# Patient Record
Sex: FEMALE | Race: WHITE | NOT HISPANIC OR LATINO | Employment: OTHER | URBAN - METROPOLITAN AREA
[De-identification: names, ages, dates, MRNs, and addresses within clinical notes are randomized per-mention and may not be internally consistent; named-entity substitution may affect disease eponyms.]

---

## 2017-02-09 ENCOUNTER — ALLSCRIPTS OFFICE VISIT (OUTPATIENT)
Dept: OTHER | Facility: OTHER | Age: 62
End: 2017-02-09

## 2017-02-09 DIAGNOSIS — E78.5 HYPERLIPIDEMIA: ICD-10-CM

## 2017-02-09 DIAGNOSIS — R94.6 ABNORMAL RESULTS OF THYROID FUNCTION STUDIES: ICD-10-CM

## 2017-08-28 ENCOUNTER — ALLSCRIPTS OFFICE VISIT (OUTPATIENT)
Dept: OTHER | Facility: OTHER | Age: 62
End: 2017-08-28

## 2017-08-28 DIAGNOSIS — Z11.59 ENCOUNTER FOR SCREENING FOR OTHER VIRAL DISEASES: ICD-10-CM

## 2017-08-28 DIAGNOSIS — M54.41 LOW BACK PAIN WITH RIGHT-SIDED SCIATICA: ICD-10-CM

## 2017-08-28 DIAGNOSIS — F11.90 UNCOMPLICATED OPIOID USE: ICD-10-CM

## 2017-08-28 DIAGNOSIS — Z12.31 ENCOUNTER FOR SCREENING MAMMOGRAM FOR MALIGNANT NEOPLASM OF BREAST: ICD-10-CM

## 2017-08-28 DIAGNOSIS — E78.2 MIXED HYPERLIPIDEMIA: ICD-10-CM

## 2017-08-28 DIAGNOSIS — Z78.0 ASYMPTOMATIC MENOPAUSAL STATE: ICD-10-CM

## 2017-08-28 DIAGNOSIS — M54.42 LOW BACK PAIN WITH LEFT-SIDED SCIATICA: ICD-10-CM

## 2018-01-10 NOTE — RESULT NOTES
Verified Results  (1) CBC/PLT/DIFF [id number] 09:20AM Mili Tristan     Test Name Result Flag Reference   WBC COUNT 5 63 Thousand/uL  4 31-10 16   RBC COUNT 3 98 Million/uL  3 81-5 12   HEMOGLOBIN 12 6 g/dL  11 5-15 4   HEMATOCRIT 37 9 %  34 8-46  1   MCV 95 fL  82-98   MCH 31 7 pg  26 8-34 3   MCHC 33 2 g/dL  31 4-37 4   RDW 12 6 %  11 6-15 1   MPV 10 7 fL  8 9-12 7   PLATELET COUNT 957 Thousands/uL  149-390   NEUTROPHILS RELATIVE PERCENT 68 %  43-75   LYMPHOCYTES RELATIVE PERCENT 19 %  14-44   MONOCYTES RELATIVE PERCENT 8 %  4-12   EOSINOPHILS RELATIVE PERCENT 4 %  0-6   BASOPHILS RELATIVE PERCENT 1 %  0-1   NEUTROPHILS ABSOLUTE COUNT 3 87 Thousands/?L  1 85-7 62   LYMPHOCYTES ABSOLUTE COUNT 1 05 Thousands/?L  0 60-4 47   MONOCYTES ABSOLUTE COUNT 0 46 Thousand/?L  0 17-1 22   EOSINOPHILS ABSOLUTE COUNT 0 21 Thousand/?L  0 00-0 61   BASOPHILS ABSOLUTE COUNT 0 04 Thousands/?L  0 00-0 10     (1) COMPREHENSIVE METABOLIC PANEL [id number] 97:29BX Mili Tristan     Test Name Result Flag Reference   GLUCOSE,RANDM 80 mg/dL     If the patient is fasting, the ADA then defines impaired fasting glucose as > 100 mg/dL and diabetes as > or equal to 123 mg/dL     SODIUM 141 mmol/L  136-145   POTASSIUM 4 7 mmol/L  3 5-5 3   CHLORIDE 104 mmol/L  100-108   CARBON DIOXIDE 29 mmol/L  21-32   ANION GAP (CALC) 8 mmol/L  4-13   BLOOD UREA NITROGEN 14 mg/dL  5-25   CREATININE 0 60 mg/dL  0 60-1 30   Standardized to IDMS reference method   CALCIUM 9 2 mg/dL  8 3-10 1   BILI, TOTAL 0 40 mg/dL  0 20-1 00   ALK PHOSPHATAS 61 U/L     ALT (SGPT) 28 U/L  12-78   AST(SGOT) 17 U/L  5-45   ALBUMIN 4 0 g/dL  3 5-5 0   TOTAL PROTEIN 7 1 g/dL  6 4-8 2   eGFR Non-African American      >60 0 ml/min/1 73sq m   [facility] Disease Education Program recommendations are as follows:  GFR calculation is accurate only with a steady state creatinine  Chronic Kidney disease less than 60 ml/min/1 73 sq  meters  Kidney failure less than 15 ml/min/1 73 sq  meters  (1) LIPID PANEL FASTING W DIRECT LDL REFLEX [id number] 09:20AM Doralee Sitter     Test Name Result Flag Reference   CHOLESTEROL 220 mg/dL H    LDL CHOLESTEROL CALCULATED 140 mg/dL H 0-100   Triglyceride:         Normal              <150 mg/dl       Borderline High    150-199 mg/dl       High               200-499 mg/dl       Very High          >499 mg/dl  Cholesterol:         Desirable        <200 mg/dl      Borderline High  200-239 mg/dl      High             >239 mg/dl  HDL Cholesterol:        High    >59 mg/dL      Low     <41 mg/dL  LDL Cholesterol:        Optimal          <100 mg/dl        Near Optimal     100-129 mg/dl        Above Optimal          Borderline High   130-159 mg/dl          High              160-189 mg/dl          Very High        >189 mg/dl  LDL CALCULATED:    This screening LDL is a calculated result  It does not have the accuracy of the Direct Measured LDL in the monitoring of patients with hyperlipidemia and/or statin therapy  Direct Measure LDL (JPI244) must be ordered separately in these patients  TRIGLYCERIDES 140 mg/dL  <=150   Specimen collection should occur prior to N-Acetylcysteine or Metamizole administration due to the potential for falsely depressed results  HDL,DIRECT 52 mg/dL  40-60   Specimen collection should occur prior to Metamizole administration due to the potential for falsely depressed results       (1) SED RATE [id number] 09:20AM Doralee Sitter     Test Name Result Flag Reference   SED RATE 45 mm/hour H 0-20     (1) TSH [id number] 09:20AM Doralee Sitter     Test Name Result Flag Reference   TSH 5 870 uIU/mL H 0 358-3 740   The recommended reference ranges for TSH during pregnancy are as follows:  First trimester 0 1 to 2 5 uIU/mL  Second trimester  0 2 to 3 0 uIU/mL  Third trimester 0 3 to 3 0 uIU/m     (1) FOLATE [id number] 09:20AM Doralee Sitter     Test Name Result Flag Reference   FOLATE 19 4 ng/mL H 3 1-17 5     (1) HEMOGLOBIN A1C [id number] 09:20AM Jose Beat     Test Name Result Flag Reference   HEMOGLOBIN A1C 5 5 %  4 2-6 3   EST  AVG   GLUCOSE 111 mg/dl       (1) MAGNESIUM [id number] 09:20AM Jose Beat     Test Name Result Flag Reference   MAGNESIUM 1 7 mg/dL  1 6-2 6     (1) VITAMIN B12 [id number] 09:20AM Jose Beat     Test Name Result Flag Reference   VITAMIN B12 416 pg/mL  100-900     (1) HEP C ANTIBODY [id number] 09:20AM Merry Duval Order Number: [id number]   Order Number: [id number]     Test Name Result Flag Reference   HEPATITIS C ANTIBODY Non-reactive  Non-reactive

## 2018-01-12 VITALS
RESPIRATION RATE: 18 BRPM | BODY MASS INDEX: 38.45 KG/M2 | HEIGHT: 63 IN | WEIGHT: 217 LBS | HEART RATE: 80 BPM | SYSTOLIC BLOOD PRESSURE: 128 MMHG | TEMPERATURE: 98.6 F | DIASTOLIC BLOOD PRESSURE: 80 MMHG

## 2018-01-13 VITALS
OXYGEN SATURATION: 92 % | RESPIRATION RATE: 20 BRPM | HEART RATE: 72 BPM | BODY MASS INDEX: 37.43 KG/M2 | WEIGHT: 211.25 LBS | HEIGHT: 63 IN | SYSTOLIC BLOOD PRESSURE: 128 MMHG | DIASTOLIC BLOOD PRESSURE: 82 MMHG | TEMPERATURE: 96.7 F

## 2018-02-15 DIAGNOSIS — M54.5 CHRONIC LOW BACK PAIN, UNSPECIFIED BACK PAIN LATERALITY, WITH SCIATICA PRESENCE UNSPECIFIED: Primary | ICD-10-CM

## 2018-02-15 DIAGNOSIS — G89.29 CHRONIC LOW BACK PAIN, UNSPECIFIED BACK PAIN LATERALITY, WITH SCIATICA PRESENCE UNSPECIFIED: Primary | ICD-10-CM

## 2018-02-15 RX ORDER — TRAMADOL HYDROCHLORIDE 50 MG/1
100 TABLET ORAL EVERY 6 HOURS PRN
Qty: 120 TABLET | Refills: 1 | OUTPATIENT
Start: 2018-02-15

## 2018-02-15 RX ORDER — TRAMADOL HYDROCHLORIDE 50 MG/1
2 TABLET ORAL EVERY 6 HOURS PRN
COMMUNITY
Start: 2016-05-26 | End: 2018-03-29 | Stop reason: SDUPTHER

## 2018-03-21 PROBLEM — E78.2 MIXED HYPERLIPIDEMIA: Status: ACTIVE | Noted: 2017-02-09

## 2018-03-21 PROBLEM — E66.9 OBESITY: Status: ACTIVE | Noted: 2017-08-28

## 2018-03-29 ENCOUNTER — OFFICE VISIT (OUTPATIENT)
Dept: FAMILY MEDICINE CLINIC | Facility: CLINIC | Age: 63
End: 2018-03-29
Payer: MEDICARE

## 2018-03-29 VITALS
HEIGHT: 63 IN | TEMPERATURE: 98.2 F | BODY MASS INDEX: 38.62 KG/M2 | HEART RATE: 68 BPM | WEIGHT: 218 LBS | RESPIRATION RATE: 20 BRPM | SYSTOLIC BLOOD PRESSURE: 128 MMHG | DIASTOLIC BLOOD PRESSURE: 78 MMHG

## 2018-03-29 DIAGNOSIS — M54.41 CHRONIC BILATERAL LOW BACK PAIN WITH BILATERAL SCIATICA: ICD-10-CM

## 2018-03-29 DIAGNOSIS — M54.42 CHRONIC BILATERAL LOW BACK PAIN WITH BILATERAL SCIATICA: ICD-10-CM

## 2018-03-29 DIAGNOSIS — Z12.39 SCREENING FOR BREAST CANCER: ICD-10-CM

## 2018-03-29 DIAGNOSIS — G89.29 CHRONIC BILATERAL LOW BACK PAIN WITH BILATERAL SCIATICA: ICD-10-CM

## 2018-03-29 DIAGNOSIS — G89.29 OTHER CHRONIC PAIN: Primary | ICD-10-CM

## 2018-03-29 DIAGNOSIS — F11.90 CHRONIC NARCOTIC USE: ICD-10-CM

## 2018-03-29 DIAGNOSIS — E78.2 MIXED HYPERLIPIDEMIA: ICD-10-CM

## 2018-03-29 DIAGNOSIS — Z13.820 SCREENING FOR OSTEOPOROSIS: ICD-10-CM

## 2018-03-29 DIAGNOSIS — E66.09 CLASS 2 OBESITY DUE TO EXCESS CALORIES WITHOUT SERIOUS COMORBIDITY WITH BODY MASS INDEX (BMI) OF 38.0 TO 38.9 IN ADULT: ICD-10-CM

## 2018-03-29 DIAGNOSIS — Z11.59 NEED FOR HEPATITIS C SCREENING TEST: ICD-10-CM

## 2018-03-29 PROCEDURE — 99214 OFFICE O/P EST MOD 30 MIN: CPT | Performed by: INTERNAL MEDICINE

## 2018-03-29 RX ORDER — TRAMADOL HYDROCHLORIDE 50 MG/1
50 TABLET ORAL EVERY 6 HOURS PRN
Qty: 120 TABLET | Refills: 0 | Status: SHIPPED | OUTPATIENT
Start: 2018-03-29 | End: 2018-05-02 | Stop reason: SDUPTHER

## 2018-03-29 RX ORDER — MULTIVITAMIN
1 TABLET ORAL DAILY
COMMUNITY
End: 2019-08-02 | Stop reason: ALTCHOICE

## 2018-03-29 NOTE — PATIENT INSTRUCTIONS

## 2018-03-29 NOTE — PROGRESS NOTES
Assessment/Plan:    No problem-specific Assessment & Plan notes found for this encounter  Diagnoses and all orders for this visit:    Other chronic pain  -     CBC and differential; Future  -     traMADol (ULTRAM) 50 mg tablet; Take 1 tablet (50 mg total) by mouth every 6 (six) hours as needed for moderate pain    Mixed hyperlipidemia  -     Comprehensive metabolic panel; Future  -     Lipid Panel with Direct LDL reflex; Future  -     TSH, 3rd generation; Future    Chronic bilateral low back pain with bilateral sciatica    Chronic narcotic use  -     Toxicology screen, urine  -     TRAMADOL, URINE    Class 2 obesity due to excess calories without serious comorbidity with body mass index (BMI) of 38 0 to 38 9 in adult    Screening for breast cancer  -     Mammo screening bilateral w 3d & cad; Future    Screening for osteoporosis  -     Vitamin D 1,25 dihydroxy; Future  -     DXA bone density spine hip and pelvis; Future    Need for hepatitis C screening test  -     Hepatitis C antibody    Other orders  -     Multiple Vitamin (MULTIVITAMIN) tablet; Take 1 tablet by mouth daily      A/P: Doing well and will check labs including a UDT  Order mammo and dexa due in July  Continue current treatment pending the labs  Encouraged to loose wt  RTC six months for routine  Subjective:      Patient ID: Elizabeth Parkinson is a 57 y o  female  WF RTC for f/u hyperlipidemia, chronic LBP, etc  Doing ok and no new issues  Remains as active as her LBP will permit  No falls reported  Due for labs, mammo, and dexa  Colon cancer is up to date  Did not get the flu vaccine this year  The following portions of the patient's history were reviewed and updated as appropriate:   She  has a past medical history of Chronic pain; Obesity; and Uterine cancer ([facility] [address] )    She   Patient Active Problem List    Diagnosis Date Noted    Chronic narcotic use 03/29/2018    Obesity 08/28/2017    Mixed hyperlipidemia 02/09/2017    Chronic lumbar radiculopathy 05/26/2016    Degeneration of intervertebral disc of lumbosacral region 05/26/2016    Chronic bilateral low back pain with bilateral sciatica 05/19/2016    Malignant neoplasm of uterus ([facility] [address] ) 05/19/2016     She  has a past surgical history that includes Cholecystectomy and Hysterectomy  Her family history includes Ovarian cancer in her mother; Pancreatic cancer in her father  She  reports that she has never smoked  She has never used smokeless tobacco  She reports that she drinks alcohol  She reports that she does not use drugs  Current Outpatient Prescriptions   Medication Sig Dispense Refill    Multiple Vitamin (MULTIVITAMIN) tablet Take 1 tablet by mouth daily      traMADol (ULTRAM) 50 mg tablet Take 1 tablet (50 mg total) by mouth every 6 (six) hours as needed for moderate pain 120 tablet 0     No current facility-administered medications for this visit  Current Outpatient Prescriptions on File Prior to Visit   Medication Sig    [DISCONTINUED] traMADol (ULTRAM) 50 mg tablet Take 2 tablets by mouth every 6 (six) hours as needed     No current facility-administered medications on file prior to visit  She has No Known Allergies       Review of Systems   Constitutional: Negative for activity change, chills, diaphoresis, fatigue and fever  Respiratory: Negative for cough, chest tightness, shortness of breath and wheezing  Cardiovascular: Negative for chest pain, palpitations and leg swelling  Gastrointestinal: Negative for abdominal pain, constipation, diarrhea, nausea and vomiting  Endocrine: Negative for cold intolerance and heat intolerance  Genitourinary: Negative for difficulty urinating, dysuria and frequency  Musculoskeletal: Positive for back pain  Negative for arthralgias, gait problem and myalgias  Neurological: Negative for dizziness, seizures, weakness, light-headedness and headaches     Psychiatric/Behavioral: Negative for confusion, dysphoric mood and sleep disturbance  The patient is not nervous/anxious  Objective:      /78 (BP Location: Left arm, Patient Position: Sitting, Cuff Size: Large)   Pulse 68   Temp 98 2 °F (36 8 °C) (Tympanic)   Resp 20   Ht 5' 3" (1 6 m)   Wt 98 9 kg (218 lb)   BMI 38 62 kg/m²          Physical Exam   Constitutional: She is oriented to person, place, and time  She appears well-developed and well-nourished  No distress  HENT:   Head: Normocephalic and atraumatic  Mouth/Throat: Oropharynx is clear and moist    Eyes: Conjunctivae are normal  Pupils are equal, round, and reactive to light  Neck: Neck supple  No JVD present  Cardiovascular: Normal rate, regular rhythm and normal heart sounds  Pulmonary/Chest: Effort normal and breath sounds normal  No respiratory distress  She has no wheezes  Abdominal: Soft  Bowel sounds are normal  She exhibits no distension  There is no tenderness  Musculoskeletal: She exhibits no edema  Neurological: She is alert and oriented to person, place, and time  Psychiatric: She has a normal mood and affect  Her behavior is normal  Judgment and thought content normal    Nursing note and vitals reviewed

## 2018-05-02 DIAGNOSIS — G89.29 OTHER CHRONIC PAIN: ICD-10-CM

## 2018-05-02 RX ORDER — TRAMADOL HYDROCHLORIDE 50 MG/1
50 TABLET ORAL EVERY 6 HOURS PRN
Qty: 120 TABLET | Refills: 0 | Status: SHIPPED | OUTPATIENT
Start: 2018-05-02 | End: 2018-05-23 | Stop reason: SDUPTHER

## 2018-05-13 ENCOUNTER — HOSPITAL ENCOUNTER (EMERGENCY)
Facility: HOSPITAL | Age: 63
Discharge: HOME | End: 2018-05-13
Attending: EMERGENCY MEDICINE
Payer: MEDICARE

## 2018-05-13 VITALS
RESPIRATION RATE: 20 BRPM | SYSTOLIC BLOOD PRESSURE: 140 MMHG | WEIGHT: 215 LBS | DIASTOLIC BLOOD PRESSURE: 60 MMHG | TEMPERATURE: 97.9 F | HEART RATE: 87 BPM | BODY MASS INDEX: 39.56 KG/M2 | HEIGHT: 62 IN | OXYGEN SATURATION: 97 %

## 2018-05-13 DIAGNOSIS — M54.30 SCIATIC PAIN: Primary | ICD-10-CM

## 2018-05-13 DIAGNOSIS — M54.50 LOW BACK PAIN: ICD-10-CM

## 2018-05-13 PROCEDURE — 96372 THER/PROPH/DIAG INJ SC/IM: CPT

## 2018-05-13 PROCEDURE — 99283 EMERGENCY DEPT VISIT LOW MDM: CPT

## 2018-05-13 RX ORDER — PREDNISONE 20 MG/1
40 TABLET ORAL DAILY
Qty: 10 TABLET | Refills: 0 | Status: SHIPPED | OUTPATIENT
Start: 2018-05-13 | End: 2018-05-21 | Stop reason: HOSPADM

## 2018-05-13 RX ORDER — CYCLOBENZAPRINE HCL 10 MG
10 TABLET ORAL 3 TIMES DAILY PRN
Qty: 30 TABLET | Refills: 0 | Status: SHIPPED | OUTPATIENT
Start: 2018-05-13 | End: 2018-05-30 | Stop reason: ALTCHOICE

## 2018-05-13 RX ORDER — PREDNISONE 20 MG/1
60 TABLET ORAL ONCE
Status: COMPLETED | OUTPATIENT
Start: 2018-05-13 | End: 2018-05-13

## 2018-05-13 RX ORDER — OXYCODONE HYDROCHLORIDE AND ACETAMINOPHEN 5; 325 MG/1; MG/1
1 TABLET ORAL EVERY 6 HOURS PRN
Qty: 25 TABLET | Refills: 0 | Status: SHIPPED | OUTPATIENT
Start: 2018-05-13 | End: 2018-05-23

## 2018-05-13 RX ORDER — ONDANSETRON 4 MG/1
4 TABLET, ORALLY DISINTEGRATING ORAL ONCE
Status: COMPLETED | OUTPATIENT
Start: 2018-05-13 | End: 2018-05-13

## 2018-05-13 RX ADMIN — ONDANSETRON 4 MG: 4 TABLET, ORALLY DISINTEGRATING ORAL at 18:29

## 2018-05-13 RX ADMIN — HYDROMORPHONE HYDROCHLORIDE 1 MG: 1 INJECTION, SOLUTION INTRAMUSCULAR; INTRAVENOUS; SUBCUTANEOUS at 18:29

## 2018-05-13 RX ADMIN — PREDNISONE 60 MG: 20 TABLET ORAL at 18:29

## 2018-05-13 NOTE — DISCHARGE INSTRUCTIONS
Heat and rest  Percocet 1 every 6 hours if needed for pain caution narcotic will make a sleepy  Flexeril every 8 hours if needed for muscle spasm caution will also make a sleepy  Prednisone 2 tabs daily for the next 5 days to reduce inflammation  Follow up with doctor for further evaluation possible physical therapy     Acute Low Back Pain   WHAT YOU NEED TO KNOW:   Acute low back pain is sudden discomfort in your lower back area that lasts for up to 6 weeks  The discomfort makes it difficult to tolerate activity  DISCHARGE INSTRUCTIONS:   Return to the emergency department if:   · You have severe pain  · You have sudden stiffness and heaviness on both buttocks down to both legs  · You have numbness or weakness in one leg, or pain in both legs  · You have numbness in your genital area or across your lower back  · You cannot control your urine or bowel movements  Contact your healthcare provider if:   · You have a fever  · You have pain at night or when you rest     · Your pain does not get better with treatment  · You have pain that worsens when you cough or sneeze  · You suddenly feel something pop or snap in your back  · You have questions or concerns about your condition or care  Medicines: The following medicines may be ordered by your healthcare provider:  · Acetaminophen  decreases pain  It is available without a doctor's order  Ask how much to take and how often to take it  Follow directions  Acetaminophen can cause liver damage if not taken correctly  · NSAIDs  help decrease swelling and pain  This medicine is available with or without a doctor's order  NSAIDs can cause stomach bleeding or kidney problems in certain people  If you take blood thinner medicine, always ask your healthcare provider if NSAIDs are safe for you  Always read the medicine label and follow directions  · Prescription pain medicine  may be given   Ask your healthcare provider how to take this medicine safely  · Muscle relaxers  decrease pain by relaxing the muscles in your lower spine  · Take your medicine as directed  Contact your healthcare provider if you think your medicine is not helping or if you have side effects  Tell him of her if you are allergic to any medicine  Keep a list of the medicines, vitamins, and herbs you take  Include the amounts, and when and why you take them  Bring the list or the pill bottles to follow-up visits  Carry your medicine list with you in case of an emergency  Self-care:   · Stay active  as much as you can without causing more pain  Bed rest could make your back pain worse  Start with some light exercises such as walking  Avoid heavy lifting until your pain is gone  Ask for more information about the activities or exercises that are right for you  · Ice  helps decrease swelling, pain, and muscle spams  Put crushed ice in a plastic bag  Cover it with a towel  Place it on your lower back for 20 to 30 minutes every 2 hours  Do this for about 2 to 3 days after your pain starts, or as directed  · Heat  helps decrease pain and muscle spasms  Start to use heat after treatment with ice has stopped  Use a small towel dampened with warm water or a heating pad, or sit in a warm bath  Apply heat on the area for 20 to 30 minutes every 2 hours for as many days as directed  Alternate heat and ice  Prevent acute low back pain:   · Use proper body mechanics  ¨ Bend at the hips and knees when you  objects  Do not bend from the waist  Use your leg muscles as you lift the load  Do not use your back  Keep the object close to your chest as you lift it  Try not to twist or lift anything above your waist     ¨ Change your position often when you stand for long periods of time  Rest one foot on a small box or footrest, and then switch to the other foot often  ¨ Try not to sit for long periods of time   When you do, sit in a straight-backed chair with your feet flat on the floor  Never reach, pull, or push while you are sitting  · Do exercises that strengthen your back muscles  Warm up before you exercise  Ask your healthcare provider the best exercises for you  · Maintain a healthy weight  Ask your healthcare provider how much you should weigh  Ask him to help you create a weight loss plan if you are overweight  Follow up with your healthcare provider as directed:  Return for a follow-up visit if you still have pain after 1 to 3 weeks of treatment  You may need to visit an orthopedist if your back pain lasts more than 12 weeks  Write down your questions so you remember to ask them during your visits  © 2017 [address]  Information is for End User's use only and may not be sold, redistributed or otherwise used for commercial purposes  All illustrations and images included in CareNotes® are the copyrighted property of [employer]  or [employer]  The above information is an  only  It is not intended as medical advice for individual conditions or treatments  Talk to your doctor, nurse or pharmacist before following any medical regimen to see if it is safe and effective for you  Sciatica   WHAT YOU NEED TO KNOW:   Sciatica is a condition that causes pain along your sciatic nerve  The sciatic nerve runs from your spine through both sides of your buttocks  It then runs down the back of your thigh, into your lower leg and foot  Your sciatic nerve may be compressed, inflamed, irritated, or stretched  DISCHARGE INSTRUCTIONS:   Medicines:   · NSAIDs:  These medicines decrease swelling and pain  NSAIDs are available without a doctor's order  Ask your healthcare provider which medicine is right for you  Ask how much to take and when to take it  Take as directed  NSAIDs can cause stomach bleeding or kidney problems if not taken correctly  · Acetaminophen: This medicine decreases pain   Acetaminophen is available without a doctor's order  Ask how much to take and when to take it  Follow directions  Acetaminophen can cause liver damage if not taken correctly  · Muscle relaxers  help decrease pain and muscle spasms  · Take your medicine as directed  Contact your healthcare provider if you think your medicine is not helping or if you have side effects  Tell him of her if you are allergic to any medicine  Keep a list of the medicines, vitamins, and herbs you take  Include the amounts, and when and why you take them  Bring the list or the pill bottles to follow-up visits  Carry your medicine list with you in case of an emergency  Follow up with your healthcare provider as directed:  Write down your questions so you remember to ask them during your visits  Manage your symptoms:   · Activity:  Decrease your activity  Do not lift heavy objects or twist your back for at least 6 weeks  Slowly return to your usual activity  · Ice:  Ice helps decrease swelling and pain  Ice may also help prevent tissue damage  Use an ice pack, or put crushed ice in a plastic bag  Cover it with a towel and place it on your low back or leg for 15 to 20 minutes every hour or as directed  · Heat:  Heat helps decrease pain and muscle spasms  Apply heat on the area for 20 to 30 minutes every 2 hours for as many days as directed  · Physical therapy:  You may need to see physical therapist to teach you exercises to help improve movement and strength, and to decrease pain  An occupational therapist teaches you skills to help with your daily activities  · Use assistive devices if directed: You may need to wear back support, such as a back brace  You may need crutches, a cane, or a walker to decrease stress on your lower back and leg muscles  Ask your healthcare provider for more information about assistive devices and how to use them correctly    Self-care:   · Avoid pressure on your back and legs:  Do not  lift heavy objects, or stand or sit for long periods of time  · Lift objects safely:  Keep your back straight and bend your knees when you  an object  Do not bend or twist your back when you lift  · Maintain a healthy weight:  Ask your healthcare provider how much you should weigh  Ask him to help you create a weight loss plan if you are overweight  · Exercise:  Ask your healthcare provider about the best stretching, warmup, and exercise plan for you  Contact your healthcare provider if:   · You have pain in your lower back at night or when resting  · You have pain in your lower back with numbness below the knee  · You have weakness in one leg only  · You have questions or concerns about your condition or care  Return to the emergency department if:   · You have trouble holding back your urine or bowel movements  · You have weakness in both legs  · You have numbness in your groin or buttocks  © 2017 [address] Information is for End User's use only and may not be sold, redistributed or otherwise used for commercial purposes  All illustrations and images included in CareNotes® are the copyrighted property of [employer]  or [employer]  The above information is an  only  It is not intended as medical advice for individual conditions or treatments  Talk to your doctor, nurse or pharmacist before following any medical regimen to see if it is safe and effective for you

## 2018-05-13 NOTE — ED PROVIDER NOTES
History  Chief Complaint   Patient presents with    Hip Pain     Patient c/o left hip, thigh, and buttocks pain that started today  HPI patient is a 38-year-old female, history of back pain in the past, patient reports using tramadol in the past   She reports today she had increasing pain in her left low back, somewhat suddenly more severe worse with bending and with moving  Patient reports she twisted and felt the sudden sharp pain in her left buttocks which now radiates down her left leg  She denies any numbness or weakness  Patient reports it is very difficult to get comfortable due to the pain  She describes it as a stabbing pain in her buttocks with radiation down the posterior part of her left leg  She denies any urinary symptoms  She denies any incontinence  She denies any focal weakness  She reports she can ambulate but it is painful  She reports the leg is better somewhat bent but when she tries to extend her knee she has severe pain in her low back  Patient reports she has had sciatica before and this is similar  Past medical history of obesity, back pain, uterine cancer  Family history noncontributory  Social history, nonsmoker no history of drug abuse  Prior to Admission Medications   Prescriptions Last Dose Informant Patient Reported? Taking? Multiple Vitamin (MULTIVITAMIN) tablet   Yes No   Sig: Take 1 tablet by mouth daily   traMADol (ULTRAM) 50 mg tablet   No No   Sig: Take 1 tablet (50 mg total) by mouth every 6 (six) hours as needed for moderate pain      Facility-Administered Medications: None       Past Medical History:   Diagnosis Date    Chronic pain     Obesity     Uterine cancer (HCC)        Past Surgical History:   Procedure Laterality Date    CHOLECYSTECTOMY      HYSTERECTOMY      Total       Family History   Problem Relation Age of Onset    Ovarian cancer Mother     Pancreatic cancer Father      I have reviewed and agree with the history as documented      Social History   Substance Use Topics    Smoking status: Never Smoker    Smokeless tobacco: Never Used    Alcohol use Yes      Comment: social         Review of Systems   Constitutional: Negative for diaphoresis, fatigue and fever  HENT: Negative for congestion, ear pain, nosebleeds and sore throat  Eyes: Negative for photophobia, pain, discharge and visual disturbance  Respiratory: Negative for cough, choking, chest tightness, shortness of breath and wheezing  Cardiovascular: Negative for chest pain and palpitations  Gastrointestinal: Negative for abdominal distention, abdominal pain, diarrhea and vomiting  Genitourinary: Negative for dysuria, flank pain and frequency  Musculoskeletal: Positive for back pain  Negative for gait problem and joint swelling  Skin: Negative for color change and rash  Neurological: Negative for dizziness, syncope and headaches  Psychiatric/Behavioral: Negative for behavioral problems and confusion  The patient is not nervous/anxious  All other systems reviewed and are negative  Physical Exam  ED Triage Vitals   Temperature Pulse Respirations Blood Pressure SpO2   05/13/18 1801 05/13/18 1801 05/13/18 1801 05/13/18 1802 05/13/18 1801   97 9 °F (36 6 °C) 87 20 140/60 97 %      Temp Source Heart Rate Source Patient Position - Orthostatic VS BP Location FiO2 (%)   05/13/18 1801 05/13/18 1801 05/13/18 1801 05/13/18 1801 --   Oral Monitor Lying Left arm       Pain Score       --                  Orthostatic Vital Signs  Vitals:    05/13/18 1801 05/13/18 1802   BP:  140/60   Pulse: 87    Patient Position - Orthostatic VS: Lying Sitting       Physical Exam   Constitutional: She is oriented to person, place, and time  She appears well-developed and well-nourished  HENT:   Head: Normocephalic     Right Ear: External ear normal    Left Ear: External ear normal    Nose: Nose normal    Mouth/Throat: Oropharynx is clear and moist    Eyes: EOM and lids are normal  Pupils are equal, round, and reactive to light  Neck: Normal range of motion  Neck supple  Pulmonary/Chest: Effort normal  No respiratory distress  Musculoskeletal: She exhibits no deformity  Decreased range of motion secondary to pain, there is left-sided low back tenderness in the left buttocks and radiate down the left leg, severe pain with left-sided straight leg raise, severe pain with extension of the left knee  Distal neurovascular tendon intact, good pulses, normal sensation and capillary refill, no incontinence  Neurological: She is alert and oriented to person, place, and time  Skin: Skin is warm and dry  Psychiatric: She has a normal mood and affect  Nursing note and vitals reviewed  ED Medications  Medications   HYDROmorphone (DILAUDID) injection 1 mg (1 mg Intramuscular Given 5/13/18 1829)   ondansetron (ZOFRAN-ODT) dispersible tablet 4 mg (4 mg Oral Given 5/13/18 1829)   predniSONE tablet 60 mg (60 mg Oral Given 5/13/18 1829)       Diagnostic Studies  Results Reviewed     None                 No orders to display              Procedures  Procedures       Phone Contacts  ED Phone Contact    ED Course                               MDM medical decision making 38-year-old female with acute low back pain radiating down her left leg consistent with sciatica, discussed treatment with prednisone she agrees, discussed analgesics, discussed anti-inflammatories and muscle relaxers, discussed follow-up, discussed indications to return    CritCare Time    Disposition  Final diagnoses:   Sciatic pain - Left leg   Low back pain     Time reflects when diagnosis was documented in both MDM as applicable and the Disposition within this note     Time User Action Codes Description Comment    5/13/2018  6:20 PM Narvis Schaumann Add [M54 30] Sciatic pain     5/13/2018  6:20 PM Amanda Grewal [M54 30] Sciatic pain Left leg    5/13/2018  6:20 PM Narvis Schaumann Add [M54 5] Low back pain       ED Disposition     ED Disposition Condition Comment    Discharge  Wally Nunez discharge to home/self care  Condition at discharge: Good        Follow-up Information     Follow up With Specialties Details Why Contact Info    Cesar Salinas DO Internal Medicine   [address]  [phone]          Discharge Medication List as of 5/13/2018  6:22 PM      START taking these medications    Details   cyclobenzaprine (FLEXERIL) 10 mg tablet Take 1 tablet (10 mg total) by mouth 3 (three) times a day as needed for muscle spasms for up to 30 doses, Starting Sun 5/13/2018, Print      oxyCODONE-acetaminophen (PERCOCET) 5-325 mg per tablet Take 1 tablet by mouth every 6 (six) hours as needed for severe pain for up to 25 doses Max Daily Amount: 4 tablets, Starting Sun 5/13/2018, Print      predniSONE 20 mg tablet Take 2 tablets (40 mg total) by mouth daily for 5 days, Starting Sun 5/13/2018, Until Fri 5/18/2018, Print         CONTINUE these medications which have NOT CHANGED    Details   Multiple Vitamin (MULTIVITAMIN) tablet Take 1 tablet by mouth daily, Historical Med      traMADol (ULTRAM) 50 mg tablet Take 1 tablet (50 mg total) by mouth every 6 (six) hours as needed for moderate pain, Starting Wed 5/2/2018, Normal           No discharge procedures on file      ED Provider  Electronically Signed by           Dorothea Wayne MD  05/13/18 2003

## 2018-05-16 ENCOUNTER — OFFICE VISIT (OUTPATIENT)
Dept: FAMILY MEDICINE CLINIC | Facility: CLINIC | Age: 63
End: 2018-05-16
Payer: MEDICARE

## 2018-05-16 VITALS
WEIGHT: 219 LBS | DIASTOLIC BLOOD PRESSURE: 100 MMHG | HEIGHT: 62 IN | TEMPERATURE: 98 F | HEART RATE: 88 BPM | BODY MASS INDEX: 40.3 KG/M2 | RESPIRATION RATE: 24 BRPM | SYSTOLIC BLOOD PRESSURE: 150 MMHG

## 2018-05-16 DIAGNOSIS — M54.17 RADICULAR PAIN OF LUMBOSACRAL REGION: Primary | ICD-10-CM

## 2018-05-16 DIAGNOSIS — R33.9 URINARY RETENTION: ICD-10-CM

## 2018-05-16 DIAGNOSIS — M51.37 DEGENERATION OF INTERVERTEBRAL DISC OF LUMBOSACRAL REGION: ICD-10-CM

## 2018-05-16 DIAGNOSIS — F41.9 ANXIETY: ICD-10-CM

## 2018-05-16 LAB
ALBUMIN SERPL BCP-MCNC: 4.5 G/DL (ref 3.5–5.7)
ALP SERPL-CCNC: 45 IU/L (ref 55–165)
ALT SERPL W P-5'-P-CCNC: 22 IU/L (ref 10–30)
ANION GAP SERPL CALCULATED.3IONS-SCNC: 13.3 MM/L
AST SERPL W P-5'-P-CCNC: 25 U/L (ref 7–26)
BASOPHILS # BLD AUTO: 0 X3/UL (ref 0–0.3)
BASOPHILS # BLD AUTO: 0.2 % (ref 0–2)
BILIRUB SERPL-MCNC: 0.4 MG/DL (ref 0.3–1)
BUN SERPL-MCNC: 23 MG/DL (ref 7–25)
CALCIUM SERPL-MCNC: 9.8 MG/DL (ref 8.6–10.5)
CHLORIDE SERPL-SCNC: 98 MM/L (ref 98–107)
CO2 SERPL-SCNC: 29 MM/L (ref 21–31)
CREAT SERPL-MCNC: 0.88 MG/DL (ref 0.6–1.2)
DEPRECATED RDW RBC AUTO: 12.5 % (ref 11.5–14.5)
EGFR (HISTORICAL): > 60 GFR
EGFR AFRICAN AMERICAN (HISTORICAL): > 60 GFR
EOSINOPHIL # BLD AUTO: 0 X3/UL (ref 0–0.5)
EOSINOPHIL NFR BLD AUTO: 0.2 % (ref 0–5)
GLUCOSE (HISTORICAL): 116 MG/DL (ref 65–99)
HCT VFR BLD AUTO: 39.1 % (ref 37–47)
HGB BLD-MCNC: 13.1 G/DL (ref 12–16)
LYMPHOCYTES # BLD AUTO: 0.8 X3/UL (ref 1.2–4.2)
LYMPHOCYTES NFR BLD AUTO: 5 % (ref 20.5–51.1)
LYMPHOCYTES NFR BLD AUTO: 8.5 % (ref 20.5–51.1)
MCH RBC QN AUTO: 31.5 PG (ref 26–34)
MCHC RBC AUTO-ENTMCNC: 33.5 G/DL (ref 31–36)
MCV RBC AUTO: 93.8 FL (ref 81–99)
MONOCYTES # BLD AUTO: 0.2 X3/UL (ref 0–1)
MONOCYTES (HISTORICAL): 4 % (ref 1.7–12)
MONOCYTES NFR BLD AUTO: 2.7 % (ref 1.7–12)
NEUTROPHILS # BLD AUTO: 8 X3/UL (ref 1.4–6.5)
NEUTROPHILS ABS COUNT (HISTORICAL): 91 % (ref 42.2–75.2)
NEUTS SEG NFR BLD AUTO: 88.4 % (ref 42.2–75.2)
OSMOLALITY, SERUM (HISTORICAL): 277 MOSM (ref 262–291)
PLATELET # BLD AUTO: 258 X3/UL (ref 130–400)
PLATELET ESTIMATE (HISTORICAL): NORMAL
PMV BLD AUTO: 8 FL (ref 8.6–11.7)
POTASSIUM SERPL-SCNC: 4.3 MM/L (ref 3.5–5.5)
RBC # BLD AUTO: 4.16 X6/UL (ref 3.9–5.2)
RBC MORPHOLOGY (HISTORICAL): NORMAL
SODIUM SERPL-SCNC: 136 MM/L (ref 134–143)
TOTAL PROTEIN (HISTORICAL): 7.7 G/DL (ref 6.4–8.9)
WBC # BLD AUTO: 9 X3/UL (ref 4.8–10.8)

## 2018-05-16 PROCEDURE — 99214 OFFICE O/P EST MOD 30 MIN: CPT | Performed by: NURSE PRACTITIONER

## 2018-05-16 PROCEDURE — 96372 THER/PROPH/DIAG INJ SC/IM: CPT

## 2018-05-16 RX ORDER — KETOROLAC TROMETHAMINE 30 MG/ML
60 INJECTION, SOLUTION INTRAMUSCULAR; INTRAVENOUS ONCE
Status: DISCONTINUED | OUTPATIENT
Start: 2018-05-16 | End: 2018-05-16 | Stop reason: HOSPADM

## 2018-05-16 RX ORDER — DIAZEPAM 10 MG/1
10 TABLET ORAL EVERY 8 HOURS PRN
Qty: 30 TABLET | Refills: 0 | Status: SHIPPED | OUTPATIENT
Start: 2018-05-16 | End: 2018-06-11 | Stop reason: SDUPTHER

## 2018-05-16 RX ADMIN — KETOROLAC TROMETHAMINE 60 MG: 30 INJECTION, SOLUTION INTRAMUSCULAR; INTRAVENOUS at 13:59

## 2018-05-16 NOTE — PROGRESS NOTES
Assessment/Plan:    No problem-specific Assessment & Plan notes found for this encounter  Diagnoses and all orders for this visit:    Radicular pain of lumbosacral region  Comments:  STAT MRI lumbosacral area ordered  Toradol admin Valium ordered  labwork ordered  Orders:  -     MRI lumbar spine w wo contrast; Future  -     ketorolac (TORADOL) 60 mg/2 mL IM injection 60 mg; Inject 2 mL (60 mg total) into the shoulder, thigh, or buttocks once   -     MRI lumbar spine w wo contrast; Future  -     CBC and differential; Future  -     Comprehensive metabolic panel; Future    Degeneration of intervertebral disc of lumbosacral region  Comments:  STAT MRI lumbosacral area ordered  Orders:  -     MRI lumbar spine w wo contrast; Future  -     CBC and differential; Future  -     Comprehensive metabolic panel; Future    Urinary retention  Comments:  STAT MRI lumbosacral area ordered  Orders:  -     MRI lumbar spine w wo contrast; Future  -     MRI lumbar spine w wo contrast; Future  -     CBC and differential; Future  -     Comprehensive metabolic panel; Future    Anxiety  Comments:  Rx for Valium provided   Orders:  -     diazepam (VALIUM) 10 mg tablet; Take 1 tablet (10 mg total) by mouth every 8 (eight) hours as needed for anxiety, sedation or muscle spasms          Subjective:      Patient ID: Fito Cabrera is a 70 y o  female here to discuss ongoing back pain despite evaluation in [facility]  Pt was given dilaudid in the ED and prescribed Prednisone, Percocet, has been using Tramadol with minimal relief pt states pain is 10/10 and pt is unable to sit or walk without assistance  Pt c/o pain lower back area radiating down front of left leg ( new onset) pt also reports urinary retention today has been experiencing constipation  HPI    The following portions of the patient's history were reviewed and updated as appropriate:   She  has a past medical history of Chronic pain; Obesity; and Uterine cancer ([address] )    She Patient Active Problem List    Diagnosis Date Noted    Chronic narcotic use 03/29/2018    Obesity 08/28/2017    Mixed hyperlipidemia 02/09/2017    Chronic lumbar radiculopathy 05/26/2016    Degeneration of intervertebral disc of lumbosacral region 05/26/2016    Chronic bilateral low back pain with bilateral sciatica 05/19/2016    Malignant neoplasm of uterus ([address] ) 05/19/2016     She  has a past surgical history that includes Cholecystectomy and Hysterectomy  Her family history includes Ovarian cancer in her mother; Pancreatic cancer in her father  She  reports that she has never smoked  She has never used smokeless tobacco  She reports that she drinks alcohol  She reports that she does not use drugs  Current Outpatient Prescriptions   Medication Sig Dispense Refill    cyclobenzaprine (FLEXERIL) 10 mg tablet Take 1 tablet (10 mg total) by mouth 3 (three) times a day as needed for muscle spasms for up to 30 doses 30 tablet 0    Multiple Vitamin (MULTIVITAMIN) tablet Take 1 tablet by mouth daily      oxyCODONE-acetaminophen (PERCOCET) 5-325 mg per tablet Take 1 tablet by mouth every 6 (six) hours as needed for severe pain for up to 25 doses Max Daily Amount: 4 tablets 25 tablet 0    predniSONE 20 mg tablet Take 2 tablets (40 mg total) by mouth daily for 5 days 10 tablet 0    traMADol (ULTRAM) 50 mg tablet Take 1 tablet (50 mg total) by mouth every 6 (six) hours as needed for moderate pain 120 tablet 0    diazepam (VALIUM) 10 mg tablet Take 1 tablet (10 mg total) by mouth every 8 (eight) hours as needed for anxiety, sedation or muscle spasms 30 tablet 0     No current facility-administered medications for this visit        Current Outpatient Prescriptions on File Prior to Visit   Medication Sig    cyclobenzaprine (FLEXERIL) 10 mg tablet Take 1 tablet (10 mg total) by mouth 3 (three) times a day as needed for muscle spasms for up to 30 doses    Multiple Vitamin (MULTIVITAMIN) tablet Take 1 tablet by mouth daily    oxyCODONE-acetaminophen (PERCOCET) 5-325 mg per tablet Take 1 tablet by mouth every 6 (six) hours as needed for severe pain for up to 25 doses Max Daily Amount: 4 tablets    predniSONE 20 mg tablet Take 2 tablets (40 mg total) by mouth daily for 5 days    traMADol (ULTRAM) 50 mg tablet Take 1 tablet (50 mg total) by mouth every 6 (six) hours as needed for moderate pain     No current facility-administered medications on file prior to visit  She has No Known Allergies       Review of Systems   Constitutional: Negative for fatigue  HENT: Negative  Negative for congestion, postnasal drip, rhinorrhea, sinus pain, sore throat and trouble swallowing  Eyes: Negative  Negative for pain and visual disturbance  Respiratory: Negative  Negative for cough, shortness of breath and wheezing  Cardiovascular: Negative  Negative for chest pain and palpitations  Gastrointestinal: Negative  Negative for constipation, diarrhea, nausea and vomiting  Endocrine: Negative  Negative for polydipsia, polyphagia and polyuria  Genitourinary: Positive for decreased urine volume  Negative for difficulty urinating, flank pain, frequency and pelvic pain  Pt reports inability to feel the need to urinate however she did urinate a large amount of urine forcibly but could not feel it   Musculoskeletal: Positive for back pain and gait problem  Negative for arthralgias, joint swelling and myalgias  Pt is c/o extreme lower back pain during evaluation   Skin: Negative  Negative for color change and rash  Allergic/Immunologic: Negative  Neurological: Positive for weakness and numbness  Negative for dizziness, syncope, light-headedness and headaches  Left leg   Hematological: Negative  Negative for adenopathy  Does not bruise/bleed easily  Psychiatric/Behavioral: Negative for behavioral problems and sleep disturbance  The patient is nervous/anxious            Objective:      /100 (BP Location: Left arm, Patient Position: Sitting, Cuff Size: Large)   Pulse 88   Temp 98 °F (36 7 °C) (Tympanic)   Resp (!) 24   Ht 5' 2" (1 575 m)   Wt 99 3 kg (219 lb)   BMI 40 06 kg/m²          Physical Exam   Constitutional: She is oriented to person, place, and time  She appears well-developed and well-nourished  She has a sickly appearance  Restless with pain in lower back   HENT:   Head: Normocephalic  Eyes: Pupils are equal, round, and reactive to light  Neck: Normal range of motion  Cardiovascular: Normal rate and regular rhythm  Pulmonary/Chest: Effort normal and breath sounds normal    Abdominal: Soft  Bowel sounds are normal    Musculoskeletal:        Lumbar back: She exhibits decreased range of motion, pain and spasm  Back:    Pt c/o bilateral lower back pain  Positive straight leg test   Neurological: She is alert and oriented to person, place, and time  A sensory deficit is present  She exhibits abnormal muscle tone  Positive straight leg test   Skin: Skin is warm and dry  Psychiatric: Her behavior is normal  Judgment and thought content normal  Her mood appears anxious  Nursing note and vitals reviewed

## 2018-05-16 NOTE — PATIENT INSTRUCTIONS
Lower Back Exercises   AMBULATORY CARE:   Lower back exercises  help heal and strengthen your back muscles to prevent another injury  Ask your healthcare provider if you need to see a physical therapist for more advanced exercises  Seek care immediately if:   · You have severe pain that prevents you from moving  Contact your healthcare provider if:   · Your pain becomes worse  · You have new pain  · You have questions or concerns about your condition or care  Do lower back exercises safely:   · Do the exercises on a mat or firm surface  (not on a bed) to support your spine and prevent low back pain  · Move slowly and smoothly  Avoid fast or jerky motions  · Breathe normally  Do not hold your breath  · Stop if you feel pain  It is normal to feel some discomfort at first  Regular exercise will help decrease your discomfort over time  Lower back exercises: Your healthcare provider may recommend that you do back exercises 10 to 30 minutes each day  He may also recommend that you do exercises 1 to 3 times each day  Ask your healthcare provider which exercises are best for you and how often to do them  · Ankle pumps:  Lie on your back  Move your foot up (with your toes pointing toward your head)  Then, move your foot down (with your toes pointing away from you)  Repeat this exercise 10 times on each side  · Heel slides:  Lie on your back  Slowly bend one leg and then straighten it  Next, bend the other leg and then straighten it  Repeat 10 times on each side  · Pelvic tilt:  Lie on your back with your knees bent and feet flat on the floor  Place your arms in a relaxed position beside your body  Tighten the muscles of your abdomen and flatten your back against the floor  Hold for 5 seconds  Repeat 5 times  · Back stretch:  Lie on your back with your hands behind your head  Bend your knees and turn the lower half of your body to one side   Hold this position for 10 seconds  Repeat 3 times on each side  · Straight leg raises:  Lie on your back with one leg straight  Bend the other knee  Tighten your abdomen and then slowly lift the straight leg up about 6 to 12 inches off the floor  Hold for 1 to 5 seconds  Lower your leg slowly  Repeat 10 times on each leg  · Knee-to-chest:  Lie on your back with your knees bent and feet flat on the floor  Pull one of your knees toward your chest and hold it there for 5 seconds  Return your leg to the starting position  Lift the other knee toward your chest and hold for 5 seconds  Do this 5 times on each side  · Cat and camel:  Place your hands and knees on the floor  Arch your back upward toward the ceiling and lower your head  Round out your spine as much as you can  Hold for 5 seconds  Lift your head upward and push your chest downward toward the floor  Hold for 5 seconds  Do 3 sets or as directed  · Wall squats:  Stand with your back against a wall  Tighten the muscles of your abdomen  Slowly lower your body until your knees are bent at a 45 degree angle  Hold this position for 5 seconds  Slowly move back up to a standing position  Repeat 10 times  · Curl up:  Lie on your back with your knees bent and feet flat on the floor  Place your hands, palms down, underneath the curve in your lower back  Next, with your elbows on the floor, lift your shoulders and chest 2 to 3 inches  Keep your head in line with your shoulders  Hold this position for 5 seconds  When you can do this exercise without pain for 10 to 15 seconds, you may add a rotation  While your shoulders and chest are lifted off the ground, turn slightly to the left and hold  Repeat on the other side  · Bird dog:  Place your hands and knees on the floor  Keep your wrists directly below your shoulders and your knees directly below your hips  Pull your belly button in toward your spine  Do not flatten or arch your back   Tighten your abdominal muscles  Raise one arm straight out so that it is aligned with your head  Next, raise the leg opposite your arm  Hold this position for 15 seconds  Lower your arm and leg slowly and change sides  Do 5 sets  © 2017 [address] [employer] Information is for End User's use only and may not be sold, redistributed or otherwise used for commercial purposes  All illustrations and images included in CareNotes® are the copyrighted property of [employer]  or [employer]  The above information is an  only  It is not intended as medical advice for individual conditions or treatments  Talk to your doctor, nurse or pharmacist before following any medical regimen to see if it is safe and effective for you

## 2018-05-17 ENCOUNTER — HOSPITAL ENCOUNTER (INPATIENT)
Facility: HOSPITAL | Age: 63
LOS: 3 days | Discharge: HOME HEALTH | DRG: 552 | End: 2018-05-21
Attending: EMERGENCY MEDICINE | Admitting: INTERNAL MEDICINE
Payer: MEDICARE

## 2018-05-17 ENCOUNTER — HOSPITAL ENCOUNTER (OUTPATIENT)
Dept: MRI IMAGING | Facility: HOSPITAL | Age: 63
Discharge: HOME | End: 2018-05-17
Payer: MEDICARE

## 2018-05-17 DIAGNOSIS — M51.37 DEGENERATION OF INTERVERTEBRAL DISC OF LUMBOSACRAL REGION: ICD-10-CM

## 2018-05-17 DIAGNOSIS — M48.061 LUMBAR STENOSIS: ICD-10-CM

## 2018-05-17 DIAGNOSIS — M54.17 RADICULAR PAIN OF LUMBOSACRAL REGION: ICD-10-CM

## 2018-05-17 DIAGNOSIS — K59.00 CONSTIPATION: ICD-10-CM

## 2018-05-17 DIAGNOSIS — R90.89 ABNORMAL MRI, SPINAL CORD: Primary | ICD-10-CM

## 2018-05-17 DIAGNOSIS — R52 PAIN: ICD-10-CM

## 2018-05-17 DIAGNOSIS — R33.9 URINARY RETENTION: ICD-10-CM

## 2018-05-17 DIAGNOSIS — M54.16 CHRONIC LUMBAR RADICULOPATHY: ICD-10-CM

## 2018-05-17 PROBLEM — R65.10 SIRS (SYSTEMIC INFLAMMATORY RESPONSE SYNDROME) (HCC): Status: ACTIVE | Noted: 2018-05-17

## 2018-05-17 PROCEDURE — A9585 GADOBUTROL INJECTION: HCPCS | Performed by: RADIOLOGY

## 2018-05-17 PROCEDURE — 99219 PR INITIAL OBSERVATION CARE/DAY 50 MINUTES: CPT | Performed by: INTERNAL MEDICINE

## 2018-05-17 PROCEDURE — 99204 OFFICE O/P NEW MOD 45 MIN: CPT | Performed by: PHYSICIAN ASSISTANT

## 2018-05-17 PROCEDURE — 96374 THER/PROPH/DIAG INJ IV PUSH: CPT

## 2018-05-17 PROCEDURE — 99284 EMERGENCY DEPT VISIT MOD MDM: CPT

## 2018-05-17 PROCEDURE — 72158 MRI LUMBAR SPINE W/O & W/DYE: CPT

## 2018-05-17 RX ORDER — PANTOPRAZOLE SODIUM 40 MG/1
40 TABLET, DELAYED RELEASE ORAL
Status: DISCONTINUED | OUTPATIENT
Start: 2018-05-18 | End: 2018-05-21 | Stop reason: HOSPADM

## 2018-05-17 RX ORDER — METHYLPREDNISOLONE 4 MG/1
8 TABLET ORAL DAILY
Status: DISCONTINUED | OUTPATIENT
Start: 2018-05-22 | End: 2018-05-21 | Stop reason: HOSPADM

## 2018-05-17 RX ORDER — DOCUSATE SODIUM 100 MG/1
100 CAPSULE, LIQUID FILLED ORAL 2 TIMES DAILY
Status: DISCONTINUED | OUTPATIENT
Start: 2018-05-17 | End: 2018-05-21 | Stop reason: HOSPADM

## 2018-05-17 RX ORDER — DIAZEPAM 5 MG/1
5 TABLET ORAL EVERY 8 HOURS PRN
Status: DISCONTINUED | OUTPATIENT
Start: 2018-05-17 | End: 2018-05-21 | Stop reason: HOSPADM

## 2018-05-17 RX ORDER — SODIUM CHLORIDE 9 MG/ML
75 INJECTION, SOLUTION INTRAVENOUS ONCE
Status: COMPLETED | OUTPATIENT
Start: 2018-05-17 | End: 2018-05-18

## 2018-05-17 RX ORDER — METHYLPREDNISOLONE 4 MG/1
12 TABLET ORAL DAILY
Status: COMPLETED | OUTPATIENT
Start: 2018-05-21 | End: 2018-05-21

## 2018-05-17 RX ORDER — OXYCODONE HYDROCHLORIDE AND ACETAMINOPHEN 5; 325 MG/1; MG/1
1 TABLET ORAL EVERY 6 HOURS PRN
Status: CANCELLED | OUTPATIENT
Start: 2018-05-17

## 2018-05-17 RX ORDER — TRAMADOL HYDROCHLORIDE 50 MG/1
50 TABLET ORAL EVERY 6 HOURS PRN
Status: CANCELLED | OUTPATIENT
Start: 2018-05-17

## 2018-05-17 RX ORDER — METHYLPREDNISOLONE 16 MG/1
16 TABLET ORAL DAILY
Status: COMPLETED | OUTPATIENT
Start: 2018-05-20 | End: 2018-05-20

## 2018-05-17 RX ORDER — OXYCODONE HYDROCHLORIDE 5 MG/1
5 TABLET ORAL EVERY 4 HOURS PRN
Status: DISCONTINUED | OUTPATIENT
Start: 2018-05-17 | End: 2018-05-19

## 2018-05-17 RX ORDER — CALCIUM CARBONATE 200(500)MG
1000 TABLET,CHEWABLE ORAL DAILY PRN
Status: DISCONTINUED | OUTPATIENT
Start: 2018-05-17 | End: 2018-05-21 | Stop reason: HOSPADM

## 2018-05-17 RX ORDER — SENNOSIDES 8.6 MG
1 TABLET ORAL DAILY
Status: DISCONTINUED | OUTPATIENT
Start: 2018-05-18 | End: 2018-05-21 | Stop reason: HOSPADM

## 2018-05-17 RX ORDER — KETOROLAC TROMETHAMINE 30 MG/ML
15 INJECTION, SOLUTION INTRAMUSCULAR; INTRAVENOUS ONCE
Status: COMPLETED | OUTPATIENT
Start: 2018-05-17 | End: 2018-05-17

## 2018-05-17 RX ORDER — ONDANSETRON 2 MG/ML
4 INJECTION INTRAMUSCULAR; INTRAVENOUS EVERY 6 HOURS PRN
Status: DISCONTINUED | OUTPATIENT
Start: 2018-05-17 | End: 2018-05-21 | Stop reason: HOSPADM

## 2018-05-17 RX ORDER — ACETAMINOPHEN 325 MG/1
975 TABLET ORAL EVERY 8 HOURS SCHEDULED
Status: DISCONTINUED | OUTPATIENT
Start: 2018-05-17 | End: 2018-05-21 | Stop reason: HOSPADM

## 2018-05-17 RX ORDER — OXYCODONE HYDROCHLORIDE 5 MG/1
2.5 TABLET ORAL EVERY 4 HOURS PRN
Status: DISCONTINUED | OUTPATIENT
Start: 2018-05-17 | End: 2018-05-20

## 2018-05-17 RX ORDER — LIDOCAINE 50 MG/G
1 PATCH TOPICAL DAILY
Status: DISCONTINUED | OUTPATIENT
Start: 2018-05-18 | End: 2018-05-18

## 2018-05-17 RX ORDER — METHYLPREDNISOLONE 4 MG/1
4 TABLET ORAL DAILY
Status: DISCONTINUED | OUTPATIENT
Start: 2018-05-23 | End: 2018-05-21 | Stop reason: HOSPADM

## 2018-05-17 RX ORDER — KETOROLAC TROMETHAMINE 30 MG/ML
15 INJECTION, SOLUTION INTRAMUSCULAR; INTRAVENOUS EVERY 6 HOURS PRN
Status: COMPLETED | OUTPATIENT
Start: 2018-05-17 | End: 2018-05-18

## 2018-05-17 RX ADMIN — OXYCODONE HYDROCHLORIDE 5 MG: 5 TABLET ORAL at 18:10

## 2018-05-17 RX ADMIN — KETOROLAC TROMETHAMINE 15 MG: 30 INJECTION, SOLUTION INTRAMUSCULAR at 19:01

## 2018-05-17 RX ADMIN — DOCUSATE SODIUM 100 MG: 100 CAPSULE, LIQUID FILLED ORAL at 18:12

## 2018-05-17 RX ADMIN — SODIUM CHLORIDE 75 ML/HR: 0.9 INJECTION, SOLUTION INTRAVENOUS at 18:13

## 2018-05-17 RX ADMIN — GADOBUTROL 9 ML: 604.72 INJECTION INTRAVENOUS at 06:45

## 2018-05-17 RX ADMIN — DIAZEPAM 5 MG: 5 TABLET ORAL at 21:26

## 2018-05-17 RX ADMIN — KETOROLAC TROMETHAMINE 15 MG: 30 INJECTION, SOLUTION INTRAMUSCULAR at 14:06

## 2018-05-17 RX ADMIN — ACETAMINOPHEN 975 MG: 325 TABLET, FILM COATED ORAL at 21:25

## 2018-05-17 NOTE — ASSESSMENT & PLAN NOTE
· Hx of lumbar spine stenosis with chronic radiculopathy   · MRI lumbar spine  · Multilevel lumbar degenerative changes  · There is moderate to severe central canal stenosis (AP canal dimension diminished 5 mm) at L3-L4 below which there is redundancy of cauda equina nerve roots  · Significant degenerative changes noted at L4-L5 and L5-S1 with multiple sites of potential impingement upon L5 nerve roots and impingement upon descending left S1 nerve roots L5-S1 lateral recess  · Appreciate neurosurgery input- no emergent surgical intervention  Exam inconsistent with cauda equina syndrome     · Pain control - add oxy 2 5 and 5prn, muscle relaxer, temo tylenol, lidocaine patch, aqua K, medrol dose pack  · Neuro checks, PT/OT

## 2018-05-17 NOTE — ED PROVIDER NOTES
History  Chief Complaint   Patient presents with    Back Pain     Pt c/o lower back pain that radiates into her left buttocks and left leg  Pt states "there is numbness in my abdomen and I don't know I have to urinate and when I stand up I start urinating " Pt had MRI this AM and was told to come to ED after  HPI   patient states that she has chronic back pain for which she has only ever seen her primary care doctor in the past   On Saturday she was lifting a drape that had fallen and trying to put it back in place when she had sudden onset of back pain that radiated down her left leg and into her foot she felt that there was some numbness and tingling in her foot intermittently  On Sunday she was seen in the emergency room and told that this is likely sciatica the patient states that on Monday she started with dribbling of urine and difficulty passing her urine that she would have to push to get her urine to come out  She saw her primary care on Tuesday who ordered her an outpatient MRI  Patient had MRI this morning and was sent to the emergency room because of abnormal reading  Patient states that she does not have weakness and has never had weakness but she has a lot of pain with ambulation  She states that she was also unable to move her bowels but with a laxative was able to have a bowel movement on Wednesday  MDM:  Neurosurgery was called and we discussed discussed with the PA  She states they will be down to evaluate the patient  Will give pain medication patient recently had laboratory work for her MRI and will get pre and postvoid residual in her urine  Prior to Admission Medications   Prescriptions Last Dose Informant Patient Reported? Taking?    Multiple Vitamin (MULTIVITAMIN) tablet   Yes No   Sig: Take 1 tablet by mouth daily   cyclobenzaprine (FLEXERIL) 10 mg tablet   No No   Sig: Take 1 tablet (10 mg total) by mouth 3 (three) times a day as needed for muscle spasms for up to 30 doses   diazepam (VALIUM) 10 mg tablet   No No   Sig: Take 1 tablet (10 mg total) by mouth every 8 (eight) hours as needed for anxiety, sedation or muscle spasms   oxyCODONE-acetaminophen (PERCOCET) 5-325 mg per tablet   No No   Sig: Take 1 tablet by mouth every 6 (six) hours as needed for severe pain for up to 25 doses Max Daily Amount: 4 tablets   predniSONE 20 mg tablet   No No   Sig: Take 2 tablets (40 mg total) by mouth daily for 5 days   traMADol (ULTRAM) 50 mg tablet   No No   Sig: Take 1 tablet (50 mg total) by mouth every 6 (six) hours as needed for moderate pain      Facility-Administered Medications Last Administration Doses Remaining   ketorolac (TORADOL) 60 mg/2 mL IM injection 60 mg 5/16/2018  1:59 PM 0          Past Medical History:   Diagnosis Date    Chronic pain     Obesity     Uterine cancer ([address] )     uterine       Past Surgical History:   Procedure Laterality Date    CHOLECYSTECTOMY      HYSTERECTOMY      Total       Family History   Problem Relation Age of Onset    Ovarian cancer Mother     Pancreatic cancer Father      I have reviewed and agree with the history as documented  Social History   Substance Use Topics    Smoking status: Never Smoker    Smokeless tobacco: Never Used    Alcohol use Yes      Comment: social         Review of Systems   Constitutional: Negative for activity change, fatigue and fever  Respiratory: Negative for chest tightness and shortness of breath  Cardiovascular: Negative for leg swelling  Gastrointestinal: Positive for constipation  Negative for abdominal distention, abdominal pain, blood in stool and vomiting  Patient states there is numbness across her abdomen   Genitourinary: Positive for difficulty urinating  Urinary dribbling since Monday   Musculoskeletal: Positive for back pain  Neurological: Negative for weakness and light-headedness  All other systems reviewed and are negative        Physical Exam  ED Triage Vitals [05/17/18 1113]   Temperature Pulse Respirations Blood Pressure SpO2   98 1 °F (36 7 °C) (!) 116 22 142/65 96 %      Temp Source Heart Rate Source Patient Position - Orthostatic VS BP Location FiO2 (%)   Oral Monitor Sitting Left arm --      Pain Score       Worst Possible Pain           Orthostatic Vital Signs  Vitals:    05/17/18 1113 05/17/18 1345 05/17/18 1549   BP: 142/65 130/62 137/66   Pulse: (!) 116 78 63   Patient Position - Orthostatic VS: Sitting  Lying       Physical Exam   Constitutional: She is oriented to person, place, and time  She appears well-developed and well-nourished  HENT:   Head: Normocephalic and atraumatic  Eyes: EOM are normal  Pupils are equal, round, and reactive to light  Neck: Normal range of motion  Neck supple  Cardiovascular: Normal rate, regular rhythm, normal heart sounds and intact distal pulses  Pulmonary/Chest: Effort normal and breath sounds normal    Abdominal: Soft  Bowel sounds are normal  She exhibits no distension  There is no tenderness  Musculoskeletal: Normal range of motion  She exhibits no edema  Neurological: She is alert and oriented to person, place, and time  No cranial nerve deficit or sensory deficit  DTR 2+ right 1+ on the left  Rectal tone normal sensation intact around  the sacral area   Skin: No erythema  Vitals reviewed  ED Medications  Medications   ketorolac (TORADOL) injection 15 mg (15 mg Intravenous Given 5/17/18 1406)       Diagnostic Studies  Results Reviewed     None                 No orders to display              Procedures  Procedures       Phone Contacts  ED Phone Contact    ED Course   Pt seen and evaluated by neurosurgery   They recommend admission with bed rest and medrol dose pack will call slim         Identification of Seniors at Risk      Most Recent Value   (ISAR) Identification of Seniors at Risk   Before the illness or injury that brought you to the Emergency, did you need someone to help you on a regular basis?  0 Filed at: 05/17/2018 1114   In the last 24 hours, have you needed more help than usual?  1 Filed at: 05/17/2018 1114   Have you been hospitalized for one or more nights during the past 6 months? 0 Filed at: 05/17/2018 1114   In general, do you see well?  0 Filed at: 05/17/2018 1114   In general, do you have serious problems with your memory? 0 Filed at: 05/17/2018 1114   Do you take more than three different medications every day? 1 Filed at: 05/17/2018 1114   ISAR Score  2 Filed at: 05/17/2018 1114                          MDM  CritCare Time    Disposition  Final diagnoses:   Abnormal MRI, spinal cord   Lumbar stenosis     Time reflects when diagnosis was documented in both MDM as applicable and the Disposition within this note     Time User Action Codes Description Comment    5/17/2018  1:01 PM Robin Simons Add [R90 89] Abnormal MRI, spinal cord     5/17/2018  4:47 PM Robin Simons Add [T16 [id number]] Lumbar stenosis       ED Disposition     ED Disposition Condition Comment    Admit  Case was discussed with ELAINE and the patient's admission status was agreed to be Admission Status: observation status to the service of Dr Lisandro Cervantes  Follow-up Information    None       Patient's Medications   Discharge Prescriptions    No medications on file     No discharge procedures on file      ED Provider  Electronically Signed by           Dee Dee Mtz MD  05/17/18 1016
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
## 2018-05-17 NOTE — CONSULTS
Consultation - Neurosurgery   Cayetano Parkinson 70 y o  female MRN: [id number]  Unit/Bed#: 2 214-01 Encounter: [id number]      Inpatient consult to Neurosurgery  Consult performed by: Josefina Bennett ordered by: Heidy Carreon          Assessment/Plan     Assessment:  1  Acute lumbar radiculopathy  2  Lumbar spinal stenosis  3  Lumbar facet hypertrophy/arthopathy  4  Lumbar degenerative disc disease  5  History of uterine cancer  6  Chronic pain syndrome    Plan:  · Exam reveals 5/5 throughout bilateral lower extremities  No sensory level appreciated  Focus of diminished pinprick central right groin  No saddle anesthesias  · Images personally by attending  Final results below  · MRI lumbar spine without contrast: multilevel lumbar degenerative changes with moderate to severe central canal stenosis L3/4  Foraminal stenosis secondary to disc bulge and facet hypertrophy most significant left L4/5 and L5/S1  No cord compression or signal abnormality  · Images reviewed in detail with patient  · Patient's exam and imaging not consistent with cauda equina syndrome  Patient does not require emergent surgical intervention  · Pre void bladder scan 1L, voided 300ml, Post void - 200s, straight cath for only 25ml  Incontinence consistent with stress incontinence as patient describes this prior to Saturday  Increased difficulties related to limitations with ambulation and timing to get to bathroom  · We discussed recommended treatment plan to include bedrest with bathroom privileges along with a multimodal analgesic regimen to include Medrol Dosepak, scheduled Tylenol, scheduled muscle relaxer and p r n  narcotic  · Discuss consideration for epidural steroid injection if no improvement  · Discussed possibility of surgical intervention if patient were to fail conservative management  · She is agreeable to plan of care  · Neurosurgery will follow  Call with questions/concerns       History of Present Illness     HPI: Noel Torres is a 70 y o  female with PMH including uterine cancer s/p VICKEY/chemotherapy and RT [facility] [address] [facility] [address] with complicated postoperative fluid collection/infection who presents with complaint of LLE pain x 5 days  Patient states she was replacing a shade which had fallen when she developed sharp low back pain while standing on a chair  Pain was progressive described as sharp and stabbing with associated radiation down the lateral aspect of her left leg to her ankle  There is associated intermittent left foot numbness and numbness in right low abd/groin  She admits to urinary incontinence but describes h/o stress incontinence x years which is now worse as limited ambulation 2/2 pain  Admits to constipation since resolved with use of miralax  She admits to chronic low back pain which is controlled with Tramadol and Tylenol arthritis  History of sciatic for which she completed 6 month of PT in 2016 with good relief  She has limited flares in her pain with activity adjustments  Given pain, patient presented to [facility]/13/[facility] and diagnosed with sciatic  She was discharged home with Percocet 5/325mg, Flexeril and prednisone  With limited improvement, she was seen by here PCP on Monday who provided an injection of Toradol and a prescription for a lumbar MRI and Valium  Patient completed MRI lumbar spine and was directed to [facility] for evaluation  Review of Systems   Constitutional: Positive for activity change  Negative for fatigue and fever  HENT: Negative for hearing loss, trouble swallowing and voice change  Eyes: Negative for photophobia and visual disturbance  Respiratory: Negative for cough and shortness of breath  Cardiovascular: Negative for chest pain and leg swelling  Gastrointestinal: Negative for abdominal pain, nausea and vomiting  Genitourinary: Negative for decreased urine volume and difficulty urinating          Stress incontinence Musculoskeletal: Positive for back pain (chronic)  Negative for gait problem (relayed to pain) and neck pain  Skin: Negative for pallor, rash and wound  Neurological: Positive for numbness (left foot, right groin)  Negative for dizziness, tremors, seizures, speech difficulty, weakness, light-headedness and headaches  Psychiatric/Behavioral: Negative for agitation and confusion         Historical Information   Past Medical History:   Diagnosis Date    Chronic pain     Obesity     Uterine cancer ([address] )     uterine     Past Surgical History:   Procedure Laterality Date    CHOLECYSTECTOMY      HYSTERECTOMY      Total     History   Alcohol Use    Yes     Comment: social      History   Drug Use No     History   Smoking Status    Never Smoker   Smokeless Tobacco    Never Used     Family History   Problem Relation Age of Onset    Ovarian cancer Mother     Pancreatic cancer Father        Meds/Allergies   all current active meds have been reviewed, current meds:   Current Facility-Administered Medications   Medication Dose Route Frequency    acetaminophen (TYLENOL) tablet 975 mg  975 mg Oral Q8H [address]    calcium carbonate (TUMS) chewable tablet 1,000 mg  1,000 mg Oral Daily PRN    diazepam (VALIUM) tablet 5 mg  5 mg Oral Q8H PRN    docusate sodium (COLACE) capsule 100 mg  100 mg Oral BID    [START ON 5/18/2018] enoxaparin (LOVENOX) subcutaneous injection 40 mg  40 mg Subcutaneous Daily    ketorolac (TORADOL) injection 15 mg  15 mg Intravenous Q6H PRN    [START ON 5/18/2018] lidocaine (LIDODERM) 5 % patch 1 patch  1 patch Transdermal Daily    [START ON 5/18/2018] methylprednisolone (MEDROL) tablet 24 mg  24 mg Oral Daily    Followed by   Bubba Kim ON 5/19/2018] methylprednisolone (MEDROL) tablet 20 mg  20 mg Oral Daily    Followed by   Bubba Kim ON 5/20/2018] methylPREDNISolone (MEDROL) tablet 16 mg  16 mg Oral Daily    Followed by   Bubba Kim ON 5/21/2018] methylprednisolone (MEDROL) tablet 12 mg  12 mg Oral Daily    Followed by   Ruby Love ON 5/22/2018] methylprednisolone (MEDROL) tablet 8 mg  8 mg Oral Daily    Followed by   Ruby Love ON 5/23/2018] methylprednisolone (MEDROL) tablet 4 mg  4 mg Oral Daily    ondansetron (ZOFRAN) injection 4 mg  4 mg Intravenous Q6H PRN    oxyCODONE (ROXICODONE) IR tablet 2 5 mg  2 5 mg Oral Q4H PRN    oxyCODONE (ROXICODONE) IR tablet 5 mg  5 mg Oral Q4H PRN    [START ON 5/18/2018] pantoprazole (PROTONIX) EC tablet 40 mg  40 mg Oral Early Morning    [START ON 5/18/2018] senna (SENOKOT) tablet 8 6 mg  1 tablet Oral Daily    and PTA meds:   Prior to Admission Medications   Prescriptions Last Dose Informant Patient Reported? Taking?    Multiple Vitamin (MULTIVITAMIN) tablet 5/17/2018 at Unknown time  Yes Yes   Sig: Take 1 tablet by mouth daily   cyclobenzaprine (FLEXERIL) 10 mg tablet 5/17/2018 at Unknown time  No Yes   Sig: Take 1 tablet (10 mg total) by mouth 3 (three) times a day as needed for muscle spasms for up to 30 doses   diazepam (VALIUM) 10 mg tablet 5/17/2018 at Unknown time  No Yes   Sig: Take 1 tablet (10 mg total) by mouth every 8 (eight) hours as needed for anxiety, sedation or muscle spasms   oxyCODONE-acetaminophen (PERCOCET) 5-325 mg per tablet 5/17/2018 at Unknown time  No Yes   Sig: Take 1 tablet by mouth every 6 (six) hours as needed for severe pain for up to 25 doses Max Daily Amount: 4 tablets   predniSONE 20 mg tablet 5/17/2018 at Unknown time  No Yes   Sig: Take 2 tablets (40 mg total) by mouth daily for 5 days   traMADol (ULTRAM) 50 mg tablet 5/17/2018 at Unknown time  No Yes   Sig: Take 1 tablet (50 mg total) by mouth every 6 (six) hours as needed for moderate pain      Facility-Administered Medications Last Administration Doses Remaining   ketorolac (TORADOL) 60 mg/2 mL IM injection 60 mg 5/16/2018  1:59 PM 0        No Known Allergies    Objective   I/O       05/15 0701 - 05/16 0700 05/16 0701 - 05/17 0700 05/17 0701 - 05/18 0700    Urine (mL/kg/hr) 557    Total Output     557    Net     -557                 Physical Exam   Constitutional: She is oriented to person, place, and time  She appears well-developed and well-nourished  No distress  HENT:   Head: Normocephalic and atraumatic  Eyes: Conjunctivae and EOM are normal  Pupils are equal, round, and reactive to light  Right eye exhibits no discharge  Left eye exhibits no discharge  No scleral icterus  Neck: Normal range of motion  Neck supple  Cardiovascular: Normal rate  Pulmonary/Chest: Effort normal  No respiratory distress  Abdominal: Soft  She exhibits no distension  There is no tenderness  obese  PP intact except focal area right central groin/low abd   Musculoskeletal: She exhibits no edema, tenderness or deformity  Neurological: She is oriented to person, place, and time  She has normal strength  She has a normal Finger-Nose-Finger Test    Reflex Scores:       Bicep reflexes are 2+ on the right side and 2+ on the left side  Brachioradialis reflexes are 2+ on the right side and 2+ on the left side  Patellar reflexes are 0 on the right side and 0 on the left side  Achilles reflexes are 1+ on the right side and 1+ on the left side  Skin: Skin is warm and dry  No rash noted  No erythema  Psychiatric: She has a normal mood and affect  Her speech is normal and behavior is normal  Judgment and thought content normal      Neurologic Exam     Mental Status   Oriented to person, place, and time  Follows 3 step commands  Attention: normal  Concentration: normal    Speech: speech is normal   Level of consciousness: alert  Knowledge: good  Normal comprehension  Cranial Nerves     CN III, IV, VI   Pupils are equal, round, and reactive to light  Extraocular motions are normal    Right pupil: Size: 4 mm  Shape: regular  Reactivity: brisk  Left pupil: Size: 4 mm  Shape: regular  Reactivity: brisk     Nystagmus: none   Upgaze: normal  Conjugate gaze: present    CN VII Facial expression full, symmetric  CN VIII   Hearing: intact    CN XI   Right trapezius strength: normal  Left trapezius strength: normal    CN XII   Tongue: not atrophic  Fasciculations: absent  Tongue deviation: none    Motor Exam   Muscle bulk: normal  Overall muscle tone: normal    Strength   Strength 5/5 throughout  Sensory Exam   Light touch normal    Pinprick normal    PP intact diffuse back and buttock  PP diminished central right groin     Gait, Coordination, and Reflexes     Coordination   Finger to nose coordination: normal    Tremor   Resting tremor: absent  Intention tremor: absent  Action tremor: absent    Reflexes   Right brachioradialis: 2+  Left brachioradialis: 2+  Right biceps: 2+  Left biceps: 2+  Right patellar: 0  Left patellar: 0  Right achilles: 1+  Left achilles: 1+  Right Painter: absent  Left Painter: absent  Right ankle clonus: absent  Left ankle clonus: absentJPS intact b/l hallus       Vitals:Blood pressure 162/86, pulse 65, temperature 97 7 °F (36 5 °C), temperature source Oral, resp  rate 18, height 5' 2" (1 575 m), weight 98 9 kg (218 lb), SpO2 94 %  ,Body mass index is 39 87 kg/m²  Lab Results:         Invalid input(s):  EOSPCT        Invalid input(s): LABALBU              No results found for: TROPONINT  ABG:No results found for: PHART, AXB4PXM, PO2ART, GMO1ETV, M1ZYDSER, BEART, SOURCE    Imaging Studies: I have personally reviewed pertinent reports  and I have personally reviewed pertinent films in PACS    Mri Lumbar Spine W Wo Contrast    Result Date: 5/17/2018  Narrative: MRI LUMBAR SPINE WITH AND WITHOUT CONTRAST INDICATION: M51 37: Other intervertebral disc degeneration, lumbosacral region R33 9: Retention of urine, unspecified M54 17: Radiculopathy, lumbosacral region  Back pain and urinary retention  COMPARISON:  None  TECHNIQUE:  Sagittal T1, sagittal T2, sagittal inversion recovery, axial T1 and axial T2, coronal T2    Sagittal and axial T1 postcontrast  IV Contrast:  9 mL of Gadobutrol injection (SINGLE-DOSE) IMAGE QUALITY:  Diagnostic FINDINGS: ALIGNMENT:  There is left convex curvature the lumbar spine, apex at L3  There is minimal grade 1 retrolisthesis of L5 relative to L4 and S1  Otherwise alignment is normal   No compression deformity  MARROW SIGNAL:  There is a 15 mm right L1 vertebral body hemangioma  No suspicious discrete marrow lesion  DISTAL CORD AND CONUS:  Normal size and signal of the distal cord and conus  The conus ends at the L2 level  PARASPINAL SOFT TISSUES:  Parapelvic renal cysts noted bilaterally  No prevertebral or paraspinal soft tissue mass is seen  SACRUM:  Normal signal within the sacrum  No evidence of insufficiency or stress fracture  LOWER THORACIC DISC SPACES:  Normal disc height and signal   No disc herniation, canal stenosis or foraminal narrowing  LUMBAR DISC SPACES: L1-L2:  Normal  L2-L3:  Small focal right paracentral protrusion type disc herniation  Slight effacement of ventral margin of thecal sac  In association with minor facet spondylosis there is mild noncompressive central canal narrowing  L3-L4:  Moderate circumferential annular bulge with advanced hypertrophic facet spondylosis bilaterally results and moderate to severe central canal stenosis, AP dimension of the canal diminished to as little as 5 mm  There is a superimposed broad-based  left paracentral and foraminal protrusion disc herniation  There is severe left and moderate to severe right lateral recess narrowing  Crowding of cauda equina nerve roots is noted and there is redundancy of cauda equina nerve roots below this level  L4-L5:  Moderate circumferential annular disc bulge which, in conjunction with hypertrophic facet spondylosis results in mild central canal stenosis, severe left lateral recess narrowing, and moderate bilateral foraminal stenosis  There is impingement upon descending left L5 roots the left lateral recess    Correlate for left L5 articular symptoms  L5-S1:  Mild annular bulge with a superimposed broad-based left paracentral, foraminal, and far lateral protrusion  Advanced left greater than right hypertrophic facet spondylosis is noted  Herniated disc material impact descending left S1 roots in the  lateral recess and partly impact exiting left L5 roots in the left neural foramen  Far lateral left disc osteophyte complex also potentially impacts the extraforaminal left L5 root  Correlate for left L5 and S1 radicular symptoms  POSTCONTRAST IMAGING:  No abnormal enhancement  No epidural epidural abscess or epidural hematoma  Impression: Multilevel lumbar degenerative changes  There is moderate to severe central canal stenosis (AP canal dimension diminished 5 mm) at L3-L4 below which there is redundancy of cauda equina nerve roots  Significant degenerative changes noted at L4-L5 and L5-S1 with multiple sites of potential impingement upon L5 nerve roots and impingement upon descending left S1 nerve roots L5-S1 lateral recess  Workstation performed: [id number]     EKG, Pathology, and Other Studies: I have personally reviewed pertinent reports  VTE Prophylaxis: Sequential compression device Justin Maguire)     Code Status: Level 1 - Full Code  Advance Directive and Living Will:      Power of :    POLST:      Counseling / Coordination of Care  I spent 1 hour with the patient

## 2018-05-17 NOTE — ASSESSMENT & PLAN NOTE
· Hx of lumbar spine stenosis with chronic radiculopathy  Acute low back pain x 5 days   · MRI lumbar spine  · Multilevel lumbar degenerative changes  · There is moderate to severe central canal stenosis (AP canal dimension diminished 5 mm) at L3-L4 below which there is redundancy of cauda equina nerve roots  · Significant degenerative changes noted at L4-L5 and L5-S1 with multiple sites of potential impingement upon L5 nerve roots and impingement upon descending left S1 nerve roots L5-S1 lateral recess  · Appreciate neurosurgery input- no emergent surgical intervention  Exam inconsistent with cauda equina syndrome     · Pain control - add oxy 2 5 and 5prn, muscle relaxer, temo tylenol, lidocaine patch, aqua K, medrol dose pack, prn toradol, add protonix for GI protection   · Neuro checks, PT/OT

## 2018-05-17 NOTE — ED NOTES
Dr Ashia Hutchins okay with patient going upstairs at this time  Pt has bridging orders        Khalida Eid RN  05/17/18 [id number]

## 2018-05-17 NOTE — ASSESSMENT & PLAN NOTE
· In ER, prevoid bladder scan 1L, voided 300cc, post void -200 and straight cath only with 25cc  · Monitor PVR   · Urinary retention protocol  · Hx of stress incontinence

## 2018-05-18 LAB
ANION GAP SERPL CALCULATED.3IONS-SCNC: 5 MMOL/L (ref 4–13)
BUN SERPL-MCNC: 27 MG/DL (ref 5–25)
CALCIUM SERPL-MCNC: 8.7 MG/DL (ref 8.3–10.1)
CHLORIDE SERPL-SCNC: 106 MMOL/L (ref 100–108)
CO2 SERPL-SCNC: 28 MMOL/L (ref 21–32)
CREAT SERPL-MCNC: 0.96 MG/DL (ref 0.6–1.3)
ERYTHROCYTE [DISTWIDTH] IN BLOOD BY AUTOMATED COUNT: 12.2 % (ref 11.6–15.1)
GFR SERPL CREATININE-BSD FRML MDRD: 62 ML/MIN/1.73SQ M
GLUCOSE SERPL-MCNC: 90 MG/DL (ref 65–140)
HCT VFR BLD AUTO: 35.2 % (ref 34.8–46.1)
HGB BLD-MCNC: 12 G/DL (ref 11.5–15.4)
MCH RBC QN AUTO: 32.3 PG (ref 26.8–34.3)
MCHC RBC AUTO-ENTMCNC: 34.1 G/DL (ref 31.4–37.4)
MCV RBC AUTO: 95 FL (ref 82–98)
PLATELET # BLD AUTO: 214 THOUSANDS/UL (ref 149–390)
PMV BLD AUTO: 10.1 FL (ref 8.9–12.7)
POTASSIUM SERPL-SCNC: 3.8 MMOL/L (ref 3.5–5.3)
RBC # BLD AUTO: 3.72 MILLION/UL (ref 3.81–5.12)
SODIUM SERPL-SCNC: 139 MMOL/L (ref 136–145)
WBC # BLD AUTO: 8.65 THOUSAND/UL (ref 4.31–10.16)

## 2018-05-18 PROCEDURE — G8978 MOBILITY CURRENT STATUS: HCPCS | Performed by: PHYSICAL THERAPIST

## 2018-05-18 PROCEDURE — 80048 BASIC METABOLIC PNL TOTAL CA: CPT | Performed by: PHYSICIAN ASSISTANT

## 2018-05-18 PROCEDURE — 97166 OT EVAL MOD COMPLEX 45 MIN: CPT

## 2018-05-18 PROCEDURE — 85027 COMPLETE CBC AUTOMATED: CPT | Performed by: PHYSICIAN ASSISTANT

## 2018-05-18 PROCEDURE — 99232 SBSQ HOSP IP/OBS MODERATE 35: CPT | Performed by: NURSE PRACTITIONER

## 2018-05-18 PROCEDURE — G8979 MOBILITY GOAL STATUS: HCPCS | Performed by: PHYSICAL THERAPIST

## 2018-05-18 PROCEDURE — 97163 PT EVAL HIGH COMPLEX 45 MIN: CPT | Performed by: PHYSICAL THERAPIST

## 2018-05-18 PROCEDURE — G8987 SELF CARE CURRENT STATUS: HCPCS

## 2018-05-18 PROCEDURE — G8988 SELF CARE GOAL STATUS: HCPCS

## 2018-05-18 PROCEDURE — 99232 SBSQ HOSP IP/OBS MODERATE 35: CPT | Performed by: PHYSICIAN ASSISTANT

## 2018-05-18 RX ORDER — LIDOCAINE 50 MG/G
1 PATCH TOPICAL DAILY
Status: DISCONTINUED | OUTPATIENT
Start: 2018-05-18 | End: 2018-05-21 | Stop reason: HOSPADM

## 2018-05-18 RX ORDER — KETOROLAC TROMETHAMINE 30 MG/ML
15 INJECTION, SOLUTION INTRAMUSCULAR; INTRAVENOUS EVERY 6 HOURS PRN
Status: COMPLETED | OUTPATIENT
Start: 2018-05-18 | End: 2018-05-19

## 2018-05-18 RX ADMIN — ACETAMINOPHEN 975 MG: 325 TABLET, FILM COATED ORAL at 21:17

## 2018-05-18 RX ADMIN — ACETAMINOPHEN 975 MG: 325 TABLET, FILM COATED ORAL at 05:25

## 2018-05-18 RX ADMIN — METHYLPREDNISOLONE 24 MG: 16 TABLET ORAL at 09:53

## 2018-05-18 RX ADMIN — KETOROLAC TROMETHAMINE 15 MG: 30 INJECTION, SOLUTION INTRAMUSCULAR at 21:17

## 2018-05-18 RX ADMIN — DIAZEPAM 5 MG: 5 TABLET ORAL at 22:10

## 2018-05-18 RX ADMIN — ACETAMINOPHEN 975 MG: 325 TABLET, FILM COATED ORAL at 13:12

## 2018-05-18 RX ADMIN — DIAZEPAM 5 MG: 5 TABLET ORAL at 05:32

## 2018-05-18 RX ADMIN — ENOXAPARIN SODIUM 40 MG: 40 INJECTION SUBCUTANEOUS at 09:57

## 2018-05-18 RX ADMIN — LIDOCAINE 1 PATCH: 50 PATCH CUTANEOUS at 09:57

## 2018-05-18 RX ADMIN — DIAZEPAM 5 MG: 5 TABLET ORAL at 13:44

## 2018-05-18 RX ADMIN — PANTOPRAZOLE SODIUM 40 MG: 40 TABLET, DELAYED RELEASE ORAL at 05:25

## 2018-05-18 RX ADMIN — DOCUSATE SODIUM 100 MG: 100 CAPSULE, LIQUID FILLED ORAL at 18:35

## 2018-05-18 RX ADMIN — OXYCODONE HYDROCHLORIDE 5 MG: 5 TABLET ORAL at 07:48

## 2018-05-18 RX ADMIN — OXYCODONE HYDROCHLORIDE 5 MG: 5 TABLET ORAL at 19:45

## 2018-05-18 RX ADMIN — OXYCODONE HYDROCHLORIDE 5 MG: 5 TABLET ORAL at 13:12

## 2018-05-18 RX ADMIN — SENNOSIDES 8.6 MG: 8.6 TABLET ORAL at 09:53

## 2018-05-18 RX ADMIN — KETOROLAC TROMETHAMINE 15 MG: 30 INJECTION, SOLUTION INTRAMUSCULAR at 07:48

## 2018-05-18 RX ADMIN — KETOROLAC TROMETHAMINE 15 MG: 30 INJECTION, SOLUTION INTRAMUSCULAR at 00:43

## 2018-05-18 RX ADMIN — DOCUSATE SODIUM 100 MG: 100 CAPSULE, LIQUID FILLED ORAL at 02:00

## 2018-05-18 NOTE — ASSESSMENT & PLAN NOTE
· In ER, prevoid bladder scan 1L, voided 300cc, post void -200 and straight cath only with 25cc  · Monitor PVR   · Urinary retention protocol, pt  With no sensation now when trying to urinate needs to force this and bowel  · Will order shift bladder scans and now with almost 200 will hold on straight cath at this time   · Hx of stress incontinence

## 2018-05-18 NOTE — PLAN OF CARE
Problem: PHYSICAL THERAPY ADULT  Goal: Performs mobility at highest level of function for planned discharge setting  See evaluation for individualized goals  Treatment/Interventions: Functional transfer training, LE strengthening/ROM, Elevations, Therapeutic exercise, Endurance training, Equipment eval/education, Bed mobility, Gait training, Spoke to nursing, Continued evaluation, OT  Equipment Recommended: Lucrecia Alvarado       See flowsheet documentation for full assessment, interventions and recommendations  Prognosis: Good  Problem List: Decreased strength, Decreased range of motion, Decreased endurance, Impaired balance, Decreased mobility, Decreased coordination, Obesity, Pain  Assessment: Pt is a 70 y o  female admitted to [facility] on 5/17/2018 w/ Chronic lumbar radiculopathy; Patient's problem list and PMH includes degeneration of intervertebral disc of lumbosacral region, malignant neoplasm of uterus, mixed hyperlipidemia, obesity, chronic narcotic use, urinary retention and SIRS  Pt exhibits significant impairments with weakness, decreased ROM, impaired balance, decreased endurance, impaired coordination, gait deviations, pain, decreased activity tolerance, decreased functional mobility tolerance, altered sensation and fall risk; these impact independence with mobility, ADLs, and IADLs; Patient received a 55 on the Barthel Index which reveals limitations;  therapy prognosis is impacted by relevant co morbidities as noted in evaluation; clinical presentation is currently evolving - Patient was supervision for bed mobility and transfers  She was min A x 1 (contact guard) for ambulation  She ambulated 25 ft x 1 with a rolling walker  PTA, pt was Modified Independent with mobility, ADLs and IADLs  PT recommends skilled acute PT services in order to optimize functional independence and discharge planning; pending functional progress, PT recommendation at discharge is home with family support  Barriers to Discharge: Inaccessible home environment     Recommendation: Home with family support          See flowsheet documentation for full assessment

## 2018-05-18 NOTE — PHYSICIAN ADVISOR
Current patient class: Inpatient  The patient is currently on Hospital Day: 2      The patient was admitted to the hospital at 1116 on 5/18/18 for the following diagnosis:  Back pain [M54 9]  Lumbar stenosis [M48 061]  Abnormal MRI, spinal cord [R90 89]       There is documentation in the medical record of an expected length of stay of at least 2 midnights  The patient is therefore expected to satisfy the 2 midnight benchmark and given the 2 midnight presumption is appropriate for INPATIENT ADMISSION  Given this expectation of a satisfying stay, CMS instructs us that the patient is most often appropriate for inpatient admission under part A provided medical necessity is documented in the chart  After review of the relevant documentation, labs, vital signs and test results, the patient is appropriate for INPATIENT ADMISSION  Admission to the hospital as an inpatient is a complex decision making process which requires the practitioner to consider the patients presenting complaint, history and physical examination and all relevant testing  With this in mind, in this case, the patient was deemed appropriate for INPATIENT ADMISSION  After review of the documentation and testing available at the time of the admission I concur with this clinical determination of medical necessity  Rationale is as follows: The patient is a 70 yrs old Female who presented to the ED at 5/17/2018 11:47 AM with a chief complaint of Back Pain (Pt c/o lower back pain that radiates into her left buttocks and left leg  Pt states "there is numbness in my abdomen and I don't know I have to urinate and when I stand up I start urinating " Pt had MRI this AM and was told to come to ED after  )     The patient presented with acute back pain that began Sunday  The patient is being admitted with acute on chronic radiculopathy and lumbar spinal stenosis for 5 days   The plan of care includes neurosurgery input, pain control, neuro checks, PT/OT consultation  This admission is appropriate for INPATIENT admission - continued hospitalization is necessary to ensure stabilization of her clinical status       The patients vitals on arrival were ED Triage Vitals [05/17/18 1113]   Temperature Pulse Respirations Blood Pressure SpO2   98 1 °F (36 7 °C) (!) 116 22 142/65 96 %      Temp Source Heart Rate Source Patient Position - Orthostatic VS BP Location FiO2 (%)   Oral Monitor Sitting Left arm --      Pain Score       Worst Possible Pain           Past Medical History:   Diagnosis Date    Chronic pain     Obesity     Uterine cancer ([address] )     uterine     Past Surgical History:   Procedure Laterality Date    CHOLECYSTECTOMY      HYSTERECTOMY      Total           Consults have been placed to:   IP CONSULT TO NEUROSURGERY    Vitals:    05/17/18 1815 05/17/18 2334 05/18/18 1213 05/18/18 1530   BP: 162/86 120/61 104/51 122/58   BP Location: Left arm Left arm Left arm Left arm   Pulse: 65 63 72 69   Resp: 18 18 18 18   Temp: 97 7 °F (36 5 °C) 98 9 °F (37 2 °C) 97 8 °F (36 6 °C) 98 8 °F (37 1 °C)   TempSrc: Oral Oral Oral Oral   SpO2: 94% 97% 97% 94%   Weight: 98 9 kg (218 lb)      Height: 5' 2" (1 575 m)          Most recent labs:    Recent Labs      05/18/18   0443   WBC  8 65   HGB  12 0   HCT  35 2   PLT  214   K  3 8   NA  139   CALCIUM  8 7   BUN  27*   CREATININE  0 96       Scheduled Meds:  Current Facility-Administered Medications:  acetaminophen 975 mg Oral Q8H [facility] Morgan Mendez PA-C   calcium carbonate 1,000 mg Oral Daily PRN Morgan Mendez PA-C   diazepam 5 mg Oral Q8H PRN Morgan Mendez PA-C   docusate sodium 100 mg Oral BID Morgan Mendez PA-C   enoxaparin 40 mg Subcutaneous Daily Morgan Mendez PA-C   lidocaine 1 patch Transdermal Daily LINA Vincent   [START ON 5/19/2018] methylPREDNISolone 20 mg Oral Daily Morgan Mendez PA-C   Followed by       Krystina Charles ON 5/20/2018] methylPREDNISolone 16 mg Oral Daily Morgan Mendez PA-C Followed by       Sana Body ON 5/21/2018] methylPREDNISolone 12 mg Oral Daily Morgan Mendez PA-C   Followed by       Sana Body ON 5/22/2018] methylPREDNISolone 8 mg Oral Daily Morgan Mendez PA-C   Followed by       Sana Body ON 5/23/2018] methylPREDNISolone 4 mg Oral Daily Morgan Mendez PA-C   ondansetron 4 mg Intravenous Q6H PRN Zachary Mendez PA-C   oxyCODONE 2 5 mg Oral Q4H PRN Zachary Mendez PA-C   oxyCODONE 5 mg Oral Q4H PRN Morgan Mendez PA-C   pantoprazole 40 mg Oral Early Morning Morgan Mendez PA-C   senna 1 tablet Oral Daily Morgan Mendez PA-C     Continuous Infusions:   PRN Meds: calcium carbonate    diazepam    ondansetron    oxyCODONE    oxyCODONE

## 2018-05-18 NOTE — PLAN OF CARE
Problem: PAIN - ADULT  Goal: Verbalizes/displays adequate comfort level or baseline comfort level  Interventions:  - Encourage patient to monitor pain and request assistance  - Assess pain using appropriate pain scale  - Administer analgesics based on type and severity of pain and evaluate response  - Implement non-pharmacological measures as appropriate and evaluate response  - Consider cultural and social influences on pain and pain management  - Notify physician/advanced practitioner if interventions unsuccessful or patient reports new pain   Outcome: Progressing      Problem: INFECTION - ADULT  Goal: Absence or prevention of progression during hospitalization  INTERVENTIONS:  - Assess and monitor for signs and symptoms of infection  - Monitor lab/diagnostic results  - Monitor all insertion sites, i e  indwelling lines, tubes, and drains  - Monitor endotracheal (as able) and nasal secretions for changes in amount and color  - [place] appropriate cooling/warming therapies per order  - Administer medications as ordered  - Instruct and encourage patient and family to use good hand hygiene technique  - Identify and instruct in appropriate isolation precautions for identified infection/condition   Outcome: Progressing    Goal: Absence of fever/infection during neutropenic period  INTERVENTIONS:  - Monitor WBC  - Implement neutropenic guidelines   Outcome: Progressing      Problem: SAFETY ADULT  Goal: Maintain or return to baseline ADL function  INTERVENTIONS:  -  Assess patient's ability to carry out ADLs; assess patient's baseline for ADL function and identify physical deficits which impact ability to perform ADLs (bathing, care of mouth/teeth, toileting, grooming, dressing, etc )  - Assess/evaluate cause of self-care deficits   - Assess range of motion  - Assess patient's mobility; develop plan if impaired  - Assess patient's need for assistive devices and provide as appropriate  - Encourage maximum independence but intervene and supervise when necessary  ¯ Involve family in performance of ADLs  ¯ Assess for home care needs following discharge   ¯ Request OT consult to assist with ADL evaluation and planning for discharge  ¯ Provide patient education as appropriate   Outcome: Progressing    Goal: Maintain or return mobility status to optimal level  INTERVENTIONS:  - Assess patient's baseline mobility status (ambulation, transfers, stairs, etc )    - Identify cognitive and physical deficits and behaviors that affect mobility  - Identify mobility aids required to assist with transfers and/or ambulation (gait belt, sit-to-stand, lift, walker, cane, etc )  - [place] fall precautions as indicated by assessment  - Record patient progress and toleration of activity level on Mobility SBAR; progress patient to next Phase/Stage  - Instruct patient to call for assistance with activity based on assessment  - Request Rehabilitation consult to assist with strengthening/weightbearing, etc    Outcome: Progressing      Problem: DISCHARGE PLANNING  Goal: Discharge to home or other facility with appropriate resources  INTERVENTIONS:  - Identify barriers to discharge w/patient and caregiver  - Arrange for needed discharge resources and transportation as appropriate  - Identify discharge learning needs (meds, wound care, etc )  - Arrange for interpretive services to assist at discharge as needed  - Refer to Case Management Department for coordinating discharge planning if the patient needs post-hospital services based on physician/advanced practitioner order or complex needs related to functional status, cognitive ability, or social support system   Outcome: Progressing      Problem: Knowledge Deficit  Goal: Patient/family/caregiver demonstrates understanding of disease process, treatment plan, medications, and discharge instructions  Complete learning assessment and assess knowledge base    Interventions:  - Provide teaching at level of understanding  - Provide teaching via preferred learning methods   Outcome: Progressing

## 2018-05-18 NOTE — ASSESSMENT & PLAN NOTE
· POA, HR >90, RR>20   · Suspect 2/2 acute on chronic low back pain   · Pt with no signs of infection more likely related to pain and anxiety at this point

## 2018-05-18 NOTE — ASSESSMENT & PLAN NOTE
· Hx of lumbar spine stenosis with chronic radiculopathy  Acute low back pain x 5 days   · MRI lumbar spine  · Multilevel lumbar degenerative changes  · There is moderate to severe central canal stenosis (AP canal dimension diminished 5 mm) at L3-L4 below which there is redundancy of cauda equina nerve roots  · Significant degenerative changes noted at L4-L5 and L5-S1 with multiple sites of potential impingement upon L5 nerve roots and impingement upon descending left S1 nerve roots L5-S1 lateral recess  · Appreciate neurosurgery input- no emergent surgical intervention   Pt does have no sensation along suprapubic area (can feel a pressure sensation), pt unable to feel sensation to urinate or have BM (discussed with neurosx  · Pain control  (neurosx to modify a little consider neurontin, pending final attending input)- add oxy 2 5 and 5prn, muscle relaxer, temo tylenol, lidocaine patch, aqua K, medrol dose pack, prn toradol, add protonix for GI protection   · Neuro to discuss with attending  · Neuro checks, PT/OT

## 2018-05-18 NOTE — PROGRESS NOTES
Progress Note - Neurosurgery   Noel Torres 70 y o  female MRN: [id number]  Unit/Bed#: 2 214-01 Encounter: [id number]    Assessment:  1  Acute lumbar radiculopathy  2  Lumbar spinal stenosis  3  Lumbar facet hypertrophy/arthopathy  4  Lumbar degenerative disc disease  5  History of uterine cancer  6  Chronic pain syndrome     Plan:  · Exam reveals 5/5 throughout bilateral lower extremities  diminished PP T11-L1 then left L5  Focus of diminished pinprick central right groin  No saddle anesthesias  ? Monitor sensory deficits  ? Able to feel straight cath on 5/17/18  · Images personally by attending  Final results below  ? MRI lumbar spine without contrast: multilevel lumbar degenerative changes with moderate to severe central canal stenosis L3/4  Foraminal stenosis secondary to disc bulge and facet hypertrophy most significant left L4/5 and L5/S1  No cord compression or signal abnormality  ? Images reviewed in detail with patient  · Patient's exam and imaging not consistent with cauda equina syndrome  Patient does not require emergent surgical intervention  ? Pre void bladder scan 1L, voided 300ml, Post void - 200s, straight cath for only 25ml  Incontinence consistent with stress incontinence as patient describes this prior to Saturday  Increased difficulties related to limitations with ambulation and timing to get to bathroom  ? Patient straining to void  PVR overnight 0  Continue to monitor PVR  · We discussed recommended treatment plan to include bedrest with bathroom privileges along with a multimodal analgesic regimen to include Medrol Dosepak, scheduled Tylenol, scheduled muscle relaxer and p r n  narcotic  ? Recommend addition of Gabapentin  · Discuss consideration for epidural steroid injection if no improvement but unable to be completed over weekend  · Discussed possibility of surgical intervention if patient were to fail conservative management    · Ideally, manage pain then mobilize and discharge home with outpatient pain management/injection  If unable to discharge 2/2 pain and limited mobility will need to consider further intervention during admission  · She is agreeable to plan of care  · Neurosurgery will follow  Call with questions/concerns  Subjective/Objective   Chief Complaint: "I feel numb"    Subjective: Patient c/o low abd numbness and straining to void  States voiding less frequently than baseline  Ongoing LLE pain and sensation alteration  No new weakness  No incontinence  Objective: Laying in bed  NAD  I/O       05/16 0701 - 05/17 0700 05/17 0701 - 05/18 0700 05/18 0701 - 05/19 0700    P  O   100     I V  (mL/kg)  1000 (10 1)     Total Intake(mL/kg)  1100 (11 1)     Urine (mL/kg/hr)  957     Total Output   957      Net   +143             Unmeasured Urine Occurrence   1 x          Invasive Devices     Peripheral Intravenous Line            Peripheral IV 05/17/18 Right Hand less than 1 day                Physical Exam:  Vitals: Blood pressure 120/61, pulse 63, temperature 98 9 °F (37 2 °C), temperature source Oral, resp  rate 18, height 5' 2" (1 575 m), weight 98 9 kg (218 lb), SpO2 97 %  ,Body mass index is 39 87 kg/m²  General appearance: alert, appears stated age, cooperative and no distress  Head: Normocephalic, without obvious abnormality, atraumatic  Eyes: EOMI  Neck: supple, symmetrical, trachea midline and NT  Back: no kyphosis present, no tenderness to percussion or palpation  Lungs: non labored breathing  Heart: regular heart rate  Neurologic:   Mental status: Alert, oriented, thought content appropriate  Sensory: PP diminished T11-L1 b/l and left L5/S1   Intact medial buttock  Motor: moving all extremities without focal weakness   Reflexes: 0 b/l patellar, 1+ achilles, no clonus    Lab Results:    Results from last 7 days  Lab Units 05/18/18  0443   WBC Thousand/uL 8 65   HEMOGLOBIN g/dL 12 0   HEMATOCRIT % 35 2   PLATELETS Thousands/uL 214       Results from last 7 days  Lab Units 05/18/18  0443   SODIUM mmol/L 139   POTASSIUM mmol/L 3 8   CHLORIDE mmol/L 106   CO2 mmol/L 28   BUN mg/dL 27*   CREATININE mg/dL 0 96   CALCIUM mg/dL 8 7   GLUCOSE RANDOM mg/dL 90                 No results found for: TROPONINT  ABG:No results found for: PHART, ZME4KTI, PO2ART, GIK9HOR, L7PCQWFD, BEART, SOURCE    Imaging Studies: I have personally reviewed pertinent reports  and I have personally reviewed pertinent films in PACS    Mri Lumbar Spine W Wo Contrast    Result Date: 5/17/2018  Narrative: MRI LUMBAR SPINE WITH AND WITHOUT CONTRAST INDICATION: M51 37: Other intervertebral disc degeneration, lumbosacral region R33 9: Retention of urine, unspecified M54 17: Radiculopathy, lumbosacral region  Back pain and urinary retention  COMPARISON:  None  TECHNIQUE:  Sagittal T1, sagittal T2, sagittal inversion recovery, axial T1 and axial T2, coronal T2  Sagittal and axial T1 postcontrast  IV Contrast:  9 mL of Gadobutrol injection (SINGLE-DOSE) IMAGE QUALITY:  Diagnostic FINDINGS: ALIGNMENT:  There is left convex curvature the lumbar spine, apex at L3  There is minimal grade 1 retrolisthesis of L5 relative to L4 and S1  Otherwise alignment is normal   No compression deformity  MARROW SIGNAL:  There is a 15 mm right L1 vertebral body hemangioma  No suspicious discrete marrow lesion  DISTAL CORD AND CONUS:  Normal size and signal of the distal cord and conus  The conus ends at the L2 level  PARASPINAL SOFT TISSUES:  Parapelvic renal cysts noted bilaterally  No prevertebral or paraspinal soft tissue mass is seen  SACRUM:  Normal signal within the sacrum  No evidence of insufficiency or stress fracture  LOWER THORACIC DISC SPACES:  Normal disc height and signal   No disc herniation, canal stenosis or foraminal narrowing  LUMBAR DISC SPACES: L1-L2:  Normal  L2-L3:  Small focal right paracentral protrusion type disc herniation  Slight effacement of ventral margin of thecal sac    In association with minor facet spondylosis there is mild noncompressive central canal narrowing  L3-L4:  Moderate circumferential annular bulge with advanced hypertrophic facet spondylosis bilaterally results and moderate to severe central canal stenosis, AP dimension of the canal diminished to as little as 5 mm  There is a superimposed broad-based  left paracentral and foraminal protrusion disc herniation  There is severe left and moderate to severe right lateral recess narrowing  Crowding of cauda equina nerve roots is noted and there is redundancy of cauda equina nerve roots below this level  L4-L5:  Moderate circumferential annular disc bulge which, in conjunction with hypertrophic facet spondylosis results in mild central canal stenosis, severe left lateral recess narrowing, and moderate bilateral foraminal stenosis  There is impingement upon descending left L5 roots the left lateral recess  Correlate for left L5 articular symptoms  L5-S1:  Mild annular bulge with a superimposed broad-based left paracentral, foraminal, and far lateral protrusion  Advanced left greater than right hypertrophic facet spondylosis is noted  Herniated disc material impact descending left S1 roots in the  lateral recess and partly impact exiting left L5 roots in the left neural foramen  Far lateral left disc osteophyte complex also potentially impacts the extraforaminal left L5 root  Correlate for left L5 and S1 radicular symptoms  POSTCONTRAST IMAGING:  No abnormal enhancement  No epidural epidural abscess or epidural hematoma  Impression: Multilevel lumbar degenerative changes  There is moderate to severe central canal stenosis (AP canal dimension diminished 5 mm) at L3-L4 below which there is redundancy of cauda equina nerve roots   Significant degenerative changes noted at L4-L5 and L5-S1 with multiple sites of potential impingement upon L5 nerve roots and impingement upon descending left S1 nerve roots L5-S1 lateral recess  Workstation performed: [id number]     EKG, Pathology, and Other Studies: I have personally reviewed pertinent reports        VTE Pharmacologic Prophylaxis: Enoxaparin (Lovenox)    VTE Mechanical Prophylaxis: sequential compression device

## 2018-05-18 NOTE — SOCIAL WORK
Cm met with patient and explained cm role  Pt alert and oriented  Pt reports she lives in 2 story home w/magaly Mcdanielvelt [phone]  Pt reports DME: cane, rw, wc, commode, prev  VNA w/ LV, prev  rehab w/SL  Pt reports being independent PTA, reports good support at home, juan josé is planned caregiver, she drives and has transport home with juan josé  Pts pharmacy is [facility] in [place]  No POA  Pt denies hx/admission for drug/etoh and psych/mental health  CM discussed meds to bed and DASH, pt acknowledged understanding of information  Pt declined meds to bed at this time  CM reviewed d/c planning process including the following: identifying help at home, patient preference for d/c planning needs, [facility]charge [facility], availability of treatment team to discuss questions or concerns patient and/or family may have regarding understanding medications and recognizing signs and symptoms once discharged  CM also encouraged patient to follow up with all recommended appointments after discharge  Patient advised of importance for patient and family to participate in managing patients medical well being

## 2018-05-18 NOTE — PROGRESS NOTES
Progress Note - Paiz Arch 1955, 70 y o  female MRN: [id number]    Unit/Bed#: CW2 214-01 Encounter: [id number]    Primary Care Provider: Joshua Dockery DO   Date and time admitted to hospital: 5/17/2018 11:47 AM        Urinary retention   Assessment & Plan    · In ER, prevoid bladder scan 1L, voided 300cc, post void -200 and straight cath only with 25cc  · Monitor PVR   · Urinary retention protocol, pt  With no sensation now when trying to urinate needs to force this and bowel  · Will order shift bladder scans and now with almost 200 will hold on straight cath at this time   · Hx of stress incontinence  * Acute on chronic lumbar radiculopathy   Assessment & Plan    · Hx of lumbar spine stenosis with chronic radiculopathy  Acute low back pain x 5 days   · MRI lumbar spine  · Multilevel lumbar degenerative changes  · There is moderate to severe central canal stenosis (AP canal dimension diminished 5 mm) at L3-L4 below which there is redundancy of cauda equina nerve roots  · Significant degenerative changes noted at L4-L5 and L5-S1 with multiple sites of potential impingement upon L5 nerve roots and impingement upon descending left S1 nerve roots L5-S1 lateral recess  · Appreciate neurosurgery input- no emergent surgical intervention   Pt does have no sensation along suprapubic area (can feel a pressure sensation), pt unable to feel sensation to urinate or have BM (discussed with neurosx  · Pain control  (neurosx to modify a little consider neurontin, pending final attending input)- add oxy 2 5 and 5prn, muscle relaxer, temo tylenol, lidocaine patch, aqua K, medrol dose pack, prn toradol, add protonix for GI protection   · Neuro to discuss with attending  · Neuro checks, PT/OT        Obesity   Assessment & Plan    Diet consult   Contributes to current issue         Mixed hyperlipidemia   Assessment & Plan    Stable         SIRS (systemic inflammatory response syndrome) ([facility] [address] )   Assessment & Plan · POA, HR >90, RR>20   · Suspect 2/2 acute on chronic low back pain   · Pt with no signs of infection more likely related to pain and anxiety at this point        Malignant neoplasm of uterus [facility])   Assessment & Plan    Hx of hysterectomy            VTE Pharmacologic Prophylaxis:   Pharmacologic: Enoxaparin (Lovenox)  Mechanical VTE Prophylaxis in Place: Yes    Patient Centered Rounds: I have performed bedside rounds with nursing staff today  Discussions with Specialists or Other Care Team Provider:  Nursing    Education and Discussions with Family / Patient:  Patient    Time Spent for Care: 45 minutes  More than 50% of total time spent on counseling and coordination of care as described above  Current Length of Stay: 0 day(s)    Current Patient Status: Observation   Certification Statement: The patient, admitted on an observation basis, will now require > 2 midnight hospital stay due to Patient unable to ambulate  Pain not under control  Plan for medication adjustments and monitoring overnight to see if any improvement  Patient may requiresteroid injection  Discharge Plan:  Still pending improvement in patient's pain and ability to mobilize, since she is usually quite mobile    Code Status: Level 1 - Full Code      Subjective:   Upon arrival to room patient is laying flat on back  She states that she cannot sit up pain is too severe  She reports pain from her left buttock area  That radiates down laterally and then medially to knee and then back laterally lower extremity  She denies any new numbness, however reports some slight tingling in bilateral lower extremities  She does report having no sensation to urinate no sensation to have bowel movement  She reports that if she needs to urinate she has to force evacuated  Now with new loss of sensation horizontally across suprapubic area  Discussed with Neurosurgery who will discuss with attending    Patient does feel pressure sensation but no pressure sensation to void  With no mobility patient has throbbing pulsation in left buttock  But with any sort of movement patient has severe pain so much so that inhibits her ability to even move for try to set up  Objective:     Vitals:   Temp (24hrs), Av 2 °F (36 8 °C), Min:97 7 °F (36 5 °C), Max:98 9 °F (37 2 °C)    HR:  [] 63  Resp:  [18-22] 18  BP: (120-162)/(61-86) 120/61  SpO2:  [94 %-97 %] 97 %  Body mass index is 39 87 kg/m²  Input and Output Summary (last 24 hours): Intake/Output Summary (Last 24 hours) at 18 1111  Last data filed at 18 0542   Gross per 24 hour   Intake             1100 ml   Output              957 ml   Net              143 ml       Physical Exam:     Physical Exam   Constitutional: She is oriented to person, place, and time  She appears well-developed and well-nourished  No distress  HENT:   Head: Normocephalic and atraumatic  Eyes: Conjunctivae are normal  Right eye exhibits no discharge  Left eye exhibits no discharge  No scleral icterus  Neck: No JVD present  No tracheal deviation present  No thyromegaly present  Cardiovascular: Normal rate  Exam reveals no gallop and no friction rub  No murmur heard  Pulmonary/Chest: No stridor  No respiratory distress  She has no wheezes  She has no rales  She exhibits no tenderness  Abdominal: She exhibits no distension and no mass  There is no tenderness  There is no rebound and no guarding  No pinprick sensation horizontally across suprapubic area    Musculoskeletal: She exhibits no edema, tenderness or deformity  Lymphadenopathy:     She has no cervical adenopathy  Neurological: She is oriented to person, place, and time  Skin: Skin is warm and dry  No rash noted  She is not diaphoretic  No erythema  No pallor  Psychiatric: She has a normal mood and affect           Additional Data:     Labs:      Results from last 7 days  Lab Units 18  0443   WBC Thousand/uL 8 65   HEMOGLOBIN g/dL 12 0   HEMATOCRIT % 35 2   PLATELETS Thousands/uL 214       Results from last 7 days  Lab Units 05/18/18  0443   SODIUM mmol/L 139   POTASSIUM mmol/L 3 8   CHLORIDE mmol/L 106   CO2 mmol/L 28   BUN mg/dL 27*   CREATININE mg/dL 0 96   CALCIUM mg/dL 8 7   GLUCOSE RANDOM mg/dL 90                     * I Have Reviewed All Lab Data Listed Above  * Additional Pertinent Lab Tests Reviewed: All Labs Within Last 24 Hours Reviewed    Imaging:    Imaging Reports Reviewed Today Include: reviewed       Recent Cultures (last 7 days):           Last 24 Hours Medication List:     Current Facility-Administered Medications:  acetaminophen 975 mg Oral Q8H [facility] Morgan Mendez PA-C   calcium carbonate 1,000 mg Oral Daily PRN Morgan Mednez PA-C   diazepam 5 mg Oral Q8H PRN Morgan Mendez PA-C   docusate sodium 100 mg Oral BID Morgan Mendez PA-C   enoxaparin 40 mg Subcutaneous Daily Morgan Mendez PA-C   lidocaine 1 patch Transdermal Daily LINA Ndiaye   [START ON 5/19/2018] methylPREDNISolone 20 mg Oral Daily Morgan Mendez PA-C   Followed by       Barbara Reyna ON 5/20/2018] methylPREDNISolone 16 mg Oral Daily Morgan Mendez PA-C   Followed by       Barbara Reyna ON 5/21/2018] methylPREDNISolone 12 mg Oral Daily Morgan Mendez PA-C   Followed by       Barbara Reyna ON 5/22/2018] methylPREDNISolone 8 mg Oral Daily Morgan Mendez PA-C   Followed by       Barbara Reyna ON 5/23/2018] methylPREDNISolone 4 mg Oral Daily Morgan Mendez PA-C   ondansetron 4 mg Intravenous Q6H PRN Morgan Mendez PA-C   oxyCODONE 2 5 mg Oral Q4H PRN Morgan Mendez PA-C   oxyCODONE 5 mg Oral Q4H PRN Morgan Mendez PA-C   pantoprazole 40 mg Oral Early Morning Morgan Mendez PA-C   senna 1 tablet Oral Daily Antonietta Mendez PA-C        Today, Patient Was Seen By: LINA Ndiaye    ** Please Note: Dictation voice to text software may have been used in the creation of this document   **

## 2018-05-18 NOTE — PHYSICAL THERAPY NOTE
PT EVALUATION   Patient was identified by name, birth date and medical bracelet   05/18/18 0501   Note Type   Note type Eval only   Pain Assessment   Pain Assessment 0-10   Pain Score Worst Possible Pain   Pain Type Acute pain   Pain Location Back;Buttocks;Leg   Pain Orientation Left   Hospital Pain Intervention(s) Heat applied;Repositioned; Ambulation/increased activity; Emotional support   Response to Interventions improved   Home Living   Type of Home House; Other (Comment)  (Patient did not mention number of GIL[facility] )   Home Layout Two level;Stairs to enter with rails   Bathroom Toilet Raised   [address] Walker;Cane;Wheelchair-manual   Prior Function   Level of [place] Independent with ADLs and functional mobility   Lives With Significant other   Receives Help From [facility], [phone] in the last 6 months 0   Vocational Retired   Restrictions/Precautions   [facility] Bearing Precautions Per Order No   Braces or Orthoses Other (Comment)  (none)   Other Precautions Fall Risk;Pain   General   Additional Pertinent History Patient has a history of urinary retention  She said she does not feel the need to go to the bathroom    However if she forces herself to go, she can go "a lot "    Family/Caregiver Present No   Cognition   Overall Cognitive Status WFL   Arousal/Participation Cooperative   Orientation Level Oriented X4   Memory Within functional limits   Following Commands Follows one step commands without difficulty   RUE Assessment   RUE Assessment WFL   LUE Assessment   LUE Assessment WFL   RLE Assessment   RLE Assessment WFL   LLE Assessment   LLE Assessment X  (Pain in the LLE secondary to back pain)   Coordination   Movements are Fluid and Coordinated 0   Coordination and Movement Description Movements are Slow and Apprehensive   Light Touch   RLE Light Touch Grossly intact   LLE Light Touch Impaired LLE Light Touch Comments N/T in the LLE reported   Bed Mobility   Rolling R 5  Supervision   Additional items Verbal cues   Rolling L 5  Supervision   Additional items Verbal cues   Supine to Sit 5  Supervision   Additional items Verbal cues   Sit to Supine 5  Supervision   Additional items Verbal cues   Transfers   Sit to Stand 5  Supervision   Stand to Sit 5  Supervision   Ambulation/Elevation   Gait pattern Decreased foot clearance; Foward flexed; Short stride; Step to;Excessively slow   Gait Assistance 4  Minimal assist  (contact guard)   Additional items Verbal cues   Assistive Device Rolling walker   Distance 25 ft x 1    Stair Management Assistance Not tested   Balance   Static Sitting Fair +   Dynamic Sitting Fair   Static Standing Fair   Dynamic Standing Fair   Ambulatory Fair   Endurance Deficit   Endurance Deficit Yes   Activity Tolerance   Activity Tolerance Patient limited by pain   Assessment   Prognosis Good   Problem List Decreased strength;Decreased range of motion;Decreased endurance; Impaired balance;Decreased mobility; Decreased coordination;Obesity;Pain   Assessment Pt is a 70 y o  female admitted to [facility] on 5/17/2018 w/ Chronic lumbar radiculopathy; Patient's problem list and PMH includes degeneration of intervertebral disc of lumbosacral region, malignant neoplasm of uterus, mixed hyperlipidemia, obesity, chronic narcotic use, urinary retention and SIRS    Pt exhibits significant impairments with weakness, decreased ROM, impaired balance, decreased endurance, impaired coordination, gait deviations, pain, decreased activity tolerance, decreased functional mobility tolerance, altered sensation and fall risk; these impact independence with mobility, ADLs, and IADLs; Patient received a 55 on the Barthel Index which reveals limitations;  therapy prognosis is impacted by relevant co morbidities as noted in evaluation; clinical presentation is currently evolving - Patient was supervision for bed mobility and transfers  She was min A x 1 (contact guard) for ambulation  She ambulated 25 ft x 1 with a rolling walker  PTA, pt was Modified Independent with mobility, ADLs and IADLs  PT recommends skilled acute PT services in order to optimize functional independence and discharge planning; pending functional progress, PT recommendation at discharge is home with family support  Barriers to Discharge Inaccessible home environment   Goals   Patient Goals "I want to have less pain"   [facility] Expiration Date 06/01/18   Short Term Goal #1 In 10-14 days, patient will be: 1  Modified Independent with Bed Mobility Rolling Right and Left     2  Modified Independent with Bed Mobility Supine-Sit     3  Modified Independent with Transfer Bed-Chair     4  Increase Dynamic Sitting Balance at least 1 Grade for improved stability with functional reach activities     5  Increase Dynamic Standing Balance at least 1 Grade for improved ease with Activities of Daily Living     6  Increase Lower Extremity Strength at least 1 Grade for improved ease mobility tasks     7  Modified Independent with  Ambulation 300 feet using a rolling walker to facilitate home and community mobility 8  Modified Independent with Ascending/Descending 12 steps to facilitate home and community accessibility  Plan   Treatment/Interventions Functional transfer training;LE strengthening/ROM; Elevations; Therapeutic exercise; Endurance training;Equipment eval/education; Bed mobility;Gait training;Spoke to nursing;Continued evaluation;OT   PT Frequency Other (Comment)  (3-5x/week )   Recommendation   Recommendation Home with family support   Equipment Recommended Walker   Barthel Index   Feeding 10   Bathing 0   Grooming Score 5   Dressing Score 5   Bladder Score 10   Bowels Score 10   Toilet Use Score 5   Transfers (Bed/Chair) Score 10   Mobility (Level Surface) Score 0   Stairs Score 0   Barthel Index Score 55   Chiara Valdez, PT
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
## 2018-05-18 NOTE — PLAN OF CARE
Problem: OCCUPATIONAL THERAPY ADULT  Goal: Performs self-care activities at highest level of function for planned discharge setting  See evaluation for individualized goals  Treatment Interventions: ADL retraining, Functional transfer training, Endurance training, Patient/family training, Equipment evaluation/education, Compensatory technique education, Activityengagement          See flowsheet documentation for full assessment, interventions and recommendations  Limitation: Decreased ADL status, Decreased endurance, Decreased self-care trans, Decreased high-level ADLs  Prognosis: Good  Assessment: Pt is a 70 y o  female who was [facility] to [facility] on 5/17/2018 with Chronic lumbar radiculopathy   Pt's problem list also includes PMH of obesity, previous surgery, cancer history and chronic pain, uterine ca  At baseline pt was completing      OT Discharge Recommendation: Home with family support  OT - OK to Discharge:  Yes

## 2018-05-18 NOTE — PLAN OF CARE
Problem: Potential for Falls  Goal: Patient will remain free of falls  INTERVENTIONS:  - Assess patient frequently for physical needs  -  Identify cognitive and physical deficits and behaviors that affect risk of falls    -  [place] fall precautions as indicated by assessment   - Educate patient/family on patient safety including physical limitations  - Instruct patient to call for assistance with activity based on assessment  - Modify environment to reduce risk of injury  - Consider OT/PT consult to assist with strengthening/mobility   Outcome: Progressing

## 2018-05-18 NOTE — CASE MANAGEMENT
Continued Stay Review    Date:  18 AT 1116 ADMIT INPATIENT  (18 AT [phone] OBSERVATION)    18 1116  Inpatient Admission Once     Transfer Service: General Medicine    Expected Discharge Time: Afternoon    Expected Discharge Date: 18       Question Answer Comment   Admitting Physician Flor Leonard    Level of Care Med Surg    Estimated length of stay More than 2 Midnights    Certification I certify that inpatient services are medically necessary for this patient for a duration of greater than two midnights  See H&P and MD Progress Notes for additional information about the patient's course of treatment          18 1116       Vital Signs: /58 (BP Location: Left arm)   Pulse 69   Temp 98 8 °F (37 1 °C) (Oral)   Resp 18   Ht 5' 2" (1 575 m)   Wt 98 9 kg (218 lb)   SpO2 94%   BMI 39 87 kg/m²        Vitals:   Temp (24hrs), Av 2 °F (36 8 °C), Min:97 7 °F (36 5 °C), Max:98 9 °F (37 2 °C)   HR:  [] 63  Resp:  [18-22] 18  BP: (120-162)/(61-86) 120/61  SpO2:  [94 %-97 %] 97 %  Body mass index is 39 87 kg/m²         Input and Output Summary (last 24 hours):   Last data filed at 18 0542    Gross per 24 hour   Intake             1100 ml   Output              957 ml   Net              143 ml       Regular House Diet    IV ACCESS    Medications:   Scheduled Meds:   Current Facility-Administered Medications:  acetaminophen 975 mg Oral Q8H [address] Morgan Mendez PA-C   calcium carbonate 1,000 mg Oral Daily PRN Morgan Mendez PA-C   diazepam 5 mg Oral Q8H PRN Morgan Mendez PA-C   docusate sodium 100 mg Oral BID Morgan Mendez PA-C   enoxaparin 40 mg Subcutaneous Daily Morgan Mendez PA-C   lidocaine 1 patch Transdermal Daily LINA Young   [START ON 2018] methylPREDNISolone 20 mg Oral Daily Morgan Mendez PA-C   Followed by       Eliot Zavala ON 2018] methylPREDNISolone 16 mg Oral Daily Morgan Mendez PA-C   Followed by       Eliot Zavala ON 2018] methylPREDNISolone 12 mg Oral Daily Morgan Mendez PA-C   Followed by       Isla Cranker ON 5/22/2018] methylPREDNISolone 8 mg Oral Daily Morgan Mendez PA-C   Followed by       Isla Cranker ON 5/23/2018] methylPREDNISolone 4 mg Oral Daily Morgan Mendez PA-C   ondansetron 4 mg Intravenous Q6H PRN Lu Mendez PA-C   oxyCODONE 2 5 mg Oral Q4H PRN Morgan Mendez PA-C   oxyCODONE 5 mg Oral Q4H PRN Morgan Mendez PA-C   pantoprazole 40 mg Oral Early Morning Morgan Mendez PA-C   senna 1 tablet Oral Daily Morgan Mendez PA-C       PRN Meds:      calcium carbonate    Diazepam 5 mg q8hrs prn given x 3/ 24 hrs    ondansetron    oxyCODONE 5 mg q4hrs prn given x 3/ 24 hrs        LABS/Diagnostic Results:   CBC  Results from last 7 days  Lab Units 05/18/18  0443   WBC Thousand/uL 8 65   HEMOGLOBIN g/dL 12 0   HEMATOCRIT % 35 2   PLATELETS Thousands/uL 214       CMP  Results from last 7 days  Lab Units 05/18/18  0443   SODIUM mmol/L 139   POTASSIUM mmol/L 3 8   CHLORIDE mmol/L 106   CO2 mmol/L 28   BUN mg/dL 27*   CREATININE mg/dL 0 96   CALCIUM mg/dL 8 7   GLUCOSE RANDOM mg/dL 90       Age/Sex: 70 y o  female       Assessment/Plan:   Urinary retention   Assessment & Plan     · In ER, prevoid bladder scan 1L, voided 300cc, post void -200 and straight cath only with 25cc  · Monitor PVR   · Urinary retention protocol, pt  With no sensation now when trying to urinate needs to force this and bowel  · Will order shift bladder scans and now with almost 200 will hold on straight cath at this time   · Hx of stress incontinence            * Acute on chronic lumbar radiculopathy   Assessment & Plan     · Hx of lumbar spine stenosis with chronic radiculopathy  Acute low back pain x 5 days   · MRI lumbar spine  ? Multilevel lumbar degenerative changes  ? There is moderate to severe central canal stenosis (AP canal dimension diminished 5 mm) at L3-L4 below which there is redundancy of cauda equina nerve roots    ? Significant degenerative changes noted at L4-L5 and L5-S1 with multiple sites of potential impingement upon L5 nerve roots and impingement upon descending left S1 nerve roots L5-S1 lateral recess  · Appreciate neurosurgery input- no emergent surgical intervention  Pt does have no sensation along suprapubic area (can feel a pressure sensation), pt unable to feel sensation to urinate or have BM (discussed with neurosx  · Pain control  (neurosx to modify a little consider neurontin, pending final attending input)- add oxy 2 5 and 5prn, muscle relaxer, temo tylenol, lidocaine patch, aqua K, medrol dose pack, prn toradol, add protonix for GI protection   · Neuro to discuss with attending  · Neuro checks, PT/OT          Obesity   Assessment & Plan     Diet consult   Contributes to current issue           Mixed hyperlipidemia   Assessment & Plan     Stable           SIRS (systemic inflammatory response syndrome) (HCC)   Assessment & Plan     · POA, HR >90, RR>20   · Suspect 2/2 acute on chronic low back pain   · Pt with no signs of infection more likely related to pain and anxiety at this point          Malignant neoplasm of uterus (HCC)   Assessment & Plan     Hx of hysterectomy                VTE Pharmacologic Prophylaxis:   Pharmacologic: Enoxaparin (Lovenox)  Mechanical VTE Prophylaxis in Place: Yes      Current Patient Status: Observation     Certification Statement: The patient, admitted on an observation basis, will now require > 2 midnight hospital stay due to Patient unable to ambulate  Pain not under control  Plan for medication adjustments and monitoring overnight to see if any improvement  Patient may requiresteroid injection              Discharge Plan:   [facility] CLEARED    CASE MANAGEMENT FOLLOWING CLOSELY FOR ALL DISCHARGE NEEDS

## 2018-05-18 NOTE — OCCUPATIONAL THERAPY NOTE
[address] Evaluation     Patient Name: Max Fernando  [id number] Date: 5/18/2018  Problem List  Patient Active Problem List   Diagnosis    Acute on chronic lumbar radiculopathy    Degeneration of intervertebral disc of lumbosacral region    Malignant neoplasm of uterus ([facility] )    Mixed hyperlipidemia    Obesity    Chronic narcotic use    Urinary retention    SIRS (systemic inflammatory response syndrome) ([facility] )     Past Medical History  Past Medical History:   Diagnosis Date    Chronic pain     Obesity     Uterine cancer ([facility] [address] )     uterine     Past Surgical History  Past Surgical History:   Procedure Laterality Date    CHOLECYSTECTOMY      HYSTERECTOMY      Total         05/18/18 1640   Note Type   Note type Eval/Treat   Restrictions/Precautions   Weight Bearing Precautions Per Order No   Pain Assessment   Pain Assessment 0-10   Pain Score Worst Possible Pain   Pain Type Acute pain   Pain Location Back;Leg   Hospital Pain Intervention(s) Heat applied;Repositioned; Ambulation/increased activity; Emotional support   Home Living   Type of [address] [place] [address] Two level;Stairs to enter with rails   Prior Function   Level of [place] Independent with ADLs and functional mobility   Lives With Significant other   Receives Help From Family   ADL Assistance Independent   IADLs Independent   Falls in the last 6 months 0   Vocational Retired   [facility] S/O    Reciprocal Relationships SUPPORTIVE FAMILY   Service to Others RETIRED   Intrinsic Gratification ACTIVE PTA   Subjective   Subjective "IT HURTS TO EVEN MOVE"   ADL   Eating Assistance 7  Independent   Grooming Assistance 5  Supervision/Setup   UB Bathing Assistance 5  Supervision/Setup   LB Bathing Assistance 4  Minimal Assistance   [address] 5  [address]  [address]  5  Supervision/Setup   Bed Mobility   Rolling R 5  Supervision   Rolling L 5  Supervision   Supine to Sit 5  Supervision   Sit to Supine 5  Supervision   Transfers   Sit to Stand 5  Supervision   Stand to Sit 5  Supervision   Stand pivot 5  Supervision   Functional Mobility   Functional Mobility 5  Supervision   Additional items Rolling walker   Balance   Static Sitting Fair +   Dynamic Sitting Fair   Static Standing Fair   Dynamic Standing Fair   Ambulatory Fair   Activity Tolerance   Activity Tolerance Patient limited by pain   RUE Assessment   RUE Assessment WFL   LUE Assessment   LUE Assessment WFL   Cognition   Overall Cognitive Status WFL   Assessment   Limitation Decreased ADL status; Decreased endurance;Decreased self-care trans;Decreased high-level ADLs   Prognosis Good   Assessment Pt is a 70 y o  female who was admitted to [facility] on 5/17/2018 with Chronic lumbar radiculopathy   Pt's problem list also includes PMH of obesity, previous surgery, cancer history and chronic pain, uterine ca  At baseline pt was completing    Goals   Patient Goals have less pain   LTG Time Frame 7-10   Long Term Goal #1 refer to established goals below   Plan   Treatment Interventions ADL retraining;Functional transfer training; Endurance training;Patient/family training;Equipment evaluation/education; Compensatory technique education; Activityengagement   Goal Expiration Date 05/28/18   OT Frequency 3-5x/wk   Recommendation   OT Discharge Recommendation Home with family support   OT - OK to Discharge Yes   Barthel Index   Feeding 10   Bathing 0   Grooming Score 5   Dressing Score 5   Bladder Score 10   Bowels Score 10   Toilet Use Score 5   Transfers (Bed/Chair) Score 10   Mobility (Level Surface) Score 0   Stairs Score 0   Barthel Index Score 55       OCCUPATIONAL THERAPY GOALS:    *MOD I ADLS AFTER SETUP WITH USE OF ADAPTIVE DEVICES PRN  *MOD I TOILETING AND CLOTHING MANAGEMENT   *MOD I FUNCTIONAL MOB AND TRANSFERS TO ALL SURFACES WITH [place] TO GOOD BALANCE/SAFETY FOR PARTICIPATION IN DYNAMIC ADLS   *DEMONSTRATE GOOD CARRYOVER WITH SAFE USE OF AD AND USE OF PROPER BODY MECHANICS  DURING FUNCTIONAL TASKS  *ASSESS DME NEEDS  *INCREASE ACTIVITY TOLERANCE TO 40-45 MIN FOR PARTICIPATION IN ADLS AND ENJOYABLE ACTIVITIES     * ASSIST WITH SAFE D/C RECOMMENDATIONS     Vijaya Woo, OT

## 2018-05-19 LAB
ANION GAP SERPL CALCULATED.3IONS-SCNC: 6 MMOL/L (ref 4–13)
BUN SERPL-MCNC: 29 MG/DL (ref 5–25)
CALCIUM SERPL-MCNC: 9 MG/DL (ref 8.3–10.1)
CHLORIDE SERPL-SCNC: 107 MMOL/L (ref 100–108)
CO2 SERPL-SCNC: 26 MMOL/L (ref 21–32)
CREAT SERPL-MCNC: 0.84 MG/DL (ref 0.6–1.3)
GFR SERPL CREATININE-BSD FRML MDRD: 73 ML/MIN/1.73SQ M
GLUCOSE SERPL-MCNC: 101 MG/DL (ref 65–140)
POTASSIUM SERPL-SCNC: 4.2 MMOL/L (ref 3.5–5.3)
SODIUM SERPL-SCNC: 139 MMOL/L (ref 136–145)

## 2018-05-19 PROCEDURE — 80048 BASIC METABOLIC PNL TOTAL CA: CPT | Performed by: NURSE PRACTITIONER

## 2018-05-19 PROCEDURE — 99231 SBSQ HOSP IP/OBS SF/LOW 25: CPT | Performed by: PHYSICIAN ASSISTANT

## 2018-05-19 PROCEDURE — 99232 SBSQ HOSP IP/OBS MODERATE 35: CPT | Performed by: NURSE PRACTITIONER

## 2018-05-19 RX ORDER — KETOROLAC TROMETHAMINE 30 MG/ML
15 INJECTION, SOLUTION INTRAMUSCULAR; INTRAVENOUS EVERY 6 HOURS PRN
Status: DISCONTINUED | OUTPATIENT
Start: 2018-05-19 | End: 2018-05-21 | Stop reason: HOSPADM

## 2018-05-19 RX ORDER — OXYCODONE HYDROCHLORIDE 5 MG/1
10 TABLET ORAL EVERY 4 HOURS PRN
Status: DISCONTINUED | OUTPATIENT
Start: 2018-05-19 | End: 2018-05-21 | Stop reason: HOSPADM

## 2018-05-19 RX ADMIN — ACETAMINOPHEN 975 MG: 325 TABLET, FILM COATED ORAL at 21:50

## 2018-05-19 RX ADMIN — OXYCODONE HYDROCHLORIDE 5 MG: 5 TABLET ORAL at 18:44

## 2018-05-19 RX ADMIN — KETOROLAC TROMETHAMINE 15 MG: 30 INJECTION, SOLUTION INTRAMUSCULAR at 05:29

## 2018-05-19 RX ADMIN — DIAZEPAM 5 MG: 5 TABLET ORAL at 13:43

## 2018-05-19 RX ADMIN — ACETAMINOPHEN 975 MG: 325 TABLET, FILM COATED ORAL at 05:29

## 2018-05-19 RX ADMIN — PANTOPRAZOLE SODIUM 40 MG: 40 TABLET, DELAYED RELEASE ORAL at 05:29

## 2018-05-19 RX ADMIN — KETOROLAC TROMETHAMINE 15 MG: 30 INJECTION, SOLUTION INTRAMUSCULAR at 13:43

## 2018-05-19 RX ADMIN — METHYLPREDNISOLONE 20 MG: 16 TABLET ORAL at 13:41

## 2018-05-19 RX ADMIN — OXYCODONE HYDROCHLORIDE 5 MG: 5 TABLET ORAL at 14:39

## 2018-05-19 RX ADMIN — ENOXAPARIN SODIUM 40 MG: 40 INJECTION SUBCUTANEOUS at 09:22

## 2018-05-19 RX ADMIN — OXYCODONE HYDROCHLORIDE 5 MG: 5 TABLET ORAL at 09:21

## 2018-05-19 RX ADMIN — DIAZEPAM 5 MG: 5 TABLET ORAL at 21:50

## 2018-05-19 RX ADMIN — ACETAMINOPHEN 975 MG: 325 TABLET, FILM COATED ORAL at 13:43

## 2018-05-19 RX ADMIN — SENNOSIDES 8.6 MG: 8.6 TABLET ORAL at 09:21

## 2018-05-19 RX ADMIN — KETOROLAC TROMETHAMINE 15 MG: 30 INJECTION, SOLUTION INTRAMUSCULAR at 19:38

## 2018-05-19 RX ADMIN — DOCUSATE SODIUM 100 MG: 100 CAPSULE, LIQUID FILLED ORAL at 09:21

## 2018-05-19 RX ADMIN — OXYCODONE HYDROCHLORIDE 5 MG: 5 TABLET ORAL at 00:37

## 2018-05-19 RX ADMIN — LIDOCAINE 1 PATCH: 50 PATCH CUTANEOUS at 09:22

## 2018-05-19 NOTE — PROGRESS NOTES
Progress Note - Jannifer Dakins 1955, 70 y o  female MRN: [id number]    Unit/Bed#: [facility] 827-01 Encounter: [id number]    Primary Care Provider: Ann Mohan DO   Date and time admitted to hospital: 5/17/2018 11:47 AM        Urinary retention   Assessment & Plan    · In ER, prevoid bladder scan 1L, voided 300cc, post void -200 and straight cath only with 25cc  · Monitor PVR   · Urinary retention protocol, pt  With no sensation now when trying to urinate needs to force this and bowel  · Will order shift bladder scans and now with almost 200 will hold on straight cath at this time   · Hx of stress incontinence  * Acute on chronic lumbar radiculopathy   Assessment & Plan    · Hx of lumbar spine stenosis with chronic radiculopathy  Acute low back pain x 5 days   · MRI lumbar spine  · Multilevel lumbar degenerative changes  · There is moderate to severe central canal stenosis (AP canal dimension diminished 5 mm) at L3-L4 below which there is redundancy of cauda equina nerve roots  · Significant degenerative changes noted at L4-L5 and L5-S1 with multiple sites of potential impingement upon L5 nerve roots and impingement upon descending left S1 nerve roots L5-S1 lateral recess  · Appreciate neurosurgery input- no emergent surgical intervention   Pt does have no sensation along suprapubic area, with some improvement on left side  (can feel a pressure sensation), pt unable to feel sensation to urinate or have BM (discussed with neurosx on Friday which they felt was new from prior exam)   · Pain control  (neurosx to modify a little consider neurontin, pending final attending input)- add oxy 2 5 and 5prn, muscle relaxer, temo tylenol, lidocaine patch, aqua K, medrol dose pack, prn toradol, add protonix for GI protection   · Neuro to discuss with attending  · Neuro checks, PT/OT        Obesity   Assessment & Plan    Diet consult   Contributes to current issue         Mixed hyperlipidemia   Assessment & Plan Stable         SIRS (systemic inflammatory response syndrome) (HCC)   Assessment & Plan    · POA, HR >90, RR>20   · Suspect 2/2 acute on chronic low back pain   · Pt with no signs of infection more likely related to pain and anxiety at this point now stable no repeat episodes         Malignant neoplasm of uterus (HCC)   Assessment & Plan    Hx of hysterectomy            VTE Pharmacologic Prophylaxis:   Pharmacologic: Enoxaparin (Lovenox)  Mechanical VTE Prophylaxis in Place: Yes    Patient Centered Rounds: I have performed bedside rounds with nursing staff today  Discussions with Specialists or Other Care Team Provider: nursing     Education and Discussions with Family / Patient: patient     Time Spent for Care: 30 minutes  More than 50% of total time spent on counseling and coordination of care as described above  Current Length of Stay: 1 day(s)    Current Patient Status: Inpatient   Certification Statement: The patient will continue to require additional inpatient hospital stay due to pending final plan for misbah on monday     Discharge Plan: home with home physical therapy at discharge     Code Status: Level 1 - Full Code      Subjective:   Pt reports that she is still with severe pain not getting out of bed  Discussed with her really important to get oob and ambulate hallway (not aggressive exercise, but light walking) nursing to encourage  Pt states she has severe pulsation in buttock at rest and she is afraid of the pain when walking so bad  No bm  Objective:     Vitals:   Temp (24hrs), Av 2 °F (36 8 °C), Min:97 9 °F (36 6 °C), Max:98 6 °F (37 °C)    HR:  [63-70] 70  Resp:  [14-18] 14  BP: (118-137)/(58-64) 122/59  SpO2:  [97 %-98 %] 98 %  Body mass index is 39 87 kg/m²  Input and Output Summary (last 24 hours):        Intake/Output Summary (Last 24 hours) at 18 1732  Last data filed at 18 1658   Gross per 24 hour   Intake              460 ml   Output              925 ml   Net -465 ml       Physical Exam:     Physical Exam   Constitutional: She is oriented to person, place, and time  She appears well-developed and well-nourished  No distress  HENT:   Head: Normocephalic and atraumatic  Mouth/Throat: No oropharyngeal exudate  Eyes: Conjunctivae are normal  Right eye exhibits no discharge  Left eye exhibits no discharge  No scleral icterus  Neck: No JVD present  No tracheal deviation present  No thyromegaly present  Cardiovascular: Normal rate  Exam reveals no gallop and no friction rub  No murmur heard  Pulmonary/Chest: Effort normal  No stridor  No respiratory distress  She has no wheezes  She has no rales  She exhibits no tenderness  Abdominal: Soft  She exhibits no distension and no mass  There is no tenderness  There is no rebound and no guarding  Musculoskeletal: She exhibits no edema, tenderness or deformity  Lymphadenopathy:     She has no cervical adenopathy  Neurological: She is oriented to person, place, and time  Skin: No rash noted  She is not diaphoretic  No erythema  No pallor  Psychiatric: She has a normal mood and affect  Additional Data:     Labs:      Results from last 7 days  Lab Units 05/18/18  0443   WBC Thousand/uL 8 65   HEMOGLOBIN g/dL 12 0   HEMATOCRIT % 35 2   PLATELETS Thousands/uL 214       Results from last 7 days  Lab Units 05/19/18  0521   SODIUM mmol/L 139   POTASSIUM mmol/L 4 2   CHLORIDE mmol/L 107   CO2 mmol/L 26   BUN mg/dL 29*   CREATININE mg/dL 0 84   CALCIUM mg/dL 9 0   GLUCOSE RANDOM mg/dL 101                     * I Have Reviewed All Lab Data Listed Above  * Additional Pertinent Lab Tests Reviewed:  All Labs Within Last 24 Hours Reviewed    Imaging:    Imaging Reports Reviewed Today Include: reviewed       Recent Cultures (last 7 days):           Last 24 Hours Medication List:     Current Facility-Administered Medications:  acetaminophen 975 mg Oral Q8H [facility] Morgan Mendez PA-C   calcium carbonate 1,000 mg Oral Daily PRN Anila Mendez PA-C   diazepam 5 mg Oral Q8H PRN Morgan Mendez PA-C   docusate sodium 100 mg Oral BID Morgan Mendez PA-C   enoxaparin 40 mg Subcutaneous Daily Morgan Mendez PA-C   lidocaine 1 patch Transdermal Daily LINA Cruz   [START ON 5/20/2018] methylPREDNISolone 16 mg Oral Daily Morgan Mendez PA-C   Followed by       Esdras Vera ON 5/21/2018] methylPREDNISolone 12 mg Oral Daily Morgan Mendez PA-C   Followed by       Esdras Vera ON 5/22/2018] methylPREDNISolone 8 mg Oral Daily Morgan Mendez PA-C   Followed by       Esdras Vera ON 5/23/2018] methylPREDNISolone 4 mg Oral Daily Morgan Mendez PA-C   ondansetron 4 mg Intravenous Q6H PRN Morgan Mendez PA-C   oxyCODONE 2 5 mg Oral Q4H PRN Morgan Mendez PA-C   oxyCODONE 5 mg Oral Q4H PRN Morgan Mendez PA-C   pantoprazole 40 mg Oral Early Morning Morgan Mendez PA-C   senna 1 tablet Oral Daily Anila Mendez PA-C        Today, Patient Was Seen By: LINA Cruz    ** Please Note: Dictation voice to text software may have been used in the creation of this document   **

## 2018-05-19 NOTE — ASSESSMENT & PLAN NOTE
· Hx of lumbar spine stenosis with chronic radiculopathy  Acute low back pain x 5 days   · MRI lumbar spine  · Multilevel lumbar degenerative changes  · There is moderate to severe central canal stenosis (AP canal dimension diminished 5 mm) at L3-L4 below which there is redundancy of cauda equina nerve roots  · Significant degenerative changes noted at L4-L5 and L5-S1 with multiple sites of potential impingement upon L5 nerve roots and impingement upon descending left S1 nerve roots L5-S1 lateral recess  · Appreciate neurosurgery input- no emergent surgical intervention   Pt does have no sensation along suprapubic area, with some improvement on left side  (can feel a pressure sensation), pt unable to feel sensation to urinate or have BM (discussed with neurosx on Friday which they felt was new from prior exam)   · Pain control  (neurosx to modify a little consider neurontin, pending final attending input)- add oxy 2 5 and 5prn, muscle relaxer, temo tylenol, lidocaine patch, aqua K, medrol dose pack, prn toradol, add protonix for GI protection   · Neuro to discuss with attending  · Neuro checks, PT/OT

## 2018-05-19 NOTE — ASSESSMENT & PLAN NOTE
· POA, HR >90, RR>20   · Suspect 2/2 acute on chronic low back pain   · Pt with no signs of infection more likely related to pain and anxiety at this point now stable no repeat episodes

## 2018-05-20 LAB
ANION GAP SERPL CALCULATED.3IONS-SCNC: 6 MMOL/L (ref 4–13)
BUN SERPL-MCNC: 32 MG/DL (ref 5–25)
CALCIUM SERPL-MCNC: 9 MG/DL (ref 8.3–10.1)
CHLORIDE SERPL-SCNC: 105 MMOL/L (ref 100–108)
CO2 SERPL-SCNC: 28 MMOL/L (ref 21–32)
CREAT SERPL-MCNC: 0.86 MG/DL (ref 0.6–1.3)
GFR SERPL CREATININE-BSD FRML MDRD: 71 ML/MIN/1.73SQ M
GLUCOSE SERPL-MCNC: 97 MG/DL (ref 65–140)
POTASSIUM SERPL-SCNC: 4.5 MMOL/L (ref 3.5–5.3)
SODIUM SERPL-SCNC: 139 MMOL/L (ref 136–145)

## 2018-05-20 PROCEDURE — 99232 SBSQ HOSP IP/OBS MODERATE 35: CPT | Performed by: INTERNAL MEDICINE

## 2018-05-20 PROCEDURE — 80048 BASIC METABOLIC PNL TOTAL CA: CPT | Performed by: NURSE PRACTITIONER

## 2018-05-20 PROCEDURE — 99231 SBSQ HOSP IP/OBS SF/LOW 25: CPT | Performed by: PHYSICIAN ASSISTANT

## 2018-05-20 RX ORDER — OXYCODONE HYDROCHLORIDE 5 MG/1
5 TABLET ORAL EVERY 4 HOURS PRN
Status: DISCONTINUED | OUTPATIENT
Start: 2018-05-20 | End: 2018-05-21 | Stop reason: HOSPADM

## 2018-05-20 RX ORDER — METHOCARBAMOL 500 MG/1
500 TABLET, FILM COATED ORAL EVERY 8 HOURS SCHEDULED
Status: DISCONTINUED | OUTPATIENT
Start: 2018-05-20 | End: 2018-05-21 | Stop reason: HOSPADM

## 2018-05-20 RX ORDER — GABAPENTIN 100 MG/1
100 CAPSULE ORAL 3 TIMES DAILY
Status: DISCONTINUED | OUTPATIENT
Start: 2018-05-20 | End: 2018-05-21 | Stop reason: HOSPADM

## 2018-05-20 RX ADMIN — GABAPENTIN 100 MG: 100 CAPSULE ORAL at 16:49

## 2018-05-20 RX ADMIN — ACETAMINOPHEN 975 MG: 325 TABLET, FILM COATED ORAL at 22:04

## 2018-05-20 RX ADMIN — LIDOCAINE 1 PATCH: 50 PATCH CUTANEOUS at 09:05

## 2018-05-20 RX ADMIN — ACETAMINOPHEN 975 MG: 325 TABLET, FILM COATED ORAL at 14:19

## 2018-05-20 RX ADMIN — METHOCARBAMOL 500 MG: 500 TABLET ORAL at 14:18

## 2018-05-20 RX ADMIN — PANTOPRAZOLE SODIUM 40 MG: 40 TABLET, DELAYED RELEASE ORAL at 05:44

## 2018-05-20 RX ADMIN — OXYCODONE HYDROCHLORIDE 10 MG: 10 TABLET ORAL at 09:01

## 2018-05-20 RX ADMIN — DIAZEPAM 5 MG: 5 TABLET ORAL at 10:13

## 2018-05-20 RX ADMIN — ACETAMINOPHEN 975 MG: 325 TABLET, FILM COATED ORAL at 05:44

## 2018-05-20 RX ADMIN — METHOCARBAMOL 500 MG: 500 TABLET ORAL at 22:03

## 2018-05-20 RX ADMIN — ENOXAPARIN SODIUM 40 MG: 40 INJECTION SUBCUTANEOUS at 09:04

## 2018-05-20 RX ADMIN — OXYCODONE HYDROCHLORIDE 10 MG: 10 TABLET ORAL at 16:49

## 2018-05-20 RX ADMIN — GABAPENTIN 100 MG: 100 CAPSULE ORAL at 22:03

## 2018-05-20 RX ADMIN — DOCUSATE SODIUM 100 MG: 100 CAPSULE, LIQUID FILLED ORAL at 09:03

## 2018-05-20 RX ADMIN — METHYLPREDNISOLONE 16 MG: 16 TABLET ORAL at 09:03

## 2018-05-20 RX ADMIN — KETOROLAC TROMETHAMINE 15 MG: 30 INJECTION, SOLUTION INTRAMUSCULAR at 02:21

## 2018-05-20 RX ADMIN — SENNOSIDES 8.6 MG: 8.6 TABLET ORAL at 09:03

## 2018-05-20 RX ADMIN — KETOROLAC TROMETHAMINE 15 MG: 30 INJECTION, SOLUTION INTRAMUSCULAR at 10:13

## 2018-05-20 RX ADMIN — OXYCODONE HYDROCHLORIDE 10 MG: 10 TABLET ORAL at 22:04

## 2018-05-20 NOTE — PROGRESS NOTES
Progress Note - Neurosurgery   Judy Acevedo 70 y o  female MRN: [id number]  Unit/Bed#: [facility] 827-01 Encounter: [id number]    Assessment:  Acute lumbar radiculopathy  Lumbar spinal stenosis  Lumbar facet hypertrophy/arthopathy  Lumbar degenerative disc disease  History of uterine cancer  Chronic pain syndrome      Plan:  - discussed with Dr Joao Lu  - will evaluate for CLIFF in the morning  - hold Lovenox  - reviewed with her  at the bedside    Subjective/Objective       Feels a little better today  Pain still moderate at best      Intake/Output       05/20/18 0701 - 05/21/18 0700      [id number] Total       Intake    P O   405  -- 405    Total Intake 405 -- 405       Output    Total Output -- -- --       Net I/O     405 -- 405          Invasive Devices     Peripheral Intravenous Line            Peripheral IV 05/20/18 Right Forearm less than 1 day                Physical Exam:     Vitals: Blood pressure 124/59, pulse 68, temperature 97 5 °F (36 4 °C), temperature source Oral, resp  rate 16, height 5' 2" (1 575 m), weight 98 9 kg (218 lb), SpO2 96 %  ,Body mass index is 39 87 kg/m²  Awake and alert  Moves extremities  LLE strength unchanged  RLL intact  Sensation intact bilateral  Lungs clear  Heart regular  Abdomen soft    Lab Results: I have personally reviewed pertinent results  Imaging Studies: I have personally reviewed pertinent reports          VTE Pharmacologic Prophylaxis: Enoxaparin (Lovenox)    VTE Mechanical Prophylaxis: sequential compression device

## 2018-05-20 NOTE — PROGRESS NOTES
Progress Note - Max Fernando 1955, 70 y o  female MRN: [id number]    Unit/Bed#: [facility] 827-01 Encounter: [id number]    Primary Care Provider: Nithin Mehta DO   Date and time admitted to hospital: 5/17/2018 11:47 AM        * Acute on chronic lumbar radiculopathy   Assessment & Plan    · Hx of lumbar spine stenosis with chronic radiculopathy  Acute low back pain x 5 days   · MRI lumbar spine  · Multilevel lumbar degenerative changes  · There is moderate to severe central canal stenosis (AP canal dimension diminished 5 mm) at L3-L4 below which there is redundancy of cauda equina nerve roots  · Significant degenerative changes noted at L4-L5 and L5-S1 with multiple sites of potential impingement upon L5 nerve roots and impingement upon descending left S1 nerve roots L5-S1 lateral recess  · Appreciate neurosurgery input- no emergent surgical intervention  These past 2 days, Pt did have no sensation along suprapubic area, with some improvement on left side  (can feel a pressure sensation), pt unable to feel sensation to urinate or have BM (discussed with neurosx on Friday which they felt was new from prior exam)  Today, 5/20, no complains of the above  Patient's complaints are more of pains on her left back which radiates to her left thigh and left leg  Otherwise no other complaints today  Patient has difficulty ambulating  · Continue with pain control  Continue with muscle relaxant, Tylenol, Lidoderm patch, ketorolac p r n  Oxycodone p r n  Yasmani Jair K pad/heating pad  Continue Medrol Dosepak  I will add Neurontin  I will ask acute pain specialist to be on board  · Neuro PA discussed with their attending, and patient is for evaluation for possibility of CLIFF tomorrow  Thus patient's Lovenox was held off    · Neuro checks, PT/OT        SIRS (systemic inflammatory response syndrome) (HCC)   Assessment & Plan    · POA, HR >90, RR>20   · Suspect 2/2 acute on chronic low back pain   · Pt with no signs of infection more likely related to pain and anxiety at this point now stable no repeat episodes         Urinary retention   Assessment & Plan    · In ER, prevoid bladder scan 1L, voided 300cc, post void -200 and straight cath only with 25cc  · Monitor PVR   · Urinary retention protocol, pt  previously with no sensation when trying to urinate needs to force this and bowel  · Will order shift bladder scans and now with almost 200 will hold on straight cath at this time   · Hx of stress incontinence  Obesity   Assessment & Plan    Contributes to current issue   Needs to lose weight  Mixed hyperlipidemia   Assessment & Plan    Stable         Malignant neoplasm of uterus [facility])   Assessment & Plan    Hx of hysterectomy            Note:  Patient is on GI prophylaxis as patient is on NSAIDs, steroids  VTE Pharmacologic Prophylaxis:   Pharmacologic: Enoxaparin (Lovenox) was on Lovenox today, but this will be on hold as patient is going to be evaluated for possibility of CLIFF tomorrow  Mechanical VTE Prophylaxis in Place: Yes    Patient Centered Rounds: I have performed bedside rounds with nursing staff today  Discussions with Specialists or Other Care Team Provider:  Neurosurgery advance practitioner  Education and Discussions with Family / Patient:  Patient  Patient's  at bedside  I gave updates for today  I answered all questions and concerns  Patient's  is very concerned about patient's pains and difficulty ambulating due to this  Patient's  is adamant that she should get the shot while here  Time Spent for Care: 30 minutes  More than 50% of total time spent on counseling and coordination of care as described above  Current Length of Stay: 2 day(s)    Current Patient Status: Inpatient   Certification Statement: The patient will continue to require additional inpatient hospital stay due to Above findings and plans  Discharge Plan:  None yet      Code Status: Level 1 - Full Code      Subjective:   Patient complains of significant pains on her left lower back, that radiates to her left thigh and left leg areas  Otherwise no other complaints  No numbness at this point  No problems urinating or moving her bowels at this point  No shortness of breath  No other pains  Due to the back pains, patient has difficulty ambulating  Objective:     Vitals:   Temp (24hrs), Av °F (36 7 °C), Min:97 5 °F (36 4 °C), Max:98 4 °F (36 9 °C)    HR:  [68-72] 68  Resp:  [16] 16  BP: (124-131)/(59-62) 124/59  SpO2:  [94 %-96 %] 96 %  Body mass index is 39 87 kg/m²  Input and Output Summary (last 24 hours): Intake/Output Summary (Last 24 hours) at 18 1503  Last data filed at 18 1401   Gross per 24 hour   Intake              585 ml   Output             1050 ml   Net             -465 ml       Physical Exam:     Physical Exam   Constitutional: No distress  HENT:   Head: Normocephalic and atraumatic  Eyes: Right eye exhibits no discharge  Left eye exhibits no discharge  No scleral icterus  Neck: No JVD present  No tracheal deviation present  Cardiovascular: Normal rate, regular rhythm and normal heart sounds  Exam reveals no gallop and no friction rub  No murmur heard  Pulmonary/Chest: Effort normal and breath sounds normal  No stridor  No respiratory distress  She has no wheezes  She has no rales  Abdominal: Soft  Bowel sounds are normal  She exhibits no distension  There is no tenderness  There is no rebound and no guarding  Musculoskeletal: She exhibits tenderness  She exhibits no edema or deformity  Positive for tenderness at the left lower back, with some muscle spasms noted  No overlying erythema or abnormal warmth noted  Neurological: She is alert  No cranial nerve deficit or sensory deficit  Left lower extremity:  Approximately 4/5, more due to pains/tenderness  Other extremities were 5/5  Skin: Skin is warm  No rash noted   She is not diaphoretic  No erythema  No pallor  Psychiatric: She has a normal mood and affect  Her behavior is normal  Thought content normal    Vitals reviewed  Additional Data:     Labs:      Results from last 7 days  Lab Units 05/18/18  0443   WBC Thousand/uL 8 65   HEMOGLOBIN g/dL 12 0   HEMATOCRIT % 35 2   PLATELETS Thousands/uL 214       Results from last 7 days  Lab Units 05/20/18  0452   SODIUM mmol/L 139   POTASSIUM mmol/L 4 5   CHLORIDE mmol/L 105   CO2 mmol/L 28   BUN mg/dL 32*   CREATININE mg/dL 0 86   CALCIUM mg/dL 9 0   GLUCOSE RANDOM mg/dL 97           * I Have Reviewed All Lab Data Listed Above  * Additional Pertinent Lab Tests Reviewed: Cec[facility] Admission Reviewed    Imaging:    Imaging Reports Reviewed Today Include:  Diagnostic imaging studies that were done on this admission  Imaging Personally Reviewed by Myself Includes:  None      Recent Cultures (last 7 days):           Last 24 Hours Medication List:     Current Facility-Administered Medications:  acetaminophen 975 mg Oral Q8H [facility] Morgan Mendez PA-C   calcium carbonate 1,000 mg Oral Daily PRN Morgan Mendez PA-C   diazepam 5 mg Oral Q8H PRN Morgan Mendez PA-C   docusate sodium 100 mg Oral BID Morgan Mendez PA-C   enoxaparin 40 mg Subcutaneous Daily Morgan Mendez PA-C   gabapentin 100 mg Oral TID Clary Leavitt MD   ketorolac 15 mg Intravenous Q6H PRN LINA Milligan   lidocaine 1 patch Transdermal Daily LINA Milligan   methocarbamol 500 mg Oral Q8H [facility] Clary Leavitt MD   [START ON 5/21/2018] methylPREDNISolone 12 mg Oral Daily Morgan Mendez PA-C   Followed by       Sumaya Saunders ON 5/22/2018] methylPREDNISolone 8 mg Oral Daily Morgan Mendez PA-C   Followed by       Sumaya Saunders ON 5/23/2018] methylPREDNISolone 4 mg Oral Daily Morgan Mendez PA-C   ondansetron 4 mg Intravenous Q6H PRN Chichi Menedz PA-C   oxyCODONE 10 mg Oral Q4H PRN LINA Milligan   oxyCODONE 5 mg Oral Q4H PRN Antoine Hartman MD   pantoprazole 40 mg Oral Early Morning Morgan Mendez PA-C   senna 1 tablet Oral Daily Helder Mendez PA-C        Today, Patient Was Seen By: Antoine Hartman MD    ** Please Note: Dragon 360 Dictation voice to text software may have been used in the creation of this document   **

## 2018-05-20 NOTE — ASSESSMENT & PLAN NOTE
· Hx of lumbar spine stenosis with chronic radiculopathy  Acute low back pain x 5 days   · MRI lumbar spine  · Multilevel lumbar degenerative changes  · There is moderate to severe central canal stenosis (AP canal dimension diminished 5 mm) at L3-L4 below which there is redundancy of cauda equina nerve roots  · Significant degenerative changes noted at L4-L5 and L5-S1 with multiple sites of potential impingement upon L5 nerve roots and impingement upon descending left S1 nerve roots L5-S1 lateral recess  · Appreciate neurosurgery input- no emergent surgical intervention  These past 2 days, Pt did have no sensation along suprapubic area, with some improvement on left side  (can feel a pressure sensation), pt unable to feel sensation to urinate or have BM (discussed with neurosx on Friday which they felt was new from prior exam)  Today, 5/20, no complains of the above  Patient's complaints are more of pains on her left back which radiates to her left thigh and left leg  Otherwise no other complaints today  Patient has difficulty ambulating  · Continue with pain control  Continue with muscle relaxant, Tylenol, Lidoderm patch, ketorolac p r n  Oxycodone p r n  Gianni Sessions K pad/heating pad  Continue Medrol Dosepak  I will add Neurontin  I will ask acute pain specialist to be on board  · Neuro PA discussed with their attending, and patient is for evaluation for possibility of CLIFF tomorrow  Thus patient's Lovenox was held off    · Neuro checks, PT/OT

## 2018-05-20 NOTE — ASSESSMENT & PLAN NOTE
· In ER, prevoid bladder scan 1L, voided 300cc, post void -200 and straight cath only with 25cc  · Monitor PVR   · Urinary retention protocol, pt  previously with no sensation when trying to urinate needs to force this and bowel  · Will order shift bladder scans and now with almost 200 will hold on straight cath at this time   · Hx of stress incontinence

## 2018-05-21 ENCOUNTER — TRANSITIONAL CARE MANAGEMENT (OUTPATIENT)
Dept: FAMILY MEDICINE CLINIC | Facility: CLINIC | Age: 63
End: 2018-05-21

## 2018-05-21 ENCOUNTER — APPOINTMENT (INPATIENT)
Dept: RADIOLOGY | Facility: HOSPITAL | Age: 63
DRG: 552 | End: 2018-05-21
Attending: RADIOLOGY
Payer: MEDICARE

## 2018-05-21 VITALS
HEIGHT: 62 IN | TEMPERATURE: 97.8 F | RESPIRATION RATE: 18 BRPM | HEART RATE: 86 BPM | OXYGEN SATURATION: 97 % | WEIGHT: 218 LBS | BODY MASS INDEX: 40.12 KG/M2 | DIASTOLIC BLOOD PRESSURE: 68 MMHG | SYSTOLIC BLOOD PRESSURE: 136 MMHG

## 2018-05-21 LAB
APTT PPP: 22 SECONDS (ref 24–36)
INR PPP: 0.94 (ref 0.86–1.17)
PROTHROMBIN TIME: 12.7 SECONDS (ref 11.8–14.2)

## 2018-05-21 PROCEDURE — 99239 HOSP IP/OBS DSCHRG MGMT >30: CPT | Performed by: INTERNAL MEDICINE

## 2018-05-21 PROCEDURE — 3E0R33Z INTRODUCTION OF ANTI-INFLAMMATORY INTO SPINAL CANAL, PERCUTANEOUS APPROACH: ICD-10-PCS | Performed by: RADIOLOGY

## 2018-05-21 PROCEDURE — 97116 GAIT TRAINING THERAPY: CPT

## 2018-05-21 PROCEDURE — 62323 NJX INTERLAMINAR LMBR/SAC: CPT

## 2018-05-21 PROCEDURE — 99231 SBSQ HOSP IP/OBS SF/LOW 25: CPT | Performed by: RADIOLOGY

## 2018-05-21 PROCEDURE — 85610 PROTHROMBIN TIME: CPT | Performed by: PHYSICIAN ASSISTANT

## 2018-05-21 PROCEDURE — 97110 THERAPEUTIC EXERCISES: CPT

## 2018-05-21 PROCEDURE — 85730 THROMBOPLASTIN TIME PARTIAL: CPT | Performed by: PHYSICIAN ASSISTANT

## 2018-05-21 PROCEDURE — 64483 NJX AA&/STRD TFRM EPI L/S 1: CPT | Performed by: RADIOLOGY

## 2018-05-21 RX ORDER — GABAPENTIN 100 MG/1
100 CAPSULE ORAL 3 TIMES DAILY
Qty: 90 CAPSULE | Refills: 0 | Status: SHIPPED | OUTPATIENT
Start: 2018-05-21 | End: 2018-09-19 | Stop reason: ALTCHOICE

## 2018-05-21 RX ORDER — METHYLPREDNISOLONE 4 MG/1
4 TABLET ORAL DAILY
Qty: 1 TABLET | Refills: 0 | Status: SHIPPED | OUTPATIENT
Start: 2018-05-23 | End: 2018-05-24

## 2018-05-21 RX ORDER — DEXAMETHASONE SODIUM PHOSPHATE 10 MG/ML
INJECTION, SOLUTION INTRAMUSCULAR; INTRAVENOUS CODE/TRAUMA/SEDATION MEDICATION
Status: DISCONTINUED | OUTPATIENT
Start: 2018-05-21 | End: 2018-05-21 | Stop reason: HOSPADM

## 2018-05-21 RX ORDER — DOCUSATE SODIUM 100 MG/1
100 CAPSULE, LIQUID FILLED ORAL 2 TIMES DAILY
Qty: 10 CAPSULE | Refills: 0 | Status: SHIPPED | OUTPATIENT
Start: 2018-05-21 | End: 2018-07-25 | Stop reason: ALTCHOICE

## 2018-05-21 RX ORDER — METHYLPREDNISOLONE 8 MG/1
8 TABLET ORAL DAILY
Qty: 1 TABLET | Refills: 0 | Status: SHIPPED | OUTPATIENT
Start: 2018-05-22 | End: 2018-05-23

## 2018-05-21 RX ORDER — ACETAMINOPHEN 325 MG/1
650 TABLET ORAL EVERY 6 HOURS PRN
Qty: 30 TABLET | Refills: 0 | Status: SHIPPED | OUTPATIENT
Start: 2018-05-21

## 2018-05-21 RX ORDER — LIDOCAINE HYDROCHLORIDE 10 MG/ML
INJECTION, SOLUTION INFILTRATION; PERINEURAL CODE/TRAUMA/SEDATION MEDICATION
Status: DISCONTINUED | OUTPATIENT
Start: 2018-05-21 | End: 2018-05-21 | Stop reason: HOSPADM

## 2018-05-21 RX ORDER — SENNOSIDES 8.6 MG
1 TABLET ORAL DAILY
Qty: 120 EACH | Refills: 0 | Status: SHIPPED | OUTPATIENT
Start: 2018-05-22 | End: 2018-05-30 | Stop reason: ALTCHOICE

## 2018-05-21 RX ADMIN — GABAPENTIN 100 MG: 100 CAPSULE ORAL at 11:21

## 2018-05-21 RX ADMIN — OXYCODONE HYDROCHLORIDE 10 MG: 10 TABLET ORAL at 02:35

## 2018-05-21 RX ADMIN — DEXAMETHASONE SODIUM PHOSPHATE 20 MG: 10 INJECTION, SOLUTION INTRAMUSCULAR; INTRAVENOUS at 10:15

## 2018-05-21 RX ADMIN — LIDOCAINE HYDROCHLORIDE 5 ML: 10 INJECTION, SOLUTION INFILTRATION; PERINEURAL at 10:16

## 2018-05-21 RX ADMIN — METHOCARBAMOL 500 MG: 500 TABLET ORAL at 06:21

## 2018-05-21 RX ADMIN — ACETAMINOPHEN 975 MG: 325 TABLET, FILM COATED ORAL at 06:21

## 2018-05-21 RX ADMIN — IOHEXOL 0.5 ML: 300 INJECTION, SOLUTION INTRAVENOUS at 10:29

## 2018-05-21 RX ADMIN — PANTOPRAZOLE SODIUM 40 MG: 40 TABLET, DELAYED RELEASE ORAL at 06:21

## 2018-05-21 RX ADMIN — METHYLPREDNISOLONE 12 MG: 4 TABLET ORAL at 11:22

## 2018-05-21 NOTE — PROGRESS NOTES
Progress Note - Neurosurgery   Regina Silverman 70 y o  female MRN: [id number]  Unit/Bed#: [facility] 827-01 Encounter: [id number]    Assessment:  1  Acute lumbar radiculopathy  2  Lumbar spinal stenosis  3  Lumbar facet hypertrophy/arthopathy  4  Lumbar degenerative disc disease  5  History of uterine cancer  6  Chronic pain syndrome     Plan:  · Exam reveals 5/5 throughout bilateral lower extremities  Diminished PP left L5/S1    ? Monitor groin sensation  ? Able to feel straight cath on 5/17/18  · Images personally by attending  Final results below  ? MRI lumbar spine without contrast: multilevel lumbar degenerative changes with moderate to severe central canal stenosis L3/4  Foraminal stenosis secondary to disc bulge and facet hypertrophy most significant left L4/5 and L5/S1  No cord compression or signal abnormality  · Pain management - agree with multimodal regimen  ? Tylenol 975 mg oral every 8 hr scheduled, gabapentin 100 mg t i d , Robaxin 500 mg Q 8H scheduled, Medrol Dosepak, oxycodone as needed  · Patient with limited review improvement on bed rest and multimodal pain regimen  · At this time, patient is an appropriate candidate for an epidural steroid injection  She was seen and evaluated by Dr Ruthine Bumpers and plan for injection today  · STAT coag studies ordered  · Discussed possibility of surgical intervention if patient were to fail conservative management  · She is agreeable to plan of care  · Neurosurgery will follow  Call with questions/concerns  · Primary team, Dr Allegra Barrera, updated with plan  Subjective/Objective   Chief Complaint: "I hear today is the day"/follow-up lumbar radiculopathy    Subjective:  Patient continues with severe left lower extremity lumbar radiculopathy described as sharp throbbing  Denies any new numbness, tingling or weakness  No chest pain or shortness of breath  Admits to some mild improvement with current analgesic regimen    She is able to ambulate to the bathroom using a roller walker but unable to remain in the seated position secondary to pain  Tolerating oral intake without nausea vomiting  Admits to controlled void and bowel movements without any episodes of incontinence or saddle anesthesias  Objective:  Lying in bed  NAD  I/O       05/19 0701 - 05/20 0700 05/20 0701 - 05/21 0700 05/21 0701 - 05/22 0700    P  O  640 525     Total Intake(mL/kg) 640 (6 5) 525 (5 3)     Urine (mL/kg/hr) 575 (0 2) 1100 (0 5)     Stool 0 (0)      Total Output 575 1100      Net +65 -575             Unmeasured Urine Occurrence 2 x      Unmeasured Stool Occurrence 2 x            Invasive Devices     Peripheral Intravenous Line            Peripheral IV 05/20/18 Right Forearm less than 1 day                Physical Exam:  Vitals: Blood pressure 148/69, pulse 82, temperature 98 7 °F (37 1 °C), temperature source Oral, resp  rate 18, height 5' 2" (1 575 m), weight 98 9 kg (218 lb), SpO2 96 %  ,Body mass index is 39 87 kg/m²      General appearance: alert, appears stated age, cooperative and no distress  Head: Normocephalic, without obvious abnormality, atraumatic  Eyes: EOMI  Neck: supple, symmetrical, trachea midline   Lungs: non labored breathing  Heart: regular heart rate  Neurologic:   Mental status: Alert, oriented, thought content appropriate  Sensory:  Decreased pinprick left L5/S1  Motor: moving all extremities without focal weakness   Reflexes: 0 bilateral patellar, 2+ bilateral Achilles, no clonus bilaterally  Joint position sense intact bilateral great toe    Lab Results:    Results from last 7 days  Lab Units 05/18/18  0443   WBC Thousand/uL 8 65   HEMOGLOBIN g/dL 12 0   HEMATOCRIT % 35 2   PLATELETS Thousands/uL 214       Results from last 7 days  Lab Units 05/20/18  0452 05/19/18  0521 05/18/18  0443   SODIUM mmol/L 139 139 139   POTASSIUM mmol/L 4 5 4 2 3 8   CHLORIDE mmol/L 105 107 106   CO2 mmol/L 28 26 28   BUN mg/dL 32* 29* 27*   CREATININE mg/dL 0 86 0 84 0 96   CALCIUM mg/dL 9 0 9 0 8 7   GLUCOSE RANDOM mg/dL 97 101 90                 No results found for: TROPONINT  ABG:No results found for: PHART, YMZ3VGW, PO2ART, CTM5UNM, T8DSPYJV, BEART, SOURCE    Imaging Studies: I have personally reviewed pertinent reports  and I have personally reviewed pertinent films in PACS    Mri Lumbar Spine W Wo Contrast    Result Date: 5/17/2018  Narrative: MRI LUMBAR SPINE WITH AND WITHOUT CONTRAST INDICATION: M51 37: Other intervertebral disc degeneration, lumbosacral region R33 9: Retention of urine, unspecified M54 17: Radiculopathy, lumbosacral region  Back pain and urinary retention  COMPARISON:  None  TECHNIQUE:  Sagittal T1, sagittal T2, sagittal inversion recovery, axial T1 and axial T2, coronal T2  Sagittal and axial T1 postcontrast  IV Contrast:  9 mL of Gadobutrol injection (SINGLE-DOSE) IMAGE QUALITY:  Diagnostic FINDINGS: ALIGNMENT:  There is left convex curvature the lumbar spine, apex at L3  There is minimal grade 1 retrolisthesis of L5 relative to L4 and S1  Otherwise alignment is normal   No compression deformity  MARROW SIGNAL:  There is a 15 mm right L1 vertebral body hemangioma  No suspicious discrete marrow lesion  DISTAL CORD AND CONUS:  Normal size and signal of the distal cord and conus  The conus ends at the L2 level  PARASPINAL SOFT TISSUES:  Parapelvic renal cysts noted bilaterally  No prevertebral or paraspinal soft tissue mass is seen  SACRUM:  Normal signal within the sacrum  No evidence of insufficiency or stress fracture  LOWER THORACIC DISC SPACES:  Normal disc height and signal   No disc herniation, canal stenosis or foraminal narrowing  LUMBAR DISC SPACES: L1-L2:  Normal  L2-L3:  Small focal right paracentral protrusion type disc herniation  Slight effacement of ventral margin of thecal sac  In association with minor facet spondylosis there is mild noncompressive central canal narrowing   L3-L4:  Moderate circumferential annular bulge with advanced hypertrophic facet spondylosis bilaterally results and moderate to severe central canal stenosis, AP dimension of the canal diminished to as little as 5 mm  There is a superimposed broad-based  left paracentral and foraminal protrusion disc herniation  There is severe left and moderate to severe right lateral recess narrowing  Crowding of cauda equina nerve roots is noted and there is redundancy of cauda equina nerve roots below this level  L4-L5:  Moderate circumferential annular disc bulge which, in conjunction with hypertrophic facet spondylosis results in mild central canal stenosis, severe left lateral recess narrowing, and moderate bilateral foraminal stenosis  There is impingement upon descending left L5 roots the left lateral recess  Correlate for left L5 articular symptoms  L5-S1:  Mild annular bulge with a superimposed broad-based left paracentral, foraminal, and far lateral protrusion  Advanced left greater than right hypertrophic facet spondylosis is noted  Herniated disc material impact descending left S1 roots in the  lateral recess and partly impact exiting left L5 roots in the left neural foramen  Far lateral left disc osteophyte complex also potentially impacts the extraforaminal left L5 root  Correlate for left L5 and S1 radicular symptoms  POSTCONTRAST IMAGING:  No abnormal enhancement  No epidural epidural abscess or epidural hematoma  Impression: Multilevel lumbar degenerative changes  There is moderate to severe central canal stenosis (AP canal dimension diminished 5 mm) at L3-L4 below which there is redundancy of cauda equina nerve roots  Significant degenerative changes noted at L4-L5 and L5-S1 with multiple sites of potential impingement upon L5 nerve roots and impingement upon descending left S1 nerve roots L5-S1 lateral recess  Workstation performed: [id number]       EKG, Pathology, and Other Studies: I have personally reviewed pertinent reports        VTE Pharmacologic Prophylaxis:  Lovenox held for epidural steroid injection    VTE Mechanical Prophylaxis: sequential compression device

## 2018-05-21 NOTE — ASSESSMENT & PLAN NOTE
Contributes to current issue   Needs to lose weight  Explained to the patient  Outpatient follow-up with primary care physician

## 2018-05-21 NOTE — ASSESSMENT & PLAN NOTE
· POA, HR >90, RR>20   · Suspect 2/2 acute on chronic low back pain   · Pt with no signs of infection more likely related to pain and anxiety at this point now stable no repeat episodes   · Outpatient follow-up with primary care physician

## 2018-05-21 NOTE — PHYSICAL THERAPY NOTE
Physical Therapy Tx Session:       05/21/18 1240   Pain Assessment   Pain Assessment 0-10   Pain Score 6   Pain Type Acute pain   Pain Location Back;Leg   Pain Orientation Left;Posterior   Hospital Pain Intervention(s) Heat applied;Repositioned; Ambulation/increased activity; Emotional support; Environmental changes; Rest   Restrictions/Precautions   Other Precautions Pain;Multiple lines   General   Chart Reviewed Yes   Family/Caregiver Present No   Cognition   Overall Cognitive Status WFL   Arousal/Participation Cooperative; Alert   Attention Within functional limits   Orientation Level Oriented X4   Following Commands Follows one step commands with increased time or repetition  (2* inc pain,slow mobility)   Subjective   Subjective pt supine in bed resting comfortably;pt willing and agreeable to work with PT and to participate in therapy intervention;per request of Dr Low Benitez to see pt for possible D/C;pt received injection earlier in the day   Bed Mobility   Supine to Sit 5  Supervision   Additional items Assist x 1;Bedrails;HOB elevated; Increased time required;Verbal cues   Transfers   Sit to Stand 5  Supervision   Additional items Assist x 1;Bedrails; Increased time required;Verbal cues   Stand to Sit 5  Supervision   Additional items Assist x 1; Armrests; Increased time required;Verbal cues  (for safety and education)   Additional Comments pt reports inc LLE and L back pain, stabbing in sensation, during static sit at EOB pre mobility   Ambulation/Elevation   Gait pattern Antalgic; Forward Flexion; Foward flexed; Short stride   Gait Assistance 5  Supervision   Additional items Assist x 1;Verbal cues   Assistive Device Rolling walker   Distance 120 feet x2 with use of RW on tile and carpet surface;slow anuja throughout   Stair Management Assistance 5  Supervision   Additional items Assist x 1;Verbal cues   Stair Management Technique Two rails; Step to pattern; Foreward;Reciprocal   Number of Stairs 14   Balance   Static Sitting Good  (in chair postmobility)   Dynamic Sitting Fair   Static Standing Fair   Dynamic Standing Fair   Ambulatory Fair   Activity Tolerance   Activity Tolerance Patient limited by pain  (good)   Exercises   Hip Flexion Sitting;20 reps;AROM; Bilateral  (2 sets x10)   Hip Abduction Sitting;20 reps;AROM; Bilateral  (2 sets x10)   Hip Adduction Sitting;20 reps;AROM; Bilateral  (2 sets x10)   Knee AROM Long Arc Quad Sitting;20 reps;AROM; Bilateral  (2 sets x10)   Ankle Pumps Sitting;20 reps;AROM; Bilateral   Assessment   Prognosis Good   Problem List Decreased strength;Decreased endurance;Decreased skin integrity;Obesity;Pain   Assessment pt able to ambulate 120 feet x2 with use of RW on various surfaces S level of A  Pt able to perfrom transfers and BM S level of A  Pt able to go up and down 14 steps with use of B rail nonreciprical gait pattern S level of A  Pt able to perform and complete BLE ther ex HEP sitting in chair postmobility AROM  Pt is able to return home with A from fiancee and family, needs RW and home PT  Pt would cont to benefit from skilled inpt PT services to maximize functional independence   Goals   Patient Goals to dec pain   STG Expiration Date 06/01/18   Treatment Day 1   Plan   Treatment/Interventions Functional transfer training;LE strengthening/ROM; Elevations; Therapeutic exercise; Endurance training;Equipment eval/education; Bed mobility;Gait training;Spoke to MD;Spoke to nursing;Spoke to case management  (Dr Trung Nelson (CM) and NSG)   Progress Progressing toward goals   PT Frequency 5x/wk   Recommendation   Recommendation Home with family support;Home PT  (needs RW)   Equipment Recommended Walker  (RW)

## 2018-05-21 NOTE — SOCIAL WORK
Cm reviewed patient during care coordination rounds with Dr Edi Earl  Patient stable for discharge home today  PT/OT recommending home therapy & RW  Patient agreeable to [facility] through [facility]  Referrals placed  Awaiting responses  Patient's fiancé to transport  Cm following

## 2018-05-21 NOTE — BRIEF OP NOTE (RAD/CATH)
Procedure name not found  Procedure Note    PATIENT NAME: Regina Silverman  : 1955  MRN: [id number]     Pre-op Diagnosis:   1  Abnormal MRI, spinal cord    2  Lumbar stenosis      Post-op Diagnosis:   1  Abnormal MRI, spinal cord    2  Lumbar stenosis        Surgeon:   Tsering Gomez MD  Assistants:     No qualified resident was available, Resident is only observing    Estimated Blood Loss: 0 cc  Findings: Successful left L5-S1 epidural steroid injection      Specimens: none    Complications:  none    Anesthesia: Local    Tsering Gomez MD     Date: 2018  Time: 10:30 AM

## 2018-05-21 NOTE — DISCHARGE INSTRUCTIONS
MAY HAVE HEATING PAD/WARM COMPRESS APPLIED TO THE BACK AREA AS NEEDED FOR PAINS  DO NOT TAKE NSAIDS MEDICATIONS SUCH AS IBUPROFEN, MOTRIN, ADVIL, ALEVE, NAPROXEN, VOLTAREN, DICLOFENAC, CELEBREX, MOBIC, AS YOU ARE ON STEROIDS AND YOU WERE GIVEN A STEROID SHOT

## 2018-05-21 NOTE — ASSESSMENT & PLAN NOTE
· Clinically improved with the CLIFF that was given today, 5/21  · No significant pains at this point  Patient was able to tolerate physical therapy and walk with a walker today  No numbness at this point  Patient will be prescribed with a walker  · Hx of lumbar spine stenosis with chronic radiculopathy  Acute low back pain x 5 days   · MRI lumbar spine  · Multilevel lumbar degenerative changes  · There is moderate to severe central canal stenosis (AP canal dimension diminished 5 mm) at L3-L4 below which there is redundancy of cauda equina nerve roots  · Significant degenerative changes noted at L4-L5 and L5-S1 with multiple sites of potential impingement upon L5 nerve roots and impingement upon descending left S1 nerve roots L5-S1 lateral recess  · Appreciate neurosurgery input- no emergent surgical intervention  · Continue with pain control  Case verbally discussed with acute pain service staff: We will continue with Tylenol, Neurontin 100 mg 3 times a day, may have heating pad p r n , finish the Medrol Dosepak (have 2 days more), per my discussion with the patient, she still has tramadol at home which will be continued for moderate pains; from my discussion with the patient, she still has oxycodone tablets at home which was prescribed recently and she can take that for severe pains; patient may continue with her previous muscle relaxant medication p r n     As per discussion with pain specialist, patient's Lidoderm patch may be changed to salonpas with lidocaine  Pain specialist told me not to prescribe this patient any NSAID and not to allow patient take NSAIDs as patient just had a steroid shot and still on Medrol Dosepak  · I spoke to Neurosurgery and they are okay with discharge of the patient today  Patient will follow up with them in the office    · Physical therapy cleared the patient for home PT

## 2018-05-21 NOTE — ASSESSMENT & PLAN NOTE
· Resolved  · Had urinary retention in the emergency room  · Hx of stress incontinence  · Status post urinary retention protocol  · Outpatient follow-up with primary care physician

## 2018-05-21 NOTE — DISCHARGE SUMMARY
Discharge- Jessica Mitchell 1955, 70 y o  female MRN: [id number]    Unit/Bed#: [facility] 827-01 Encounter: [id number]    Primary Care Provider: Arin Degroot DO   Date and time admitted to hospital: 5/17/2018 11:47 AM        * Acute on chronic lumbar radiculopathy   Assessment & Plan    · Clinically improved with the CLIFF that was given today, 5/21  · No significant pains at this point  Patient was able to tolerate physical therapy and walk with a walker today  No numbness at this point  Patient will be prescribed with a walker  · Hx of lumbar spine stenosis with chronic radiculopathy  Acute low back pain x 5 days   · MRI lumbar spine  · Multilevel lumbar degenerative changes  · There is moderate to severe central canal stenosis (AP canal dimension diminished 5 mm) at L3-L4 below which there is redundancy of cauda equina nerve roots  · Significant degenerative changes noted at L4-L5 and L5-S1 with multiple sites of potential impingement upon L5 nerve roots and impingement upon descending left S1 nerve roots L5-S1 lateral recess  · Appreciate neurosurgery input- no emergent surgical intervention  · Continue with pain control  Case verbally discussed with acute pain service staff: We will continue with Tylenol, Neurontin 100 mg 3 times a day, may have heating pad p r n , finish the Medrol Dosepak (have 2 days more), per my discussion with the patient, she still has tramadol at home which will be continued for moderate pains; from my discussion with the patient, she still has oxycodone tablets at home which was prescribed recently and she can take that for severe pains; patient may continue with her previous muscle relaxant medication p r n     As per discussion with pain specialist, patient's Lidoderm patch may be changed to salonpas with lidocaine    Pain specialist told me not to prescribe this patient any NSAID and not to allow patient take NSAIDs as patient just had a steroid shot and still on Medrol Dosepak  · I spoke to Neurosurgery and they are okay with discharge of the patient today  Patient will follow up with them in the office  · Physical therapy cleared the patient for home PT  SIRS (systemic inflammatory response syndrome) (HCC)   Assessment & Plan    · POA, HR >90, RR>20   · Suspect 2/2 acute on chronic low back pain   · Pt with no signs of infection more likely related to pain and anxiety at this point now stable no repeat episodes   · Outpatient follow-up with primary care physician  Urinary retention   Assessment & Plan    · Resolved  · Had urinary retention in the emergency room  · Hx of stress incontinence  · Status post urinary retention protocol  · Outpatient follow-up with primary care physician  Obesity   Assessment & Plan    Contributes to current issue   Needs to lose weight  Explained to the patient  Outpatient follow-up with primary care physician  Mixed hyperlipidemia   Assessment & Plan    Stable   Outpatient follow with primary care physician  Malignant neoplasm of [facility])   Assessment & Plan    Hx of hysterectomy  Outpatient follow-up with primary care physician  Discharging Physician / Practitioner: Matthew Ojeda MD  PCP: Gume Schwartz DO  Admission Date:  May 17, 2018    Discharge Date: 05/21/18        Consultations During Hospital Stay:  · Neurosurgery    Procedures Performed:     · Please see diagnosis and notes above  Significant Findings / Test Results:     · Please see diagnosis and notes above  Incidental Findings:   · None  Test Results Pending at Discharge (will require follow up): · None  Outpatient Tests Requested:  · None  Complications:  None  Reason for Admission:  Acute back pain  Hospital Course: Lana Lamar is a 70 y o  female patient who originally presented to the hospital on 5/17/2018 due to acute back pain  MRI of the back was done    Patient was found to have lumbar spinal stenosis with facet hypertrophy/arthropathy, lumbar degenerative disc disease with acute lumbar radiculopathy; with history of chronic pain syndrome  Thus patient was referred to our neurosurgeon  Patient underwent TSI today  Patient felt better  Patient denies any more significant pains and was able to walk fine with a walker  With presently no numbness and no problems urinating or moving her bowels at this point  Neurosurgery discussed possibility of surgical intervention if patient were to fail conservative management  Patient was also placed on Medrol Dosepak and just needs 2 more days of this medication  Patient will continue with pain control as prescribed (please see notes above)  Neurosurgery is okay with her her discharge to home today  For details about patient's diagnosis, workups, management, hospital course and discharge plans, Please see above list of diagnoses and notes          Condition at Discharge: stable     Discharge Day Visit / Exam:     Subjective:    Patient is doing fine and feels a lot better after the CLIFF  Not much pains anymore  Patient was able to walk with a walker and with the physical therapist, without much problems  No numbness  No problems urinating or moving her bowels  No other complaints  No shortness of breath  Vitals: Blood Pressure: (!) 182/88 (Rn aware ) (05/21/18 1245)  Pulse: 104 (05/21/18 1245)  Temperature: 97 8 °F (36 6 °C) (05/21/18 1245)  Temp Source: Oral (05/21/18 1245)  Respirations: 18 (05/21/18 1245)  Height: 5' 2" (157 5 cm) (05/17/18 1815)  Weight - Scale: 98 9 kg (218 lb) (05/17/18 1815)  SpO2: 97 % (05/21/18 1245)  Exam:   Physical Exam   Constitutional: No distress  HENT:   Head: Normocephalic and atraumatic  Eyes: Right eye exhibits no discharge  Left eye exhibits no discharge  No scleral icterus  Neck: No JVD present  No tracheal deviation present  Cardiovascular: Normal rate, regular rhythm and normal heart sounds  Exam reveals no gallop and no friction rub  No murmur heard  Pulmonary/Chest: Effort normal and breath sounds normal  No stridor  No respiratory distress  She has no wheezes  She has no rales  Abdominal: Soft  Bowel sounds are normal  She exhibits no distension  There is no tenderness  There is no rebound and no guarding  Musculoskeletal: She exhibits no edema, tenderness or deformity  Neurological: She is alert  She has normal strength  No cranial nerve deficit or sensory deficit  Skin: Skin is warm  No rash noted  She is not diaphoretic  No erythema  No pallor  Psychiatric: She has a normal mood and affect  Her behavior is normal  Thought content normal    Vitals reviewed  Discussion with Family:  I offered to call patient's family but patient declined  Discharge instructions/Information to patient and family:   See after visit summary for information provided to patient and family  Provisions for Follow-Up Care:  See after visit summary for information related to follow-up care and any pertinent home health orders  Disposition:     Home with VNA Services (Reminder: Complete face to face encounter), with PT/OT  For Discharges to [facility]:   · Not Applicable to this Patient - Not Applicable to this Patient    Planned Readmission:  None  Discharge Statement:  I spent 45 minutes discharging the patient  This time was spent on the day of discharge  I had direct contact with the patient on the day of discharge  Greater than 50% of the total time was spent examining patient, answering all patient questions, arranging and discussing plan of care with patient as well as directly providing post-discharge instructions  Additional time then spent on discharge activities  Discharge Medications:  See after visit summary for reconciled discharge medications provided to patient and family        ** Please Note: This note has been constructed using a voice recognition system **

## 2018-05-21 NOTE — SEDATION DOCUMENTATION
Pt received from unit co 8/10 pain in left hip and knee  Tolerated prone position for epidural steroid injection by Dr Wendy Hutton  Pain 0/10 post procedure  Pt stable and comfortable on transfer back to  it   No movement restriction post injection

## 2018-05-23 ENCOUNTER — TELEPHONE (OUTPATIENT)
Dept: FAMILY MEDICINE CLINIC | Facility: CLINIC | Age: 63
End: 2018-05-23

## 2018-05-23 ENCOUNTER — TELEPHONE (OUTPATIENT)
Dept: INTERNAL MEDICINE CLINIC | Facility: CLINIC | Age: 63
End: 2018-05-23

## 2018-05-23 ENCOUNTER — PATIENT OUTREACH (OUTPATIENT)
Dept: INTERNAL MEDICINE CLINIC | Facility: CLINIC | Age: 63
End: 2018-05-23

## 2018-05-23 DIAGNOSIS — G89.29 OTHER CHRONIC PAIN: ICD-10-CM

## 2018-05-23 RX ORDER — TRAMADOL HYDROCHLORIDE 50 MG/1
50 TABLET ORAL EVERY 6 HOURS PRN
Qty: 120 TABLET | Refills: 0 | Status: SHIPPED | OUTPATIENT
Start: 2018-05-23 | End: 2018-06-26 | Stop reason: SDUPTHER

## 2018-05-23 NOTE — TELEPHONE ENCOUNTER
Home care nurse called because patient is on cyclobenzaprine,oxycodone and tramadol  And there is a drug interaction between them  What do you recommend the patient to do?

## 2018-05-23 NOTE — TELEPHONE ENCOUNTER
Why is [facility] going in to see pt? Was she just in the hospital? If so, for what and what were her d/cing meds and dx  Does she need a KARISHMA?

## 2018-05-23 NOTE — TELEPHONE ENCOUNTER
We are currently working on getting her in this week for a TCM    She was admitted from 05/17-05/21 @  [facility]

## 2018-05-23 NOTE — TELEPHONE ENCOUNTER
Pt need refill on tramodol 50 mg takes 1-2 Q6 hrs  With the most recent pain she has been taking 2    Pharmacy [facility]    Please advise    [phone]

## 2018-05-30 ENCOUNTER — TRANSITIONAL CARE MANAGEMENT (OUTPATIENT)
Dept: FAMILY MEDICINE CLINIC | Facility: CLINIC | Age: 63
End: 2018-05-30

## 2018-05-30 ENCOUNTER — OFFICE VISIT (OUTPATIENT)
Dept: FAMILY MEDICINE CLINIC | Facility: CLINIC | Age: 63
End: 2018-05-30

## 2018-05-30 VITALS
HEIGHT: 62 IN | RESPIRATION RATE: 20 BRPM | BODY MASS INDEX: 39.56 KG/M2 | SYSTOLIC BLOOD PRESSURE: 120 MMHG | TEMPERATURE: 97.9 F | WEIGHT: 215 LBS | HEART RATE: 96 BPM | DIASTOLIC BLOOD PRESSURE: 76 MMHG

## 2018-05-30 DIAGNOSIS — M51.37 DEGENERATION OF INTERVERTEBRAL DISC OF LUMBOSACRAL REGION: Primary | ICD-10-CM

## 2018-05-30 DIAGNOSIS — IMO0001 TRANSITION OF CARE PERFORMED WITH SHARING OF CLINICAL SUMMARY: ICD-10-CM

## 2018-05-30 DIAGNOSIS — M54.16 CHRONIC LUMBAR RADICULOPATHY: ICD-10-CM

## 2018-05-30 PROCEDURE — TCMS TRANSITION OF CARE: Performed by: NURSE PRACTITIONER

## 2018-05-30 NOTE — PROGRESS NOTES
Date and time hospital follow up call was made:  5/23/2018  8:53 AM  Patient was hopsitalized at:  [facility]  Date of admission:  5/17/18  Date of discharge:  5/21/18  Diagnosis:  back pain  Disposition:  Home  Were the patients medicaitons reviewed and updated:  Yes  Should patient be enrolled in anticoag monitoring?:  No  Scheduled for follow up?:  Yes  Did you obtain your prescribed medications:  Yes  Do you need help managing your perscriptions or medications:  No  Is transportation to your appointments needed:  No  I have advised the patient to call PCP with any new or worsening symptoms (please type in name along with any credentials):  Yuly Juarez DARRYN  Living Arrangements:  Spouse or Significiant other  Support System:  Spouse  The type of support provided:  Emotional, Physical, Financial  Do you have social support:  Yes, as much as I need  Are you recieving outpatient services:  No  Are you recieving home care services:  No  Are you using any community resources:  No  Current waiver service:  No  Have you fallen in the last 12 months:  No  Interperter language line required?:  No  Counseling:  Patient           Transition of Care  Follow-up After Hospitalization    Anastasiia Key 70 y o  female   Date:  5/30/2018    Date and time hospital follow up call was made:  5/23/2018  8:53 AM  Patient was hopsitalized at:  [facility]  Date of admission:  5/17/18  Date of discharge:  5/21/18  Diagnosis:  back pain  Disposition:  Home  Were the patients medicaitons reviewed and updated:  Yes  Should patient be enrolled in anticoag monitoring?:  No  Scheduled for follow up?:  Yes  Did you obtain your prescribed medications:  Yes  Do you need help managing your perscriptions or medications:  No  Is transportation to your appointments needed:  No  I have advised the patient to call PCP with any new or worsening symptoms (please type in name along with any credentials):  Yuly Juarez RMA  Living Arrangements:  Spouse or Significiant other  Support System:  Spouse  The type of support provided:  Emotional, Physical, Financial  Do you have social support:  Yes, as much as I need  Are you recieving outpatient services:  No  Are you recieving home care services:  No  Are you using any community resources:  No  Current waiver service:  No  Have you fallen in the last 12 months:  No  Interperter language line required?:  No  Counseling:  Patient       Hospital records were reviewed  Medications upon discharge reviewed/updated  yes  Discharge Disposition:  Pt is improved but continues to c/o left burning pain radiating from lumbar spine down the entire leg, regardless of activity  Pt currently receiving in home PT with [facility]    Follow up visits with other specialists: Pt has been referred to [address] [place] [address]:    Diagnoses and all orders for this visit:        Jenae Riggins was seen today for transition of care management  Diagnoses and all orders for this visit:    Degeneration of intervertebral disc of lumbosacral region  Comments:  Pt referred to [facility] due to persistent back pain and numbness  was told not to return to [facility]  Orders:  -     Ambulatory referral to Neurosurgery; Future    Transition of care performed with sharing of clinical summary  Comments:  Pt currently using Tramadol, Valium and Tyelonol arthritis for back pain  Pt receiving Home PT with [facility]    Acute on chronic lumbar radiculopathy  Comments:  Pt currently using Tramadol, Valium and Tyelonol arthritis for back pain  Pt receiving Home PT with [facility]            HPI:    Assessment:    Low back pain, likely secondary to lumbar radiculopathy with suspect impingement syndrome [id number] per MRI                      ROS: Review of Systems   Constitutional: Positive for activity change  Negative for fatigue          Pt is still using RW, c/o pain left leg radiating from spine to left knee   HENT: Negative for congestion, postnasal drip, rhinorrhea, sinus pain, sore throat and trouble swallowing  Eyes: Negative for pain and visual disturbance  Respiratory: Negative for cough, shortness of breath and wheezing  Cardiovascular: Negative for chest pain and palpitations  Gastrointestinal: Negative for constipation, diarrhea, nausea and vomiting  Endocrine: Negative for polydipsia, polyphagia and polyuria  Genitourinary: Negative for difficulty urinating, flank pain, frequency and pelvic pain  Musculoskeletal: Positive for back pain and gait problem  Negative for arthralgias, joint swelling and myalgias  Skin: Negative  Negative for color change and rash  Neurological: Negative for dizziness, syncope, weakness, light-headedness, numbness and headaches  Hematological: Negative for adenopathy  Does not bruise/bleed easily  Psychiatric/Behavioral: Negative for behavioral problems and sleep disturbance  The patient is not nervous/anxious  Past Medical History:   Diagnosis Date    Abnormal blood chemistry     resolved: 2/9/2017    Abscess of groin     last assessed: 10/11/2013    Chronic pain     Obesity     Uterine cancer ([facility] [address] )     uterine       Past Surgical History:   Procedure Laterality Date    CHOLECYSTECTOMY      EPIDURAL BLOCK INJECTION      HYSTERECTOMY      Total: With removal of both tubes and both ovaries       Social History     Social History    Marital status:       Spouse name: N/A    Number of children: N/A    Years of education: N/A     Occupational History    underwear salesperson      retired     Social History Main Topics    Smoking status: Never Smoker    Smokeless tobacco: Never Used    Alcohol use Yes      Comment: social ; denied: history of alcohol  use (history) ( as per allscripts); occasional alcohol use ( as per allscripts)    Drug use: No      Comment: chronic narcotic use ( as per allscripts)    Sexual activity: Not Asked     Other Topics Concern    None     Social History Narrative    Single- Engaged    Retired    Lives at home with spouse     Engaged to be        Family History   Problem Relation Age of Onset    Ovarian cancer Mother     Cancer Mother     Pancreatic cancer Father     Cancer Father     Diabetes Family      mellitus       No Known Allergies      Current Outpatient Prescriptions:     acetaminophen (TYLENOL) 325 mg tablet, Take 2 tablets (650 mg total) by mouth every 6 (six) hours as needed for mild pain, Disp: 30 tablet, Rfl: 0    Camphor-Menthol-Methyl Sal (HM SALONPAS PAIN RELIEF) 1 2-5 7-6 3 % PTCH, Place 1 patch on the skin daily, Disp: 30 patch, Rfl: 0    diazepam (VALIUM) 10 mg tablet, Take 1 tablet (10 mg total) by mouth every 8 (eight) hours as needed for anxiety, sedation or muscle spasms, Disp: 30 tablet, Rfl: 0    docusate sodium (COLACE) 100 mg capsule, Take 1 capsule (100 mg total) by mouth 2 (two) times a day, Disp: 10 capsule, Rfl: 0    gabapentin (NEURONTIN) 100 mg capsule, Take 1 capsule (100 mg total) by mouth 3 (three) times a day, Disp: 90 capsule, Rfl: 0    traMADol (ULTRAM) 50 mg tablet, Take 1 tablet (50 mg total) by mouth every 6 (six) hours as needed for moderate pain, Disp: 120 tablet, Rfl: 0    Multiple Vitamin (MULTIVITAMIN) tablet, Take 1 tablet by mouth daily, Disp: , Rfl:       Physical Exam:  /76 (BP Location: Left arm, Patient Position: Sitting, Cuff Size: Large)   Pulse 96   Temp 97 9 °F (36 6 °C) (Tympanic)   Resp 20   Ht 5' 2" (1 575 m)   Wt 97 5 kg (215 lb)   BMI 39 32 kg/m²     Physical Exam   Constitutional: She is oriented to person, place, and time  She appears well-developed and well-nourished  HENT:   Head: Normocephalic  Eyes: Pupils are equal, round, and reactive to light  Neck: Normal range of motion  Cardiovascular: Normal rate and regular rhythm  Pulmonary/Chest: Effort normal and breath sounds normal    Abdominal: Soft   Bowel sounds are normal  Musculoskeletal:        Left knee: She exhibits decreased range of motion  Lumbar back: She exhibits decreased range of motion, tenderness, pain and spasm  Neurological: She is alert and oriented to person, place, and time  Skin: Skin is warm and dry  Psychiatric: She has a normal mood and affect  Her behavior is normal  Judgment and thought content normal    Nursing note and vitals reviewed            Labs:  Lab Results   Component Value Date    WBC 8 65 05/18/2018    HGB 12 0 05/18/2018    HCT 35 2 05/18/2018    MCV 95 05/18/2018     05/18/2018     Lab Results   Component Value Date     05/20/2018    K 4 5 05/20/2018     05/20/2018    CO2 28 05/20/2018    ANIONGAP 6 05/20/2018    BUN 32 (H) 05/20/2018    CREATININE 0 86 05/20/2018    GLUCOSE 97 05/20/2018    CALCIUM 9 0 05/20/2018    AST 17 05/20/2016    ALT 28 05/20/2016    ALKPHOS 61 05/20/2016    PROT 7 1 05/20/2016    BILITOT 0 40 05/20/2016    EGFR 71 05/20/2018

## 2018-05-31 ENCOUNTER — PATIENT OUTREACH (OUTPATIENT)
Dept: INTERNAL MEDICINE CLINIC | Facility: CLINIC | Age: 63
End: 2018-05-31

## 2018-05-31 NOTE — PROGRESS NOTES
Outpatient Care Management Note:  Patient continues to have lower back pain and burning radiating down left leg to knee  Using muscle relaxants and anti-inflammatory medications with only minimal relief  PT is continuing to work with patient at home  Per patient, neurosurgery scheduling told her that she did not need to be seen  Had a follow up visit with her PCP on 5/29/18-they recommended a second opinion with [facility]  She is calling today to set up that appointment  She is having no other concerns at this time  Amenable to continued follow up

## 2018-06-07 ENCOUNTER — TELEPHONE (OUTPATIENT)
Dept: FAMILY MEDICINE CLINIC | Facility: CLINIC | Age: 63
End: 2018-06-07

## 2018-06-07 NOTE — TELEPHONE ENCOUNTER
[facility] PT called she did 4 weeks of therapy she is doing much better they are discharging her today 6-7-18 She has a follow up with [facility] dr Null Gentle

## 2018-06-11 DIAGNOSIS — F41.9 ANXIETY: ICD-10-CM

## 2018-06-11 RX ORDER — DIAZEPAM 10 MG/1
10 TABLET ORAL EVERY 8 HOURS PRN
Qty: 30 TABLET | Refills: 0 | Status: SHIPPED | OUTPATIENT
Start: 2018-06-11 | End: 2018-10-04 | Stop reason: ALTCHOICE

## 2018-06-21 ENCOUNTER — PATIENT OUTREACH (OUTPATIENT)
Dept: FAMILY MEDICINE CLINIC | Facility: CLINIC | Age: 63
End: 2018-06-21

## 2018-06-26 ENCOUNTER — PATIENT OUTREACH (OUTPATIENT)
Dept: FAMILY MEDICINE CLINIC | Facility: CLINIC | Age: 63
End: 2018-06-26

## 2018-06-26 DIAGNOSIS — G89.29 OTHER CHRONIC PAIN: ICD-10-CM

## 2018-06-26 RX ORDER — TRAMADOL HYDROCHLORIDE 50 MG/1
50 TABLET ORAL EVERY 6 HOURS PRN
Qty: 120 TABLET | Refills: 0 | Status: SHIPPED | OUTPATIENT
Start: 2018-06-26 | End: 2018-07-24 | Stop reason: SDUPTHER

## 2018-07-24 DIAGNOSIS — G89.29 OTHER CHRONIC PAIN: ICD-10-CM

## 2018-07-24 RX ORDER — TRAMADOL HYDROCHLORIDE 50 MG/1
50 TABLET ORAL EVERY 6 HOURS PRN
Qty: 120 TABLET | Refills: 0 | Status: SHIPPED | OUTPATIENT
Start: 2018-07-24 | End: 2018-07-25 | Stop reason: SDUPTHER

## 2018-07-24 RX ORDER — TRAMADOL HYDROCHLORIDE 50 MG/1
50 TABLET ORAL EVERY 6 HOURS PRN
Qty: 120 TABLET | Refills: 0 | Status: SHIPPED | OUTPATIENT
Start: 2018-07-24 | End: 2018-08-22 | Stop reason: SDUPTHER

## 2018-07-24 NOTE — PROGRESS NOTES
Assessment/Plan:    No problem-specific Assessment & Plan notes found for this encounter  Diagnoses and all orders for this visit:    Acute on chronic lumbar radiculopathy    Degeneration of intervertebral disc of lumbosacral region    Chronic narcotic use          Subjective:      Patient ID: Torri Ribeiro is a 70 y o  female here for f/u low back pain    Back Pain   This is a chronic problem  The current episode started more than 1 month ago  The problem occurs daily  The problem has been gradually improving since onset  The pain is present in the lumbar spine and sacro-iliac  The quality of the pain is described as burning, aching, shooting and stabbing  The pain radiates to the left thigh  Pertinent negatives include no chest pain, headaches, numbness, pelvic pain or weakness  Risk factors include lack of exercise, menopause and obesity  She has tried ice, NSAIDs, walking, muscle relaxant, heat and home exercises for the symptoms  The following portions of the patient's history were reviewed and updated as appropriate:   She  has a past medical history of Abnormal blood chemistry; Abscess of groin; Chronic pain; Obesity; and Uterine cancer ([facility] )  She   Patient Active Problem List    Diagnosis Date Noted    Urinary retention 05/17/2018    SIRS (systemic inflammatory response syndrome) ([facility] [address] ) 05/17/2018    Chronic narcotic use 03/29/2018    Obesity 08/28/2017    Mixed hyperlipidemia 02/09/2017    Acute on chronic lumbar radiculopathy 05/26/2016    Degeneration of intervertebral disc of lumbosacral region 05/26/2016    Malignant neoplasm of uterus ([facility] [address] ) 05/19/2016     She  has a past surgical history that includes Cholecystectomy; Hysterectomy; and Epidural block injection  Her family history includes Cancer in her father and mother; Diabetes in her family; Ovarian cancer in her mother; Pancreatic cancer in her father  She  reports that she has never smoked   She has never used smokeless tobacco  She reports that she drinks alcohol  She reports that she does not use drugs  Current Outpatient Prescriptions   Medication Sig Dispense Refill    acetaminophen (TYLENOL) 325 mg tablet Take 2 tablets (650 mg total) by mouth every 6 (six) hours as needed for mild pain 30 tablet 0    Camphor-Menthol-Methyl Sal (HM SALONPAS PAIN RELIEF) 1 2-5 7-6 3 % PTCH Place 1 patch on the skin daily 30 patch 0    diazepam (VALIUM) 10 mg tablet Take 1 tablet (10 mg total) by mouth every 8 (eight) hours as needed for muscle spasms Dr Shalini Yang Supervising 30 tablet 0    docusate sodium (COLACE) 100 mg capsule Take 1 capsule (100 mg total) by mouth 2 (two) times a day 10 capsule 0    gabapentin (NEURONTIN) 100 mg capsule Take 1 capsule (100 mg total) by mouth 3 (three) times a day 90 capsule 0    Multiple Vitamin (MULTIVITAMIN) tablet Take 1 tablet by mouth daily      traMADol (ULTRAM) 50 mg tablet Take 1 tablet (50 mg total) by mouth every 6 (six) hours as needed for moderate pain 120 tablet 0     No current facility-administered medications for this visit        Current Outpatient Prescriptions on File Prior to Visit   Medication Sig    acetaminophen (TYLENOL) 325 mg tablet Take 2 tablets (650 mg total) by mouth every 6 (six) hours as needed for mild pain    Camphor-Menthol-Methyl Sal (HM SALONPAS PAIN RELIEF) 1 2-5 7-6 3 % PTC Place 1 patch on the skin daily    diazepam (VALIUM) 10 mg tablet Take 1 tablet (10 mg total) by mouth every 8 (eight) hours as needed for muscle spasms Dr Shalini Yang Supervising    docusate sodium (COLACE) 100 mg capsule Take 1 capsule (100 mg total) by mouth 2 (two) times a day    gabapentin (NEURONTIN) 100 mg capsule Take 1 capsule (100 mg total) by mouth 3 (three) times a day    Multiple Vitamin (MULTIVITAMIN) tablet Take 1 tablet by mouth daily    traMADol (ULTRAM) 50 mg tablet Take 1 tablet (50 mg total) by mouth every 6 (six) hours as needed for moderate pain     No current facility-administered medications on file prior to visit  She is allergic to paclitaxel       Review of Systems   Constitutional: Positive for activity change  Negative for fatigue  Pt is still using RW, c/o pain left leg radiating from spine to left knee   HENT: Negative for congestion, postnasal drip, rhinorrhea, sinus pain, sore throat and trouble swallowing  Eyes: Negative for pain and visual disturbance  Respiratory: Negative for cough, shortness of breath and wheezing  Cardiovascular: Negative for chest pain and palpitations  Gastrointestinal: Negative for constipation, diarrhea, nausea and vomiting  Endocrine: Negative for polydipsia, polyphagia and polyuria  Genitourinary: Negative for difficulty urinating, flank pain, frequency and pelvic pain  Musculoskeletal: Positive for back pain and gait problem  Negative for arthralgias, joint swelling and myalgias  Skin: Negative  Negative for color change and rash  Neurological: Negative for dizziness, syncope, weakness, light-headedness, numbness and headaches  Hematological: Negative for adenopathy  Does not bruise/bleed easily  Psychiatric/Behavioral: Negative for behavioral problems and sleep disturbance  The patient is not nervous/anxious  Objective: There were no vitals taken for this visit  Physical Exam   Constitutional: She is oriented to person, place, and time  She appears well-developed and well-nourished  HENT:   Head: Normocephalic  Eyes: Pupils are equal, round, and reactive to light  Neck: Normal range of motion  Cardiovascular: Normal rate and regular rhythm  Pulmonary/Chest: Effort normal and breath sounds normal    Abdominal: Soft  Bowel sounds are normal    Musculoskeletal:        Left knee: She exhibits decreased range of motion  Lumbar back: She exhibits decreased range of motion, tenderness, pain and spasm     Neurological: She is alert and oriented to person, place, and time  Skin: Skin is warm and dry  Psychiatric: She has a normal mood and affect  Her behavior is normal  Judgment and thought content normal    Nursing note and vitals reviewed

## 2018-07-24 NOTE — TELEPHONE ENCOUNTER
Pt is out of her tramadol 50 mg tab  Takes 1 QD 6 hrs prn  # 120    She thought that she would have enough till her appointment tomorrow but she is out    Pharmacy [facility]    Please advise    Phone: [phone]

## 2018-07-25 ENCOUNTER — OFFICE VISIT (OUTPATIENT)
Dept: FAMILY MEDICINE CLINIC | Facility: CLINIC | Age: 63
End: 2018-07-25
Payer: MEDICARE

## 2018-07-25 VITALS
TEMPERATURE: 98.9 F | HEART RATE: 68 BPM | RESPIRATION RATE: 16 BRPM | BODY MASS INDEX: 39.27 KG/M2 | WEIGHT: 213.4 LBS | HEIGHT: 62 IN | DIASTOLIC BLOOD PRESSURE: 86 MMHG | SYSTOLIC BLOOD PRESSURE: 140 MMHG

## 2018-07-25 DIAGNOSIS — Z13.5 SCREENING FOR GLAUCOMA: ICD-10-CM

## 2018-07-25 DIAGNOSIS — M54.16 CHRONIC LUMBAR RADICULOPATHY: ICD-10-CM

## 2018-07-25 DIAGNOSIS — Z23 ENCOUNTER FOR IMMUNIZATION: ICD-10-CM

## 2018-07-25 DIAGNOSIS — R53.83 OTHER FATIGUE: ICD-10-CM

## 2018-07-25 DIAGNOSIS — E55.9 VITAMIN D DEFICIENCY: ICD-10-CM

## 2018-07-25 DIAGNOSIS — Z00.00 MEDICARE ANNUAL WELLNESS VISIT, INITIAL: Primary | ICD-10-CM

## 2018-07-25 DIAGNOSIS — Z13.220 SCREENING FOR HYPERLIPIDEMIA: ICD-10-CM

## 2018-07-25 DIAGNOSIS — Z23 NEED FOR VACCINATION WITH 13-POLYVALENT PNEUMOCOCCAL CONJUGATE VACCINE: ICD-10-CM

## 2018-07-25 DIAGNOSIS — Z13.1 SCREENING FOR DIABETES MELLITUS: ICD-10-CM

## 2018-07-25 DIAGNOSIS — Z13.6 SCREENING FOR AAA (ABDOMINAL AORTIC ANEURYSM): ICD-10-CM

## 2018-07-25 DIAGNOSIS — F11.90 CHRONIC NARCOTIC USE: ICD-10-CM

## 2018-07-25 PROCEDURE — 99214 OFFICE O/P EST MOD 30 MIN: CPT | Performed by: NURSE PRACTITIONER

## 2018-07-25 PROCEDURE — G0402 INITIAL PREVENTIVE EXAM: HCPCS | Performed by: NURSE PRACTITIONER

## 2018-07-25 PROCEDURE — G0009 ADMIN PNEUMOCOCCAL VACCINE: HCPCS

## 2018-07-25 PROCEDURE — 90670 PCV13 VACCINE IM: CPT

## 2018-07-25 NOTE — PROGRESS NOTES
Assessment and Plan:    Problem List Items Addressed This Visit        Nervous and Auditory    Acute on chronic lumbar radiculopathy    Relevant Orders    UA w Reflex to Microscopic w Reflex to Culture       Other    Chronic narcotic use    Relevant Orders    UA w Reflex to Microscopic w Reflex to Culture      Other Visit Diagnoses     Medicare annual wellness visit, initial    -  Primary    Completed today  5 wishes document provided today    Screening for glaucoma        Referred to Dr Ralph Sam     Relevant Orders    Ambulatory referral to Ophthalmology    Screening for diabetes mellitus        Labwork ordered    Relevant Orders    Comprehensive metabolic panel    Screening for hyperlipidemia        Labwork ordered    Relevant Orders    Lipid panel    Vitamin D deficiency        Labwork ordered    Relevant Orders    Vitamin D 25 hydroxy    Other fatigue        Labwork ordered    Relevant Orders    CBC and differential    TSH, 3rd generation with Free T4 reflex    UA w Reflex to Microscopic w Reflex to Culture    Screening for AAA (abdominal aortic aneurysm)        US AAA ordered    Relevant Orders    US abdominal aorta    Encounter for immunization        Prevnar 13 provided today    Relevant Orders    PNEUMOCOCCAL CONJUGATE VACCINE 13-VALENT LESS THAN 5Y0  (Prevnar 13) (Completed)    Need for vaccination with 13-polyvalent pneumococcal conjugate vaccine        Prevnar 13 provided today        Health Maintenance Due   Topic Date Due    HIV SCREENING  1955         HPI:  Desire Britton is a 70 y o  female here for her Initial Wellness Visit      Patient Active Problem List   Diagnosis    Acute on chronic lumbar radiculopathy    Degeneration of intervertebral disc of lumbosacral region    Malignant neoplasm of uterus ([address] )    Mixed hyperlipidemia    Obesity    Chronic narcotic use    Urinary retention    SIRS (systemic inflammatory response syndrome) ([address] )     Past Medical History:   Diagnosis Date    Abnormal blood chemistry     resolved: 2/9/2017    Abscess of groin     last assessed: 10/11/2013    Chronic pain     Obesity     Spinal stenosis     Uterine cancer (HCC)     uterine     Past Surgical History:   Procedure Laterality Date    CHOLECYSTECTOMY      EPIDURAL BLOCK INJECTION      HYSTERECTOMY      Total: With removal of both tubes and both ovaries     Family History   Problem Relation Age of Onset    Ovarian cancer Mother     Cancer Mother     Pancreatic cancer Father     Cancer Father     Diabetes Family         mellitus     History   Smoking Status    Never Smoker   Smokeless Tobacco    Never Used     History   Alcohol Use    Yes     Comment: social ; denied: history of alcohol  use (history) ( as per allscripts); occasional alcohol use ( as per allscripts)      History   Drug Use No     Comment: chronic narcotic use ( as per allscripts)       Current Outpatient Prescriptions   Medication Sig Dispense Refill    acetaminophen (TYLENOL) 325 mg tablet Take 2 tablets (650 mg total) by mouth every 6 (six) hours as needed for mild pain 30 tablet 0    diazepam (VALIUM) 10 mg tablet Take 1 tablet (10 mg total) by mouth every 8 (eight) hours as needed for muscle spasms Dr Trell Dykes Supervising 30 tablet 0    gabapentin (NEURONTIN) 100 mg capsule Take 1 capsule (100 mg total) by mouth 3 (three) times a day 90 capsule 0    Multiple Vitamin (MULTIVITAMIN) tablet Take 1 tablet by mouth daily      traMADol (ULTRAM) 50 mg tablet TAKE 1 TABLET (50 MG TOTAL) BY MOUTH EVERY 6 (SIX) HOURS AS NEEDED FOR MODERATE PAIN 120 tablet 0     No current facility-administered medications for this visit        Allergies   Allergen Reactions    Paclitaxel Anaphylaxis     Anaphylaxis     Immunization History   Administered Date(s) Administered    Pneumococcal Conjugate 13-Valent 07/25/2018    Pneumococcal Polysaccharide PPV23 04/11/2014    Tdap 05/19/2016       Patient Care Team:  Caroline Izquierdo DO as PCP - General      Medicare Screening Tests and Risk Assessments:  AWV Clinical     ISAR:   Previous hospitalizations?:  Yes   How many hospitalizations have you had in the last year?:  1-2   Additional Comments:  back pain       Once in a Lifetime Medicare Screening:   EKG performed?:  Yes        Medicare Screening Tests and Risk Assessment:   AAA Risk Assessment    None Indicated:  Yes    Osteoporosis Risk Assessment     Female:  Yes   :  Yes    Age over 48:  Yes    HIV Risk Assessment    None indicated:  Yes        Drug and Alcohol Use:   Tobacco use    Cigarettes:  never smoker    Smokeless:  never used smokeless tobacco    Tobacco use duration    Tobacco Cessation Readiness    Alcohol use    Alcohol use:  occasional use    Amount of alcohol consumed:  1 drink per month    Concern about alcohol use:  No Tolerance to alcohol:  No   Attempts to cut down:  No Guilt about use:  No   Patient concern:  No Annoyed by criticism:  No   Morning drinking:  No Family concern:  No   Alcohol Treatment Readiness   Readiness to quit:  declined    Illicit Drug Use    Drug use:  never    Drug type:  no sedative use       Diet & Exercise:   Diet   What is your diet?:  Regular   How many servings a day of the following:   Fruits and Vegetables:  1-2 Meat:  1-2   Whole Grains:  1 Simple Carbs:  2   Dairy:  1 Soda:  0   Coffee:  1 Tea:  3   Exercise    Do you currently exercise?:  currently not exercising       Cognitive Impairment Screening:   Depression screening preformed:  Yes     PHQ-9 Depression scale score:  1   Depression screening results:  no significant symptoms   Cognitive Impairment Screening    Do you have difficulty learning or retaining new information?:  No Do you have difficulty handling new tasks?:  No   Do you have difficulty with reasoning?:  No Do you have difficulty with spatial ability and orientation?:  No   Do you have difficulty with language?:  No Do you have difficulty with behavior?:  No Functional Ability/Level of Safety:   Hearing    Hearing difficulties:  No Bilateral:  normal   Left:  normal Right:  normal   Hearing aid:  No    Hearing Impairment Assessment    Hearing status:  No impairment   Do your family members ever complain that you turn on the radio or T V  too loudly?:  No Do you find that other people have to repeat themselves when talking to you?:  No   Do you have difficulty hearing while talking on the phone?:  No Has anyone ever told you that you are speaking too loudly when talking with them?:  No   Do you have trouble hearing the doorbell or phone ringing?:  No Do you have difficulty hearing such that you feel frustrated talking to people?:  No   Do you feel sad because you cannot hear well?:  No Do you feel inconvienced due to your hearing problem?:  No   Do you think you would be a happier person if you could hear better?:  No Would you be willing to go for a hearing aid fitting if suggested?:  Yes   Current Activities    Status:  unlimited ADL's, unlimited driving   Help needed with the folllowing:    Using the phone:  No Transportation:  No   Shopping:  No Preparing Meals:  No   Doing Housework:  No Doing Laundry:  No   Managing Medications:  No Managing Money:  No   ADL    Feeding:  Independant   Oral hygiene and Facial grooming:  Independant   Bathing:  Independant   Upper Body Dressing:  Independant   Lower Body Dressing:  Independant   Toileting:  Independant   Bed Mobility:  Independant   Fall Risk   Have you fallen in the last 12 months?:  No Are you unsteady on your feet?:  Yes    Are you taking any medications that may cause fatigue or dizziness?:  Yes   Do you have any chronic conditions that may contribute to a fall?:  Neurological diseases, Arthritis, Joint disease Do you rush to the bathroom potentially risking a fall?:  No   Injury History   Polypharmacy:  No Antidepressant Use:  No   Sedative Use:  No Antihypertensive Use:  No   Previous Fall:  No Alcohol Use:  No Deconditioning:  No Visual Impairment:  No   Cogitive Impairment:  No Mmobility Impairment:  No   Postural Hypotension:  No Urinary Incontinence:  No       Home Safety:   Are there hazards in your environment?:  Yes   What are the hazards:  Loose rugs on the floor   If you fell, would you need help to get back up from the ground?:  Yes Do you have problems or concerns getting in/out of a bed, chair, tub, or toilet?:  No   Do you feel unsteady when walking?:  Yes Is your activity limited by pain?:  Yes   Do you have handrails and grab-bars in the home?:  No Are emergency numbers kept by the phone and regularly updated?:  Yes   Are you and/or family members aware of the dangers of smoking in bed?:  Yes Are firearms stored securely?:  Yes   Do you have working smoke alarms and fire extinguisher?:  Yes Do all household members know how to use them?:  Yes   Have you left the stove on unsupervised?:  No    Home Safety Risk Factors   Unfamilar with surroundings:  No Uneven floors:  No   Stairs or handrail saftey risk:  No Loose rugs:  Yes   Household clutter:  No Poor household lighting:  No   No grab bars in bathroom:  Yes Further evaluation needed:  No       Advanced Directives:   Advanced Directives    Living Will:  No Durable POA for healthcare:  No   Advanced directive:  No    Patient's End of Life Decisions    Reviewed with patient:  Yes Provider agrees with end of life decisions:  Yes       Urinary Incontinence:   Do you have urinary incontinence?:  Yes Do you have incomplete emptying?:  No   Do you urinate frequently?:  No Do you have urinary urgency?:  No   Do you have urinary hesitancy?:  No Do you have dysuria (painful and/or difficult urination)?:  No   Do you have nocturia (waking up to urinate)?:  No Do you strain when urinating (have to push to urinate)?:  No   Do you have a weak stream when urinating?:  No Do you have intermittent streaming when urinating?:  No   Do you dribble urine after finishing?:  No Do you have vaginal pressure?:  No Do you have vaginal dryness?:  No       Glaucoma:            Provider Screening     Preventative Screening/Counseling:   Cardiovascular Screening/Counseling:   (Labs Q5 years, EKG optional one-time)   General:  Screening Current           Diabetes Screening/Counseling:   (2 tests/year if Pre-Diabetes or 1 test/year if no Diabetes)   General:  Screening Current           Colorectal Cancer Screening/Counseling:   (FOBT Q1 yr; Flex Sig Q4 yrs or Q10 yrs after Screening Colonoscopy; Screening Colonoscpy Q2 yrs High Risk or Q10 yrs Low Risk; Barium Enema Q2 yrs High Risk or Q4 yrs Low Risk)   General:  Screening Current           Prostate Cancer Screening/Counseling:   (Annual)          Breast Cancer Screening/Counseling:   (Baseline Age 28 - 43; Annual Age 36+)   General:  Screening Current          Cervical Cancer Screening/Counseling:   (Annual for High Risk or Childbearing Age with Abnormal Pap in Last 3 yrs; Every 2 all others)   General:  Screening Current           Osteoporosis Screening/Counseling:   (Every 2 Yrs if at risk or more if medically necessary)   General:  Patient Declines           AAA Screening/Counseling:   (Once per Lifetime with risk factors)      [place] US:  Screening US         Glaucoma Screening/Counseling:   (Annual)    Due for: Referrals:  referral to ophthalmology         HIV Screening/Counseling:   (Voluntary; Once annually for high risk OR 3 times for Pregnancy at diagnosis of IUP; 3rd trimester; and at Labor   General:  Risks and Benefits Discussed           Hepatitis C Screening:   Hepatitis C Counseling Provided:   Yes               Immunizations:   Influenza (annual):  Patient Declines   Pneumococcal (Once in a Lifetime):  Pneumococcal Due Today   Hepatitis B Series (low risk patients):  Prevention Counseling   Hepatitis B Series (medium to high risk patients scheduled series):  Patient Declines   Zostavax (Medicare D Coverage, Pt >72 yo):  Risks & Benefits Discussed   TD (Non-Medicare Wellness  Visit required): Td Vaccine UTD   Tdap (Non-Medicare Wellness Visit required):   Tdap Vaccine UTD       Other Preventative Couseling (Non-Medicare Wellness Visit Required):   nutrition counseling performed, fall prevention education provided, car/seat belt/driving safety reviewed, sunscreen education provided, Skin self-exam counseling given, Increased physical activity counseling given       Referrals (Non-Medicare Wellness Visit Required):       Medical Equipment/Suppliers:

## 2018-07-25 NOTE — PATIENT INSTRUCTIONS
Obesity   AMBULATORY CARE:   Obesity  is when your body mass index (BMI) is greater than 30  Your healthcare provider will use your height and weight to measure your BMI  The risks of obesity include  many health problems, such as injuries or physical disability  You may need tests to check for the following:  · Diabetes     · High blood pressure or high cholesterol     · Heart disease     · Gallbladder or liver disease     · Cancer of the colon, breast, prostate, liver, or kidney     · Sleep apnea     · Arthritis or gout  Seek care immediately if:   · You have a severe headache, confusion, or difficulty speaking  · You have weakness on one side of your body  · You have chest pain, sweating, or shortness of breath  Contact your healthcare provider if:   · You have symptoms of gallbladder or liver disease, such as pain in your upper abdomen  · You have knee or hip pain and discomfort while walking  · You have symptoms of diabetes, such as intense hunger and thirst, and frequent urination  · You have symptoms of sleep apnea, such as snoring or daytime sleepiness  · You have questions or concerns about your condition or care  Treatment for obesity  focuses on helping you lose weight to improve your health  Even a small decrease in BMI can reduce the risk for many health problems  Your healthcare provider will help you set a weight-loss goal   · Lifestyle changes  are the first step in treating obesity  These include making healthy food choices and getting regular physical activity  Your healthcare provider may suggest a weight-loss program that involves coaching, education, and therapy  · Medicine  may help you lose weight when it is used with a healthy diet and physical activity  · Surgery  can help you lose weight if you are very obese and have other health problems  There are several types of weight-loss surgery  Ask your healthcare provider for more information    Be successful losing weight:   · Set small, realistic goals  An example of a small goal is to walk for 20 minutes 5 days a week  Anther goal is to lose 5% of your body weight  · Tell friends, family members, and coworkers about your goals  and ask for their support  Ask a friend to lose weight with you, or join a weight-loss support group  · Identify foods or triggers that may cause you to overeat , and find ways to avoid them  Remove tempting high-calorie foods from your home and workplace  Place a bowl of fresh fruit on your kitchen counter  If stress causes you to eat, then find other ways to cope with stress  · Keep a diary to track what you eat and drink  Also write down how many minutes of physical activity you do each day  Weigh yourself once a week and record it in your diary  Eating changes: You will need to eat 500 to 1,000 fewer calories each day than you currently eat to lose 1 to 2 pounds a week  The following changes will help you cut calories:  · Eat smaller portions  Use small plates, no larger than 9 inches in diameter  Fill your plate half full of fruits and vegetables  Measure your food using measuring cups until you know what a serving size looks like  · Eat 3 meals and 1 or 2 snacks each day  Plan your meals in advance  [employer] and eat at home most of the time  Eat slowly  · Eat fruits and vegetables at every meal   They are low in calories and high in fiber, which makes you feel full  Do not add butter, margarine, or cream sauce to vegetables  Use herbs to season steamed vegetables  · Eat less fat and fewer fried foods  Eat more baked or grilled chicken and fish  These protein sources are lower in calories and fat than red meat  Limit fast food  Dress your salads with olive oil and vinegar instead of bottled dressing  · Limit the amount of sugar you eat  Do not drink sugary beverages  Limit alcohol  Activity changes:  Physical activity is good for your body in many ways   It helps you burn calories and build strong muscles  It decreases stress and depression, and improves your mood  It can also help you sleep better  Talk to your healthcare provider before you begin an exercise program   · Exercise for at least 30 minutes 5 days a week  Start slowly  Set aside time each day for physical activity that you enjoy and that is convenient for you  It is best to do both weight training and an activity that increases your heart rate, such as walking, bicycling, or swimming  · Find ways to be more active  Do yard work and housecleaning  Walk up the stairs instead of using elevators  Spend your leisure time going to events that require walking, such as outdoor festivals or fairs  This extra physical activity can help you lose weight and keep it off  Follow up with your healthcare provider as directed: You may need to meet with a dietitian  Write down your questions so you remember to ask them during your visits  © 2017 [employer] Information is for End User's use only and may not be sold, redistributed or otherwise used for commercial purposes  All illustrations and images included in CareNotes® are the copyrighted property of [employer]  or Ric[employer]o Ellison  The above information is an  only  It is not intended as medical advice for individual conditions or treatments  Talk to your doctor, nurse or pharmacist before following any medical regimen to see if it is safe and effective for you  Urinary Incontinence   WHAT YOU NEED TO KNOW:   What is urinary incontinence? Urinary incontinence (UI) is when you lose control of your bladder  What causes UI? UI occurs because your bladder cannot store or empty urine properly  The following are the most common types of UI:  · Stress incontinence  is when you leak urine due to increased bladder pressure  This may happen when you cough, sneeze, or exercise       · Urge incontinence  is when you feel the need to urinate right away and leak urine accidentally  · Mixed incontinence  is when you have both stress and urge UI  What are the signs and symptoms of UI?   · You feel like your bladder does not empty completely when you urinate  · You urinate often and need to urinate immediately  · You leak urine when you sleep, or you wake up with the urge to urinate  · You leak urine when you cough, sneeze, exercise, or laugh  How is UI diagnosed? Your healthcare provider will ask how often you leak urine and whether you have stress or urge symptoms  Tell him which medicines you take, how often you urinate, and how much liquid you drink each day  You may need any of the following tests:  · Urine tests  may show infection or kidney function  · A pelvic exam  may be done to check for blockages  A pelvic exam will also show if your bladder, uterus, or other organs have moved out of place  · An x-ray, ultrasound, or CT  may show problems with parts of your urinary system  You may be given contrast liquid to help your organs show up better in the pictures  Tell the healthcare provider if you have ever had an allergic reaction to contrast liquid  Do not enter the MRI room with anything metal  Metal can cause serious injury  Tell the healthcare provider if you have any metal in or on your body  · A bladder scan  will show how much urine is left in your bladder after you urinate  You will be asked to urinate and then healthcare providers will use a small ultrasound machine to check the urine left in your bladder  · Cystometry  is used to check the function of your urinary system  Your healthcare provider checks the pressure in your bladder while filling it with fluid  Your bladder pressure may also be tested when your bladder is full and while you urinate  How is UI treated? · Medicines  can help strengthen your bladder control      · Electrical stimulation  is used to send a small amount of electrical energy to your pelvic floor muscles  This helps control your bladder function  Electrodes may be placed outside your body or in your rectum  For women, the electrodes may be placed in the vagina  · A bulking agent  may be injected into the wall of your urethra to make it thicker  This helps keep your urethra closed and decreases urine leakage  · Devices  such as a clamp, pessary, or tampon may help stop urine leaks  Ask your healthcare provider for more information about these and other devices  · Surgery  may be needed if other treatments do not work  Several types of surgery can help improve your bladder control  Ask your healthcare provider for more information about the surgery you may need  How can I manage my symptoms? · Do pelvic muscle exercises often  Your pelvic muscles help you stop urinating  Squeeze these muscles tight for 5 seconds, then relax for 5 seconds  Gradually work up to squeezing for 10 seconds  Do 3 sets of 15 repetitions a day, or as directed  This will help strengthen your pelvic muscles and improve bladder control  · A catheter  may be used to help empty your bladder  A catheter is a tiny, plastic tube that is put into your bladder to drain your urine  Your healthcare provider may tell you to use a catheter to prevent your bladder from getting too full and leaking urine  · Keep a UI record  Write down how often you leak urine and how much you leak  Make a note of what you were doing when you leaked urine  · Train your bladder  Go to the bathroom at set times, such as every 2 hours, even if you do not feel the urge to go  You can also try to hold your urine when you feel the urge to go  For example, hold your urine for 5 minutes when you feel the urge to go  As that becomes easier, hold your urine for 10 minutes  · Drink liquids as directed  Ask your healthcare provider how much liquid to drink each day and which liquids are best for you   You may need to limit the amount of liquid you drink to help control your urine leakage  Limit or do not have drinks that contain caffeine or alcohol  Do not drink any liquid right before you go to bed  · Prevent constipation  Eat a variety of high-fiber foods  Good examples are high-fiber cereals, beans, vegetables, and whole-grain breads  Prune juice may help make your bowel movement softer  Walking is the best way to trigger your intestines to have a bowel movement  · Exercise regularly and maintain a healthy weight  Ask your healthcare provider how much you should weigh and about the best exercise plan for you  Weight loss and exercise will decrease pressure on your bladder and help you control your leakage  Ask him to help you create a weight loss plan if you are overweight  When should I seek immediate care? · You have severe pain  · You are confused or cannot think clearly  When should I contact my healthcare provider? · You have a fever  · You see blood in your urine  · You have pain when you urinate  · You have new or worse pain, even after treatment  · Your mouth feels dry or you have vision changes  · Your urine is cloudy or smells bad  · You have questions or concerns about your condition or care  CARE AGREEMENT:   You have the right to help plan your care  Learn about your health condition and how it may be treated  Discuss treatment options with your caregivers to decide what care you want to receive  You always have the right to refuse treatment  The above information is an  only  It is not intended as medical advice for individual conditions or treatments  Talk to your doctor, nurse or pharmacist before following any medical regimen to see if it is safe and effective for you  © 2017 [employer] Information is for End User's use only and may not be sold, redistributed or otherwise used for commercial purposes   All illustrations and images included in CareNotes® are the copyrighted property of [employer]  or Riccardo Ellison  Cigarette Smoking and Your Health   AMBULATORY CARE:   Risks to your health if you smoke:  Nicotine and other chemicals found in tobacco damage every cell in your body  Even if you are a light smoker, you have an increased risk for cancer, heart disease, and lung disease  If you are pregnant or have diabetes, smoking increases your risk for complications  Benefits to your health if you stop smoking:   · You decrease respiratory symptoms such as coughing, wheezing, and shortness of breath  · You reduce your risk for cancers of the lung, mouth, throat, kidney, bladder, pancreas, stomach, and cervix  If you already have cancer, you increase the benefits of chemotherapy  You also reduce your risk for cancer returning or a second cancer from developing  · You reduce your risk for heart disease, blood clots, heart attack, and stroke  · You reduce your risk for lung infections, and diseases such as pneumonia, asthma, chronic bronchitis, and emphysema  · Your circulation improves  More oxygen can be delivered to your body  If you have diabetes, you lower your risk for complications, such as kidney, artery, and eye diseases  You also lower your risk for nerve damage  Nerve damage can lead to amputations, poor vision, and blindness  · You improve your body's ability to heal and to fight infections  Benefits to the health of others if you stop smoking:  Tobacco is harmful to nonsmokers who breathe in your secondhand smoke  The following are ways the health of others around you may improve when you stop smoking:  · You lower the risks for lung cancer and heart disease in nonsmoking adults  · If you are pregnant, you lower the risk for miscarriage, early delivery, low birth weight, and stillbirth  You also lower your baby's risk for SIDS, obesity, developmental delay, and neurobehavioral problems, such as ADHD  · If you have children, you lower their risk for ear infections, colds, pneumonia, bronchitis, and asthma  For more information and support to stop smoking:   · [place]  gov  Phone: 6- 931 - 868-0178  Web Address: www smokefree  gov  Follow up with your healthcare provider as directed:  Write down your questions so you remember to ask them during your visits  © 2017 [address] [employer] Information is for End User's use only and may not be sold, redistributed or otherwise used for commercial purposes  All illustrations and images included in CareNotes® are the copyrighted property of [employer]  or Riccardo Ellison  The above information is an  only  It is not intended as medical advice for individual conditions or treatments  Talk to your doctor, nurse or pharmacist before following any medical regimen to see if it is safe and effective for you  Fall Prevention   AMBULATORY CARE:   Fall prevention  includes ways to make your home and other areas safer  It also includes ways you can move more carefully to prevent a fall  Health conditions that cause changes in your blood pressure, vision, or muscle strength and coordination may increase your risk for falls  Medicines may also increase your risk for falls if they make you dizzy, weak, or sleepy  Call 911 or have someone else call if:   · You have fallen and are unconscious  · You have fallen and cannot move part of your body  Contact your healthcare provider if:   · You have fallen and have pain or a headache  · You have questions or concerns about your condition or care  Fall prevention tips:   · Stand or sit up slowly  This may help you keep your balance and prevent falls  · Use assistive devices as directed  Your healthcare provider may suggest that you use a cane or walker to help you keep your balance  You may need to have grab bars put in your bathroom near the toilet or in the shower      · Wear shoes that fit well and have soles that   Wear shoes both inside and outside  Use slippers with good   Do not wear shoes with high heels  · Wear a personal alarm  This is a device that allows you to call 911 if you fall and need help  Ask your healthcare provider for more information  · Stay active  Exercise can help strengthen your muscles and improve your balance  Your healthcare provider may recommend water aerobics or walking  He or she may also recommend physical therapy to improve your coordination  Never start an exercise program without talking to your healthcare provider first      · Manage your medical conditions  Keep all appointments with your healthcare providers  Visit your eye doctor as directed  Home safety tips:   · Add items to prevent falls in the bathroom  Put nonslip strips on your bath or shower floor to prevent you from slipping  Use a bath mat if you do not have carpet in the bathroom  This will prevent you from falling when you step out of the bath or shower  Use a shower seat so you do not need to stand while you shower  Sit on the toilet or a chair in your bathroom to dry yourself and put on clothing  This will prevent you from losing your balance from drying or dressing yourself while you are standing  · Keep paths clear  Remove books, shoes, and other objects from walkways and stairs  Place cords for telephones and lamps out of the way so that you do not need to walk over them  Tape them down if you cannot move them  Remove small rugs  If you cannot remove a rug, secure it with double-sided tape  This will prevent you from tripping  · Install bright lights in your home  Use night lights to help light paths to the bathroom or kitchen  Always turn on the light before you start walking  · Keep items you use often on shelves within reach  Do not use a step stool to help you reach an item  · Paint or place reflective tape on the edges of your stairs    This will help you see the stairs better  Follow up with your healthcare provider as directed:  Write down your questions so you remember to ask them during your visits  © 2017 [address] [employer] Information is for End User's use only and may not be sold, redistributed or otherwise used for commercial purposes  All illustrations and images included in CareNotes® are the copyrighted property of [employer]  or Riccardo Ellison  The above information is an  only  It is not intended as medical advice for individual conditions or treatments  Talk to your doctor, nurse or pharmacist before following any medical regimen to see if it is safe and effective for you  Advance Directives   WHAT YOU NEED TO KNOW:   What are advance directives? Advance directives are legal documents that state your wishes and plans for medical care  These plans are made ahead of time in case you lose your ability to make decisions for yourself  Advance directives can apply to any medical decision, such as the treatments you want, and if you want to donate organs  What are the types of advance directives? There are many types of advance directives, and each state has rules about how to use them  You may choose a combination of any of the following:  · Living will: This is a written record of the treatment you want  You can also choose which treatments you do not want, which to limit, and which to stop at a certain time  This includes surgery, medicine, IV fluid, and tube feedings  · Durable power of  for healthcare [place] [facility]): This is a written record that states who you want to make healthcare choices for you when you are unable to make them for yourself  This person, called a proxy, is usually a family member or a friend  You may choose more than 1 proxy  · Do not resuscitate (DNR) order:  A DNR order is used in case your heart stops beating or you stop breathing   It is a request not to have certain forms of treatment, such as CPR  A DNR order may be included in other types of advance directives  · Medical directive: This covers the care that you want if you are in a coma, near death, or unable to make decisions for yourself  You can list the treatments you want for each condition  Treatment may include pain medicine, surgery, blood transfusions, dialysis, IV or tube feedings, and a ventilator (breathing machine)  · Values history: This document has questions about your views, beliefs, and how you feel and think about life  This information can help others choose the care that you would choose  Why are advance directives important? An advance directive helps you control your care  Although spoken wishes may be used, it is better to have your wishes written down  Spoken wishes can be misunderstood, or not followed  Treatments may be given even if you do not want them  An advance directive may make it easier for your family to make difficult choices about your care  How do I decide what to put in my advance directives? · Make informed decisions:  Make sure you fully understand treatments or care you may receive  Think about the benefits and problems your decisions could cause for you or your family  Talk to healthcare providers if you have concerns or questions before you write down your wishes  You may also want to talk with your Rastafari or , or a   Check your state laws to make sure that what you put in your advance directive is legal      · Sign all forms:  Sign and date your advance directive when you have finished  You may also need 2 witnesses to sign the forms  Witnesses cannot be your doctor or his staff, your spouse, heirs or beneficiaries, people you owe money to, or your chosen proxy  Talk to your family, proxy, and healthcare providers about your advance directive  Give each person a copy, and keep one for yourself in a place you can get to easily   Do not keep it hidden or locked away  · Review and revise your plans: You can revise your advance directive at any time, as long as you are able to make decisions  Review your plan every year, and when there are changes in your life, or your health  When you make changes, let your family, proxy, and healthcare providers know  Give each a new copy  Where can I find more information? · [employer]  [address] , [place]  Phone: 2- 592 - [phone]  Phone: 6- [phone] - [phone]  Web Address: http://www  aafp org  · [address] [facility])  [address] [address] [facility], [address] [facility] , [address] [place] [address]  Phone: [phone]  Phone: [phone]  Web Address: Poonam diehl  CARE AGREEMENT:   You have the right to help plan your care  To help with this plan, you must learn about your health condition and treatment options  You must also learn about advance directives and how they are used  Work with your healthcare providers to decide what care will be used to treat you  You always have the right to refuse treatment  The above information is an  only  It is not intended as medical advice for individual conditions or treatments  Talk to your doctor, nurse or pharmacist before following any medical regimen to see if it is safe and effective for you  © 2017 [address] [employer] Information is for End User's use only and may not be sold, redistributed or otherwise used for commercial purposes  All illustrations and images included in CareNotes® are the copyrighted property of [employer] M [employer]  or Riccardo Ellison

## 2018-08-22 DIAGNOSIS — G89.29 OTHER CHRONIC PAIN: ICD-10-CM

## 2018-08-23 ENCOUNTER — TELEPHONE (OUTPATIENT)
Dept: FAMILY MEDICINE CLINIC | Facility: CLINIC | Age: 63
End: 2018-08-23

## 2018-08-23 DIAGNOSIS — G89.29 OTHER CHRONIC PAIN: ICD-10-CM

## 2018-08-23 RX ORDER — TRAMADOL HYDROCHLORIDE 50 MG/1
50 TABLET ORAL EVERY 6 HOURS PRN
Qty: 120 TABLET | Refills: 0 | Status: SHIPPED | OUTPATIENT
Start: 2018-08-23 | End: 2018-09-19 | Stop reason: SDUPTHER

## 2018-08-23 RX ORDER — TRAMADOL HYDROCHLORIDE 50 MG/1
50 TABLET ORAL EVERY 6 HOURS PRN
Qty: 120 TABLET | Refills: 0 | Status: SHIPPED | OUTPATIENT
Start: 2018-08-23 | End: 2018-09-24 | Stop reason: SDUPTHER

## 2018-08-23 NOTE — TELEPHONE ENCOUNTER
She called yesterday for this and I sent it to [facility] to sign off-

## 2018-08-23 NOTE — TELEPHONE ENCOUNTER
Pt needs refill on tramadol 50 mg  Takes QD every 6 hours PRN  #120 tabs    Please advise    Pharmacy [facility] in [place]    Phone: [phone]

## 2018-09-11 ENCOUNTER — HOSPITAL ENCOUNTER (OUTPATIENT)
Dept: ULTRASOUND IMAGING | Facility: HOSPITAL | Age: 63
Discharge: HOME | End: 2018-09-11
Payer: MEDICARE

## 2018-09-11 DIAGNOSIS — Z13.6 SCREENING FOR AAA (ABDOMINAL AORTIC ANEURYSM): ICD-10-CM

## 2018-09-11 PROCEDURE — 76775 US EXAM ABDO BACK WALL LIM: CPT

## 2018-09-12 ENCOUNTER — TELEPHONE (OUTPATIENT)
Dept: FAMILY MEDICINE CLINIC | Facility: CLINIC | Age: 63
End: 2018-09-12

## 2018-09-12 NOTE — TELEPHONE ENCOUNTER
----- Message from [address] sent at 9/11/2018  2:55 PM EDT -----  Please call pt and let her know that her AAA scan is normal

## 2018-09-19 ENCOUNTER — OFFICE VISIT (OUTPATIENT)
Dept: FAMILY MEDICINE CLINIC | Facility: CLINIC | Age: 63
End: 2018-09-19
Payer: MEDICARE

## 2018-09-19 VITALS
WEIGHT: 215 LBS | BODY MASS INDEX: 39.56 KG/M2 | HEIGHT: 62 IN | OXYGEN SATURATION: 96 % | SYSTOLIC BLOOD PRESSURE: 130 MMHG | HEART RATE: 76 BPM | RESPIRATION RATE: 16 BRPM | TEMPERATURE: 98.7 F | DIASTOLIC BLOOD PRESSURE: 82 MMHG

## 2018-09-19 DIAGNOSIS — R05.9 COUGH: ICD-10-CM

## 2018-09-19 DIAGNOSIS — J01.00 ACUTE NON-RECURRENT MAXILLARY SINUSITIS: Primary | ICD-10-CM

## 2018-09-19 DIAGNOSIS — R09.89 CHEST CONGESTION: ICD-10-CM

## 2018-09-19 PROBLEM — C54.1 ENDOMETRIAL CANCER (HCC): Status: ACTIVE | Noted: 2018-09-07

## 2018-09-19 PROCEDURE — 99213 OFFICE O/P EST LOW 20 MIN: CPT | Performed by: NURSE PRACTITIONER

## 2018-09-19 RX ORDER — AMOXICILLIN AND CLAVULANATE POTASSIUM 875; 125 MG/1; MG/1
1 TABLET, FILM COATED ORAL EVERY 12 HOURS SCHEDULED
Qty: 14 TABLET | Refills: 0 | Status: SHIPPED | OUTPATIENT
Start: 2018-09-19 | End: 2018-09-26

## 2018-09-19 RX ORDER — PROMETHAZINE HYDROCHLORIDE AND CODEINE PHOSPHATE 6.25; 1 MG/5ML; MG/5ML
5 SYRUP ORAL EVERY 4 HOURS PRN
Qty: 180 ML | Refills: 0 | Status: SHIPPED | OUTPATIENT
Start: 2018-09-19 | End: 2018-10-04 | Stop reason: ALTCHOICE

## 2018-09-19 NOTE — PROGRESS NOTES
Assessment/Plan:    No problem-specific Assessment & Plan notes found for this encounter  Diagnoses and all orders for this visit:    Acute non-recurrent maxillary sinusitis  Comments:    Rx for Augmentin provided  Orders:  -     amoxicillin-clavulanate (AUGMENTIN) 875-125 mg per tablet; Take 1 tablet by mouth every 12 (twelve) hours for 7 days    Chest congestion  Comments:  Rx for Augmentin provided     Cough  Comments:  Rx for Phenergan w Codeine provided, enc fluid intake  Orders:  -     promethazine-codeine (PHENERGAN WITH CODEINE) 6 25-10 mg/5 mL syrup; Take 5 mL by mouth every 4 (four) hours as needed for cough          Subjective:      Patient ID: Marquis Cisneros is a 70 y o  female here for c/o chest congestion, cough, sinus congestion loss of voice began 6 days ago  Pt was exposed to many sick people at the hospital during testing  Pt reports she has pm fever and difficulty sleeping due to hacking cough  Pt has tried OTC cough and cold symptoms with no relief    HPI    The following portions of the patient's history were reviewed and updated as appropriate:   She  has a past medical history of Abnormal blood chemistry; Abscess of groin; Chronic pain; Obesity; Spinal stenosis; and Uterine cancer ([facility] [address] )  She   Patient Active Problem List    Diagnosis Date Noted    Chest congestion 09/19/2018    Cough 09/19/2018    Acute non-recurrent maxillary sinusitis 09/19/2018    Endometrial cancer ([address] ) 09/07/2018    Urinary retention 05/17/2018    SIRS (systemic inflammatory response syndrome) ([address] ) 05/17/2018    Chronic narcotic use 03/29/2018    Obesity 08/28/2017    Mixed hyperlipidemia 02/09/2017    Acute on chronic lumbar radiculopathy 05/26/2016    Degeneration of intervertebral disc of lumbosacral region 05/26/2016    Malignant neoplasm of uterus ([address] ) 05/19/2016     She  has a past surgical history that includes Cholecystectomy; Hysterectomy; and Epidural block injection    Her family history includes Cancer in her father and mother; Diabetes in her family; Ovarian cancer in her mother; Pancreatic cancer in her father  She  reports that she has never smoked  She has never used smokeless tobacco  She reports that she drinks alcohol  She reports that she does not use drugs  Current Outpatient Prescriptions   Medication Sig Dispense Refill    acetaminophen (TYLENOL) 325 mg tablet Take 2 tablets (650 mg total) by mouth every 6 (six) hours as needed for mild pain 30 tablet 0    diazepam (VALIUM) 10 mg tablet Take 1 tablet (10 mg total) by mouth every 8 (eight) hours as needed for muscle spasms Dr Prakash Lo Supervising 30 tablet 0    Multiple Vitamin (MULTIVITAMIN) tablet Take 1 tablet by mouth daily      traMADol (ULTRAM) 50 mg tablet TAKE 1 TABLET (50 MG TOTAL) BY MOUTH EVERY 6 (SIX) HOURS AS NEEDED FOR MODERATE PAIN 120 tablet 0    amoxicillin-clavulanate (AUGMENTIN) 875-125 mg per tablet Take 1 tablet by mouth every 12 (twelve) hours for 7 days 14 tablet 0    promethazine-codeine (PHENERGAN WITH CODEINE) 6 25-10 mg/5 mL syrup Take 5 mL by mouth every 4 (four) hours as needed for cough 180 mL 0     No current facility-administered medications for this visit        Current Outpatient Prescriptions on File Prior to Visit   Medication Sig    acetaminophen (TYLENOL) 325 mg tablet Take 2 tablets (650 mg total) by mouth every 6 (six) hours as needed for mild pain    diazepam (VALIUM) 10 mg tablet Take 1 tablet (10 mg total) by mouth every 8 (eight) hours as needed for muscle spasms Dr Prakash Lo Supervising    Multiple Vitamin (MULTIVITAMIN) tablet Take 1 tablet by mouth daily    traMADol (ULTRAM) 50 mg tablet TAKE 1 TABLET (50 MG TOTAL) BY MOUTH EVERY 6 (SIX) HOURS AS NEEDED FOR MODERATE PAIN    [DISCONTINUED] gabapentin (NEURONTIN) 100 mg capsule Take 1 capsule (100 mg total) by mouth 3 (three) times a day    [DISCONTINUED] traMADol (ULTRAM) 50 mg tablet Take 1 tablet (50 mg total) by mouth every 6 (six) hours as needed for moderate pain     No current facility-administered medications on file prior to visit  She is allergic to paclitaxel       Review of Systems   Constitutional: Negative  HENT: Positive for congestion, postnasal drip, sinus pain, sinus pressure and sore throat  Eyes: Negative  Respiratory: Positive for cough  Cardiovascular: Negative  Gastrointestinal: Negative  Endocrine: Negative  Genitourinary: Negative  Musculoskeletal: Negative  Skin: Negative  Allergic/Immunologic: Negative  Neurological: Negative  Hematological: Negative  Psychiatric/Behavioral: Negative  Objective:      /82 (BP Location: Left arm, Patient Position: Sitting, Cuff Size: Large)   Pulse 76   Temp 98 7 °F (37 1 °C) (Tympanic)   Resp 16   Ht 5' 2" (1 575 m)   Wt 97 5 kg (215 lb)   SpO2 96%   BMI 39 32 kg/m²          Physical Exam   Constitutional: She is oriented to person, place, and time  She appears well-developed and well-nourished  HENT:   Head: Normocephalic  Nose: Mucosal edema and rhinorrhea present  Right sinus exhibits maxillary sinus tenderness  Left sinus exhibits maxillary sinus tenderness  cobblestone   Eyes: Pupils are equal, round, and reactive to light  Neck: Normal range of motion  Cardiovascular: Normal rate and regular rhythm  Pulmonary/Chest: Effort normal and breath sounds normal    Abdominal: Soft  Bowel sounds are normal    Musculoskeletal: Normal range of motion  Neurological: She is alert and oriented to person, place, and time  Skin: Skin is warm and dry  Psychiatric: She has a normal mood and affect  Her behavior is normal  Judgment and thought content normal    Nursing note and vitals reviewed

## 2018-09-24 DIAGNOSIS — G89.29 OTHER CHRONIC PAIN: ICD-10-CM

## 2018-09-24 RX ORDER — TRAMADOL HYDROCHLORIDE 50 MG/1
50 TABLET ORAL EVERY 6 HOURS PRN
Qty: 120 TABLET | Refills: 0 | Status: SHIPPED | OUTPATIENT
Start: 2018-09-24 | End: 2018-10-30 | Stop reason: SDUPTHER

## 2018-09-24 RX ORDER — TRAMADOL HYDROCHLORIDE 50 MG/1
50 TABLET ORAL EVERY 6 HOURS PRN
Qty: 120 TABLET | Refills: 0 | Status: SHIPPED | OUTPATIENT
Start: 2018-09-24 | End: 2018-10-04 | Stop reason: SDUPTHER

## 2018-10-04 ENCOUNTER — OFFICE VISIT (OUTPATIENT)
Dept: FAMILY MEDICINE CLINIC | Facility: CLINIC | Age: 63
End: 2018-10-04
Payer: MEDICARE

## 2018-10-04 ENCOUNTER — APPOINTMENT (OUTPATIENT)
Dept: LAB | Facility: CLINIC | Age: 63
End: 2018-10-04
Payer: MEDICARE

## 2018-10-04 ENCOUNTER — TRANSCRIBE ORDERS (OUTPATIENT)
Dept: LAB | Facility: CLINIC | Age: 63
End: 2018-10-04

## 2018-10-04 VITALS
HEART RATE: 72 BPM | DIASTOLIC BLOOD PRESSURE: 82 MMHG | TEMPERATURE: 98 F | BODY MASS INDEX: 41 KG/M2 | WEIGHT: 222.8 LBS | RESPIRATION RATE: 20 BRPM | HEIGHT: 62 IN | SYSTOLIC BLOOD PRESSURE: 124 MMHG

## 2018-10-04 DIAGNOSIS — L03.811 CELLULITIS OF HEAD EXCEPT FACE: ICD-10-CM

## 2018-10-04 DIAGNOSIS — Z11.59 NEED FOR HEPATITIS C SCREENING TEST: ICD-10-CM

## 2018-10-04 DIAGNOSIS — H60.01: ICD-10-CM

## 2018-10-04 DIAGNOSIS — Z13.1 SCREENING FOR DIABETES MELLITUS (DM): ICD-10-CM

## 2018-10-04 DIAGNOSIS — C54.1 ENDOMETRIAL CANCER (HCC): ICD-10-CM

## 2018-10-04 DIAGNOSIS — M51.37 DEGENERATION OF INTERVERTEBRAL DISC OF LUMBOSACRAL REGION: ICD-10-CM

## 2018-10-04 DIAGNOSIS — F11.90 CHRONIC NARCOTIC USE: ICD-10-CM

## 2018-10-04 DIAGNOSIS — E66.01 CLASS 3 SEVERE OBESITY DUE TO EXCESS CALORIES WITHOUT SERIOUS COMORBIDITY WITH BODY MASS INDEX (BMI) OF 40.0 TO 44.9 IN ADULT (HCC): ICD-10-CM

## 2018-10-04 DIAGNOSIS — E78.2 MIXED HYPERLIPIDEMIA: Primary | ICD-10-CM

## 2018-10-04 DIAGNOSIS — E78.2 MIXED HYPERLIPIDEMIA: ICD-10-CM

## 2018-10-04 DIAGNOSIS — J01.10 ACUTE NON-RECURRENT FRONTAL SINUSITIS: ICD-10-CM

## 2018-10-04 PROBLEM — R05.9 COUGH: Status: RESOLVED | Noted: 2018-09-19 | Resolved: 2018-10-04

## 2018-10-04 PROBLEM — R65.10 SIRS (SYSTEMIC INFLAMMATORY RESPONSE SYNDROME) (HCC): Status: RESOLVED | Noted: 2018-05-17 | Resolved: 2018-10-04

## 2018-10-04 PROBLEM — J01.00 ACUTE NON-RECURRENT MAXILLARY SINUSITIS: Status: RESOLVED | Noted: 2018-09-19 | Resolved: 2018-10-04

## 2018-10-04 PROBLEM — E66.813 CLASS 3 SEVERE OBESITY WITHOUT SERIOUS COMORBIDITY WITH BODY MASS INDEX (BMI) OF 40.0 TO 44.9 IN ADULT (HCC): Status: ACTIVE | Noted: 2017-08-28

## 2018-10-04 PROBLEM — R09.89 CHEST CONGESTION: Status: RESOLVED | Noted: 2018-09-19 | Resolved: 2018-10-04

## 2018-10-04 PROBLEM — R33.9 URINARY RETENTION: Status: RESOLVED | Noted: 2018-05-17 | Resolved: 2018-10-04

## 2018-10-04 LAB
ALBUMIN SERPL BCP-MCNC: 3.7 G/DL (ref 3.5–5)
ALP SERPL-CCNC: 58 U/L (ref 46–116)
ALT SERPL W P-5'-P-CCNC: 28 U/L (ref 12–78)
ANION GAP SERPL CALCULATED.3IONS-SCNC: 7 MMOL/L (ref 4–13)
AST SERPL W P-5'-P-CCNC: 17 U/L (ref 5–45)
BASOPHILS # BLD AUTO: 0.03 THOUSANDS/ΜL (ref 0–0.1)
BASOPHILS NFR BLD AUTO: 0 % (ref 0–1)
BILIRUB SERPL-MCNC: 0.38 MG/DL (ref 0.2–1)
BUN SERPL-MCNC: 18 MG/DL (ref 5–25)
CALCIUM SERPL-MCNC: 8.9 MG/DL (ref 8.3–10.1)
CHLORIDE SERPL-SCNC: 104 MMOL/L (ref 100–108)
CHOLEST SERPL-MCNC: 211 MG/DL (ref 50–200)
CO2 SERPL-SCNC: 27 MMOL/L (ref 21–32)
CREAT SERPL-MCNC: 0.7 MG/DL (ref 0.6–1.3)
EOSINOPHIL # BLD AUTO: 0.29 THOUSAND/ΜL (ref 0–0.61)
EOSINOPHIL NFR BLD AUTO: 3 % (ref 0–6)
ERYTHROCYTE [DISTWIDTH] IN BLOOD BY AUTOMATED COUNT: 11.9 % (ref 11.6–15.1)
EST. AVERAGE GLUCOSE BLD GHB EST-MCNC: 117 MG/DL
GFR SERPL CREATININE-BSD FRML MDRD: 91 ML/MIN/1.73SQ M
GLUCOSE P FAST SERPL-MCNC: 81 MG/DL (ref 65–99)
HBA1C MFR BLD: 5.7 % (ref 4.2–6.3)
HCT VFR BLD AUTO: 36.8 % (ref 34.8–46.1)
HDLC SERPL-MCNC: 49 MG/DL (ref 40–60)
HGB BLD-MCNC: 11.8 G/DL (ref 11.5–15.4)
IMM GRANULOCYTES # BLD AUTO: 0.03 THOUSAND/UL (ref 0–0.2)
IMM GRANULOCYTES NFR BLD AUTO: 0 % (ref 0–2)
LDLC SERPL CALC-MCNC: 125 MG/DL (ref 0–100)
LYMPHOCYTES # BLD AUTO: 1.66 THOUSANDS/ΜL (ref 0.6–4.47)
LYMPHOCYTES NFR BLD AUTO: 19 % (ref 14–44)
MCH RBC QN AUTO: 32.2 PG (ref 26.8–34.3)
MCHC RBC AUTO-ENTMCNC: 32.1 G/DL (ref 31.4–37.4)
MCV RBC AUTO: 100 FL (ref 82–98)
MONOCYTES # BLD AUTO: 0.64 THOUSAND/ΜL (ref 0.17–1.22)
MONOCYTES NFR BLD AUTO: 7 % (ref 4–12)
NEUTROPHILS # BLD AUTO: 6.03 THOUSANDS/ΜL (ref 1.85–7.62)
NEUTS SEG NFR BLD AUTO: 71 % (ref 43–75)
NRBC BLD AUTO-RTO: 0 /100 WBCS
PLATELET # BLD AUTO: 236 THOUSANDS/UL (ref 149–390)
PMV BLD AUTO: 10.6 FL (ref 8.9–12.7)
POTASSIUM SERPL-SCNC: 4.1 MMOL/L (ref 3.5–5.3)
PROT SERPL-MCNC: 7.8 G/DL (ref 6.4–8.2)
RBC # BLD AUTO: 3.67 MILLION/UL (ref 3.81–5.12)
SODIUM SERPL-SCNC: 138 MMOL/L (ref 136–145)
T4 FREE SERPL-MCNC: 1 NG/DL (ref 0.76–1.46)
TRIGL SERPL-MCNC: 183 MG/DL
TSH SERPL DL<=0.05 MIU/L-ACNC: 6.69 UIU/ML (ref 0.36–3.74)
WBC # BLD AUTO: 8.68 THOUSAND/UL (ref 4.31–10.16)

## 2018-10-04 PROCEDURE — 99214 OFFICE O/P EST MOD 30 MIN: CPT | Performed by: INTERNAL MEDICINE

## 2018-10-04 PROCEDURE — 84439 ASSAY OF FREE THYROXINE: CPT

## 2018-10-04 PROCEDURE — 96372 THER/PROPH/DIAG INJ SC/IM: CPT | Performed by: INTERNAL MEDICINE

## 2018-10-04 PROCEDURE — 80061 LIPID PANEL: CPT

## 2018-10-04 PROCEDURE — 86803 HEPATITIS C AB TEST: CPT

## 2018-10-04 PROCEDURE — 36415 COLL VENOUS BLD VENIPUNCTURE: CPT

## 2018-10-04 PROCEDURE — 83036 HEMOGLOBIN GLYCOSYLATED A1C: CPT

## 2018-10-04 PROCEDURE — 84443 ASSAY THYROID STIM HORMONE: CPT

## 2018-10-04 PROCEDURE — 80053 COMPREHEN METABOLIC PANEL: CPT

## 2018-10-04 PROCEDURE — 85025 COMPLETE CBC W/AUTO DIFF WBC: CPT

## 2018-10-04 RX ORDER — LEVOFLOXACIN 500 MG/1
500 TABLET, FILM COATED ORAL EVERY 24 HOURS
Qty: 10 TABLET | Refills: 0 | Status: SHIPPED | OUTPATIENT
Start: 2018-10-04 | End: 2018-10-14

## 2018-10-04 RX ORDER — CEFTRIAXONE 1 G/1
1000 INJECTION, POWDER, FOR SOLUTION INTRAMUSCULAR; INTRAVENOUS EVERY 24 HOURS
Status: COMPLETED | OUTPATIENT
Start: 2018-10-05 | End: 2018-10-04

## 2018-10-04 RX ORDER — GUAIFENESIN 600 MG
600 TABLET, EXTENDED RELEASE 12 HR ORAL EVERY 12 HOURS SCHEDULED
Qty: 20 TABLET | Refills: 0 | Status: SHIPPED | OUTPATIENT
Start: 2018-10-04 | End: 2018-11-08

## 2018-10-04 RX ORDER — FLUTICASONE PROPIONATE 50 MCG
1 SPRAY, SUSPENSION (ML) NASAL DAILY
Qty: 16 G | Refills: 0 | Status: SHIPPED | OUTPATIENT
Start: 2018-10-04 | End: 2018-11-08

## 2018-10-04 RX ORDER — CEFTRIAXONE 1 G/1
1000 INJECTION, POWDER, FOR SOLUTION INTRAMUSCULAR; INTRAVENOUS EVERY 24 HOURS
Status: DISCONTINUED | OUTPATIENT
Start: 2018-10-04 | End: 2018-10-04

## 2018-10-04 RX ADMIN — CEFTRIAXONE 1000 MG: 1 INJECTION, POWDER, FOR SOLUTION INTRAMUSCULAR; INTRAVENOUS at 09:32

## 2018-10-04 RX ADMIN — CEFTRIAXONE 1000 MG: 1 INJECTION, POWDER, FOR SOLUTION INTRAMUSCULAR; INTRAVENOUS at 09:45

## 2018-10-04 NOTE — PATIENT INSTRUCTIONS

## 2018-10-04 NOTE — PROGRESS NOTES
Assessment/Plan:    No problem-specific Assessment & Plan notes found for this encounter  Diagnoses and all orders for this visit:    Mixed hyperlipidemia  -     Comprehensive metabolic panel; Future  -     Lipid Panel with Direct LDL reflex; Future  -     TSH, 3rd generation with Free T4 reflex; Future    Endometrial cancer (HCC)  -     CBC and differential; Future    Degeneration of intervertebral disc of lumbosacral region    Chronic narcotic use    Class 3 severe obesity due to excess calories without serious comorbidity with body mass index (BMI) of 40 0 to 44 9 in adult [facility])    Need for hepatitis C screening test  -     Hepatitis C antibody; Future    Screening for diabetes mellitus (DM)  -     Hemoglobin A1C; Future    Cellulitis of head except face    Abscess of right pinna  -     cefTRIAXone (ROCEPHIN) injection 1,000 mg; Infuse 1,000 mg into a venous catheter every 24 hours   -     levofloxacin (LEVAQUIN) 500 mg tablet; Take 1 tablet (500 mg total) by mouth every 24 hours for 10 days    Acute non-recurrent frontal sinusitis  -     fluticasone (FLONASE) 50 mcg/act nasal spray; 1 spray into each nostril daily  -     guaiFENesin (MUCINEX) 600 mg 12 hr tablet; Take 1 tablet (600 mg total) by mouth every 12 (twelve) hours  -     levofloxacin (LEVAQUIN) 500 mg tablet; Take 1 tablet (500 mg total) by mouth every 24 hours for 10 days    Other orders  -     Cancel: Vitamin D 25 hydroxy; Future      A/P: Rest and increase po fluids  OTC PRN motrin or tylenol  Need to be aggressive due to the cellulitis and ??abscess  Warm soaks and may need I & D  Want avelox, but insurance won't cover  Will give IM rocephin and start levo  Add mucinex and INS for URI  Check labs  Hold on vaccines until next visit  Continue current treatment otherwise  RTC one week  Subjective:      Patient ID: Helder Leon is a 70 y o  female  WF RTC for f/u hyperlipidemia, Uterine CA, etc  Doing poorly   Has been battling a URI despite abx for two weeks  Now with redness, pain, and increase temp on the right ear  Pt with past deformity and intermittent infections  THis episode about four days long  Still with a productive cough, nasal congestion, and sore throat  No SOB or wheezing  Non smoker  Otherwise, was doing well and remained active w/o any falls  Denies depression  Uterine ca in remission  Due for labs and vaccines  The following portions of the patient's history were reviewed and updated as appropriate:   She  has a past medical history of Abnormal blood chemistry; Abscess of groin; Cataracts, bilateral; Chronic pain; Obesity; Spinal stenosis; and Uterine cancer ([facility] )  She   Patient Active Problem List    Diagnosis Date Noted    Endometrial cancer ([facility] ) 09/07/2018    Chronic narcotic use 03/29/2018    Class 3 severe obesity without serious comorbidity with body mass index (BMI) of 40 0 to 44 9 in [facility]) 08/28/2017    Mixed hyperlipidemia 02/09/2017    Degeneration of intervertebral disc of lumbosacral region 05/26/2016    Malignant neoplasm of uterus ([facility] ) 05/19/2016     She  has a past surgical history that includes Cholecystectomy; Hysterectomy; and Epidural block injection  Her family history includes Cancer in her father and mother; Diabetes in her family; Ovarian cancer in her mother; Pancreatic cancer in her father  She  reports that she has never smoked  She has never used smokeless tobacco  She reports that she drinks alcohol  She reports that she does not use drugs    Current Outpatient Prescriptions   Medication Sig Dispense Refill    acetaminophen (TYLENOL) 325 mg tablet Take 2 tablets (650 mg total) by mouth every 6 (six) hours as needed for mild pain 30 tablet 0    Multiple Vitamin (MULTIVITAMIN) tablet Take 1 tablet by mouth daily      traMADol (ULTRAM) 50 mg tablet TAKE 1 TABLET (50 MG TOTAL) BY MOUTH EVERY 6 (SIX) HOURS AS NEEDED FOR MODERATE PAIN 120 tablet 0    fluticasone (FLONASE) 50 mcg/act nasal spray 1 spray into each nostril daily 16 g 0    guaiFENesin (MUCINEX) 600 mg 12 hr tablet Take 1 tablet (600 mg total) by mouth every 12 (twelve) hours 20 tablet 0    levofloxacin (LEVAQUIN) 500 mg tablet Take 1 tablet (500 mg total) by mouth every 24 hours for 10 days 10 tablet 0     Current Facility-Administered Medications   Medication Dose Route Frequency Provider Last Rate Last Dose    cefTRIAXone (ROCEPHIN) injection 1,000 mg  1,000 mg Intravenous Q24H Metjo ann Cruzen, DO   1,000 mg at 10/04/18 4333     Current Outpatient Prescriptions on File Prior to Visit   Medication Sig    acetaminophen (TYLENOL) 325 mg tablet Take 2 tablets (650 mg total) by mouth every 6 (six) hours as needed for mild pain    Multiple Vitamin (MULTIVITAMIN) tablet Take 1 tablet by mouth daily    traMADol (ULTRAM) 50 mg tablet TAKE 1 TABLET (50 MG TOTAL) BY MOUTH EVERY 6 (SIX) HOURS AS NEEDED FOR MODERATE PAIN    [DISCONTINUED] diazepam (VALIUM) 10 mg tablet Take 1 tablet (10 mg total) by mouth every 8 (eight) hours as needed for muscle spasms Dr Hiwot Slater (Patient not taking: Reported on 10/4/2018 )    [DISCONTINUED] promethazine-codeine (PHENERGAN WITH CODEINE) 6 25-10 mg/5 mL syrup Take 5 mL by mouth every 4 (four) hours as needed for cough    [DISCONTINUED] traMADol (ULTRAM) 50 mg tablet Take 1 tablet (50 mg total) by mouth every 6 (six) hours as needed for moderate pain     No current facility-administered medications on file prior to visit  She is allergic to paclitaxel       Review of Systems   Constitutional: Negative for activity change, chills, diaphoresis, fatigue and fever  HENT: Positive for congestion, ear pain, facial swelling, postnasal drip, rhinorrhea, sinus pressure, sore throat and trouble swallowing  Negative for hearing loss and sinus pain  Eyes: Negative for visual disturbance  Respiratory: Negative for cough, chest tightness, shortness of breath and wheezing  Cardiovascular: Negative for chest pain, palpitations and leg swelling  Gastrointestinal: Negative for abdominal pain, constipation, diarrhea, nausea and vomiting  Endocrine: Negative for cold intolerance and heat intolerance  Genitourinary: Negative for difficulty urinating, dysuria and frequency  Musculoskeletal: Negative for arthralgias, gait problem and myalgias  Neurological: Negative for dizziness, tremors, seizures, syncope, weakness, light-headedness, numbness and headaches  Psychiatric/Behavioral: Negative for confusion and dysphoric mood  The patient is not nervous/anxious  Objective:      /82 (BP Location: Left arm, Patient Position: Sitting, Cuff Size: Large)   Pulse 72   Temp 98 °F (36 7 °C) (Tympanic)   Resp 20   Ht 5' 2" (1 575 m)   Wt 101 kg (222 lb 12 8 oz)   BMI 40 75 kg/m²          Physical Exam   Constitutional: She is oriented to person, place, and time  She appears well-developed and well-nourished  No distress  HENT:   Head: Normocephalic and atraumatic  Mouth/Throat: No oropharyngeal exudate  Sinus tenderness noted with turbinates red and inflamed  Cobblestoning noted  Right Pinnae with anterior halix with erythema, swelling, increase temp and tenderness  ??pea size fluctuant area on the temple area  EAC is patent, but some inflammation noted  Eyes: Pupils are equal, round, and reactive to light  Conjunctivae and EOM are normal    Neck: Neck supple  No JVD present  No tracheal deviation present  No thyromegaly present  Cardiovascular: Normal rate, regular rhythm and normal heart sounds  No murmur heard  Pulmonary/Chest: Effort normal and breath sounds normal  No respiratory distress  She has no wheezes  Coarse and decreased BS  Abdominal: Soft  Bowel sounds are normal  She exhibits no distension  There is no tenderness  Lymphadenopathy:     She has cervical adenopathy     Neurological: She is alert and oriented to person, place, and time    Skin: There is erythema  Psychiatric: She has a normal mood and affect  Her behavior is normal  Judgment and thought content normal    Nursing note and vitals reviewed

## 2018-10-05 DIAGNOSIS — R79.9 ABNORMAL BLOOD CHEMISTRY: Primary | ICD-10-CM

## 2018-10-05 LAB — HCV AB SER QL: NORMAL

## 2018-10-09 ENCOUNTER — OFFICE VISIT (OUTPATIENT)
Dept: FAMILY MEDICINE CLINIC | Facility: CLINIC | Age: 63
End: 2018-10-09
Payer: MEDICARE

## 2018-10-09 VITALS
HEART RATE: 64 BPM | DIASTOLIC BLOOD PRESSURE: 78 MMHG | TEMPERATURE: 97.9 F | HEIGHT: 62 IN | BODY MASS INDEX: 40.3 KG/M2 | WEIGHT: 219 LBS | OXYGEN SATURATION: 97 % | SYSTOLIC BLOOD PRESSURE: 126 MMHG

## 2018-10-09 DIAGNOSIS — B37.3 CANDIDA VAGINITIS: ICD-10-CM

## 2018-10-09 DIAGNOSIS — L02.91 ABSCESS: Primary | ICD-10-CM

## 2018-10-09 PROCEDURE — 99213 OFFICE O/P EST LOW 20 MIN: CPT | Performed by: INTERNAL MEDICINE

## 2018-10-09 RX ORDER — FLUCONAZOLE 150 MG/1
150 TABLET ORAL ONCE
Qty: 3 TABLET | Refills: 0 | Status: SHIPPED | OUTPATIENT
Start: 2018-10-09 | End: 2018-10-09

## 2018-10-09 NOTE — PROGRESS NOTES
Assessment/Plan:       Diagnoses and all orders for this visit:    Abscess  -     Ambulatory referral to General Surgery; Future    Candida vaginitis  -     fluconazole (DIFLUCAN) 150 mg tablet; Take 1 tablet (150 mg total) by mouth once for 1 dose        No problem-specific Assessment & Plan notes found for this encounter  The patient will follow up with surgery and we will see how she is doing afterward  Subjective:      Patient ID: Helder Leon is a 70 y o  female  The patient was noted to have an infection of the ear and notes that he has no other issues at this time  The patient was treated with courses of Augmentin and Levaquin for a sinus infection  The patient state she had increased swelling and noted pain in the anterior part of the pinnae  The patient notes no fever or chills  She states that the area is painful and throbs  There are no other issues at this time        The following portions of the patient's history were reviewed and updated as appropriate:   She has a past medical history of Abnormal blood chemistry; Abscess of groin; Cataracts, bilateral; Chronic pain; Obesity; Spinal stenosis; and Uterine cancer ([facility] [address] )  ,   does not have any pertinent problems on file  ,   has a past surgical history that includes Cholecystectomy; Hysterectomy; and Epidural block injection  ,  family history includes Cancer in her father and mother; Diabetes in her family; Ovarian cancer in her mother; Pancreatic cancer in her father  ,   reports that she has never smoked  She has never used smokeless tobacco  She reports that she drinks alcohol  She reports that she does not use drugs  ,  is allergic to paclitaxel     Current Outpatient Prescriptions   Medication Sig Dispense Refill    acetaminophen (TYLENOL) 325 mg tablet Take 2 tablets (650 mg total) by mouth every 6 (six) hours as needed for mild pain 30 tablet 0    fluticasone (FLONASE) 50 mcg/act nasal spray 1 spray into each nostril daily 16 g 0    guaiFENesin (MUCINEX) 600 mg 12 hr tablet Take 1 tablet (600 mg total) by mouth every 12 (twelve) hours 20 tablet 0    levofloxacin (LEVAQUIN) 500 mg tablet Take 1 tablet (500 mg total) by mouth every 24 hours for 10 days 10 tablet 0    Multiple Vitamin (MULTIVITAMIN) tablet Take 1 tablet by mouth daily      traMADol (ULTRAM) 50 mg tablet TAKE 1 TABLET (50 MG TOTAL) BY MOUTH EVERY 6 (SIX) HOURS AS NEEDED FOR MODERATE PAIN 120 tablet 0    fluconazole (DIFLUCAN) 150 mg tablet Take 1 tablet (150 mg total) by mouth once for 1 dose 3 tablet 0     No current facility-administered medications for this visit  Review of Systems   Constitutional: Negative for chills, fatigue and fever  HENT: Positive for ear pain and facial swelling  Negative for rhinorrhea, sinus pain, sinus pressure and sneezing  Respiratory: Negative for cough, choking and shortness of breath  Cardiovascular: Negative for chest pain and palpitations  Gastrointestinal: Negative for abdominal pain, constipation, diarrhea and nausea  Objective:  Vitals:    10/09/18 1515   BP: 126/78   BP Location: Left arm   Patient Position: Sitting   Cuff Size: Large   Pulse: 64   Temp: 97 9 °F (36 6 °C)   TempSrc: Tympanic   SpO2: 97%   Weight: 99 3 kg (219 lb)   Height: 5' 2" (1 575 m)     Body mass index is 40 06 kg/m²       Physical Exam

## 2018-10-10 ENCOUNTER — OFFICE VISIT (OUTPATIENT)
Dept: SURGERY | Facility: CLINIC | Age: 63
End: 2018-10-10
Payer: MEDICARE

## 2018-10-10 VITALS
BODY MASS INDEX: 40.12 KG/M2 | SYSTOLIC BLOOD PRESSURE: 110 MMHG | DIASTOLIC BLOOD PRESSURE: 62 MMHG | RESPIRATION RATE: 18 BRPM | HEART RATE: 68 BPM | HEIGHT: 62 IN | WEIGHT: 218 LBS

## 2018-10-10 DIAGNOSIS — L02.01 ABSCESS OF PREAURICULAR SINUS: ICD-10-CM

## 2018-10-10 DIAGNOSIS — Q18.1 ABSCESS OF PREAURICULAR SINUS: ICD-10-CM

## 2018-10-10 PROCEDURE — 69000 DRG XTRNL EAR ABSC/HEM SMPL: CPT | Performed by: SURGERY

## 2018-10-10 PROCEDURE — 87070 CULTURE OTHR SPECIMN AEROBIC: CPT | Performed by: PHYSICIAN ASSISTANT

## 2018-10-10 PROCEDURE — 87205 SMEAR GRAM STAIN: CPT | Performed by: PHYSICIAN ASSISTANT

## 2018-10-10 RX ORDER — CHOLECALCIFEROL (VITAMIN D3) 125 MCG
CAPSULE ORAL DAILY
COMMUNITY
End: 2019-08-02 | Stop reason: ALTCHOICE

## 2018-10-10 RX ORDER — CHOLECALCIFEROL (VITAMIN D3) 125 MCG
5000 CAPSULE ORAL DAILY
COMMUNITY
End: 2019-08-02 | Stop reason: ALTCHOICE

## 2018-10-10 RX ORDER — LIDOCAINE HYDROCHLORIDE 10 MG/ML
5 INJECTION, SOLUTION INFILTRATION; PERINEURAL ONCE
Status: COMPLETED | OUTPATIENT
Start: 2018-10-10 | End: 2018-10-10

## 2018-10-10 RX ORDER — NAPROXEN 250 MG/1
220 TABLET ORAL AS NEEDED
COMMUNITY

## 2018-10-10 RX ADMIN — LIDOCAINE HYDROCHLORIDE 5 ML: 10 INJECTION, SOLUTION INFILTRATION; PERINEURAL at 13:03

## 2018-10-10 NOTE — ASSESSMENT & PLAN NOTE
The patient presents with abscess and local cellulitis of her right sided pre-auricular sinus despite 6 days of oral antibiotics  Treatment with incision and drainage now indicated  Details of the procedure and risks related to anesthesia, bleeding, infection, and neurovascular injury reviewed  Informed verbal consent obtained  Procedure completed by Dr Barbara Marie  Wound care instructions reviewed, follow up arranged

## 2018-10-10 NOTE — PROGRESS NOTES
Consult - General Surgery   Karla Sorto 70 y o  female MRN: [id number]  Encounter: [id number]    Assessment/Plan    Abscess of preauricular sinus  The patient presents with abscess and local cellulitis of her right sided pre-auricular sinus despite 6 days of oral antibiotics  Treatment with incision and drainage now indicated  Details of the procedure and risks related to anesthesia, bleeding, infection, and neurovascular injury reviewed  Informed verbal consent obtained  Procedure completed by Dr Sveta Lee  Wound care instructions reviewed, follow up arranged  Diagnoses and all orders for this visit:    Abscess of preauricular sinus    Other orders    History of Present Illness   Nurse Note:  Patient is here today for a infected right ear preauricular malformation for about 10 days with no drainage no fever or chills  Rico Christy 71 yo F with PMH right sided pre-auricular sinus here with signs and symptoms of localized abscess and cellulitis of this sinus  Onset x 10 days  Associated pain, swelling, redness  No drainage  On day #6 oral fluoroquinolone  No prior evaluation by ENT  Interested in a procedure for symptom relief  Review of Systems   Constitutional: Negative for chills and fever  HENT: Negative for congestion  Eyes: Negative for visual disturbance  Respiratory: Negative for shortness of breath  Cardiovascular: Negative for chest pain  Gastrointestinal: Negative for abdominal pain, constipation, diarrhea, nausea and vomiting  Genitourinary: Negative for dysuria  Musculoskeletal: Negative for back pain  Skin: Negative for rash and wound  Neurological: Negative for dizziness and headaches  Psychiatric/Behavioral: Negative for confusion         Historical Information   Past Medical History:   Diagnosis Date    Abnormal blood chemistry     resolved: 2/9/2017    Abscess of groin     last assessed: 10/11/2013    Cataracts, bilateral     Chronic pain     Obesity     Preauricular 1955    Spinal stenosis     [facility]ine [facility] ([facility] [address] )     uterine     Past Surgical History:   Procedure Laterality Date    CHOLECYSTECTOMY      EPIDURAL BLOCK INJECTION      HYSTERECTOMY      Total: With removal of both tubes and both ovaries     Social History   History   Alcohol Use    Yes     Comment: social ; denied: history of alcohol  use (history) ( as per allscripts); occasional alcohol use ( as per allscripts)     History   Drug Use No     Comment: chronic narcotic use ( as per allscripts)     History   Smoking Status    Never Smoker   Smokeless Tobacco    Never Used     Family History   Problem Relation Age of Onset    Ovarian cancer Mother     Cancer Mother     Pancreatic cancer Father     Cancer Father     Diabetes Family         mellitus       Meds/Allergies     Current Outpatient Prescriptions:     cyanocobalamin (VITAMIN B-12) 500 mcg tablet, Take 5,000 mcg by mouth daily, Disp: , Rfl:     fluticasone (FLONASE) 50 mcg/act nasal spray, 1 spray into each nostril daily, Disp: 16 g, Rfl: 0    guaiFENesin (MUCINEX) 600 mg 12 hr tablet, Take 1 tablet (600 mg total) by mouth every 12 (twelve) hours, Disp: 20 tablet, Rfl: 0    Lactobacillus (PROBIOTIC ACIDOPHILUS) CAPS, Take by mouth daily, Disp: , Rfl:     levofloxacin (LEVAQUIN) 500 mg tablet, Take 1 tablet (500 mg total) by mouth every 24 hours for 10 days, Disp: 10 tablet, Rfl: 0    Multiple Vitamin (MULTIVITAMIN) tablet, Take 1 tablet by mouth daily, Disp: , Rfl:     naproxen (NAPROSYN) 250 mg tablet, Take 220 mg by mouth 2 (two) times a day with meals, Disp: , Rfl:     traMADol (ULTRAM) 50 mg tablet, TAKE 1 TABLET (50 MG TOTAL) BY MOUTH EVERY 6 (SIX) HOURS AS NEEDED FOR MODERATE PAIN, Disp: 120 tablet, Rfl: 0    acetaminophen (TYLENOL) 325 mg tablet, Take 2 tablets (650 mg total) by mouth every 6 (six) hours as needed for mild pain (Patient not taking: Reported on 10/10/2018 ), Disp: 30 tablet, Rfl: 0  Allergies   Allergen Reactions    Paclitaxel Anaphylaxis     Anaphylaxis       The following portions of the patient's history were reviewed and updated as appropriate: allergies, current medications, past family history, past medical history, past social history, past surgical history and problem list     Objective   Current Vitals:   Blood pressure 110/62, pulse 68, resp  rate 18, height 5' 2" (1 575 m), weight 98 9 kg (218 lb)  Physical Exam   Constitutional: She is oriented to person, place, and time  She appears well-developed and well-nourished  No distress  HENT:   Head: Normocephalic and atraumatic  Ears:    Eyes: Pupils are equal, round, and reactive to light  EOM are normal    Neck: Normal range of motion  Musculoskeletal: Normal range of motion  Neurological: She is alert and oriented to person, place, and time  Skin: Skin is warm and dry  No rash noted  She is not diaphoretic  Psychiatric: She has a normal mood and affect  Her behavior is normal    Nursing note and vitals reviewed        Signature:  Barbra Dent PA-C  Date: 10/10/2018 Time: 12:46 PM

## 2018-10-10 NOTE — PROGRESS NOTES
Incision and Drainage  Date/Time: 10/10/2018 12:47 PM  Performed by: Sandra Velasco by: Lola Jewell     Patient location:  Bedside  Other Assisting Provider: Yes (comment) Nick Dent PA-C)    Consent:     Consent obtained:  Verbal    Consent given by:  Patient    Risks discussed:  Bleeding, incomplete drainage, pain, infection and damage to other organs (Neurovascular injury )    Alternatives discussed:  No treatment, delayed treatment, alternative treatment and referral  Universal protocol:     Procedure explained and questions answered to patient or proxy's satisfaction: yes      Relevant documents present and verified: yes      Test results available and properly labeled: no      Radiology Images displayed and confirmed  If images not available, report reviewed: no      Required blood products, implants, devices, and special equipment available: no      Site/side marked: yes      Immediately prior to procedure a time out was called: yes      Patient identity confirmed:  Verbally with patient  Location:     Type:  Abscess    Location:  Head/neck    Head/neck location:  R external ear  Pre-procedure details:     Skin preparation:  Betadine  Anesthesia (see MAR for exact dosages): Anesthesia method:  Local infiltration    Local anesthetic:  Lidocaine 1% w/o epi  Procedure details:     Complexity:  Intermediate    Needle aspiration: no      Incision types:  Single straight    Scalpel blade:  15    Approach:  Open    Incision depth:  Skin    Wound management:  Probed and deloculated and debrided    Drainage:  Purulent and bloody    Drainage amount: Moderate    Wound treatment:  Packing placed    Packing materials:  1/4 in gauze  Post-procedure details:     Patient tolerance of procedure:   Tolerated well, no immediate complications

## 2018-10-10 NOTE — PATIENT INSTRUCTIONS
Abscess Incision and Drainage   WHAT YOU NEED TO KNOW:   An abscess incision and drainage (I and D) is a procedure to drain pus from an abscess and clean it out so it can heal   DISCHARGE INSTRUCTIONS:   Contact your healthcare provider if:   · The area around your abscess has red streaks or is warm and painful  · You have a fever or chills  · You have increased redness, swelling, or pain in your wound  · Your wound does not start to heal after a few days  · Your abscess returns  · You have questions or concerns about your condition or care  Medicines:   · NSAIDs , such as ibuprofen, help decrease swelling, pain, and fever  NSAIDs can cause stomach bleeding or kidney problems in certain people  If you take blood thinner medicine, always ask your healthcare provider if NSAIDs are safe for you  Always read the medicine label and follow directions  · Take your medicine as directed  Contact your healthcare provider if you think your medicine is not helping or if you have side effects  Tell him or her if you are allergic to any medicine  Keep a list of the medicines, vitamins, and herbs you take  Include the amounts, and when and why you take them  Bring the list or the pill bottles to follow-up visits  Carry your medicine list with you in case of an emergency  Care for your wound as directed:   · Do not remove your bandage  unless your healthcare provider says it is okay  Keep the bandage clean and dry  Remove your bandage and clean the wound once your healthcare provider gives you directions  · Apply heat  on the bandage over your wound for 20 to 30 minutes every 2 hours for as many days as directed  This will increase blood flow to the area and help it heal     · Elevate  your wound above level of your heart as often as you can  This will help decrease swelling and pain  Prop your wounded area on pillows or blankets to keep it elevated comfortably    Follow up with your healthcare provider as directed: You may need to return in 1 to 3 days to have the gauze in your wound removed and your wound examined  You may be taught how to change the gauze in your wound  Write down your questions so you remember to ask them during your visits  © 2017 [employer] [employer] Information is for End User's use only and may not be sold, redistributed or otherwise used for commercial purposes  All illustrations and images included in CareNotes® are the copyrighted property of A D A M [employer] Inc  or [employer] Ellison  The above information is an  only  It is not intended as medical advice for individual conditions or treatments  Talk to your doctor, nurse or pharmacist before following any medical regimen to see if it is safe and effective for you

## 2018-10-10 NOTE — LETTER
October 10, 2018     DO Tania Lopez [facility] [address]    Patient: Silvia Brennan   YOB: 1955   Date of Visit: 10/10/2018       Dear Dr Cherri Dunlap: Thank you for referring Joanne Brink to me for evaluation  Below are my notes for this consultation  If you have questions, please do not hesitate to call me  I look forward to following your patient along with you  Sincerely,        Yamileth Ruben Dent PA-C        CC: No Recipients  Kami GARCIAkiel Dent PA-C  10/10/2018 12:55 PM  Sign at close encounter  [address] 70 y o  female MRN: [id number]  Encounter: [id number]    Assessment/Plan    Abscess of preauricular sinus  The patient presents with abscess and local cellulitis of her right sided pre-auricular sinus despite 6 days of oral antibiotics  Treatment with incision and drainage now indicated  Details of the procedure and risks related to anesthesia, bleeding, infection, and neurovascular injury reviewed  Informed verbal consent obtained  Procedure completed by Dr Les Oliveira  Wound care instructions reviewed, follow up arranged  Diagnoses and all orders for this visit:    Abscess of preauricular sinus    Other orders    History of Present Illness   Nurse Note:  Patient is here today for a infected right ear preauricular malformation for about 10 days with no drainage no fever or chills  [facility] Ver[place] Christy 71 yo F with PMH right sided pre-auricular sinus here with signs and symptoms of localized abscess and cellulitis of this sinus  Onset x 10 days  Associated pain, swelling, redness  No drainage  On day #6 oral fluoroquinolone  No prior evaluation by ENT  Interested in a procedure for symptom relief  Review of Systems   Constitutional: Negative for chills and fever  HENT: Negative for congestion  Eyes: Negative for visual disturbance  Respiratory: Negative for shortness of breath  Cardiovascular: Negative for chest pain  Gastrointestinal: Negative for abdominal pain, constipation, diarrhea, nausea and vomiting  Genitourinary: Negative for dysuria  Musculoskeletal: Negative for back pain  Skin: Negative for rash and wound  Neurological: Negative for dizziness and headaches  Psychiatric/Behavioral: Negative for confusion         Historical Information   Past Medical History:   Diagnosis Date    Abnormal blood chemistry     resolved: 2/9/2017    Abscess of groin     last assessed: 10/11/2013    Cataracts, bilateral     Chronic pain     Obesity     Preauricular 1955    Spinal stenosis     Uterine cancer ([facility] [address] )     uterine     Past Surgical History:   Procedure Laterality Date    CHOLECYSTECTOMY      EPIDURAL BLOCK INJECTION      HYSTERECTOMY      Total: With removal of both tubes and both ovaries     Social History   History   Alcohol Use    Yes     Comment: social ; denied: history of alcohol  use (history) ( as per allscripts); occasional alcohol use ( as per allscripts)     History   Drug Use No     Comment: chronic narcotic use ( as per allscripts)     History   Smoking Status    Never Smoker   Smokeless Tobacco    Never Used     Family History   Problem Relation Age of Onset    Ovarian cancer Mother     Cancer Mother     Pancreatic cancer Father     Cancer Father     Diabetes Family         mellitus       Meds/Allergies     Current Outpatient Prescriptions:     cyanocobalamin (VITAMIN B-12) 500 mcg tablet, Take 5,000 mcg by mouth daily, Disp: , Rfl:     fluticasone (FLONASE) 50 mcg/act nasal spray, 1 spray into each nostril daily, Disp: 16 g, Rfl: 0    guaiFENesin (MUCINEX) 600 mg 12 hr tablet, Take 1 tablet (600 mg total) by mouth every 12 (twelve) hours, Disp: 20 tablet, Rfl: 0    Lactobacillus (PROBIOTIC ACIDOPHILUS) CAPS, Take by mouth daily, Disp: , Rfl:     levofloxacin (LEVAQUIN) 500 mg tablet, Take 1 tablet (500 mg total) by mouth every 24 hours for 10 days, Disp: 10 tablet, Rfl: 0    Multiple Vitamin (MULTIVITAMIN) tablet, Take 1 tablet by mouth daily, Disp: , Rfl:     naproxen (NAPROSYN) 250 mg tablet, Take 220 mg by mouth 2 (two) times a day with meals, Disp: , Rfl:     traMADol (ULTRAM) 50 mg tablet, TAKE 1 TABLET (50 MG TOTAL) BY MOUTH EVERY 6 (SIX) HOURS AS NEEDED FOR MODERATE PAIN, Disp: 120 tablet, Rfl: 0    acetaminophen (TYLENOL) 325 mg tablet, Take 2 tablets (650 mg total) by mouth every 6 (six) hours as needed for mild pain (Patient not taking: Reported on 10/10/2018 ), Disp: 30 tablet, Rfl: 0  Allergies   Allergen Reactions    Paclitaxel Anaphylaxis     Anaphylaxis       The following portions of the patient's history were reviewed and updated as appropriate: allergies, current medications, past family history, past medical history, past social history, past surgical history and problem list     Objective   Current Vitals:   Blood pressure 110/62, pulse 68, resp  rate 18, height 5' 2" (1 575 m), weight 98 9 kg (218 lb)  Physical Exam   Constitutional: She is oriented to person, place, and time  She appears well-developed and well-nourished  No distress  HENT:   Head: Normocephalic and atraumatic  Ears:    Eyes: Pupils are equal, round, and reactive to light  EOM are normal    Neck: Normal range of motion  Musculoskeletal: Normal range of motion  Neurological: She is alert and oriented to person, place, and time  Skin: Skin is warm and dry  No rash noted  She is not diaphoretic  Psychiatric: She has a normal mood and affect  Her behavior is normal    Nursing note and vitals reviewed  Signature:  Zoe Dent PA-C  Date: 10/10/2018 Time: 12:46 PM     Marvel Dent PA-C  10/10/2018 12:50 PM  Sign at close encounter  Incision and Drainage  Date/Time: 10/10/2018 12:47 PM  Performed by: Jhoana Rueda by: Pravin Rae     Patient location:  Bedside  Other Assisting Provider:  Yes (comment) (Marvel eDnt PA-C)    Consent:     Consent obtained:  Verbal    Consent given by:  Patient    Risks discussed:  Bleeding, incomplete drainage, pain, infection and damage to other organs (Neurovascular injury )    Alternatives discussed:  No treatment, delayed treatment, alternative treatment and referral  Universal protocol:     Procedure explained and questions answered to patient or proxy's satisfaction: yes      Relevant documents present and verified: yes      Test results available and properly labeled: no      Radiology Images displayed and confirmed  If images not available, report reviewed: no      Required blood products, implants, devices, and special equipment available: no      Site/side marked: yes      Immediately prior to procedure a time out was called: yes      Patient identity confirmed:  Verbally with patient  Location:     Type:  Abscess    Location:  Head/neck    Head/neck location:  R external ear  Pre-procedure details:     Skin preparation:  Betadine  Anesthesia (see MAR for exact dosages): Anesthesia method:  Local infiltration    Local anesthetic:  Lidocaine 1% w/o epi  Procedure details:     Complexity:  Intermediate    Needle aspiration: no      Incision types:  Single straight    Scalpel blade:  15    Approach:  Open    Incision depth:  Skin    Wound management:  Probed and deloculated and debrided    Drainage:  Purulent and bloody    Drainage amount: Moderate    Wound treatment:  Packing placed    Packing materials:  1/4 in gauze  Post-procedure details:     Patient tolerance of procedure:   Tolerated well, no immediate complications

## 2018-10-14 LAB
BACTERIA WND AEROBE CULT: NORMAL
GRAM STN SPEC: NORMAL

## 2018-10-17 ENCOUNTER — OFFICE VISIT (OUTPATIENT)
Dept: SURGERY | Facility: CLINIC | Age: 63
End: 2018-10-17

## 2018-10-17 DIAGNOSIS — L02.01 ABSCESS OF PREAURICULAR SINUS: Primary | ICD-10-CM

## 2018-10-17 DIAGNOSIS — Q18.1 ABSCESS OF PREAURICULAR SINUS: Primary | ICD-10-CM

## 2018-10-17 PROCEDURE — 99024 POSTOP FOLLOW-UP VISIT: CPT | Performed by: PHYSICIAN ASSISTANT

## 2018-10-17 NOTE — LETTER
October 17, 2018     Dorothea Garcia DO  [place] [address] [facility]    Patient: Miranda Jewell   YOB: 1955   Date of Visit: 10/17/2018       Dear Dr Rory Miller: Thank you for referring Jonas Doss to me for evaluation  Below are my notes for this consultation  If you have questions, please do not hesitate to call me  I look forward to following your patient along with you  Sincerely,        Maynor Dent PA-C        CC: No Recipients  Napoleon Karl Dent PA-C  10/17/2018  8:34 AM  Sign at close encounter  Progress Note - General Surgery   Miranda Jewell 70 y o  female MRN: [id number]  Encounter: [id number]    Assessment/Plan    Abscess of preauricular sinus  The patient is doing well status post incision and drainage of their abscess  No evidence of ongoing or recurrent infection  No indication for further surgical intervention or antibiotics  Wound care instructions reviewed  Questions answered  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  There are no diagnoses linked to this encounter  Subjective     10- Nurse Note: Patient is here today for a one week follow up of right ear abscess and the area is still hard but no drainage  [facility] Verito Christy 40-year-old female here 1 week status post I&D of abscess of right-sided preauricular sinus  Patient reports feeling well  Denies fever, chills, pain, discharge  Reports mild pressure  Has completed her course of oral antibiotics  Review of Systems   Constitutional: Negative for chills and fever  HENT: Negative for congestion  Eyes: Negative for visual disturbance  Respiratory: Negative for shortness of breath  Cardiovascular: Negative for chest pain  Gastrointestinal: Negative for abdominal pain, constipation, diarrhea, nausea and vomiting  Genitourinary: Negative for dysuria     Musculoskeletal: Negative for back pain  Skin: Negative for rash and wound  Neurological: Negative for dizziness and headaches  Psychiatric/Behavioral: Negative for confusion  The following portions of the patient's history were reviewed and updated as appropriate: allergies, current medications, past family history, past medical history, past social history, past surgical history and problem list     Objective      There were no vitals taken for this visit  Physical Exam   Constitutional: She is oriented to person, place, and time  She appears well-developed and well-nourished  No distress  HENT:   Head: Normocephalic and atraumatic  Eyes: Pupils are equal, round, and reactive to light  Conjunctivae and EOM are normal    Neck: Normal range of motion  Pulmonary/Chest: No respiratory distress  Musculoskeletal: Normal range of motion  Neurological: She is alert and oriented to person, place, and time  Skin: Skin is warm and dry  Capillary refill takes less than 2 seconds  She is not diaphoretic  Incision clean, dry, closed with scab  No residual erythema, cellulitis, fluctuance, or abscess  Psychiatric: She has a normal mood and affect   Her behavior is normal        Signature:  Marvel Dent PA-C  Date: 10/17/2018 Time: 8:32 AM

## 2018-10-17 NOTE — ASSESSMENT & PLAN NOTE
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

## 2018-10-17 NOTE — PROGRESS NOTES
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  There are no diagnoses linked to this encounter  Subjective     10- Nurse Note: Patient is here today for a one week follow up of right ear abscess and the area is still hard but no drainage  Edwin Sellers MA    Pleasant 52-year-old female here 1 week status post I&D of abscess of right-sided preauricular sinus  Patient reports feeling well  Denies fever, chills, pain, discharge  Reports mild pressure  Has completed her course of oral antibiotics  Review of Systems   Constitutional: Negative for chills and fever  HENT: Negative for congestion  Eyes: Negative for visual disturbance  Respiratory: Negative for shortness of breath  Cardiovascular: Negative for chest pain  Gastrointestinal: Negative for abdominal pain, constipation, diarrhea, nausea and vomiting  Genitourinary: Negative for dysuria  Musculoskeletal: Negative for back pain  Skin: Negative for rash and wound  Neurological: Negative for dizziness and headaches  Psychiatric/Behavioral: Negative for confusion  The following portions of the patient's history were reviewed and updated as appropriate: allergies, current medications, past family history, past medical history, past social history, past surgical history and problem list     Objective      There were no vitals taken for this visit  Physical Exam   Constitutional: She is oriented to person, place, and time  She appears well-developed and well-nourished   No distress  HENT:   Head: Normocephalic and atraumatic  Eyes: Pupils are equal, round, and reactive to light  Conjunctivae and EOM are normal    Neck: Normal range of motion  Pulmonary/Chest: No respiratory distress  Musculoskeletal: Normal range of motion  Neurological: She is alert and oriented to person, place, and time  Skin: Skin is warm and dry  Capillary refill takes less than 2 seconds  She is not diaphoretic  Incision clean, dry, closed with scab  No residual erythema, cellulitis, fluctuance, or abscess  Psychiatric: She has a normal mood and affect   Her behavior is normal        Signature:  Marvel Dent PA-C  Date: 10/17/2018 Time: 8:32 AM

## 2018-10-26 DIAGNOSIS — G89.29 OTHER CHRONIC PAIN: ICD-10-CM

## 2018-10-26 NOTE — TELEPHONE ENCOUNTER
Pt needs refill on tramadol 50 mg takes Q6 hrs prn #120    Pharmacy  [facility]      Phone; [phone]

## 2018-10-30 DIAGNOSIS — G89.29 OTHER CHRONIC PAIN: ICD-10-CM

## 2018-10-30 RX ORDER — TRAMADOL HYDROCHLORIDE 50 MG/1
50 TABLET ORAL EVERY 6 HOURS PRN
Qty: 120 TABLET | Refills: 0 | Status: SHIPPED | OUTPATIENT
Start: 2018-10-30 | End: 2018-11-08

## 2018-10-30 RX ORDER — TRAMADOL HYDROCHLORIDE 50 MG/1
50 TABLET ORAL EVERY 6 HOURS PRN
Qty: 120 TABLET | Refills: 0 | Status: SHIPPED | OUTPATIENT
Start: 2018-10-30 | End: 2018-12-28 | Stop reason: SDUPTHER

## 2018-11-08 ENCOUNTER — OFFICE VISIT (OUTPATIENT)
Dept: INTERNAL MEDICINE CLINIC | Facility: CLINIC | Age: 63
End: 2018-11-08
Payer: MEDICARE

## 2018-11-08 VITALS
BODY MASS INDEX: 40.3 KG/M2 | TEMPERATURE: 98.4 F | WEIGHT: 219 LBS | HEART RATE: 83 BPM | SYSTOLIC BLOOD PRESSURE: 124 MMHG | RESPIRATION RATE: 18 BRPM | HEIGHT: 62 IN | DIASTOLIC BLOOD PRESSURE: 72 MMHG | OXYGEN SATURATION: 96 %

## 2018-11-08 DIAGNOSIS — H25.9 AGE-RELATED CATARACT OF BOTH EYES, UNSPECIFIED AGE-RELATED CATARACT TYPE: ICD-10-CM

## 2018-11-08 DIAGNOSIS — Z01.818 PRE-OP EXAM: Primary | ICD-10-CM

## 2018-11-08 DIAGNOSIS — E78.2 MIXED HYPERLIPIDEMIA: ICD-10-CM

## 2018-11-08 PROCEDURE — 99213 OFFICE O/P EST LOW 20 MIN: CPT | Performed by: INTERNAL MEDICINE

## 2018-11-08 NOTE — PROGRESS NOTES
Subjective: Mamadou Frederick is a 70 y o  female who presents to the office today for a preoperative consultation at the request of surgeon Dr Bette Vasquez who plans on performing Bilateral Cataract Surgery on November 14 and December 6  This consultation is requested for the specific conditions prompting preoperative evaluation (i e  because of potential affect on operative risk): none  Planned anesthesia: local  The patient has the following known anesthesia issues: N/A  Patients bleeding risk: no recent abnormal bleeding  Patient does not have objections to receiving blood products if needed  The following portions of the patient's history were reviewed and updated as appropriate:   She  has a past medical history of Abnormal blood chemistry; Abscess of groin; Arthritis; Cataracts, bilateral; Chronic pain; Obesity; Preauricular (1953); Spinal stenosis; and Uterine cancer ([facility] )  She   Patient Active Problem List    Diagnosis Date Noted    Abscess of preauricular sinus 10/10/2018    Endometrial cancer ([facility] ) 09/07/2018    Chronic narcotic use 03/29/2018    Class 3 severe obesity without serious comorbidity with body mass index (BMI) of 40 0 to 44 9 in adult [facility]) 08/28/2017    Mixed hyperlipidemia 02/09/2017    Degeneration of intervertebral disc of lumbosacral region 05/26/2016    Malignant neoplasm of uterus ([facility] ) 05/19/2016     She  has a past surgical history that includes Cholecystectomy; Hysterectomy; Epidural block injection; and Abscess drainage  Her family history includes Cancer in her father and mother; Diabetes in her family; Ovarian cancer in her mother; Pancreatic cancer in her father  She  reports that she has never smoked  She has never used smokeless tobacco  She reports that she drinks alcohol  She reports that she does not use drugs    Current Outpatient Prescriptions   Medication Sig Dispense Refill    acetaminophen (TYLENOL) 325 mg tablet Take 2 tablets (650 mg total) by mouth every 6 (six) hours as needed for mild pain 30 tablet 0    cyanocobalamin (VITAMIN B-12) 500 mcg tablet Take 5,000 mcg by mouth daily      Lactobacillus (PROBIOTIC ACIDOPHILUS) CAPS Take by mouth daily      Multiple Vitamin (MULTIVITAMIN) tablet Take 1 tablet by mouth daily      naproxen (NAPROSYN) 250 mg tablet Take 220 mg by mouth 2 (two) times a day with meals      traMADol (ULTRAM) 50 mg tablet Take 1 tablet (50 mg total) by mouth every 6 (six) hours as needed for moderate pain 120 tablet 0     No current facility-administered medications for this visit  Current Outpatient Prescriptions on File Prior to Visit   Medication Sig    acetaminophen (TYLENOL) 325 mg tablet Take 2 tablets (650 mg total) by mouth every 6 (six) hours as needed for mild pain    cyanocobalamin (VITAMIN B-12) 500 mcg tablet Take 5,000 mcg by mouth daily    Lactobacillus (PROBIOTIC ACIDOPHILUS) CAPS Take by mouth daily    Multiple Vitamin (MULTIVITAMIN) tablet Take 1 tablet by mouth daily    naproxen (NAPROSYN) 250 mg tablet Take 220 mg by mouth 2 (two) times a day with meals    traMADol (ULTRAM) 50 mg tablet Take 1 tablet (50 mg total) by mouth every 6 (six) hours as needed for moderate pain    [DISCONTINUED] fluticasone (FLONASE) 50 mcg/act nasal spray 1 spray into each nostril daily (Patient not taking: Reported on 11/8/2018 )    [DISCONTINUED] guaiFENesin (MUCINEX) 600 mg 12 hr tablet Take 1 tablet (600 mg total) by mouth every 12 (twelve) hours (Patient not taking: Reported on 11/8/2018 )    [DISCONTINUED] traMADol (ULTRAM) 50 mg tablet TAKE 1 TABLET (50 MG TOTAL) BY MOUTH EVERY 6 (SIX) HOURS AS NEEDED FOR MODERATE PAIN (Patient not taking: Reported on 11/8/2018 )     No current facility-administered medications on file prior to visit  She is allergic to paclitaxel       Review of Systems  Pertinent items are noted in HPI       Objective:     /72 (BP Location: Left arm, Patient Position: Sitting, Cuff Size: Large) Pulse 83   Temp 98 4 °F (36 9 °C) (Tympanic)   Resp 18   Ht 5' 2" (1 575 m)   Wt 99 3 kg (219 lb)   SpO2 96%   BMI 40 06 kg/m²     General Appearance:    Alert, cooperative, no distress, appears stated age   Head:    Normocephalic, without obvious abnormality, atraumatic   Eyes:    PERRL, conjunctiva/corneas clear, EOM's intact, fundi     benign, both eyes   Ears:    Normal TM's and external ear canals, both ears   Nose:   Nares normal, septum midline, mucosa normal, no drainage    or sinus tenderness   Throat:   Lips, mucosa, and tongue normal; teeth and gums normal   Neck:   Supple, symmetrical, trachea midline, no adenopathy;     thyroid:  no enlargement/tenderness/nodules; no carotid    bruit or JVD   Back:     Symmetric, no curvature, ROM normal, no CVA tenderness   Lungs:     Clear to auscultation bilaterally, respirations unlabored   Chest Wall:    No tenderness or deformity    Heart:    Regular rate and rhythm, S1 and S2 normal, no murmur, rub   or gallop   Breast Exam:    No tenderness, masses, or nipple abnormality   Abdomen:     Soft, non-tender, bowel sounds active all four quadrants,     no masses, no organomegaly   Genitalia:    Normal female without lesion, discharge or tenderness   Rectal:    Normal tone, no masses or tenderness; guaiac negative stool   Extremities:   Extremities normal, atraumatic, no cyanosis or edema   Pulses:   2+ and symmetric all extremities   Skin:   Skin color, texture, turgor normal, no rashes or lesions   Lymph nodes:   Cervical, supraclavicular, and axillary nodes normal   Neurologic:   CNII-XII intact, normal strength, sensation and reflexes     throughout       Predictors of intubation difficulty:   Morbid obesity? no   Anatomically abnormal facies? no   Prominent incisors? no   Receding mandible? no   Short, thick neck? no   Neck range of motion: normal   Mallampati score:  I (soft palate, uvula, fauces, and tonsillar pillars visible)   Thyromental distance: < 6cm   Mouth openin cm   Dentition: No chipped, loose, or missing teeth  Cardiographics  ECG: normal sinus rhythm, no blocks or conduction defects, no ischemic changes  Echocardiogram: not done    Imaging  Chest x-ray: normal     Lab Review   not applicable     Assessment:     70 y o  female with planned surgery as above  Known risk factors for perioperative complications: None    Difficulty with intubation is not anticipated  Cardiac Risk Estimation: Low      Current medications which may produce withdrawal symptoms if withheld perioperatively: None      Plan:     1  Preoperative workup as follows none  2  Change in medication regimen before surgery: none, continue medication regimen including morning of surgery, with sip of water  3  Prophylaxis for cardiac events with perioperative beta-blockers: not indicated  4  Invasive hemodynamic monitoring perioperatively: not indicated  5  Deep vein thrombosis prophylaxis postoperatively:N/A   6  Surveillance for postoperative MI with ECG immediately postoperatively and on postoperative days 1 and 2 AND troponin levels 24 hours postoperatively and on day 4 or hospital discharge (whichever comes first): not indicated  7  Other measures: N/A    8   Cleared for surgery/Low risk

## 2018-12-02 ENCOUNTER — OFFICE VISIT (OUTPATIENT)
Dept: URGENT CARE | Facility: CLINIC | Age: 63
End: 2018-12-02
Payer: MEDICARE

## 2018-12-02 VITALS
HEART RATE: 90 BPM | DIASTOLIC BLOOD PRESSURE: 92 MMHG | TEMPERATURE: 97.9 F | SYSTOLIC BLOOD PRESSURE: 138 MMHG | RESPIRATION RATE: 18 BRPM | OXYGEN SATURATION: 98 %

## 2018-12-02 DIAGNOSIS — R35.0 URINARY FREQUENCY: ICD-10-CM

## 2018-12-02 DIAGNOSIS — N39.0 URINARY TRACT INFECTION WITH HEMATURIA, SITE UNSPECIFIED: Primary | ICD-10-CM

## 2018-12-02 DIAGNOSIS — R31.9 URINARY TRACT INFECTION WITH HEMATURIA, SITE UNSPECIFIED: Primary | ICD-10-CM

## 2018-12-02 LAB
SL AMB  POCT GLUCOSE, UA: ABNORMAL
SL AMB LEUKOCYTE ESTERASE,UA: ABNORMAL
SL AMB POCT BILIRUBIN,UA: ABNORMAL
SL AMB POCT BLOOD,UA: ABNORMAL
SL AMB POCT CLARITY,UA: ABNORMAL
SL AMB POCT COLOR,UA: YELLOW
SL AMB POCT KETONES,UA: ABNORMAL
SL AMB POCT NITRITE,UA: ABNORMAL
SL AMB POCT PH,UA: 6
SL AMB POCT SPECIFIC GRAVITY,UA: 1.02
SL AMB POCT URINE PROTEIN: ABNORMAL
SL AMB POCT UROBILINOGEN: 0.2

## 2018-12-02 PROCEDURE — 87086 URINE CULTURE/COLONY COUNT: CPT | Performed by: PHYSICIAN ASSISTANT

## 2018-12-02 PROCEDURE — 99213 OFFICE O/P EST LOW 20 MIN: CPT | Performed by: PHYSICIAN ASSISTANT

## 2018-12-02 PROCEDURE — G0463 HOSPITAL OUTPT CLINIC VISIT: HCPCS | Performed by: PHYSICIAN ASSISTANT

## 2018-12-02 PROCEDURE — 81002 URINALYSIS NONAUTO W/O SCOPE: CPT | Performed by: PHYSICIAN ASSISTANT

## 2018-12-02 RX ORDER — SULFAMETHOXAZOLE AND TRIMETHOPRIM 800; 160 MG/1; MG/1
1 TABLET ORAL EVERY 12 HOURS SCHEDULED
Qty: 14 TABLET | Refills: 0 | Status: SHIPPED | OUTPATIENT
Start: 2018-12-02 | End: 2018-12-09

## 2018-12-02 NOTE — PROGRESS NOTES
[facility]    NAME: Minor Uribe is a 70 y o  female  : 1955    MRN: [id number]  DATE: 2018  TIME: 3:31 PM    Assessment and Plan   Urinary tract infection with hematuria, site unspecified [N39 0, R31 9]  1  Urinary tract infection with hematuria, site unspecified  sulfamethoxazole-trimethoprim (BACTRIM DS) 800-160 mg per tablet   2  Urinary frequency  Urine culture    POCT urine dip       Patient Instructions     Patient Instructions   I have prescribed an antibiotic for the infection  Please take the antibiotic as prescribed and finish the entire prescription  I recommend that the patient takes an over the counter probiotic or eats yogurt with live cultures in it [place]) to keep good bacteria in the gut and help prevent diarrhea  If not improving over the next 7-10 days, follow up with PCP  Go to the emergency department if:   You have severe back, side, or abdominal pain  You have fever and shaking chills  You vomit several times in a row  Contact your primary care provider if:   Your symptoms do not go away, even after treatment  Drink more liquids  Liquids help flush out bacteria that may be causing an infection  Chief Complaint     Chief Complaint   Patient presents with    Possible UTI     Pt c/o burning upon urination and frequency  History of Present Illness   24-year-old female here with a urinary frequency and burning with urination for the last 4 days  Denies any fever, chills, nausea, vomiting or back pain  Feels like she has a UTI  Review of Systems   Review of Systems   Constitutional: Negative for activity change, appetite change, chills, diaphoresis, fatigue, fever and unexpected weight change  HENT: Negative for congestion, dental problem, hearing loss, sinus pressure, sneezing, sore throat, tinnitus, trouble swallowing and voice change  Eyes: Negative for photophobia, redness and visual disturbance     Respiratory: Negative for apnea, cough, chest tightness, shortness of breath, wheezing and stridor  Cardiovascular: Negative for chest pain, palpitations and leg swelling  Gastrointestinal: Negative for abdominal distention, abdominal pain, blood in stool, constipation, diarrhea, nausea and vomiting  Endocrine: Negative for cold intolerance, heat intolerance, polydipsia, polyphagia and polyuria  Genitourinary: Positive for dysuria and frequency  Negative for difficulty urinating, flank pain, hematuria and urgency  Musculoskeletal: Negative for arthralgias, back pain, gait problem, joint swelling, myalgias, neck pain and neck stiffness  Skin: Negative for pallor, rash and wound  Neurological: Negative for dizziness, tremors, seizures, speech difficulty, weakness and headaches  Hematological: Negative for adenopathy  Does not bruise/bleed easily  Psychiatric/Behavioral: Negative for agitation, confusion, dysphoric mood and sleep disturbance  The patient is not nervous/anxious  All other systems reviewed and are negative        Current Medications     Current Outpatient Prescriptions:     acetaminophen (TYLENOL) 325 mg tablet, Take 2 tablets (650 mg total) by mouth every 6 (six) hours as needed for mild pain, Disp: 30 tablet, Rfl: 0    cyanocobalamin (VITAMIN B-12) 500 mcg tablet, Take 5,000 mcg by mouth daily, Disp: , Rfl:     Lactobacillus (PROBIOTIC ACIDOPHILUS) CAPS, Take by mouth daily, Disp: , Rfl:     Multiple Vitamin (MULTIVITAMIN) tablet, Take 1 tablet by mouth daily, Disp: , Rfl:     naproxen (NAPROSYN) 250 mg tablet, Take 220 mg by mouth 2 (two) times a day with meals, Disp: , Rfl:     sulfamethoxazole-trimethoprim (BACTRIM DS) 800-160 mg per tablet, Take 1 tablet by mouth every 12 (twelve) hours for 7 days, Disp: 14 tablet, Rfl: 0    traMADol (ULTRAM) 50 mg tablet, Take 1 tablet (50 mg total) by mouth every 6 (six) hours as needed for moderate pain, Disp: 120 tablet, Rfl: 0    Current Allergies     Allergies as of 12/02/2018 - Reviewed 12/02/2018   Allergen Reaction Noted    Paclitaxel Anaphylaxis 08/11/2014          The following portions of the patient's history were reviewed and updated as appropriate: allergies, current medications, past family history, past medical history, past social history, past surgical history and problem list    Past Medical History:   Diagnosis Date    Abnormal blood chemistry     resolved: 2/9/2017    Abscess of groin     last assessed: 10/11/2013    Arthritis     Cataracts, bilateral     Chronic pain     Obesity     Preauricular 1955    Spinal stenosis     Uterine cancer ([address] )     uterine     Past Surgical History:   Procedure Laterality Date    ABCESS DRAINAGE      right ear    CHOLECYSTECTOMY      EPIDURAL BLOCK INJECTION      HYSTERECTOMY      Total: With removal of both tubes and both ovaries     Family History   Problem Relation Age of Onset    Ovarian cancer Mother     Cancer Mother     Pancreatic cancer Father     Cancer Father     Diabetes Family         mellitus     Social History     Social History    Marital status:      Spouse name: N/A    Number of children: N/A    Years of education: N/A     Occupational History    underwear salesperson      retired     Social History Main Topics    Smoking status: Never Smoker    Smokeless tobacco: Never Used    Alcohol use Yes      Comment: social ; denied: history of alcohol  use (history) ( as per allscripts); occasional alcohol use ( as per allscripts)    Drug use: No      Comment: chronic narcotic use ( as per allscripts)    Sexual activity: Yes     Partners: Male     Other Topics Concern    Not on file     Social History Narrative    Single- Engaged    Retired    Lives at home with spouse     Engaged to be      Medications have been verified      Objective   /92   Pulse 90   Temp 97 9 °F (36 6 °C)   Resp 18   SpO2 98%      Physical Exam   Physical Exam Constitutional: She appears well-developed and well-nourished  No distress  HENT:   Head: Normocephalic  Right Ear: External ear normal    Left Ear: External ear normal    Nose: Nose normal    Mouth/Throat: Oropharynx is clear and moist  No oropharyngeal exudate  Neck: Normal range of motion  Neck supple  Cardiovascular: Normal rate, regular rhythm and normal heart sounds  No murmur heard  Pulmonary/Chest: Effort normal and breath sounds normal  No respiratory distress  She has no wheezes  She has no rales  Abdominal: Soft  Bowel sounds are normal  There is no tenderness  There is no CVA tenderness  Musculoskeletal: Normal range of motion  Lymphadenopathy:     She has no cervical adenopathy  Skin: Skin is warm  No rash noted  Nursing note and vitals reviewed

## 2018-12-03 LAB — BACTERIA UR CULT: NORMAL

## 2018-12-15 ENCOUNTER — OFFICE VISIT (OUTPATIENT)
Dept: URGENT CARE | Facility: CLINIC | Age: 63
End: 2018-12-15
Payer: MEDICARE

## 2018-12-15 VITALS
HEART RATE: 71 BPM | WEIGHT: 219 LBS | TEMPERATURE: 97.8 F | OXYGEN SATURATION: 97 % | DIASTOLIC BLOOD PRESSURE: 70 MMHG | BODY MASS INDEX: 40.3 KG/M2 | HEIGHT: 62 IN | RESPIRATION RATE: 16 BRPM | SYSTOLIC BLOOD PRESSURE: 145 MMHG

## 2018-12-15 DIAGNOSIS — N39.0 URINARY TRACT INFECTION WITH HEMATURIA, SITE UNSPECIFIED: Primary | ICD-10-CM

## 2018-12-15 DIAGNOSIS — R31.9 URINARY TRACT INFECTION WITH HEMATURIA, SITE UNSPECIFIED: Primary | ICD-10-CM

## 2018-12-15 DIAGNOSIS — R30.0 DYSURIA: ICD-10-CM

## 2018-12-15 LAB
SL AMB  POCT GLUCOSE, UA: ABNORMAL
SL AMB LEUKOCYTE ESTERASE,UA: ABNORMAL
SL AMB POCT BILIRUBIN,UA: ABNORMAL
SL AMB POCT BLOOD,UA: ABNORMAL
SL AMB POCT CLARITY,UA: ABNORMAL
SL AMB POCT COLOR,UA: YELLOW
SL AMB POCT KETONES,UA: ABNORMAL
SL AMB POCT NITRITE,UA: ABNORMAL
SL AMB POCT PH,UA: 5
SL AMB POCT SPECIFIC GRAVITY,UA: 1.02
SL AMB POCT URINE PROTEIN: 300
SL AMB POCT UROBILINOGEN: 0.2

## 2018-12-15 PROCEDURE — 87186 SC STD MICRODIL/AGAR DIL: CPT | Performed by: PHYSICIAN ASSISTANT

## 2018-12-15 PROCEDURE — 99213 OFFICE O/P EST LOW 20 MIN: CPT | Performed by: PHYSICIAN ASSISTANT

## 2018-12-15 PROCEDURE — G0463 HOSPITAL OUTPT CLINIC VISIT: HCPCS | Performed by: PHYSICIAN ASSISTANT

## 2018-12-15 PROCEDURE — 87086 URINE CULTURE/COLONY COUNT: CPT | Performed by: PHYSICIAN ASSISTANT

## 2018-12-15 PROCEDURE — 81002 URINALYSIS NONAUTO W/O SCOPE: CPT | Performed by: PHYSICIAN ASSISTANT

## 2018-12-15 PROCEDURE — 87077 CULTURE AEROBIC IDENTIFY: CPT | Performed by: PHYSICIAN ASSISTANT

## 2018-12-15 RX ORDER — NITROFURANTOIN 25; 75 MG/1; MG/1
100 CAPSULE ORAL 2 TIMES DAILY
Qty: 14 CAPSULE | Refills: 0 | Status: SHIPPED | OUTPATIENT
Start: 2018-12-15 | End: 2018-12-22

## 2018-12-15 NOTE — PROGRESS NOTES
[facility]    NAME: Bryson Hare is a 70 y o  female  : 1955    MRN: [id number]  DATE: December 15, 2018  TIME: 3:14 PM    Assessment and Plan   Urinary tract infection with hematuria, site unspecified [N39 0, R31 9]  1  Urinary tract infection with hematuria, site unspecified  nitrofurantoin (MACROBID) 100 mg capsule   2  Dysuria  POCT urine dip auto non-scope    Urine culture       Patient Instructions     Patient Instructions   I have prescribed an antibiotic for the infection  Please take the antibiotic as prescribed and finish the entire prescription  I recommend that the patient takes an over the counter probiotic or eats yogurt with live cultures in it [place]) to keep good bacteria in the gut and help prevent diarrhea  If not improving over the next 7-10 days, follow up with PCP  Go to the emergency department if:   You have severe back, side, or abdominal pain  You have fever and shaking chills  You vomit several times in a row  Contact your primary care provider if:   Your symptoms do not go away, even after treatment  Drink more liquids  Liquids help flush out bacteria that may be causing an infection  Chief Complaint     Chief Complaint   Patient presents with    Possible UTI       History of Present Illness   15-year-old female here with complaint burning with urination and blood in her urine that started today  Patient had a UTI a few weeks ago and was treated  Symptoms returned today  No fever chills  No back pain, nausea or vomiting  Review of Systems   Review of Systems   Constitutional: Negative for activity change, appetite change, chills, diaphoresis, fatigue, fever and unexpected weight change  HENT: Negative for congestion, dental problem, hearing loss, sinus pressure, sneezing, sore throat, tinnitus, trouble swallowing and voice change  Eyes: Negative for photophobia, redness and visual disturbance     Respiratory: Negative for apnea, cough, chest tightness, shortness of breath, wheezing and stridor  Cardiovascular: Negative for chest pain, palpitations and leg swelling  Gastrointestinal: Negative for abdominal distention, abdominal pain, blood in stool, constipation, diarrhea, nausea and vomiting  Endocrine: Negative for cold intolerance, heat intolerance, polydipsia, polyphagia and polyuria  Genitourinary: Positive for dysuria, frequency and urgency  Negative for difficulty urinating, flank pain and hematuria  Musculoskeletal: Negative for arthralgias, back pain, gait problem, joint swelling, myalgias, neck pain and neck stiffness  Skin: Negative for pallor, rash and wound  Neurological: Negative for dizziness, tremors, seizures, speech difficulty, weakness and headaches  Hematological: Negative for adenopathy  Does not bruise/bleed easily  Psychiatric/Behavioral: Negative for agitation, confusion, dysphoric mood and sleep disturbance  The patient is not nervous/anxious  All other systems reviewed and are negative        Current Medications     Current Outpatient Prescriptions:     acetaminophen (TYLENOL) 325 mg tablet, Take 2 tablets (650 mg total) by mouth every 6 (six) hours as needed for mild pain, Disp: 30 tablet, Rfl: 0    cyanocobalamin (VITAMIN B-12) 500 mcg tablet, Take 5,000 mcg by mouth daily, Disp: , Rfl:     Lactobacillus (PROBIOTIC ACIDOPHILUS) CAPS, Take by mouth daily, Disp: , Rfl:     Multiple Vitamin (MULTIVITAMIN) tablet, Take 1 tablet by mouth daily, Disp: , Rfl:     naproxen (NAPROSYN) 250 mg tablet, Take 220 mg by mouth 2 (two) times a day with meals, Disp: , Rfl:     traMADol (ULTRAM) 50 mg tablet, Take 1 tablet (50 mg total) by mouth every 6 (six) hours as needed for moderate pain, Disp: 120 tablet, Rfl: 0    nitrofurantoin (MACROBID) 100 mg capsule, Take 1 capsule (100 mg total) by mouth 2 (two) times a day for 7 days, Disp: 14 capsule, Rfl: 0    Current Allergies     Allergies as of 12/15/2018 - Reviewed 12/15/2018   Allergen Reaction Noted    Paclitaxel Anaphylaxis 08/11/2014          The following portions of the patient's history were reviewed and updated as appropriate: allergies, current medications, past family history, past medical history, past social history, past surgical history and problem list    Past Medical History:   Diagnosis Date    Abnormal blood chemistry     resolved: 2/9/2017    Abscess of groin     last assessed: 10/11/2013    Arthritis     Cataracts, bilateral     Chronic pain     Obesity     Preauricular 1955    Spinal stenosis     Uterine cancer ([address] )     uterine     Past Surgical History:   Procedure Laterality Date    ABCESS DRAINAGE      right ear    CHOLECYSTECTOMY      EPIDURAL BLOCK INJECTION      HYSTERECTOMY      Total: With removal of both tubes and both ovaries     Family History   Problem Relation Age of Onset    Ovarian cancer Mother     Cancer Mother     Pancreatic cancer Father     Cancer Father     Diabetes Family         mellitus     Social History     Social History    Marital status:      Spouse name: N/A    Number of children: N/A    Years of education: N/A     Occupational History    underwear salesperson      retired     Social History Main Topics    Smoking status: Never Smoker    Smokeless tobacco: Never Used    Alcohol use Yes      Comment: social ; denied: history of alcohol  use (history) ( as per allscripts); occasional alcohol use ( as per allscripts)    Drug use: No      Comment: chronic narcotic use ( as per allscripts)    Sexual activity: Yes     Partners: Male     Other Topics Concern    Not on file     Social History Narrative    Single- Engaged    Retired    Lives at home with spouse     Engaged to be      Medications have been verified      Objective   /70   Pulse 71   Temp 97 8 °F (36 6 °C)   Resp 16   Ht 5' 2" (1 575 m)   Wt 99 3 kg (219 lb)   SpO2 97%   BMI 40 06 kg/m² Physical Exam   Physical Exam   Constitutional: She appears well-developed and well-nourished  No distress  HENT:   Head: Normocephalic  Right Ear: External ear normal    Left Ear: External ear normal    Nose: Nose normal    Mouth/Throat: Oropharynx is clear and moist  No oropharyngeal exudate  Neck: Normal range of motion  Neck supple  Cardiovascular: Normal rate, regular rhythm and normal heart sounds  No murmur heard  Pulmonary/Chest: Effort normal and breath sounds normal  No respiratory distress  She has no wheezes  She has no rales  Abdominal: Soft  Bowel sounds are normal  There is no tenderness  There is no CVA tenderness  Musculoskeletal: Normal range of motion  Lymphadenopathy:     She has no cervical adenopathy  Skin: Skin is warm  No rash noted  Nursing note and vitals reviewed

## 2018-12-17 LAB — BACTERIA UR CULT: ABNORMAL

## 2018-12-28 DIAGNOSIS — G89.29 OTHER CHRONIC PAIN: ICD-10-CM

## 2018-12-28 RX ORDER — TRAMADOL HYDROCHLORIDE 50 MG/1
50 TABLET ORAL EVERY 6 HOURS PRN
Qty: 120 TABLET | Refills: 0 | Status: SHIPPED | OUTPATIENT
Start: 2018-12-28 | End: 2019-01-28 | Stop reason: SDUPTHER

## 2019-01-14 PROCEDURE — 88305 TISSUE EXAM BY PATHOLOGIST: CPT | Performed by: PATHOLOGY

## 2019-01-15 ENCOUNTER — LAB REQUISITION (OUTPATIENT)
Dept: LAB | Facility: HOSPITAL | Age: 64
End: 2019-01-15
Payer: MEDICARE

## 2019-01-15 DIAGNOSIS — D21.0 BENIGN NEOPLASM OF CONNECTIVE AND OTHER SOFT TISSUE OF HEAD, FACE AND NECK: ICD-10-CM

## 2019-01-28 DIAGNOSIS — G89.29 OTHER CHRONIC PAIN: ICD-10-CM

## 2019-01-28 RX ORDER — TRAMADOL HYDROCHLORIDE 50 MG/1
50 TABLET ORAL EVERY 6 HOURS PRN
Qty: 120 TABLET | Refills: 0 | Status: SHIPPED | OUTPATIENT
Start: 2019-01-28 | End: 2019-02-25 | Stop reason: SDUPTHER

## 2019-02-25 DIAGNOSIS — G89.29 OTHER CHRONIC PAIN: ICD-10-CM

## 2019-02-25 RX ORDER — TRAMADOL HYDROCHLORIDE 50 MG/1
50 TABLET ORAL EVERY 6 HOURS PRN
Qty: 120 TABLET | Refills: 0 | Status: SHIPPED | OUTPATIENT
Start: 2019-02-25 | End: 2019-03-25 | Stop reason: SDUPTHER

## 2019-03-25 DIAGNOSIS — G89.29 OTHER CHRONIC PAIN: ICD-10-CM

## 2019-03-25 RX ORDER — TRAMADOL HYDROCHLORIDE 50 MG/1
50 TABLET ORAL EVERY 6 HOURS PRN
Qty: 120 TABLET | Refills: 0 | Status: SHIPPED | OUTPATIENT
Start: 2019-03-25 | End: 2019-04-26 | Stop reason: SDUPTHER

## 2019-04-26 DIAGNOSIS — G89.29 OTHER CHRONIC PAIN: ICD-10-CM

## 2019-04-27 RX ORDER — TRAMADOL HYDROCHLORIDE 50 MG/1
50 TABLET ORAL EVERY 6 HOURS PRN
Qty: 120 TABLET | Refills: 0 | Status: SHIPPED | OUTPATIENT
Start: 2019-04-27 | End: 2019-05-23 | Stop reason: SDUPTHER

## 2019-05-23 DIAGNOSIS — G89.29 OTHER CHRONIC PAIN: ICD-10-CM

## 2019-05-23 RX ORDER — TRAMADOL HYDROCHLORIDE 50 MG/1
50 TABLET ORAL EVERY 6 HOURS PRN
Qty: 120 TABLET | Refills: 0 | Status: SHIPPED | OUTPATIENT
Start: 2019-05-23 | End: 2019-06-25 | Stop reason: SDUPTHER

## 2019-06-25 DIAGNOSIS — G89.29 OTHER CHRONIC PAIN: ICD-10-CM

## 2019-06-25 RX ORDER — TRAMADOL HYDROCHLORIDE 50 MG/1
50 TABLET ORAL EVERY 6 HOURS PRN
Qty: 120 TABLET | Refills: 0 | Status: SHIPPED | OUTPATIENT
Start: 2019-06-25 | End: 2019-07-25 | Stop reason: SDUPTHER

## 2019-07-25 DIAGNOSIS — G89.29 OTHER CHRONIC PAIN: ICD-10-CM

## 2019-07-25 RX ORDER — TRAMADOL HYDROCHLORIDE 50 MG/1
50 TABLET ORAL EVERY 6 HOURS PRN
Qty: 120 TABLET | Refills: 0 | Status: SHIPPED | OUTPATIENT
Start: 2019-07-25 | End: 2019-08-22 | Stop reason: SDUPTHER

## 2019-08-02 ENCOUNTER — OFFICE VISIT (OUTPATIENT)
Dept: FAMILY MEDICINE CLINIC | Facility: CLINIC | Age: 64
End: 2019-08-02
Payer: MEDICARE

## 2019-08-02 VITALS
BODY MASS INDEX: 41.26 KG/M2 | WEIGHT: 224.2 LBS | TEMPERATURE: 96.1 F | HEART RATE: 74 BPM | OXYGEN SATURATION: 96 % | SYSTOLIC BLOOD PRESSURE: 136 MMHG | DIASTOLIC BLOOD PRESSURE: 82 MMHG | HEIGHT: 62 IN

## 2019-08-02 DIAGNOSIS — E55.9 VITAMIN D DEFICIENCY: ICD-10-CM

## 2019-08-02 DIAGNOSIS — Z13.29 SCREENING FOR THYROID DISORDER: ICD-10-CM

## 2019-08-02 DIAGNOSIS — Z12.39 SCREENING FOR BREAST CANCER: ICD-10-CM

## 2019-08-02 DIAGNOSIS — Z13.0 SCREENING FOR DEFICIENCY ANEMIA: ICD-10-CM

## 2019-08-02 DIAGNOSIS — Z00.00 ENCOUNTER FOR MEDICARE ANNUAL WELLNESS EXAM: Primary | ICD-10-CM

## 2019-08-02 DIAGNOSIS — E66.01 CLASS 3 SEVERE OBESITY DUE TO EXCESS CALORIES WITHOUT SERIOUS COMORBIDITY WITH BODY MASS INDEX (BMI) OF 40.0 TO 44.9 IN ADULT (HCC): ICD-10-CM

## 2019-08-02 DIAGNOSIS — E78.2 MIXED HYPERLIPIDEMIA: ICD-10-CM

## 2019-08-02 DIAGNOSIS — Z13.1 SCREENING FOR DIABETES MELLITUS: ICD-10-CM

## 2019-08-02 DIAGNOSIS — Z23 NEED FOR 23-POLYVALENT PNEUMOCOCCAL POLYSACCHARIDE VACCINE: ICD-10-CM

## 2019-08-02 DIAGNOSIS — Z78.0 POST-MENOPAUSAL: ICD-10-CM

## 2019-08-02 PROCEDURE — G0438 PPPS, INITIAL VISIT: HCPCS | Performed by: NURSE PRACTITIONER

## 2019-08-02 PROCEDURE — 90732 PPSV23 VACC 2 YRS+ SUBQ/IM: CPT

## 2019-08-02 PROCEDURE — G0009 ADMIN PNEUMOCOCCAL VACCINE: HCPCS

## 2019-08-02 NOTE — PROGRESS NOTES
Assessment and Plan:     Problem List Items Addressed This Visit        Other    Mixed hyperlipidemia    Relevant Orders    Lipid Panel with Direct LDL reflex    Class 3 severe obesity without serious comorbidity with body mass index (BMI) of 40 0 to 44 9 in adult [facility])    Screening for breast cancer - Primary    Relevant Orders    Mammo screening bilateral w 3d & cad    Post-menopausal    Relevant Orders    DXA bone density spine hip and pelvis    Need for 23-polyvalent pneumococcal polysaccharide vaccine    Relevant Orders    PNEUMOCOCCAL POLYSACCHARIDE VACCINE 23-VALENT =>1YO SQ IM (Completed)    Screening for deficiency anemia    Relevant Orders    CBC and differential    Vitamin D deficiency    Relevant Orders    Vitamin D 25 hydroxy         History of Present Illness:     Patient presents for Welcome to Medicare visit  Patient Care Team:  Kamari azar PCP - General (Internal Medicine)     Review of Systems:     Review of Systems   Constitutional: Negative  HENT: Negative  Eyes: Negative  Respiratory: Negative  Cardiovascular: Negative  Gastrointestinal: Negative  Negative for bowel incontinence  Endocrine: Negative  Genitourinary: Negative  Musculoskeletal: Negative  Skin: Negative  Allergic/Immunologic: Negative  Neurological: Negative  Hematological: Negative  Psychiatric/Behavioral: Negative  The patient is not nervous/anxious           Problem List:     Patient Active Problem List   Diagnosis    Degeneration of intervertebral disc of lumbosacral region    Malignant neoplasm of uterus ([address] )    Mixed hyperlipidemia    Class 3 severe obesity without serious comorbidity with body mass index (BMI) of 40 0 to 44 9 in adult [facility])    Chronic narcotic use    Endometrial cancer (HCC)    Abscess of preauricular sinus    Screening for breast cancer    Post-menopausal    Need for 23-polyvalent pneumococcal polysaccharide vaccine    Screening for deficiency anemia    Vitamin D deficiency      Past Medical and Surgical History:     Past Medical History:   Diagnosis Date    Abnormal blood chemistry     resolved: 2/9/2017    Abscess of groin     last assessed: 10/11/2013    Arthritis     Cataracts, bilateral     Chronic pain     Obesity     Preauricular 1953    Spinal stenosis     Uterine cancer (HCC)     uterine     Past Surgical History:   Procedure Laterality Date    ABCESS DRAINAGE      right ear    CATARACT EXTRACTION, BILATERAL      CHOLECYSTECTOMY      EPIDURAL BLOCK INJECTION      HYSTERECTOMY      Total: With removal of both tubes and both ovaries      Family History:     Family History   Problem Relation Age of Onset    Ovarian cancer Mother     Cancer Mother     Pancreatic cancer Father     Cancer Father     Diabetes Family         mellitus      Social History:     Social History     Tobacco Use   Smoking Status Never Smoker   Smokeless Tobacco Never Used     Social History     Substance and Sexual Activity   Alcohol Use Yes    Comment: social ; denied: history of alcohol  use (history) ( as per allscripts); occasional alcohol use ( as per allscripts)     Social History     Substance and Sexual Activity   Drug Use No    Comment: chronic narcotic use ( as per allscripts)      Medications and Allergies:     Current Outpatient Medications   Medication Sig Dispense Refill    acetaminophen (TYLENOL) 325 mg tablet Take 2 tablets (650 mg total) by mouth every 6 (six) hours as needed for mild pain 30 tablet 0    naproxen (NAPROSYN) 250 mg tablet Take 220 mg by mouth 2 (two) times a day with meals      traMADol (ULTRAM) 50 mg tablet Take 1 tablet (50 mg total) by mouth every 6 (six) hours as needed for moderate pain 120 tablet 0     No current facility-administered medications for this visit        Allergies   Allergen Reactions    Paclitaxel Anaphylaxis     Anaphylaxis      Immunizations:     Immunization History   Administered Date(s) Administered    H1N1 Inj 09/20/2018    H1N1, All Formulations 09/20/2018    Pneumococcal Conjugate 13-Valent 07/25/2018    Pneumococcal Polysaccharide PPV23 04/11/2014, 08/02/2019    Tdap 05/19/2016      Medicare Screening Tests and Risk Assessments: Giancarlo Fiugeroa is here for her Subsequent Wellness visit  Last Medicare Wellness visit information reviewed, patient interviewed and updates made to the record today  Health Risk Assessment:  Patient rates overall health as good  Patient feels that their physical health rating is Slightly worse  Eyesight was rated as Same  Hearing was rated as Same  Patient feels that their emotional and mental health rating is Same  Pain experienced by patient in the last 7 days has been A lot  Patient's pain rating has been 8/10  Patient states that she has experienced weight loss or gain in last 6 months  Emotional/Mental Health:  Patient has not been feeling nervous/anxious  PHQ-9 Depression Screening:    Frequency of the following problems over the past two weeks:      1  Little interest or pleasure in doing things: 0 - not at all      2  Feeling down, depressed, or hopeless: 0 - not at all  PHQ-2 Score: 0          Broken Bones/Falls: Fall Risk Assessment:    In the past year, patient has experienced: No history of falling in past year          Bladder/Bowel:  Patient has leaked urine accidently in the last six months  Patient reports no loss of bowel control  Immunizations:  Patient has had a flu vaccination within the last year  Patient has received a pneumonia shot  Patient has not received a shingles shot  Patient has received tetanus/diphtheria shot  Home Safety:  Patient has trouble with stairs inside or outside of their home  Patient currently reports that there are no safety hazards present in home, working smoke alarms, working carbon monoxide detectors        Preventative Screenings:   Breast cancer screening performed, colon cancer screen completed, cholesterol screen completed, glaucoma eye exam completed,     Nutrition:  Current diet: Regular and Limited junk food with servings of the following:    Medications:  Patient is not currently taking any over-the-counter supplements  Patient is able to manage medications  Lifestyle Choices:  Patient reports no tobacco use  Patient has not smoked or used tobacco in the past   Patient reports alcohol use  Alcohol use per week: 1  Patient drives a vehicle  Patient wears seat belt  Activities of Daily Living:  Can get out of bed by his or her self, able to dress self, able to make own meals, able to do own shopping, able to bathe self, can do own laundry/housekeeping, can manage own money, pay bills and track expenses    Previous Hospitalizations:  No hospitalization or ED visit in past 12 months        Advanced Directives:  Patient has not decided on power of   Patient has not completed advanced directive  Preventative Screening/Counseling:      Cardiovascular:      General: Risks and Benefits Discussed and Screening Current      Counseling: Healthy Diet, Improve Blood Pressure, Improve Cholesterol, Healthy Weight and Improve Exercise Tolerance          Diabetes:      General: Risks and Benefits Discussed and Screening Current      Counseling: Healthy Diet and Healthy Weight          Colorectal Cancer:      General: Risks and Benefits Discussed and Screening Current      Counseling: high fiber diet          Breast Cancer:      General: Risks and Benefits Discussed and Screening Current      Comments: Risks and benefits of mammography discussed to screen for breast cancer  Anjelica Beal is agreeable to the test and order for mammogram placed    Anjelica Beal is instructed to call the number on the order to schedule the appointment for the test               Cervical Cancer:      General: Risks and Benefits Discussed and Screening Current          Osteoporosis: General: Risks and Benefits Discussed      Comments: Risks and benefits of DEXA scan testing discussed to screen for osteoporosis  Judy Acevedo is agreeable to the test and order placed  Judy Acevedo instructed to call the number on the order to schedule the test  Will review results and inform Judy Acevedo of result  AAA:      General: Screening Not Indicated          Glaucoma:      General: Screening Current and Risks and Benefits Discussed      Comments: Last eye exam 2018        HIV:      General: Screening Not Indicated          Hepatitis C:      General: Screening Not Indicated      Additional Comments: Hepatis C negative 2018    Advanced Directives:   Patient has no living will for healthcare, does not have durable POA for healthcare, patient does not have an advanced directive  5 wishes given  Immunizations:      Influenza: Influenza Recommended Annually      Pneumococcal: Pneumococcal Due Today      Shingrix: Patient Declines and Risks & Benefits Discussed  Additional Comments: Pt stats she never had Chicken Pox and declines the Shingles Vaccination  Other Preventative Counseling (Non-Medicare): Increase physical activity, Nutrition Counseling, Car/seat belt/driving safety reviewed, Skin self-exam, Sunscreen use and Weight reduction discussed        No exam data present     Physical Exam:     /82 (BP Location: Left arm, Patient Position: Sitting, Cuff Size: Large)   Pulse 74   Temp (!) 96 1 °F (35 6 °C) (Tympanic)   Ht 5' 2" (1 575 m)   Wt 102 kg (224 lb 3 2 oz)   SpO2 96%   BMI 41 01 kg/m²     Physical Exam   Constitutional: She is oriented to person, place, and time  She appears well-developed and well-nourished  HENT:   Head: Normocephalic and atraumatic     Right Ear: External ear normal    Left Ear: External ear normal    Nose: Nose normal    Mouth/Throat: Oropharynx is clear and moist and mucous membranes are normal    Eyes: Pupils are equal, round, and reactive to light  Conjunctivae and EOM are normal    Neck: Normal range of motion  Neck supple  Cardiovascular: Normal rate, regular rhythm, S1 normal, S2 normal, normal heart sounds and intact distal pulses  Exam reveals no gallop and no friction rub  No murmur heard  Pulmonary/Chest: Effort normal and breath sounds normal  She has no decreased breath sounds  Abdominal: Soft  Bowel sounds are normal    Musculoskeletal: Normal range of motion  Neurological: She is alert and oriented to person, place, and time  Skin: Skin is warm and dry  Capillary refill takes less than 2 seconds  Psychiatric: She has a normal mood and affect  Her behavior is normal  Judgment and thought content normal    Nursing note and vitals reviewed  BMI Counseling: Body mass index is 41 01 kg/m²  Discussed the patient's BMI with her  The BMI is above average  BMI counseling and education was provided to the patient  Nutrition recommendations include reducing portion sizes, decreasing overall calorie intake, 3-5 servings of fruits/vegetables daily, reducing fast food intake, consuming healthier snacks, decreasing soda and/or juice intake, moderation in carbohydrate intake, increasing intake of lean protein, reducing intake of saturated fat and trans fat and reducing intake of cholesterol  Exercise recommendations include vigorous aerobic physical activity for 75 minutes/week, exercising 3-5 times per week, joining a gym and strength training exercises

## 2019-08-02 NOTE — PATIENT INSTRUCTIONS
Obesity   AMBULATORY CARE:   Obesity  is when your body mass index (BMI) is greater than 30  Your healthcare provider will use your height and weight to measure your BMI  The risks of obesity include  many health problems, such as injuries or physical disability  You may need tests to check for the following:  · Diabetes     · High blood pressure or high cholesterol     · Heart disease     · Gallbladder or liver disease     · Cancer of the colon, breast, prostate, liver, or kidney     · Sleep apnea     · Arthritis or gout  Seek care immediately if:   · You have a severe headache, confusion, or difficulty speaking  · You have weakness on one side of your body  · You have chest pain, sweating, or shortness of breath  Contact your healthcare provider if:   · You have symptoms of gallbladder or liver disease, such as pain in your upper abdomen  · You have knee or hip pain and discomfort while walking  · You have symptoms of diabetes, such as intense hunger and thirst, and frequent urination  · You have symptoms of sleep apnea, such as snoring or daytime sleepiness  · You have questions or concerns about your condition or care  Treatment for obesity  focuses on helping you lose weight to improve your health  Even a small decrease in BMI can reduce the risk for many health problems  Your healthcare provider will help you set a weight-loss goal   · Lifestyle changes  are the first step in treating obesity  These include making healthy food choices and getting regular physical activity  Your healthcare provider may suggest a weight-loss program that involves coaching, education, and therapy  · Medicine  may help you lose weight when it is used with a healthy diet and physical activity  · Surgery  can help you lose weight if you are very obese and have other health problems  There are several types of weight-loss surgery  Ask your healthcare provider for more information    Be successful losing weight:   · Set small, realistic goals  An example of a small goal is to walk for 20 minutes 5 days a week  Anther goal is to lose 5% of your body weight  · Tell friends, family members, and coworkers about your goals  and ask for their support  Ask a friend to lose weight with you, or join a weight-loss support group  · Identify foods or triggers that may cause you to overeat , and find ways to avoid them  Remove tempting high-calorie foods from your home and workplace  Place a bowl of fresh fruit on your kitchen counter  If stress causes you to eat, then find other ways to cope with stress  · Keep a diary to track what you eat and drink  Also write down how many minutes of physical activity you do each day  Weigh yourself once a week and record it in your diary  Eating changes: You will need to eat 500 to 1,000 fewer calories each day than you currently eat to lose 1 to 2 pounds a week  The following changes will help you cut calories:  · Eat smaller portions  Use small plates, no larger than 9 inches in diameter  Fill your plate half full of fruits and vegetables  Measure your food using measuring cups until you know what a serving size looks like  · Eat 3 meals and 1 or 2 snacks each day  Plan your meals in advance  Hayley Pel and eat at home most of the time  Eat slowly  · Eat fruits and vegetables at every meal   They are low in calories and high in fiber, which makes you feel full  Do not add butter, margarine, or cream sauce to vegetables  Use herbs to season steamed vegetables  · Eat less fat and fewer fried foods  Eat more baked or grilled chicken and fish  These protein sources are lower in calories and fat than red meat  Limit fast food  Dress your salads with olive oil and vinegar instead of bottled dressing  · Limit the amount of sugar you eat  Do not drink sugary beverages  Limit alcohol  Activity changes:  Physical activity is good for your body in many ways   It helps you burn calories and build strong muscles  It decreases stress and depression, and improves your mood  It can also help you sleep better  Talk to your healthcare provider before you begin an exercise program   · Exercise for at least 30 minutes 5 days a week  Start slowly  Set aside time each day for physical activity that you enjoy and that is convenient for you  It is best to do both weight training and an activity that increases your heart rate, such as walking, bicycling, or swimming  · Find ways to be more active  Do yard work and housecleaning  Walk up the stairs instead of using elevators  Spend your leisure time going to events that require walking, such as outdoor festivals or fairs  This extra physical activity can help you lose weight and keep it off  Follow up with your healthcare provider as directed: You may need to meet with a dietitian  Write down your questions so you remember to ask them during your visits  © 2017 [facility] Information is for End User's use only and may not be sold, redistributed or otherwise used for commercial purposes  All illustrations and images included in CareNotes® are the copyrighted property of "[employer]  or Riccardo Ellison  The above information is an  only  It is not intended as medical advice for individual conditions or treatments  Talk to your doctor, nurse or pharmacist before following any medical regimen to see if it is safe and effective for you  Urinary Incontinence   WHAT YOU NEED TO KNOW:   What is urinary incontinence? Urinary incontinence (UI) is when you lose control of your bladder  What causes UI? UI occurs because your bladder cannot store or empty urine properly  The following are the most common types of UI:  · Stress incontinence  is when you leak urine due to increased bladder pressure  This may happen when you cough, sneeze, or exercise       · Urge incontinence  is when you feel the need to urinate right away and leak urine accidentally  · Mixed incontinence  is when you have both stress and urge UI  What are the signs and symptoms of UI?   · You feel like your bladder does not empty completely when you urinate  · You urinate often and need to urinate immediately  · You leak urine when you sleep, or you wake up with the urge to urinate  · You leak urine when you cough, sneeze, exercise, or laugh  How is UI diagnosed? Your healthcare provider will ask how often you leak urine and whether you have stress or urge symptoms  Tell him which medicines you take, how often you urinate, and how much liquid you drink each day  You may need any of the following tests:  · Urine tests  may show infection or kidney function  · A pelvic exam  may be done to check for blockages  A pelvic exam will also show if your bladder, uterus, or other organs have moved out of place  · An x-ray, ultrasound, or CT  may show problems with parts of your urinary system  You may be given contrast liquid to help your organs show up better in the pictures  Tell the healthcare provider if you have ever had an allergic reaction to contrast liquid  Do not enter the MRI room with anything metal  Metal can cause serious injury  Tell the healthcare provider if you have any metal in or on your body  · A bladder scan  will show how much urine is left in your bladder after you urinate  You will be asked to urinate and then healthcare providers will use a small ultrasound machine to check the urine left in your bladder  · Cystometry  is used to check the function of your urinary system  Your healthcare provider checks the pressure in your bladder while filling it with fluid  Your bladder pressure may also be tested when your bladder is full and while you urinate  How is UI treated? · Medicines  can help strengthen your bladder control      · Electrical stimulation  is used to send a small amount of electrical energy to your pelvic floor muscles  This helps control your bladder function  Electrodes may be placed outside your body or in your rectum  For women, the electrodes may be placed in the vagina  · A bulking agent  may be injected into the wall of your urethra to make it thicker  This helps keep your urethra closed and decreases urine leakage  · Devices  such as a clamp, pessary, or tampon may help stop urine leaks  Ask your healthcare provider for more information about these and other devices  · Surgery  may be needed if other treatments do not work  Several types of surgery can help improve your bladder control  Ask your healthcare provider for more information about the surgery you may need  How can I manage my symptoms? · Do pelvic muscle exercises often  Your pelvic muscles help you stop urinating  Squeeze these muscles tight for 5 seconds, then relax for 5 seconds  Gradually work up to squeezing for 10 seconds  Do 3 sets of 15 repetitions a day, or as directed  This will help strengthen your pelvic muscles and improve bladder control  · A catheter  may be used to help empty your bladder  A catheter is a tiny, plastic tube that is put into your bladder to drain your urine  Your healthcare provider may tell you to use a catheter to prevent your bladder from getting too full and leaking urine  · Keep a UI record  Write down how often you leak urine and how much you leak  Make a note of what you were doing when you leaked urine  · Train your bladder  Go to the bathroom at set times, such as every 2 hours, even if you do not feel the urge to go  You can also try to hold your urine when you feel the urge to go  For example, hold your urine for 5 minutes when you feel the urge to go  As that becomes easier, hold your urine for 10 minutes  · Drink liquids as directed  Ask your healthcare provider how much liquid to drink each day and which liquids are best for you   You may need to limit the amount of liquid you drink to help control your urine leakage  Limit or do not have drinks that contain caffeine or alcohol  Do not drink any liquid right before you go to bed  · Prevent constipation  Eat a variety of high-fiber foods  Good examples are high-fiber cereals, beans, vegetables, and whole-grain breads  Prune juice may help make your bowel movement softer  Walking is the best way to trigger your intestines to have a bowel movement  · Exercise regularly and maintain a healthy weight  Ask your healthcare provider how much you should weigh and about the best exercise plan for you  Weight loss and exercise will decrease pressure on your bladder and help you control your leakage  Ask him to help you create a weight loss plan if you are overweight  When should I seek immediate care? · You have severe pain  · You are confused or cannot think clearly  When should I contact my healthcare provider? · You have a fever  · You see blood in your urine  · You have pain when you urinate  · You have new or worse pain, even after treatment  · Your mouth feels dry or you have vision changes  · Your urine is cloudy or smells bad  · You have questions or concerns about your condition or care  CARE AGREEMENT:   You have the right to help plan your care  Learn about your health condition and how it may be treated  Discuss treatment options with your caregivers to decide what care you want to receive  You always have the right to refuse treatment  The above information is an  only  It is not intended as medical advice for individual conditions or treatments  Talk to your doctor, nurse or pharmacist before following any medical regimen to see if it is safe and effective for you  © 2017 [facility] Information is for End User's use only and may not be sold, redistributed or otherwise used for commercial purposes   All illustrations and images included in CareNotes® are the copyrighted property of [employer] M [employer] Inc  or Riccardo Ellison  Cigarette Smoking and Your Health   AMBULATORY CARE:   Risks to your health if you smoke:  Nicotine and other chemicals found in tobacco damage every cell in your body  Even if you are a light smoker, you have an increased risk for cancer, heart disease, and lung disease  If you are pregnant or have diabetes, smoking increases your risk for complications  Benefits to your health if you stop smoking:   · You decrease respiratory symptoms such as coughing, wheezing, and shortness of breath  · You reduce your risk for cancers of the lung, mouth, throat, kidney, bladder, pancreas, stomach, and cervix  If you already have cancer, you increase the benefits of chemotherapy  You also reduce your risk for cancer returning or a second cancer from developing  · You reduce your risk for heart disease, blood clots, heart attack, and stroke  · You reduce your risk for lung infections, and diseases such as pneumonia, asthma, chronic bronchitis, and emphysema  · Your circulation improves  More oxygen can be delivered to your body  If you have diabetes, you lower your risk for complications, such as kidney, artery, and eye diseases  You also lower your risk for nerve damage  Nerve damage can lead to amputations, poor vision, and blindness  · You improve your body's ability to heal and to fight infections  Benefits to the health of others if you stop smoking:  Tobacco is harmful to nonsmokers who breathe in your secondhand smoke  The following are ways the health of others around you may improve when you stop smoking:  · You lower the risks for lung cancer and heart disease in nonsmoking adults  · If you are pregnant, you lower the risk for miscarriage, early delivery, low birth weight, and stillbirth  You also lower your baby's risk for SIDS, obesity, developmental delay, and neurobehavioral problems, such as ADHD  · If you have children, you lower their risk for ear infections, colds, pneumonia, bronchitis, and asthma  For more information and support to stop smoking:   · Smokefree  gov  Phone: 4- 821 - 605-0919  Web Address: www smokefree  gov  Follow up with your healthcare provider as directed:  Write down your questions so you remember to ask them during your visits  © 2017 [address] Information is for End User's use only and may not be sold, redistributed or otherwise used for commercial purposes  All illustrations and images included in CareNotes® are the copyrighted property of [employer] M , Inc  or Riccardo Ellison  The above information is an  only  It is not intended as medical advice for individual conditions or treatments  Talk to your doctor, nurse or pharmacist before following any medical regimen to see if it is safe and effective for you  Fall Prevention   AMBULATORY CARE:   Fall prevention  includes ways to make your home and other areas safer  It also includes ways you can move more carefully to prevent a fall  Health conditions that cause changes in your blood pressure, vision, or muscle strength and coordination may increase your risk for falls  Medicines may also increase your risk for falls if they make you dizzy, weak, or sleepy  Call 911 or have someone else call if:   · You have fallen and are unconscious  · You have fallen and cannot move part of your body  Contact your healthcare provider if:   · You have fallen and have pain or a headache  · You have questions or concerns about your condition or care  Fall prevention tips:   · Stand or sit up slowly  This may help you keep your balance and prevent falls  · Use assistive devices as directed  Your healthcare provider may suggest that you use a cane or walker to help you keep your balance  You may need to have grab bars put in your bathroom near the toilet or in the shower      · Wear shoes that fit well and have soles that   Wear shoes both inside and outside  Use slippers with good   Do not wear shoes with high heels  · Wear a personal alarm  This is a device that allows you to call 911 if you fall and need help  Ask your healthcare provider for more information  · Stay active  Exercise can help strengthen your muscles and improve your balance  Your healthcare provider may recommend water aerobics or walking  He or she may also recommend physical therapy to improve your coordination  Never start an exercise program without talking to your healthcare provider first      · Manage your medical conditions  Keep all appointments with your healthcare providers  Visit your eye doctor as directed  Home safety tips:   · Add items to prevent falls in the bathroom  Put nonslip strips on your bath or shower floor to prevent you from slipping  Use a bath mat if you do not have carpet in the bathroom  This will prevent you from falling when you step out of the bath or shower  Use a shower seat so you do not need to stand while you shower  Sit on the toilet or a chair in your bathroom to dry yourself and put on clothing  This will prevent you from losing your balance from drying or dressing yourself while you are standing  · Keep paths clear  Remove books, shoes, and other objects from walkways and stairs  Place cords for telephones and lamps out of the way so that you do not need to walk over them  Tape them down if you cannot move them  Remove small rugs  If you cannot remove a rug, secure it with double-sided tape  This will prevent you from tripping  · Install bright lights in your home  Use night lights to help light paths to the bathroom or kitchen  Always turn on the light before you start walking  · Keep items you use often on shelves within reach  Do not use a step stool to help you reach an item  · Paint or place reflective tape on the edges of your stairs    This will help you see the stairs better  Follow up with your healthcare provider as directed:  Write down your questions so you remember to ask them during your visits  © 2017 [address] Information is for End User's use only and may not be sold, redistributed or otherwise used for commercial purposes  All illustrations and images included in CareNotes® are the copyrighted property of [employer] M [employer] Inc  or Riccardo Ellison  The above information is an  only  It is not intended as medical advice for individual conditions or treatments  Talk to your doctor, nurse or pharmacist before following any medical regimen to see if it is safe and effective for you  Advance Directives   WHAT YOU NEED TO KNOW:   What are advance directives? Advance directives are legal documents that state your wishes and plans for medical care  These plans are made ahead of time in case you lose your ability to make decisions for yourself  Advance directives can apply to any medical decision, such as the treatments you want, and if you want to donate organs  What are the types of advance directives? There are many types of advance directives, and each state has rules about how to use them  You may choose a combination of any of the following:  · Living will: This is a written record of the treatment you want  You can also choose which treatments you do not want, which to limit, and which to stop at a certain time  This includes surgery, medicine, IV fluid, and tube feedings  · Durable power of  for healthcare [facility]): This is a written record that states who you want to make healthcare choices for you when you are unable to make them for yourself  This person, called a proxy, is usually a family member or a friend  You may choose more than 1 proxy  · Do not resuscitate (DNR) order:  A DNR order is used in case your heart stops beating or you stop breathing   It is a request not to have certain forms of treatment, such as CPR  A DNR order may be included in other types of advance directives  · Medical directive: This covers the care that you want if you are in a coma, near death, or unable to make decisions for yourself  You can list the treatments you want for each condition  Treatment may include pain medicine, surgery, blood transfusions, dialysis, IV or tube feedings, and a ventilator (breathing machine)  · Values history: This document has questions about your views, beliefs, and how you feel and think about life  This information can help others choose the care that you would choose  Why are advance directives important? An advance directive helps you control your care  Although spoken wishes may be used, it is better to have your wishes written down  Spoken wishes can be misunderstood, or not followed  Treatments may be given even if you do not want them  An advance directive may make it easier for your family to make difficult choices about your care  How do I decide what to put in my advance directives? · Make informed decisions:  Make sure you fully understand treatments or care you may receive  Think about the benefits and problems your decisions could cause for you or your family  Talk to healthcare providers if you have concerns or questions before you write down your wishes  You may also want to talk with your Christianity or , or a   Check your state laws to make sure that what you put in your advance directive is legal      · Sign all forms:  Sign and date your advance directive when you have finished  You may also need 2 witnesses to sign the forms  Witnesses cannot be your doctor or his staff, your spouse, heirs or beneficiaries, people you owe money to, or your chosen proxy  Talk to your family, proxy, and healthcare providers about your advance directive  Give each person a copy, and keep one for yourself in a place you can get to easily   Do not keep it hidden or locked away  · Review and revise your plans: You can revise your advance directive at any time, as long as you are able to make decisions  Review your plan every year, and when there are changes in your life, or your health  When you make changes, let your family, proxy, and healthcare providers know  Give each a new copy  Where can I find more information? · [employer]  [address] , [address]  Phone: 4- 656 - [phone]  Phone: 4- 165 - [phone]  Web Address: http://www  aafp org  · [address] [facility])  [address] [address] [facility], [address] [facility] , [address] [place] [address]  Phone: 3- [phone] - [phone]  Phone: [phone]  Web Address: Poonam diehl  CARE AGREEMENT:   You have the right to help plan your care  To help with this plan, you must learn about your health condition and treatment options  You must also learn about advance directives and how they are used  Work with your healthcare providers to decide what care will be used to treat you  You always have the right to refuse treatment  The above information is an  only  It is not intended as medical advice for individual conditions or treatments  Talk to your doctor, nurse or pharmacist before following any medical regimen to see if it is safe and effective for you  © 2017 [address] Information is for End User's use only and may not be sold, redistributed or otherwise used for commercial purposes  All illustrations and images included in CareNotes® are the copyrighted property of [employer] M [employer]  or Riccardo Ellison

## 2019-08-22 DIAGNOSIS — G89.29 OTHER CHRONIC PAIN: ICD-10-CM

## 2019-08-22 RX ORDER — TRAMADOL HYDROCHLORIDE 50 MG/1
50 TABLET ORAL EVERY 6 HOURS PRN
Qty: 120 TABLET | Refills: 0 | Status: SHIPPED | OUTPATIENT
Start: 2019-08-22 | End: 2019-09-19 | Stop reason: SDUPTHER

## 2019-09-19 DIAGNOSIS — G89.29 OTHER CHRONIC PAIN: ICD-10-CM

## 2019-09-19 RX ORDER — TRAMADOL HYDROCHLORIDE 50 MG/1
50 TABLET ORAL EVERY 6 HOURS PRN
Qty: 120 TABLET | Refills: 0 | Status: SHIPPED | OUTPATIENT
Start: 2019-09-19 | End: 2019-10-24 | Stop reason: SDUPTHER

## 2019-10-24 DIAGNOSIS — G89.29 OTHER CHRONIC PAIN: ICD-10-CM

## 2019-10-25 RX ORDER — TRAMADOL HYDROCHLORIDE 50 MG/1
50 TABLET ORAL EVERY 6 HOURS PRN
Qty: 120 TABLET | Refills: 0 | Status: SHIPPED | OUTPATIENT
Start: 2019-10-25 | End: 2019-12-04 | Stop reason: SDUPTHER

## 2019-10-25 NOTE — TELEPHONE ENCOUNTER
Prior to prescribing the controlled substance, a patient search was performed on the PDMP prescription drug monitoring program in [place]  There was no evidence of diversion or misuse  Prescription provided

## 2019-11-11 ENCOUNTER — TRANSCRIBE ORDERS (OUTPATIENT)
Dept: LAB | Facility: CLINIC | Age: 64
End: 2019-11-11

## 2019-11-11 ENCOUNTER — LAB (OUTPATIENT)
Dept: LAB | Facility: CLINIC | Age: 64
End: 2019-11-11
Payer: MEDICARE

## 2019-11-11 DIAGNOSIS — Z13.0 SCREENING FOR DEFICIENCY ANEMIA: ICD-10-CM

## 2019-11-11 DIAGNOSIS — R25.2 CRAMP OF BOTH LOWER EXTREMITIES: ICD-10-CM

## 2019-11-11 DIAGNOSIS — E55.9 VITAMIN D DEFICIENCY: ICD-10-CM

## 2019-11-11 DIAGNOSIS — E78.2 MIXED HYPERLIPIDEMIA: ICD-10-CM

## 2019-11-11 DIAGNOSIS — Z13.29 SCREENING FOR THYROID DISORDER: ICD-10-CM

## 2019-11-11 DIAGNOSIS — R20.2 TINGLING: ICD-10-CM

## 2019-11-11 DIAGNOSIS — Z13.1 SCREENING FOR DIABETES MELLITUS: ICD-10-CM

## 2019-11-11 LAB
25(OH)D3 SERPL-MCNC: 20.6 NG/ML (ref 30–100)
ALBUMIN SERPL BCP-MCNC: 4.6 G/DL (ref 3.5–5)
ALP SERPL-CCNC: 56 U/L (ref 46–116)
ALT SERPL W P-5'-P-CCNC: 25 U/L (ref 12–78)
ANION GAP SERPL CALCULATED.3IONS-SCNC: 8 MMOL/L (ref 4–13)
AST SERPL W P-5'-P-CCNC: 16 U/L (ref 5–45)
BASOPHILS # BLD AUTO: 0.04 THOUSANDS/ΜL (ref 0–0.1)
BASOPHILS NFR BLD AUTO: 1 % (ref 0–1)
BILIRUB SERPL-MCNC: 0.29 MG/DL (ref 0.2–1)
BUN SERPL-MCNC: 23 MG/DL (ref 5–25)
CALCIUM SERPL-MCNC: 9.6 MG/DL (ref 8.3–10.1)
CHLORIDE SERPL-SCNC: 106 MMOL/L (ref 100–108)
CHOLEST SERPL-MCNC: 229 MG/DL (ref 50–200)
CO2 SERPL-SCNC: 26 MMOL/L (ref 21–32)
CREAT SERPL-MCNC: 0.83 MG/DL (ref 0.6–1.3)
EOSINOPHIL # BLD AUTO: 0.23 THOUSAND/ΜL (ref 0–0.61)
EOSINOPHIL NFR BLD AUTO: 3 % (ref 0–6)
ERYTHROCYTE [DISTWIDTH] IN BLOOD BY AUTOMATED COUNT: 12 % (ref 11.6–15.1)
GFR SERPL CREATININE-BSD FRML MDRD: 74 ML/MIN/1.73SQ M
GLUCOSE P FAST SERPL-MCNC: 84 MG/DL (ref 65–99)
HCT VFR BLD AUTO: 36.6 % (ref 34.8–46.1)
HDLC SERPL-MCNC: 49 MG/DL
HGB BLD-MCNC: 11.8 G/DL (ref 11.5–15.4)
IMM GRANULOCYTES # BLD AUTO: 0.02 THOUSAND/UL (ref 0–0.2)
IMM GRANULOCYTES NFR BLD AUTO: 0 % (ref 0–2)
LDLC SERPL CALC-MCNC: 146 MG/DL (ref 0–100)
LYMPHOCYTES # BLD AUTO: 1.57 THOUSANDS/ΜL (ref 0.6–4.47)
LYMPHOCYTES NFR BLD AUTO: 22 % (ref 14–44)
MCH RBC QN AUTO: 31.9 PG (ref 26.8–34.3)
MCHC RBC AUTO-ENTMCNC: 32.2 G/DL (ref 31.4–37.4)
MCV RBC AUTO: 99 FL (ref 82–98)
MONOCYTES # BLD AUTO: 0.43 THOUSAND/ΜL (ref 0.17–1.22)
MONOCYTES NFR BLD AUTO: 6 % (ref 4–12)
NEUTROPHILS # BLD AUTO: 4.72 THOUSANDS/ΜL (ref 1.85–7.62)
NEUTS SEG NFR BLD AUTO: 68 % (ref 43–75)
NRBC BLD AUTO-RTO: 0 /100 WBCS
PLATELET # BLD AUTO: 280 THOUSANDS/UL (ref 149–390)
PMV BLD AUTO: 10.5 FL (ref 8.9–12.7)
POTASSIUM SERPL-SCNC: 4.2 MMOL/L (ref 3.5–5.3)
PROT SERPL-MCNC: 8 G/DL (ref 6.4–8.2)
RBC # BLD AUTO: 3.7 MILLION/UL (ref 3.81–5.12)
SODIUM SERPL-SCNC: 140 MMOL/L (ref 136–145)
TRIGL SERPL-MCNC: 170 MG/DL
TSH SERPL DL<=0.05 MIU/L-ACNC: 2.76 UIU/ML (ref 0.36–3.74)
WBC # BLD AUTO: 7.01 THOUSAND/UL (ref 4.31–10.16)

## 2019-11-11 PROCEDURE — 84443 ASSAY THYROID STIM HORMONE: CPT

## 2019-11-11 PROCEDURE — 80061 LIPID PANEL: CPT

## 2019-11-11 PROCEDURE — 82306 VITAMIN D 25 HYDROXY: CPT

## 2019-11-11 PROCEDURE — 85025 COMPLETE CBC W/AUTO DIFF WBC: CPT

## 2019-11-11 PROCEDURE — 80053 COMPREHEN METABOLIC PANEL: CPT

## 2019-11-11 PROCEDURE — 36415 COLL VENOUS BLD VENIPUNCTURE: CPT

## 2019-11-11 PROCEDURE — 83735 ASSAY OF MAGNESIUM: CPT

## 2019-11-12 ENCOUNTER — OFFICE VISIT (OUTPATIENT)
Dept: FAMILY MEDICINE CLINIC | Facility: CLINIC | Age: 64
End: 2019-11-12
Payer: MEDICARE

## 2019-11-12 VITALS
DIASTOLIC BLOOD PRESSURE: 80 MMHG | SYSTOLIC BLOOD PRESSURE: 138 MMHG | BODY MASS INDEX: 42.03 KG/M2 | WEIGHT: 228.4 LBS | OXYGEN SATURATION: 95 % | HEART RATE: 77 BPM | HEIGHT: 62 IN | TEMPERATURE: 97.9 F

## 2019-11-12 DIAGNOSIS — R20.2 TINGLING: ICD-10-CM

## 2019-11-12 DIAGNOSIS — E55.9 VITAMIN D DEFICIENCY: ICD-10-CM

## 2019-11-12 DIAGNOSIS — Z23 NEED FOR INFLUENZA VACCINATION: ICD-10-CM

## 2019-11-12 DIAGNOSIS — E78.2 MIXED HYPERLIPIDEMIA: Primary | ICD-10-CM

## 2019-11-12 DIAGNOSIS — R25.2 CRAMP OF BOTH LOWER EXTREMITIES: ICD-10-CM

## 2019-11-12 LAB — MAGNESIUM SERPL-MCNC: 1.5 MG/DL (ref 1.6–2.6)

## 2019-11-12 PROCEDURE — 90662 IIV NO PRSV INCREASED AG IM: CPT

## 2019-11-12 PROCEDURE — G0008 ADMIN INFLUENZA VIRUS VAC: HCPCS

## 2019-11-12 PROCEDURE — 99213 OFFICE O/P EST LOW 20 MIN: CPT | Performed by: NURSE PRACTITIONER

## 2019-11-12 RX ORDER — ERGOCALCIFEROL 1.25 MG/1
50000 CAPSULE ORAL WEEKLY
Qty: 4 CAPSULE | Refills: 5 | Status: SHIPPED | OUTPATIENT
Start: 2019-11-12 | End: 2020-05-12 | Stop reason: ALTCHOICE

## 2019-11-12 NOTE — PROGRESS NOTES
Assessment/Plan:    Problem List Items Addressed This Visit        Other    Mixed hyperlipidemia - Primary    Vitamin D deficiency    Relevant Medications    ergocalciferol (VITAMIN D2) 50,000 units    Tingling    Relevant Orders    Magnesium    Cramp of both lower extremities    Relevant Orders    Magnesium      Other Visit Diagnoses     Need for influenza vaccination        Relevant Orders    influenza vaccine, high-dose, PF 0 5 mL (Completed)           Diagnoses and all orders for this visit:    Mixed hyperlipidemia    Vitamin D deficiency  -     ergocalciferol (VITAMIN D2) 50,000 units; Take 1 capsule (50,000 Units total) by mouth once a week    Cramp of both lower extremities  Comments:  BL feet; probably related to the lumbar DDD  Orders:  -     Magnesium; Future    Tingling  Comments:  BL feet; probably related to the lumbar DDD  Orders:  -     Magnesium; Future    Need for influenza vaccination  -     influenza vaccine, high-dose, PF 0 5 mL    Other orders  -     Cancel: Mammo screening bilateral w 3d & cad; Future        No problem-specific Assessment & Plan notes found for this encounter  Subjective:      Patient ID: Vira Shah is a 75 y o  female  Vira Shah is here for a routine visit and discuss serology from 11/11/19    Patient does complain of cramping and tingling sensation in her toes bilaterally  Patient does have a positive history for lumbar DDD  Patient denies any lower extremity weakness or numbness or tingling in her  area, denies loss of bowel or bladder control  Hyperlipidemia   This is a chronic problem  The current episode started more than 1 year ago  Recent lipid tests were reviewed and are high  Exacerbating diseases include obesity  She has no history of chronic renal disease, diabetes, hypothyroidism, liver disease or nephrotic syndrome  There are no known factors aggravating her hyperlipidemia   Pertinent negatives include no chest pain, focal sensory loss, focal weakness, leg pain, myalgias or shortness of breath  She is currently on no antihyperlipidemic treatment  The current treatment provides mild improvement of lipids  There are no compliance problems  Risk factors for coronary artery disease include obesity, post-menopausal and dyslipidemia  The following portions of the patient's history were reviewed and updated as appropriate:   She has a past medical history of Abnormal blood chemistry, Abscess of groin, Arthritis, Cataracts, bilateral, Chronic pain, Obesity, Preauricular (1953), Spinal stenosis, and Uterine cancer ([facility] [address] )  ,  does not have any pertinent problems on file  ,   has a past surgical history that includes Cholecystectomy; Hysterectomy; Epidural block injection; Abscess drainage; and Cataract extraction, bilateral ,  family history includes Cancer in her father and mother; Diabetes in her family; Ovarian cancer in her mother; Pancreatic cancer in her father  ,   reports that she has never smoked  She has never used smokeless tobacco  She reports that she drinks alcohol  She reports that she does not use drugs  ,  is allergic to paclitaxel     Current Outpatient Medications   Medication Sig Dispense Refill    acetaminophen (TYLENOL) 325 mg tablet Take 2 tablets (650 mg total) by mouth every 6 (six) hours as needed for mild pain 30 tablet 0    naproxen (NAPROSYN) 250 mg tablet Take 220 mg by mouth 2 (two) times a day with meals      traMADol (ULTRAM) 50 mg tablet Take 1 tablet (50 mg total) by mouth every 6 (six) hours as needed for moderate pain 120 tablet 0    ergocalciferol (VITAMIN D2) 50,000 units Take 1 capsule (50,000 Units total) by mouth once a week 4 capsule 5     No current facility-administered medications for this visit  BMI Counseling: There is no height or weight on file to calculate BMI   The BMI is above normal  Nutrition recommendations include decreasing portion sizes, encouraging healthy choices of fruits and vegetables, decreasing fast food intake, consuming healthier snacks, limiting drinks that contain sugar, moderation in carbohydrate intake, increasing intake of lean protein, reducing intake of saturated and trans fat and reducing intake of cholesterol  Exercise recommendations include vigorous physical activity 75 minutes/week, exercising 3-5 times per week, obtaining a gym membership and strength training exercises  No pharmacotherapy was ordered  Falls Plan of Care: balance, strength, and gait training instructions were provided  Medications that increase falls were reviewed  Assessed feet and footwear  Review of Systems   Constitutional: Negative  HENT: Negative  Eyes: Negative  Respiratory: Negative  Negative for shortness of breath  Cardiovascular: Negative  Negative for chest pain  Gastrointestinal: Negative  Endocrine: Negative  Genitourinary: Negative  Musculoskeletal: Negative  Negative for myalgias  Skin: Negative  Allergic/Immunologic: Negative  Neurological: Positive for numbness  Negative for focal weakness  Tingling   Hematological: Negative  Psychiatric/Behavioral: Negative  Objective:  Vitals:    11/12/19 1022   BP: 138/80   BP Location: Left arm   Patient Position: Sitting   Cuff Size: Large   Pulse: 77   Temp: 97 9 °F (36 6 °C)   TempSrc: Tympanic   SpO2: 95%   Weight: 104 kg (228 lb 6 4 oz)   Height: 5' 2" (1 575 m)     Body mass index is 41 77 kg/m²  Physical Exam   Constitutional: She is oriented to person, place, and time  She appears well-developed and well-nourished  She is cooperative  HENT:   Head: Normocephalic and atraumatic     Right Ear: Tympanic membrane, external ear and ear canal normal    Left Ear: Tympanic membrane, external ear and ear canal normal    Nose: Nose normal    Mouth/Throat: Uvula is midline, oropharynx is clear and moist and mucous membranes are normal    Eyes: Pupils are equal, round, and reactive to light  Conjunctivae, EOM and lids are normal    Neck: Trachea normal, normal range of motion, full passive range of motion without pain and phonation normal  Neck supple  No JVD present  No thyroid mass and no thyromegaly present  Cardiovascular: Normal rate, regular rhythm, S1 normal, S2 normal, normal heart sounds, intact distal pulses and normal pulses  Exam reveals no gallop and no friction rub  No murmur heard  Pulmonary/Chest: Effort normal and breath sounds normal  She has no decreased breath sounds  Abdominal: Soft  Normal appearance and bowel sounds are normal  There is no hepatosplenomegaly  There is no tenderness  No hernia  Genitourinary:   Genitourinary Comments: Deferred    Musculoskeletal: Normal range of motion  Neurological: She is alert and oriented to person, place, and time  She has normal reflexes  No cranial nerve deficit  Skin: Skin is warm, dry and intact  Capillary refill takes less than 2 seconds  Psychiatric: She has a normal mood and affect  Her speech is normal and behavior is normal  Judgment and thought content normal  Cognition and memory are normal    Nursing note and vitals reviewed  Appointment on 11/11/2019   Component Date Value Ref Range Status    Cholesterol 11/11/2019 229* 50 - 200 mg/dL Final      Cholesterol:       Desirable         <200 mg/dl       Borderline         200-239 mg/dl       High              >239           Triglycerides 11/11/2019 170* <=150 mg/dL Final      Triglyceride:     Normal          <150 mg/dl     Borderline High 150-199 mg/dl     High            200-499 mg/dl        Very High       >499 mg/dl    Specimen collection should occur prior to N-Acetylcysteine or Metamizole administration due to the potential for falsely depressed results      HDL, Direct 11/11/2019 49  >=40 mg/dL Final      HDL Cholesterol:       Low     <41 mg/dL  Specimen collection should occur prior to Metamizole administration due to the potential for falsley depressed results   LDL Calculated 11/11/2019 146* 0 - 100 mg/dL Final      This screening LDL is a calculated result  It does not have the accuracy of the Direct Measured LDL in the monitoring of patients with hyperlipidemia and/or statin therapy  Direct Measure LDL (HCT743) must be ordered separately in these patients    LDL Cholesterol:     Optimal           <100 mg/dl     Near Optimal      100-129 mg/dl     Above Optimal       Borderline High 130-159 mg/dl       High            160-189 mg/dl       Very High       >189 mg/dl           WBC 11/11/2019 7 01  4 31 - 10 16 Thousand/uL Final    RBC 11/11/2019 3 70* 3 81 - 5 12 Million/uL Final    Hemoglobin 11/11/2019 11 8  11 5 - 15 4 g/dL Final    Hematocrit 11/11/2019 36 6  34 8 - 46 1 % Final    MCV 11/11/2019 99* 82 - 98 fL Final    MCH 11/11/2019 31 9  26 8 - 34 3 pg Final    MCHC 11/11/2019 32 2  31 4 - 37 4 g/dL Final    RDW 11/11/2019 12 0  11 6 - 15 1 % Final    MPV 11/11/2019 10 5  8 9 - 12 7 fL Final    Platelets 20/87/7713 280  149 - 390 Thousands/uL Final    nRBC 11/11/2019 0  /100 WBCs Final    Neutrophils Relative 11/11/2019 68  43 - 75 % Final    Immat GRANS % 11/11/2019 0  0 - 2 % Final    Lymphocytes Relative 11/11/2019 22  14 - 44 % Final    Monocytes Relative 11/11/2019 6  4 - 12 % Final    Eosinophils Relative 11/11/2019 3  0 - 6 % Final    Basophils Relative 11/11/2019 1  0 - 1 % Final    Neutrophils Absolute 11/11/2019 4 72  1 85 - 7 62 Thousands/µL Final    Immature Grans Absolute 11/11/2019 0 02  0 00 - 0 20 Thousand/uL Final    Lymphocytes Absolute 11/11/2019 1 57  0 60 - 4 47 Thousands/µL Final    Monocytes Absolute 11/11/2019 0 43  0 17 - 1 22 Thousand/µL Final    Eosinophils Absolute 11/11/2019 0 23  0 00 - 0 61 Thousand/µL Final    Basophils Absolute 11/11/2019 0 04  0 00 - 0 10 Thousands/µL Final    Sodium 11/11/2019 140  136 - 145 mmol/L Final    Potassium 11/11/2019 4 2  3 5 - 5 3 mmol/L Final    Chloride 11/11/2019 106  100 - 108 mmol/L Final    CO2 11/11/2019 26  21 - 32 mmol/L Final    ANION GAP 11/11/2019 8  4 - 13 mmol/L Final    BUN 11/11/2019 23  5 - 25 mg/dL Final    Creatinine 11/11/2019 0 83  0 60 - 1 30 mg/dL Final    Standardized to IDMS reference method    Glucose, Fasting 11/11/2019 84  65 - 99 mg/dL Final      Specimen collection should occur prior to Sulfasalazine administration due to the potential for falsely depressed results  Specimen collection should occur prior to Sulfapyridine administration due to the potential for falsely elevated results   Calcium 11/11/2019 9 6  8 3 - 10 1 mg/dL Final    AST 11/11/2019 16  5 - 45 U/L Final      Specimen collection should occur prior to Sulfasalazine administration due to the potential for falsely depressed results   ALT 11/11/2019 25  12 - 78 U/L Final      Specimen collection should occur prior to Sulfasalazine and/or Sulfapyridine administration due to the potential for falsely depressed results   Alkaline Phosphatase 11/11/2019 56  46 - 116 U/L Final    Total Protein 11/11/2019 8 0  6 4 - 8 2 g/dL Final    Albumin 11/11/2019 4 6  3 5 - 5 0 g/dL Final    Total Bilirubin 11/11/2019 0 29  0 20 - 1 00 mg/dL Final    eGFR 11/11/2019 74  ml/min/1 73sq m Final    Vit D, 25-Hydroxy 11/11/2019 20 6* 30 0 - 100 0 ng/mL Final    TSH 3RD GENERATON 11/11/2019 2 760  0 358 - 3 740 uIU/mL Final      The recommended reference ranges for TSH during pregnancy are as follows:   First trimester 0 1 to 2 5 uIU/mL   Second trimester  0 2 to 3 0 uIU/mL   Third trimester 0 3 to 3 0 uIU/m    Note: Normal ranges may not apply to patients who are transgender, non-binary, or whose legal sex, sex at birth, and gender identity differ  Using supplements with high doses of biotin 20 to more than 300 times greater than the adequate daily intake for adults of 30 mcg/day as established by the [place] [employer], can cause falsely depress results       I have reviewed all the lab results  There are some abnormalities that are not critical to the patient's health, I have discussed these in person at this office appointment  Vitamin D is low, will send in script  Take 1 tab per week per orders  Consider taking OTC vitamin D 2000 IU daily once the prescription is completed  Elevated LDL, TG, and total cholestrol - recommend lifestyle and dietary changes which include plant based diet with white lean meats, no frying, EVOO on vegetables instead of butter, nuts, fruits, and hydration with water with a regular moderate exercise regimen 20 to 30 minutes three times a week      The 10-year ASCVD risk score (Rajesh Worthy et al , 2013) is: 8%    Values used to calculate the score:      Age: 75 years      Sex: Female      Is Non- : No      Diabetic: No      Tobacco smoker: No      Systolic Blood Pressure: 733 mmHg      Is BP treated: No      HDL Cholesterol: 49 mg/dL      Total Cholesterol: 229 mg/dL

## 2019-11-12 NOTE — RESULT ENCOUNTER NOTE
Please call the patient regarding her abnormal result  Mg is slightly low but as discussed at todays visit, I do not feel this is the cause of the tingling the feet and toe movement as this has been ongoing since last time Yoana did Mg testing  The symptom may be more r/t the Lumbar DDD  She can take OTC Mg if she desires

## 2019-11-12 NOTE — PATIENT INSTRUCTIONS
Cholesterol and Your Health   AMBULATORY CARE:   Cholesterol  is a waxy, fat-like substance  Cholesterol is made by your body, but also comes from certain foods you eat  Your body uses cholesterol to make hormones and new cells  Your body also uses cholesterol to protect nerves  Cholesterol comes from foods such as meat and dairy products  Your total cholesterol level is made up by LDL cholesterol, HDL cholesterol, and triglycerides:  · LDL cholesterol  is called bad cholesterol  because it forms plaque in your arteries  As plaque builds up, your arteries become narrow, and less blood flows through  When plaque decreases blood flow to your heart, you may have chest pain  If plaque completely blocks an artery that bring blood to your heart, you may have a heart attack  Plaque can break off and form blood clots  Blood clots may block arteries in your brain and cause a stroke  · HDL cholesterol  is called good cholesterol  because it helps remove LDL cholesterol from your arteries  It does this by attaching to LDL cholesterol and carrying it to your liver  Your liver breaks down LDL cholesterol so your body can get rid of it  High levels of HDL cholesterol can help prevent a heart attack and stroke  Low levels of HDL cholesterol can increase your risk for heart disease, heart attack, and stroke  · Triglycerides  are a type of fat that store energy from foods you eat  High levels of triglycerides also cause plaque buildup  This can increase your risk for a heart attack or stroke  If your triglyceride level is high, your LDL cholesterol level may also be high  How food affects your cholesterol levels:   · Unhealthy fats  increase LDL cholesterol and triglyceride levels in your blood  They are found in foods high in cholesterol, saturated fat, and trans fat:     ¨ Cholesterol  is found in eggs, dairy, and meat  ¨ Saturated fat  is found in butter, cheese, ice cream, whole milk, and coconut oil  Saturated fat is also found in meat, such as sausage, hot dogs, and bologna  ¨ Trans fat  is found in liquid oils and is used in fried and baked foods  Foods that contain trans fats include chips, crackers, muffins, sweet rolls, microwave popcorn, and cookies  · Healthy fats,  also called unsaturated fats, help lower LDL cholesterol and triglyceride levels  Healthy fats include the following:     ¨ Monounsaturated fats  are found in foods such as olive oil, canola oil, avocado, nuts, and olives  ¨ Polyunsaturated fats,  such as omega 3 fats, are found in fish, such as salmon, trout, and tuna  They can also be found in plant foods such as flaxseed, walnuts, and soybeans  Other things that affect your cholesterol levels:   · Smoking cigarettes    · Being overweight or obese     · Drinking large amounts of alcohol    · Not enough exercise or no exercise    · Certain genes passed from your parents to you  What you need to know about having your cholesterol levels checked: Adults 21to 39years of age should have their cholesterol levels checked every 4 to 6 years  Adults 45 years and older should have their cholesterol checked every 1 to 2 years  You may need your cholesterol checked more often, or at a younger age, if you have risk factors for heart disease  You may also need to have your cholesterol checked more often if you have other health conditions, such as diabetes  Blood tests are used to check cholesterol levels  Blood tests measure your levels of triglycerides, LDL cholesterol, and HDL cholesterol  Cholesterol level goals: Your cholesterol level goal may depend on your risk for heart disease  It may also depend on your age and other health conditions  Ask your healthcare provider if the following goals are right for you:  · Your total cholesterol level  should be less than 200 mg/dL  This number may also depend on your HDL and LDL cholesterol goals       · Your LDL cholesterol level  should be less than 130 mg/dL  · Your HDL cholesterol level  should be 60 mg/dL or higher  · Your triglyceride level  should be less than 150 mg/dL  Treatment for high cholesterol:  Treatment for high cholesterol will also decrease your risk of heart disease, heart attack, and stroke  Treatment may include any of the following:  · Medicines  may be given to lower your LDL cholesterol, triglyceride levels, or total cholesterol level  You may need medicines to lower your cholesterol if any of the following is true:     ¨ You have a history of stroke, TIA, unstable angina, or a heart attack    ¨ Your LDL cholesterol level is 190 mg/dL or higher    ¨ You are age 36to 76years of age, have diabetes, and your LDL cholesterol is 70 mg/dL or higher    ¨ You are age 36to 76years of age, have risk factors for heart disease, and your LDL cholesterol is 70 mg/dL or higher    · Lifestyle changes  include changes to your diet, exercise, weight loss, and quitting smoking  It also includes decreasing the amount of alcohol you drink  · Supplements  include fish oil, red yeast rice, and garlic  Fish oil may help lower your triglyceride and LDL cholesterol levels  It may also increase your HDL cholesterol level  Red yeast rice may help decrease your total cholesterol level and LDL cholesterol level  Garlic may help lower your total cholesterol level  Do not take these supplements without talking to your healthcare provider  Nutrition to help lower your cholesterol levels:  A registered dietitian can help you create a healthy eating plan  Read food labels and choose foods low in saturated fat, trans fats, and cholesterol  · Decrease the total amount of fat you eat  Choose lean meats, fat-free or 1% fat milk, and low-fat dairy products, such as yogurt and cheese  Try to limit or avoid red meats  Limit or do not eat fried foods or baked goods such as cookies  · Replace unhealthy fats with healthy fats    Cook foods in olive oil or canola oil  Choose soft margarines that are low in saturated fat and trans fat  Seeds, nuts, and avocados are other examples of healthy fats  · Eat foods with omega-3 fats  Examples include salmon, tuna, mackerel, walnuts, and flaxseed  Eat fish 2 times per week  Children and pregnant women should not eat fish that have high levels of mercury, such as shark, swordfish, and werner mackerel  · Increase the amount of plant-based foods you eat  Plant-based foods are low in cholesterol and fat  Eating more of these foods may help lower your cholesterol and help you lose weight  Examples of plant-based foods includes fruits, vegetables, legumes, and whole grains  Replace milk that contains dairy with almond, soy, or coconut milk  Eat beans and foods with soy for protein instead of meat  Ask your healthcare provider or dietitian for more information on plant-based foods  · Increase the amount of fiber you eat  High-fiber foods can help lower your LDL cholesterol  You should eat between 20 and 30 grams of fiber each day  Eat at least 5 servings of fruits and vegetables each day  Other examples of high-fiber foods include whole-grain or whole-wheat breads, pastas, or cereals, and brown rice  Eat 3 ounces of whole-grain foods each day  Increase fiber slowly  You may have abdominal discomfort, bloating, and gas if you add fiber to your diet too quickly  Lifestyle changes you can make to help lower your cholesterol levels:   · Maintain a healthy weight  Ask your healthcare provider how much you should weigh  Ask him or her to help you create a weight loss plan if you are overweight  Weight loss can decrease your total cholesterol and triglyceride levels  · Exercise regularly  Exercise can help lower your total cholesterol level and maintain a healthy weight  Exercise can also help increase your HDL cholesterol level   Work with your healthcare provider to create an exercise program that is right for you  Get at least 30 minutes of moderate exercise most days of the week  Examples of exercise include brisk walking, swimming, or biking  · Do not smoke  Nicotine and other chemicals in cigarettes and cigars can damage your lungs, heart, and blood vessels  They can also raise your triglyceride levels  Ask your healthcare provider for information if you currently smoke and need help to quit  E-cigarettes or smokeless tobacco still contain nicotine  Talk to your healthcare provider before you use these products  · Limit or do not drink alcohol  Alcohol can increase your triglyceride levels  Ask your healthcare provider if it is safe for you to drink alcohol  Also ask how much is safe for you to drink each day  © 2017 [facility] [facility] Information is for End User's use only and may not be sold, redistributed or otherwise used for commercial purposes  All illustrations and images included in CareNotes® are the copyrighted property of [employer]  or Riccardo Ellison  The above information is an  only  It is not intended as medical advice for individual conditions or treatments  Talk to your doctor, nurse or pharmacist before following any medical regimen to see if it is safe and effective for you  Low Fat Diet   AMBULATORY CARE:   A low-fat diet  is an eating plan that is low in total fat, unhealthy fat, and cholesterol  You may need to follow a low-fat diet if you have trouble digesting or absorbing fat  You may also need to follow this diet if you have high cholesterol  You can also lower your cholesterol by increasing the amount of fiber in your diet  Soluble fiber is a type of fiber that helps to decrease cholesterol levels  Different types of fat in food:   · Limit unhealthy fats  A diet that is high in cholesterol, saturated fat, and trans fat may cause unhealthy cholesterol levels   Unhealthy cholesterol levels increase your risk of heart disease  ¨ Cholesterol:  Limit intake of cholesterol to less than 200 mg per day  Cholesterol is found in meat, eggs, and dairy  ¨ Saturated fat:  Limit saturated fat to less than 7% of your total daily calories  Ask your dietitian how many calories you need each day  Saturated fat is found in butter, cheese, ice cream, whole milk, and palm oil  Saturated fat is also found in meat, such as beef, pork, chicken skin, and processed meats  Processed meats include sausage, hot dogs, and bologna  ¨ Trans fat:  Avoid trans fat as much as possible  Trans fat is used in fried and baked foods  Foods that say trans fat free on the label may still have up to 0 5 grams of trans fat per serving  · Include healthy fats  Replace foods that are high in saturated and trans fat with foods high in healthy fats  This may help to decrease high cholesterol levels  ¨ Monounsaturated fats: These are found in avocados, nuts, and vegetable oils, such as olive, canola, and sunflower oil  ¨ Polyunsaturated fats: These can be found in vegetable oils, such as soybean or corn oil  Omega-3 fats can help to decrease the risk of heart disease  Omega-3 fats are found in fish, such as salmon, herring, trout, and tuna  Omega-3 fats can also be found in plant foods, such as walnuts, flaxseed, soybeans, and canola oil    Foods to limit or avoid:   · Grains:      ¨ Snacks that are made with partially hydrogenated oils, such as chips, regular crackers, and butter-flavored popcorn    ¨ High-fat baked goods, such as biscuits, croissants, doughnuts, pies, cookies, and pastries    · Dairy:      ¨ Whole milk, 2% milk, and yogurt and ice cream made with whole milk    ¨ Half and half creamer, heavy cream, and whipping cream    ¨ Cheese, cream cheese, and sour cream    · Meats and proteins:      ¨ High-fat cuts of meat (T-bone steak, regular hamburger, and ribs)    ¨ [employer], poultry (turkey and chicken), and fish    ¨ Poultry (chicken and turkey) with skin    ¨ Cold cuts (salami or bologna), hot dogs, da silva, and sausage    ¨ Whole eggs and egg yolks    · Vegetables and fruits with added fat:      ¨ Fried vegetables or vegetables in butter or high-fat sauces, such as cream or cheese sauces    ¨ Fried fruit or fruit served with butter or cream    · Fats:      ¨ Butter, stick margarine, and shortening    ¨ Coconut, palm oil, and palm kernel oil  Foods to include:   · Grains:      ¨ Whole-grain breads, cereals, pasta, and brown rice    ¨ Low-fat crackers and pretzels    · Vegetables and fruits:      ¨ Fresh, frozen, or canned vegetables (no salt or low-sodium)    ¨ Fresh, frozen, dried, or canned fruit (canned in light syrup or fruit juice)    ¨ Avocado    · Low-fat dairy products:      ¨ Nonfat (skim) or 1% milk    ¨ Nonfat or low-fat cheese, yogurt, and cottage cheese    · Meats and proteins:      ¨ Chicken or turkey with no skin    ¨ Baked or broiled fish    ¨ Lean beef and pork (loin, round, extra lean hamburger)    ¨ Beans and peas, unsalted nuts, soy products    ¨ Egg whites and substitutes    ¨ Seeds and nuts    · Fats:      ¨ Unsaturated oil, such as canola, olive, peanut, soybean, or sunflower oil    ¨ Soft or liquid margarine and vegetable oil spread    ¨ Low-fat salad dressing  Other ways to decrease fat:   · Read food labels before you buy foods  Choose foods that have less than 30% of calories from fat  Choose low-fat or fat-free dairy products  Remember that fat free does not mean calorie free  These foods still contain calories, and too many calories can lead to weight gain  · Trim fat from meat and avoid fried food  Trim all visible fat from meat before you cook it  Remove the skin from poultry  Do not jarvis meat, fish, or poultry  Bake, roast, boil, or broil these foods instead  Avoid fried foods  Eat a baked potato instead of [place] fries  Steam vegetables instead of sautéing them in butter  · Add less fat to foods    Use imitation da silva bits on salads and baked potatoes instead of regular da silva bits  Use fat-free or low-fat salad dressings instead of regular dressings  Use low-fat or nonfat butter-flavored topping instead of regular butter or margarine on popcorn and other foods  Ways to decrease fat in recipes:  Replace high-fat ingredients with low-fat or nonfat ones  This may cause baked goods to be drier than usual  You may need to use nonfat cooking spray on pans to prevent food from sticking  You also may need to change the amount of other ingredients, such as water, in the recipe  Try the following:  · Use low-fat or light margarine instead of regular margarine or shortening  · Use lean ground turkey breast or chicken, or lean ground beef (less than 5% fat) instead of hamburger  · Add 1 teaspoon of canola oil to 8 ounces of skim milk instead of using cream or half and half  · Use grated zucchini, carrots, or apples in breads instead of coconut  · Use blenderized, low-fat cottage cheese, plain tofu, or low-fat ricotta cheese instead of cream cheese  · Use 1 egg white and 1 teaspoon of canola oil, or use ¼ cup (2 ounces) of fat-free egg substitute instead of a whole egg  · Replace half of the oil that is called for in a recipe with applesauce when you bake  Use 3 tablespoons of cocoa powder and 1 tablespoon of canola oil instead of a square of baking chocolate  How to increase fiber:  Eat enough high-fiber foods to get 20 to 30 grams of fiber every day  Slowly increase your fiber intake to avoid stomach cramps, gas, and other problems  · Eat 3 ounces of whole-grain foods each day  An ounce is about 1 slice of bread  Eat whole-grain breads, such as whole-wheat bread  Whole wheat, whole-wheat flour, or other whole grains should be listed as the first ingredient on the food label  Replace white flour with whole-grain flour or use half of each in recipes   Whole-grain flour is heavier than white flour, so you may have to add more yeast or baking powder  · Eat a high-fiber cereal for breakfast   Oatmeal is a good source of soluble fiber  Look for cereals that have bran or fiber in the name  Choose whole-grain products, such as brown rice, barley, and whole-wheat pasta  · Eat more beans, peas, and lentils  For example, add beans to soups or salads  Eat at least 5 cups of fruits and vegetables each day  Eat fruits and vegetables with the peel because the peel is high in fiber  © 2017 [address] [facility] Information is for End User's use only and may not be sold, redistributed or otherwise used for commercial purposes  All illustrations and images included in CareNotes® are the copyrighted property of [employer]  or Riccardo Ellison  The above information is an  only  It is not intended as medical advice for individual conditions or treatments  Talk to your doctor, nurse or pharmacist before following any medical regimen to see if it is safe and effective for you  Lipid Profile   AMBULATORY CARE:   A lipid profile,  or lipid panel, is a blood test to check your lipid levels  Lipids are fats that cannot dissolve in blood  High lipid levels increase your risk for heart disease and a heart attack or stroke  A lipid profile includes the following:  · Total cholesterol  is the main number used for cholesterol values  ¨ Goal: Less than 200 mg/dL    ¨ Borderline high: 200 to 239 mg/dL    ¨ High: 240 mg/dL or higher    · LDL (bad) cholesterol  carries cholesterol and deposits it in the arteries  This can cause a blockage  ¨ Goal: 100 mg/dL or lower    ¨ Near goal: 100 to 129 mg/dL     ¨ Borderline high: 130 to 159 mg/dL    ¨ High: 160 to 189 mg/dL    ¨ Very high: 190 mg/dL or higher    · HDL (good) cholesterol  removes cholesterol from your body       ¨ Goal: 60 mg/dL or higher    ¨ Borderline risk: 40 to 59 mg/dL    ¨ High risk: 40 mg/dL or lower    · Triglycerides  are a different kind of fat than cholesterol  ¨ Goal: 150 mg/dL or lower    ¨ Borderline high: 150 to 199 mg/dL    ¨ High: 200 to 499 mg/dL    ¨ Very high: 500 mg/dL or higher  How to prepare for the test:  Do not eat or drink anything, except water, for 12 to 14 hours before the test  Ask your healthcare provider if you should take your medicines on the day of your test    What you need to know about your test results: Your healthcare provider will discuss your test results with you  If your test results are abnormal, you may need treatment to decrease your risk for heart disease  © 2017 [address] [employer]  Information is for End User's use only and may not be sold, redistributed or otherwise used for commercial purposes  All illustrations and images included in CareNotes® are the copyrighted property of [employer]  or Riccardo Ellison  The above information is an  only  It is not intended as medical advice for individual conditions or treatments  Talk to your doctor, nurse or pharmacist before following any medical regimen to see if it is safe and effective for you  Exercise Safety   AMBULATORY CARE:   Exercise safety  includes using correct equipment and positions to work the muscles without causing more injury  You will need to know which exercises to do and how often to do them  You will also need to know what to do if you feel pain or think an exercise caused injury  Do not start an exercise program before you talk to your healthcare provider  You may need to wait until your swelling and pain have gone down before you start to exercise  Contact your healthcare provider if:   · You have sharp pain during exercise or at rest     · You have questions or concerns about your stretches or exercises  What you need to know before you exercise:   · Exercises help lower pain and swelling after an injury or surgery   They also help strengthen the muscles that support your joints and bones  This may help prevent another injury  · You may need a light weight or an exercise band for some exercises  Your healthcare provider will tell you how much weight to exercise with  Ask your healthcare provider where to buy a weight or an exercise band  · Your healthcare provider will tell you how often to do each exercise  The provider will also tell you how many times to repeat each exercise and how many sets you should do  You may be told to do different exercises on different days  · Warm up and stretch before you exercise  Walk or ride a stationary bike for 5 to 10 minutes to help you warm up  Stretching helps increase range of motion  It may also relieve muscle soreness and help prevent another injury  Your healthcare provider will show you which stretches you need to do  What you can do to exercise safely:   · Move slowly and smoothly  Avoid fast or jerky motions  This will help prevent another injury  · Breathe normally  Do not hold your breath  It is important to breathe in and out so you do not tense up during exercise  Tension could prevent you from moving your joint in a full range of motion  · Stop if you feel sharp pain or an increase in pain  Stop the exercise and contact your healthcare provider if you have these symptoms  It is normal to feel some discomfort, such as a dull ache, during exercise  Regular exercise will help decrease your discomfort over time  Follow up with your physical therapist as directed:  Write down your questions so you remember to ask them during your visits  © 2017 [address] [employer] Information is for End User's use only and may not be sold, redistributed or otherwise used for commercial purposes  All illustrations and images included in CareNotes® are the copyrighted property of A [employer] M [employer]  or Riccardo Ell[employer]  The above information is an  only   It is not intended as medical advice for individual conditions or treatments  Talk to your doctor, nurse or pharmacist before following any medical regimen to see if it is safe and effective for you  Vitamin D Deficiency   AMBULATORY CARE:   Vitamin D deficiency  is a low level of vitamin D in your body  Vitamin D helps your body absorb calcium from foods  Your body makes vitamin D when your skin is exposed to sunlight  You can also get vitamin D from certain foods such as fish, eggs, and meat  Most of the vitamin D in your body comes from sunlight exposure  Common symptoms include the following:  Low levels of vitamin D can lead to weak and brittle bones that are more likely to fracture  You may not have any signs and symptoms, or you may have any of the following:  · Bone pain or discomfort in your lower back, pelvis, or legs    · Muscle aches and weakness    · Low back pain in women    · Poor growth, irritability, and frequent respiratory tract infections in infants    · Deformed bones and slow growth in children  Contact your healthcare provider for the following symptoms:   · Continued or worsening symptoms    · Nausea, vomiting, or a headache after possibly taking too much of a vitamin D supplement    · Questions or concerns about your condition or care  Treatment for vitamin D deficiency  includes high doses of vitamin D for 8 to 12 weeks to increase your levels  Your levels will then be rechecked  If your levels are still low, you will need to take vitamin D supplements for another 8 weeks  After your levels have gone back to normal, you may need to continue to take a vitamin D supplement  Amount of vitamin D do you need each day:  The amount of vitamin D you need depends on your age  You may need more than the recommended amounts below if you take certain medicines or you are obese  Ask your healthcare provider how much vitamin D you need    · Infants up to 1 year of age: 0 international units (IU)    · Children 1 year and older: 600 IU    · Adults aged 23to 79years old: 600 IU    · Adults older than 70 years: 800 IU  Prevent vitamin D deficiency:   · Eat foods that are high in vitamin D  Fatty fish such as mackerel, canned tuna and sardines, and salmon are good sources of vitamin D  Certain foods such as milk, juice, and cereal are fortified with vitamin D      · Give your  infant a vitamin D supplement  of 400 IU each day  · Take vitamin D supplements as directed  High doses of vitamin D can be toxic  Your healthcare provider will tell you how much vitamin D you should take each day  Vitamin D is best absorbed when taken with food  · Expose your skin to sunlight as directed  Ask your healthcare provider how you can safely expose your skin to sunlight and for how long  Too much exposure to sunlight can cause skin cancer  Follow up with your healthcare provider as directed:  Write down your questions so you remember to ask them during your visits  © 2017 [address] [employer] Information is for End User's use only and may not be sold, redistributed or otherwise used for commercial purposes  All illustrations and images included in CareNotes® are the copyrighted property of [employer]  or [employer]  The above information is an  only  It is not intended as medical advice for individual conditions or treatments  Talk to your doctor, nurse or pharmacist before following any medical regimen to see if it is safe and effective for you  Influenza Vaccine   AMBULATORY CARE:   The influenza vaccine  is an injection given to help prevent influenza (flu)  The flu is caused by a virus  The virus spreads from person to person through coughing and sneezing  Several types of viruses cause the flu  The viruses change over time, so new vaccines are made each year  The vaccine begins to protect you about 2 weeks after you get it  The flu shot usually injected into your upper arm  It may be given in your thigh   You may get a vaccine with a weak or dead virus  Call 911 for any of the following:   · Your mouth and throat are swollen  · You are wheezing or have trouble breathing  · You have chest pain or your heart is beating faster than normal for you  · You feel like you are going to faint  Seek care immediately if:   · Your face is red or swollen  · You have hives that spread over your body  · You feel weak or dizzy  Contact your healthcare provider if:   · You have increased pain, redness, or swelling around the area where the shot was given  · You have questions or concerns about the influenza vaccine  When to get the influenza vaccine: The influenza vaccine is offered every year starting in September or October  Get the influenza vaccine as soon as it is available  Children 6 months to 6years old need 2 doses during the first year they get the vaccine  The 2 doses should be given at least 4 weeks apart  It is best if the same type of vaccine is given both times  The child can then receive 1 dose each year  Children 9 years or older should get 1 dose each year  Who should get the flu shot:   · Infants 6 months or older    · Any healthy adult who would like to decrease the risk for the flu    · Anyone living with or caring for children younger than 5 years     · Healthcare workers    · Anyone who lives in a long-term care facility    · Anyone who has chronic health problems, such as asthma, diabetes, or blood disorders    · Anyone who has a weak immune system    · Women who are or will be pregnant during the flu season  Who should not get the flu shot:  If you have an egg allergy, ask your healthcare provider if it is safe to get the flu shot  You will need to be closely monitored by a healthcare provider while you receive the vaccine, and for an hour or more after   The following should not get the flu shot:  · Infants younger than 6 months     · Anyone who has had an allergic reaction to the flu shot    · Anyone who is sick or has a fever    · Anyone who received a diagnosis of Guillain-Barré syndrome within 6 weeks of getting a flu vaccine    · Anyone who is allergic to thimerosal (mercury)  Risks of the influenza vaccine: The flu shot may cause mild symptoms, such as a fever, headache, and muscle aches  It may also cause mild to moderate soreness or redness at the area where you were given the shot  The nasal spray may cause a fever, runny or stuffy nose, headache, muscle aches, or vomiting  You may still get the flu after you receive the influenza vaccine  If you are allergic to eggs, ask about an egg-free vaccine  You may have an allergic reaction to the vaccine  This can be life-threatening  Follow up with your healthcare provider as directed:  Write down your questions so you remember to ask them during your visits  © 2017 [facility] Information is for End User's use only and may not be sold, redistributed or otherwise used for commercial purposes  All illustrations and images included in CareNotes® are the copyrighted property of [employer]  or [employer]  The above information is an  only  It is not intended as medical advice for individual conditions or treatments  Talk to your doctor, nurse or pharmacist before following any medical regimen to see if it is safe and effective for you

## 2019-12-04 DIAGNOSIS — G89.29 OTHER CHRONIC PAIN: ICD-10-CM

## 2019-12-04 RX ORDER — TRAMADOL HYDROCHLORIDE 50 MG/1
50 TABLET ORAL EVERY 6 HOURS PRN
Qty: 120 TABLET | Refills: 0 | Status: SHIPPED | OUTPATIENT
Start: 2019-12-04 | End: 2020-01-02 | Stop reason: SDUPTHER

## 2020-01-02 DIAGNOSIS — G89.29 OTHER CHRONIC PAIN: ICD-10-CM

## 2020-01-02 RX ORDER — TRAMADOL HYDROCHLORIDE 50 MG/1
50 TABLET ORAL EVERY 6 HOURS PRN
Qty: 120 TABLET | Refills: 0 | Status: SHIPPED | OUTPATIENT
Start: 2020-01-02 | End: 2020-01-31 | Stop reason: SDUPTHER

## 2020-01-31 DIAGNOSIS — G89.29 OTHER CHRONIC PAIN: ICD-10-CM

## 2020-01-31 RX ORDER — TRAMADOL HYDROCHLORIDE 50 MG/1
50 TABLET ORAL EVERY 6 HOURS PRN
Qty: 120 TABLET | Refills: 0 | Status: SHIPPED | OUTPATIENT
Start: 2020-01-31 | End: 2020-03-11 | Stop reason: SDUPTHER

## 2020-02-06 ENCOUNTER — OFFICE VISIT (OUTPATIENT)
Dept: FAMILY MEDICINE CLINIC | Facility: CLINIC | Age: 65
End: 2020-02-06
Payer: MEDICARE

## 2020-02-06 VITALS
SYSTOLIC BLOOD PRESSURE: 140 MMHG | BODY MASS INDEX: 42.69 KG/M2 | OXYGEN SATURATION: 94 % | HEIGHT: 62 IN | TEMPERATURE: 100 F | DIASTOLIC BLOOD PRESSURE: 82 MMHG | WEIGHT: 232 LBS | HEART RATE: 94 BPM

## 2020-02-06 DIAGNOSIS — C54.1 ENDOMETRIAL CANCER (HCC): ICD-10-CM

## 2020-02-06 DIAGNOSIS — E66.01 CLASS 3 SEVERE OBESITY DUE TO EXCESS CALORIES WITHOUT SERIOUS COMORBIDITY WITH BODY MASS INDEX (BMI) OF 40.0 TO 44.9 IN ADULT (HCC): ICD-10-CM

## 2020-02-06 DIAGNOSIS — J01.00 ACUTE NON-RECURRENT MAXILLARY SINUSITIS: ICD-10-CM

## 2020-02-06 DIAGNOSIS — R05.9 COUGH: Primary | ICD-10-CM

## 2020-02-06 DIAGNOSIS — R06.2 WHEEZING: ICD-10-CM

## 2020-02-06 PROCEDURE — 1036F TOBACCO NON-USER: CPT | Performed by: NURSE PRACTITIONER

## 2020-02-06 PROCEDURE — 3008F BODY MASS INDEX DOCD: CPT | Performed by: NURSE PRACTITIONER

## 2020-02-06 PROCEDURE — 4040F PNEUMOC VAC/ADMIN/RCVD: CPT | Performed by: NURSE PRACTITIONER

## 2020-02-06 PROCEDURE — 99213 OFFICE O/P EST LOW 20 MIN: CPT | Performed by: NURSE PRACTITIONER

## 2020-02-06 PROCEDURE — 1160F RVW MEDS BY RX/DR IN RCRD: CPT | Performed by: NURSE PRACTITIONER

## 2020-02-06 RX ORDER — FLUTICASONE PROPIONATE 50 MCG
2 SPRAY, SUSPENSION (ML) NASAL 2 TIMES DAILY
Qty: 16 G | Refills: 0 | Status: SHIPPED | OUTPATIENT
Start: 2020-02-06 | End: 2020-02-24 | Stop reason: ALTCHOICE

## 2020-02-06 RX ORDER — DEXTROMETHORPHAN HYDROBROMIDE AND PROMETHAZINE HYDROCHLORIDE 15; 6.25 MG/5ML; MG/5ML
5 SOLUTION ORAL 4 TIMES DAILY PRN
Qty: 240 ML | Refills: 0 | Status: SHIPPED | OUTPATIENT
Start: 2020-02-06 | End: 2020-02-24 | Stop reason: ALTCHOICE

## 2020-02-06 RX ORDER — PREDNISONE 10 MG/1
TABLET ORAL
Qty: 30 TABLET | Refills: 0 | Status: SHIPPED | OUTPATIENT
Start: 2020-02-06 | End: 2020-02-24 | Stop reason: ALTCHOICE

## 2020-02-06 RX ORDER — ALBUTEROL SULFATE 90 UG/1
2 AEROSOL, METERED RESPIRATORY (INHALATION) EVERY 6 HOURS PRN
Qty: 1 INHALER | Refills: 0 | Status: SHIPPED | OUTPATIENT
Start: 2020-02-06 | End: 2020-05-12 | Stop reason: ALTCHOICE

## 2020-02-06 RX ORDER — LORATADINE 10 MG/1
10 TABLET ORAL DAILY
Qty: 30 TABLET | Refills: 0 | Status: SHIPPED | OUTPATIENT
Start: 2020-02-06 | End: 2020-02-24 | Stop reason: ALTCHOICE

## 2020-02-06 NOTE — PROGRESS NOTES
Assessment/Plan:    Problem List Items Addressed This Visit     Class 3 severe obesity without serious comorbidity with body mass index (BMI) of 40 0 to 44 9 in [facility])    Endometrial cancer ([facility] )      Other Visit Diagnoses     Cough    -  Primary    Relevant Medications    Promethazine-DM (PHENERGAN-DM) 6 25-15 mg/5 mL oral syrup    Wheezing        Relevant Medications    predniSONE 10 mg tablet    albuterol (PROVENTIL HFA,VENTOLIN HFA) 90 mcg/act inhaler    Acute non-recurrent maxillary sinusitis        Relevant Medications    fluticasone (FLONASE) 50 mcg/act nasal spray    Misc Natural Product Nasal (NASAL CLEANSE RINSE MIX) PACK    loratadine (CLARITIN) 10 mg tablet           Diagnoses and all orders for this visit:    Cough  -     Promethazine-DM (PHENERGAN-DM) 6 25-15 mg/5 mL oral syrup; Take 5 mL by mouth 4 (four) times a day as needed for cough    Wheezing  -     predniSONE 10 mg tablet; 4 tablets for 3 days, 3 tablets for 3 days, 2 tablets for 3 days, 1 tablet for 3 days, then D/C   -     albuterol (PROVENTIL HFA,VENTOLIN HFA) 90 mcg/act inhaler; Inhale 2 puffs every 6 (six) hours as needed for wheezing or shortness of breath    Acute non-recurrent maxillary sinusitis  -     fluticasone (FLONASE) 50 mcg/act nasal spray; 2 sprays into each nostril 2 (two) times a day  -     Misc Natural Product Nasal (NASAL CLEANSE RINSE MIX) PACK; 1 spray into each nostril 2 (two) times a day as needed (nasal irriagtion) for up to 7 days  -     loratadine (CLARITIN) 10 mg tablet; Take 1 tablet (10 mg total) by mouth daily    Class 3 severe obesity due to excess calories without serious comorbidity with body mass index (BMI) of 40 0 to 44 9 in [facility])    Endometrial cancer ([facility] )        No problem-specific Assessment & Plan notes found for this encounter  Subjective:      Patient ID: David Mccabe is a 75 y o  female  David Mccabe presents with c/o URI symptoms of congestion, cough, and "cold"   States that this has been ongoing for last two days  She states that her friends have been sick as well with similar symptoms and they have progressed to bronchitis and pneumonia  Reports she notes that the symptoms are all in her sinuses and that the PND is causing cough and wheezing  She took Mucinex OTC yesterday which was effective in getting her OOB but did not take any today  She reports taking APAP this AM which was not effective  Cough   This is a new problem  The current episode started in the past 7 days  The problem occurs constantly  The cough is productive of sputum (yellowish)  Associated symptoms include ear congestion, nasal congestion, postnasal drip, rhinorrhea and wheezing  Pertinent negatives include no chest pain, chills, fever, headaches, myalgias or sore throat  The treatment provided mild relief  Her past medical history is significant for asthma  There is no history of bronchiectasis, bronchitis, COPD, emphysema, environmental allergies or pneumonia  as a child   Sinus Problem   This is a new problem  The current episode started yesterday  The problem is unchanged  There has been no fever  She is experiencing no pain  Associated symptoms include coughing and sinus pressure  Pertinent negatives include no chills, headaches, neck pain or sore throat  The following portions of the patient's history were reviewed and updated as appropriate:   She has a past medical history of Abnormal blood chemistry, Abscess of groin, Arthritis, Cataracts, bilateral, Chronic pain, Obesity, Preauricular (1953), Spinal stenosis, and Uterine cancer ([facility] )  ,  does not have any pertinent problems on file  ,   has a past surgical history that includes Cholecystectomy; Hysterectomy; Epidural block injection; Abscess drainage; and Cataract extraction, bilateral ,  family history includes Cancer in her father and mother; Diabetes in her family; Ovarian cancer in her mother; Pancreatic cancer in her father  ,   reports that she has never smoked  She has never used smokeless tobacco  She reports that she drinks alcohol  She reports that she does not use drugs  ,  is allergic to paclitaxel     Current Outpatient Medications   Medication Sig Dispense Refill    acetaminophen (TYLENOL) 325 mg tablet Take 2 tablets (650 mg total) by mouth every 6 (six) hours as needed for mild pain 30 tablet 0    ergocalciferol (VITAMIN D2) 50,000 units Take 1 capsule (50,000 Units total) by mouth once a week 4 capsule 5    naproxen (NAPROSYN) 250 mg tablet Take 220 mg by mouth 2 (two) times a day with meals      traMADol (ULTRAM) 50 mg tablet Take 1 tablet (50 mg total) by mouth every 6 (six) hours as needed for moderate pain 120 tablet 0    albuterol (PROVENTIL HFA,VENTOLIN HFA) 90 mcg/act inhaler Inhale 2 puffs every 6 (six) hours as needed for wheezing or shortness of breath 1 Inhaler 0    fluticasone (FLONASE) 50 mcg/act nasal spray 2 sprays into each nostril 2 (two) times a day 16 g 0    loratadine (CLARITIN) 10 mg tablet Take 1 tablet (10 mg total) by mouth daily 30 tablet 0    Misc Natural Product Nasal (NASAL CLEANSE RINSE MIX) PACK 1 spray into each nostril 2 (two) times a day as needed (nasal irriagtion) for up to 7 days 1 each 0    predniSONE 10 mg tablet 4 tablets for 3 days, 3 tablets for 3 days, 2 tablets for 3 days, 1 tablet for 3 days, then D/C  30 tablet 0    Promethazine-DM (PHENERGAN-DM) 6 25-15 mg/5 mL oral syrup Take 5 mL by mouth 4 (four) times a day as needed for cough 240 mL 0     No current facility-administered medications for this visit  BMI Counseling: There is no height or weight on file to calculate BMI   The BMI is above normal  Nutrition recommendations include decreasing portion sizes, encouraging healthy choices of fruits and vegetables, decreasing fast food intake, consuming healthier snacks, limiting drinks that contain sugar, moderation in carbohydrate intake, increasing intake of lean protein, reducing intake of saturated and trans fat and reducing intake of cholesterol  Exercise recommendations include vigorous physical activity 75 minutes/week, exercising 3-5 times per week, obtaining a gym membership and strength training exercises  No pharmacotherapy was ordered  Falls Plan of Care: balance, strength, and gait training instructions were provided  Medications that increase falls were reviewed  Assessed feet and footwear  Review of Systems   Constitutional: Negative for chills and fever  HENT: Positive for postnasal drip, rhinorrhea and sinus pressure  Negative for sore throat  Respiratory: Positive for cough and wheezing  Cardiovascular: Negative for chest pain  Musculoskeletal: Negative for myalgias and neck pain  Allergic/Immunologic: Negative for environmental allergies  Neurological: Negative for headaches  All other systems reviewed and are negative  Objective:  Vitals:    02/06/20 0959   BP: 140/82   Pulse: 94   Temp: 100 °F (37 8 °C)   TempSrc: Tympanic   SpO2: 94%   Weight: 105 kg (232 lb)   Height: 5' 2" (1 575 m)     Body mass index is 42 43 kg/m²  Physical Exam   Constitutional: She is oriented to person, place, and time  She appears well-developed and well-nourished  She is cooperative  HENT:   Head: Normocephalic and atraumatic  Right Ear: Tympanic membrane, external ear and ear canal normal    Left Ear: Tympanic membrane, external ear and ear canal normal    Nose: Rhinorrhea present  Right sinus exhibits maxillary sinus tenderness  Right sinus exhibits no frontal sinus tenderness  Left sinus exhibits maxillary sinus tenderness  Left sinus exhibits no frontal sinus tenderness  Mouth/Throat: Uvula is midline and mucous membranes are normal  Posterior oropharyngeal erythema present  Eyes: Pupils are equal, round, and reactive to light   Conjunctivae, EOM and lids are normal    Neck: Trachea normal, normal range of motion, full passive range of motion without pain and phonation normal  Neck supple  No JVD present  No thyroid mass and no thyromegaly present  Cardiovascular: Normal rate, regular rhythm, S1 normal, S2 normal, normal heart sounds, intact distal pulses and normal pulses  Exam reveals no gallop and no friction rub  No murmur heard  Pulmonary/Chest: Effort normal  No respiratory distress  She has no decreased breath sounds  She has wheezes  Abdominal: Soft  Normal appearance and bowel sounds are normal  There is no hepatosplenomegaly  There is no tenderness  No hernia  Genitourinary:   Genitourinary Comments: Deferred    Musculoskeletal: Normal range of motion  Neurological: She is alert and oriented to person, place, and time  She has normal reflexes  No cranial nerve deficit  Skin: Skin is warm, dry and intact  Capillary refill takes less than 2 seconds  Psychiatric: She has a normal mood and affect  Her speech is normal and behavior is normal  Judgment and thought content normal  Cognition and memory are normal    Nursing note and vitals reviewed

## 2020-02-06 NOTE — PATIENT INSTRUCTIONS
Wheezing   WHAT YOU NEED TO KNOW:   Wheezing happens when air flows through a narrowed airway  Wheezing can happen when you breathe in, breathe out, or both  Wheezes may sound like a whistle, squeal, groan, or creak  Wheezes may also sound musical or high-pitched  Wheezing cannot be stopped by coughing  Asthma, allergies, or infection are the most common causes of wheezing  A foreign body, asthma, extra mucus, or smoking can also cause wheezing  DISCHARGE INSTRUCTIONS:   Call 911 if:   · You have sudden trouble breathing  · Your throat feels like it is swelling or feels tight  · You are dizzy, lightheaded, confused, or feel faint  · You have chest pain or tightness  Return to the emergency department if:   · You have shortness of breath  · You are coughing up blood  · You have chest pain  Contact your healthcare provider if:   · You have a fever  · Your wheezing does not get better or it gets worse  · You have questions or concerns about your condition or care  Medicines:   · Medicines  decrease inflammation, open airways, and make it easier to breathe  · Take your medicine as directed  Contact your healthcare provider if you think your medicine is not helping or if you have side effects  Tell him of her if you are allergic to any medicine  Keep a list of the medicines, vitamins, and herbs you take  Include the amounts, and when and why you take them  Bring the list or the pill bottles to follow-up visits  Carry your medicine list with you in case of an emergency  Follow up with your healthcare provider as directed: You may be referred to a specialist  Write down your questions so you remember to ask them during your visits  Manage your symptoms:   · Avoid allergy triggers , such as animals, grass, pollen, or dust     · Return to your usual activity as directed  You may need to limit certain activities until you follow up with your healthcare provider or your symptoms improve  Your child may need to avoid sports until his symptoms improve  · Take deep breaths and cough several times a day  This will decrease your risk for a lung infection and help decrease wheezing  Take a deep breath and hold it for as long as you can  Let the air out and then cough strongly  Deep breaths help open your airway  You may be given an incentive spirometer to help you take deep breaths  Put the plastic piece in your mouth and take a slow, deep breath, then let the air out and cough  Repeat these steps 10 times every hour  · Drink liquids as directed  You may need to drink more liquids than usual to thin your mucus and prevent dehydration  Ask how much liquid to drink each day and which liquids are best for you  © 2017 [employer]  Information is for End User's use only and may not be sold, redistributed or otherwise used for commercial purposes  All illustrations and images included in CareNotes® are the copyrighted property of [employer]  or [employer]  The above information is an  only  It is not intended as medical advice for individual conditions or treatments  Talk to your doctor, nurse or pharmacist before following any medical regimen to see if it is safe and effective for you  Acute Cough   WHAT YOU NEED TO KNOW:   An acute cough can last up to 3 weeks  Common causes of an acute cough include a cold, allergies, or a lung infection  DISCHARGE INSTRUCTIONS:   Return to the emergency department if:   · You have trouble breathing or feel short of breath  · You cough up blood, or you see blood in your mucus  · You faint or feel weak or dizzy  · You have chest pain when you cough or take a deep breath  · You have new wheezing  Contact your healthcare provider if:   · You have a fever  · Your cough lasts longer than 4 weeks  · Your symptoms do not improve with treatment       · You have questions or concerns about your condition or care  Medicines:   · Medicines  may be needed to stop the cough, decrease swelling in your airways, or help open your airways  Medicine may also be given to help you cough up mucus  Ask your healthcare provider what over-the-counter medicines you can take  If you have an infection caused by bacteria, you may need antibiotics  · Take your medicine as directed  Contact your healthcare provider if you think your medicine is not helping or if you have side effects  Tell him or her if you are allergic to any medicine  Keep a list of the medicines, vitamins, and herbs you take  Include the amounts, and when and why you take them  Bring the list or the pill bottles to follow-up visits  Carry your medicine list with you in case of an emergency  Manage your symptoms:   · Do not smoke and stay away from others who smoke  Nicotine and other chemicals in cigarettes and cigars can cause lung damage and make your cough worse  Ask your healthcare provider for information if you currently smoke and need help to quit  E-cigarettes or smokeless tobacco still contain nicotine  Talk to your healthcare provider before you use these products  · Drink extra liquids as directed  Liquids will help thin and loosen mucus so you can cough it up  Liquids will also help prevent dehydration  Examples of good liquids to drink include water, fruit juice, and broth  Do not drink liquids that contain caffeine  Caffeine can increase your risk for dehydration  Ask your healthcare provider how much liquid to drink each day  · Rest as directed  Do not do activities that make your cough worse, such as exercise  · Use a humidifier or vaporizer  Use a cool mist humidifier or a vaporizer to increase air moisture in your home  This may make it easier for you to breathe and help decrease your cough  · Eat 2 to 5 mL of honey 2 times each day  Honey can help thin mucus and decrease your cough       · Use cough drops or lozenges  These can help decrease throat irritation and your cough  Follow up with your healthcare provider as directed:  Write down your questions so you remember to ask them during your visits  © 2017 [employer]  Information is for End User's use only and may not be sold, redistributed or otherwise used for commercial purposes  All illustrations and images included in CareNotes® are the copyrighted property of [employer]  or [employer]  The above information is an  only  It is not intended as medical advice for individual conditions or treatments  Talk to your doctor, nurse or pharmacist before following any medical regimen to see if it is safe and effective for you  Sinusitis   AMBULATORY CARE:   Sinusitis  is inflammation or infection of your sinuses  It is most often caused by a virus  Acute sinusitis may last up to 12 weeks  Chronic sinusitis lasts longer than 12 weeks  Recurrent sinusitis means you have 4 or more times in 1 year  Common symptoms include the following:   · Fever    · Pain, pressure, redness, or swelling around the forehead, cheeks, or eyes    · Thick yellow or green discharge from your nose    · Tenderness when you touch your face over your sinuses    · Dry cough that happens mostly at night or when you lie down    · Headache and face pain that is worse when you lean forward    · Tooth pain, or pain when you chew  Seek care immediately if:   · Your eye and eyelid are red, swollen, and painful  · You cannot open your eye  · You have vision changes, such as double vision  · Your eyeball bulges out or you cannot move your eye  · You are more sleepy than normal, or you notice changes in your ability to think, move, or talk  · You have a stiff neck, a fever, or a bad headache  · You have swelling of your forehead or scalp  Contact your healthcare provider if:   · Your symptoms do not improve after 3 days      · Your symptoms do not go away after 10 days  · You have nausea and are vomiting  · Your nose is bleeding  · You have questions or concerns about your condition or care  Treatment for sinusitis:  Your symptoms may go away on their own  Your healthcare provider may recommend watchful waiting for up to 10 days before starting antibiotics  You may  need any of the following:  · Acetaminophen  decreases pain and fever  It is available without a doctor's order  Ask how much to take and how often to take it  Follow directions  Read the labels of all other medicines you are using to see if they also contain acetaminophen, or ask your doctor or pharmacist  Acetaminophen can cause liver damage if not taken correctly  Do not use more than 4 grams (4,000 milligrams) total of acetaminophen in one day  · NSAIDs , such as ibuprofen, help decrease swelling, pain, and fever  This medicine is available with or without a doctor's order  NSAIDs can cause stomach bleeding or kidney problems in certain people  If you take blood thinner medicine, always ask your healthcare provider if NSAIDs are safe for you  Always read the medicine label and follow directions  · Nasal steroid sprays  may help decrease inflammation in your nose and sinuses  · Decongestants  help reduce swelling and drain mucus in the nose and sinuses  They may help you breathe easier  · Antihistamines  help dry mucus in the nose and relieve sneezing  · Antibiotics  help treat or prevent a bacterial infection  · Take your medicine as directed  Contact your healthcare provider if you think your medicine is not helping or if you have side effects  Tell him or her if you are allergic to any medicine  Keep a list of the medicines, vitamins, and herbs you take  Include the amounts, and when and why you take them  Bring the list or the pill bottles to follow-up visits  Carry your medicine list with you in case of an emergency    Self-care:   · Rinse your sinuses  Use a sinus rinse device to rinse your nasal passages with a saline (salt water) solution or distilled water  Do not use tap water  This will help thin the mucus in your nose and rinse away pollen and dirt  It will also help reduce swelling so you can breathe normally  Ask your healthcare provider how often to do this  · Breathe in steam   Heat a bowl of water until you see steam  Lean over the bowl and make a tent over your head with a large towel  Breathe deeply for about 20 minutes  Be careful not to get too close to the steam or burn yourself  Do this 3 times a day  You can also breathe deeply when you take a hot shower  · Sleep with your head elevated  Place an extra pillow under your head before you go to sleep to help your sinuses drain  · Drink liquids as directed  Ask your healthcare provider how much liquid to drink each day and which liquids are best for you  Liquids will thin the mucus in your nose and help it drain  Avoid drinks that contain alcohol or caffeine  · Do not smoke, and avoid secondhand smoke  Nicotine and other chemicals in cigarettes and cigars can make your symptoms worse  Ask your healthcare provider for information if you currently smoke and need help to quit  E-cigarettes or smokeless tobacco still contain nicotine  Talk to your healthcare provider before you use these products  Prevent the spread of germs that cause sinusitis:  Wash your hands often with soap and water  Wash your hands after you use the bathroom, change a child's diaper, or sneeze  Wash your hands before you prepare or eat food  Follow up with your healthcare provider as directed: You may be referred to an ear, nose, and throat specialist  Write down your questions so you remember to ask them during your visits  © 2017 [employer] Information is for End User's use only and may not be sold, redistributed or otherwise used for commercial purposes   All illustrations and images included in CareNotes® are the copyrighted property of [employer]  or [employer]  The above information is an  only  It is not intended as medical advice for individual conditions or treatments  Talk to your doctor, nurse or pharmacist before following any medical regimen to see if it is safe and effective for you

## 2020-02-24 DIAGNOSIS — R06.2 WHEEZING: ICD-10-CM

## 2020-02-28 DIAGNOSIS — J01.00 ACUTE NON-RECURRENT MAXILLARY SINUSITIS: ICD-10-CM

## 2020-02-28 RX ORDER — FLUTICASONE PROPIONATE 50 MCG
2 SPRAY, SUSPENSION (ML) NASAL 2 TIMES DAILY
Qty: 16 ML | Refills: 0 | OUTPATIENT
Start: 2020-02-28

## 2020-02-28 RX ORDER — LORATADINE 10 MG/1
TABLET ORAL
Qty: 30 TABLET | Refills: 0 | OUTPATIENT
Start: 2020-02-28

## 2020-03-11 DIAGNOSIS — G89.29 OTHER CHRONIC PAIN: ICD-10-CM

## 2020-03-12 RX ORDER — TRAMADOL HYDROCHLORIDE 50 MG/1
50 TABLET ORAL EVERY 6 HOURS PRN
Qty: 120 TABLET | Refills: 0 | Status: SHIPPED | OUTPATIENT
Start: 2020-03-12 | End: 2020-04-14 | Stop reason: SDUPTHER

## 2020-03-12 NOTE — TELEPHONE ENCOUNTER
Prior to prescribing the controlled substance, a patient search was performed on the [place]MP prescription drug monitoring program in [place]  There was no evidence of diversion or misuse  Prescription is provided

## 2020-04-14 DIAGNOSIS — G89.29 OTHER CHRONIC PAIN: ICD-10-CM

## 2020-04-14 RX ORDER — TRAMADOL HYDROCHLORIDE 50 MG/1
50 TABLET ORAL EVERY 6 HOURS PRN
Qty: 120 TABLET | Refills: 0 | Status: SHIPPED | OUTPATIENT
Start: 2020-04-14 | End: 2020-05-15 | Stop reason: SDUPTHER

## 2020-05-06 ENCOUNTER — LAB (OUTPATIENT)
Dept: LAB | Facility: CLINIC | Age: 65
End: 2020-05-06
Payer: MEDICARE

## 2020-05-06 ENCOUNTER — TRANSCRIBE ORDERS (OUTPATIENT)
Dept: LAB | Facility: CLINIC | Age: 65
End: 2020-05-06

## 2020-05-06 DIAGNOSIS — E83.42 HYPOMAGNESEMIA: Primary | ICD-10-CM

## 2020-05-06 DIAGNOSIS — E83.42 HYPOMAGNESEMIA: ICD-10-CM

## 2020-05-06 LAB — MAGNESIUM SERPL-MCNC: 1.8 MG/DL (ref 1.6–2.6)

## 2020-05-06 PROCEDURE — 36415 COLL VENOUS BLD VENIPUNCTURE: CPT

## 2020-05-06 PROCEDURE — 83735 ASSAY OF MAGNESIUM: CPT

## 2020-05-12 ENCOUNTER — TELEMEDICINE (OUTPATIENT)
Dept: FAMILY MEDICINE CLINIC | Facility: CLINIC | Age: 65
End: 2020-05-12
Payer: MEDICARE

## 2020-05-12 VITALS — HEIGHT: 62 IN | WEIGHT: 232 LBS | TEMPERATURE: 96.8 F | BODY MASS INDEX: 42.69 KG/M2

## 2020-05-12 DIAGNOSIS — E66.01 CLASS 3 SEVERE OBESITY DUE TO EXCESS CALORIES WITHOUT SERIOUS COMORBIDITY WITH BODY MASS INDEX (BMI) OF 40.0 TO 44.9 IN ADULT (HCC): ICD-10-CM

## 2020-05-12 DIAGNOSIS — Z13.1 SCREENING FOR DIABETES MELLITUS: ICD-10-CM

## 2020-05-12 DIAGNOSIS — E55.9 VITAMIN D DEFICIENCY: ICD-10-CM

## 2020-05-12 DIAGNOSIS — Z13.29 SCREENING FOR THYROID DISORDER: ICD-10-CM

## 2020-05-12 DIAGNOSIS — Z13.0 SCREENING FOR DEFICIENCY ANEMIA: ICD-10-CM

## 2020-05-12 DIAGNOSIS — E78.2 MIXED HYPERLIPIDEMIA: ICD-10-CM

## 2020-05-12 DIAGNOSIS — M25.511 ACUTE PAIN OF RIGHT SHOULDER: Primary | ICD-10-CM

## 2020-05-12 PROCEDURE — 99214 OFFICE O/P EST MOD 30 MIN: CPT | Performed by: NURSE PRACTITIONER

## 2020-05-15 DIAGNOSIS — G89.29 OTHER CHRONIC PAIN: ICD-10-CM

## 2020-05-15 RX ORDER — TRAMADOL HYDROCHLORIDE 50 MG/1
50 TABLET ORAL EVERY 6 HOURS PRN
Qty: 120 TABLET | Refills: 0 | Status: SHIPPED | OUTPATIENT
Start: 2020-05-15 | End: 2020-06-15 | Stop reason: SDUPTHER

## 2020-06-15 DIAGNOSIS — G89.29 OTHER CHRONIC PAIN: ICD-10-CM

## 2020-06-15 RX ORDER — TRAMADOL HYDROCHLORIDE 50 MG/1
50 TABLET ORAL EVERY 6 HOURS PRN
Qty: 120 TABLET | Refills: 0 | Status: SHIPPED | OUTPATIENT
Start: 2020-06-15 | End: 2020-07-13 | Stop reason: SDUPTHER

## 2020-07-13 DIAGNOSIS — G89.29 OTHER CHRONIC PAIN: ICD-10-CM

## 2020-07-13 RX ORDER — TRAMADOL HYDROCHLORIDE 50 MG/1
50 TABLET ORAL EVERY 6 HOURS PRN
Qty: 120 TABLET | Refills: 0 | Status: SHIPPED | OUTPATIENT
Start: 2020-07-13 | End: 2020-08-12 | Stop reason: SDUPTHER

## 2020-08-12 DIAGNOSIS — G89.29 OTHER CHRONIC PAIN: ICD-10-CM

## 2020-08-12 RX ORDER — TRAMADOL HYDROCHLORIDE 50 MG/1
50 TABLET ORAL EVERY 6 HOURS PRN
Qty: 120 TABLET | Refills: 0 | Status: SHIPPED | OUTPATIENT
Start: 2020-08-12 | End: 2020-09-10 | Stop reason: SDUPTHER

## 2020-09-10 DIAGNOSIS — G89.29 OTHER CHRONIC PAIN: ICD-10-CM

## 2020-09-10 RX ORDER — TRAMADOL HYDROCHLORIDE 50 MG/1
50 TABLET ORAL EVERY 6 HOURS PRN
Qty: 120 TABLET | Refills: 0 | Status: SHIPPED | OUTPATIENT
Start: 2020-09-10 | End: 2020-10-09 | Stop reason: SDUPTHER

## 2020-10-09 DIAGNOSIS — G89.29 OTHER CHRONIC PAIN: ICD-10-CM

## 2020-10-09 RX ORDER — TRAMADOL HYDROCHLORIDE 50 MG/1
50 TABLET ORAL EVERY 6 HOURS PRN
Qty: 120 TABLET | Refills: 0 | Status: SHIPPED | OUTPATIENT
Start: 2020-10-09 | End: 2020-11-05 | Stop reason: SDUPTHER

## 2020-11-05 DIAGNOSIS — G89.29 OTHER CHRONIC PAIN: ICD-10-CM

## 2020-11-05 RX ORDER — TRAMADOL HYDROCHLORIDE 50 MG/1
50 TABLET ORAL EVERY 6 HOURS PRN
Qty: 120 TABLET | Refills: 0 | Status: SHIPPED | OUTPATIENT
Start: 2020-11-05 | End: 2020-12-01 | Stop reason: SDUPTHER

## 2020-12-01 DIAGNOSIS — G89.29 OTHER CHRONIC PAIN: ICD-10-CM

## 2020-12-02 RX ORDER — TRAMADOL HYDROCHLORIDE 50 MG/1
50 TABLET ORAL EVERY 6 HOURS PRN
Qty: 120 TABLET | Refills: 0 | Status: SHIPPED | OUTPATIENT
Start: 2020-12-02 | End: 2021-01-06 | Stop reason: SDUPTHER

## 2020-12-04 ENCOUNTER — TRANSCRIBE ORDERS (OUTPATIENT)
Dept: LAB | Facility: CLINIC | Age: 65
End: 2020-12-04

## 2020-12-04 ENCOUNTER — LAB (OUTPATIENT)
Dept: LAB | Facility: CLINIC | Age: 65
End: 2020-12-04
Payer: MEDICARE

## 2020-12-04 DIAGNOSIS — Z13.1 SCREENING FOR DIABETES MELLITUS: ICD-10-CM

## 2020-12-04 DIAGNOSIS — Z13.29 SCREENING FOR THYROID DISORDER: ICD-10-CM

## 2020-12-04 DIAGNOSIS — E78.2 MIXED HYPERLIPIDEMIA: ICD-10-CM

## 2020-12-04 DIAGNOSIS — Z13.0 SCREENING FOR DEFICIENCY ANEMIA: ICD-10-CM

## 2020-12-04 DIAGNOSIS — E55.9 VITAMIN D DEFICIENCY: ICD-10-CM

## 2020-12-04 LAB
25(OH)D3 SERPL-MCNC: 16.4 NG/ML (ref 30–100)
ALBUMIN SERPL BCP-MCNC: 3.9 G/DL (ref 3.5–5)
ALP SERPL-CCNC: 50 U/L (ref 46–116)
ALT SERPL W P-5'-P-CCNC: 27 U/L (ref 12–78)
ANION GAP SERPL CALCULATED.3IONS-SCNC: 8 MMOL/L (ref 4–13)
AST SERPL W P-5'-P-CCNC: 18 U/L (ref 5–45)
BASOPHILS # BLD AUTO: 0.05 THOUSANDS/ΜL (ref 0–0.1)
BASOPHILS NFR BLD AUTO: 1 % (ref 0–1)
BILIRUB SERPL-MCNC: 0.36 MG/DL (ref 0.2–1)
BUN SERPL-MCNC: 21 MG/DL (ref 5–25)
CALCIUM SERPL-MCNC: 9.5 MG/DL (ref 8.3–10.1)
CHLORIDE SERPL-SCNC: 107 MMOL/L (ref 100–108)
CHOLEST SERPL-MCNC: 196 MG/DL (ref 50–200)
CO2 SERPL-SCNC: 25 MMOL/L (ref 21–32)
CREAT SERPL-MCNC: 0.81 MG/DL (ref 0.6–1.3)
EOSINOPHIL # BLD AUTO: 0.22 THOUSAND/ΜL (ref 0–0.61)
EOSINOPHIL NFR BLD AUTO: 3 % (ref 0–6)
ERYTHROCYTE [DISTWIDTH] IN BLOOD BY AUTOMATED COUNT: 12.2 % (ref 11.6–15.1)
GFR SERPL CREATININE-BSD FRML MDRD: 75 ML/MIN/1.73SQ M
GLUCOSE P FAST SERPL-MCNC: 84 MG/DL (ref 65–99)
HCT VFR BLD AUTO: 38.3 % (ref 34.8–46.1)
HDLC SERPL-MCNC: 47 MG/DL
HGB BLD-MCNC: 12 G/DL (ref 11.5–15.4)
IMM GRANULOCYTES # BLD AUTO: 0.02 THOUSAND/UL (ref 0–0.2)
IMM GRANULOCYTES NFR BLD AUTO: 0 % (ref 0–2)
LDLC SERPL CALC-MCNC: 111 MG/DL (ref 0–100)
LYMPHOCYTES # BLD AUTO: 1.52 THOUSANDS/ΜL (ref 0.6–4.47)
LYMPHOCYTES NFR BLD AUTO: 23 % (ref 14–44)
MCH RBC QN AUTO: 31.4 PG (ref 26.8–34.3)
MCHC RBC AUTO-ENTMCNC: 31.3 G/DL (ref 31.4–37.4)
MCV RBC AUTO: 100 FL (ref 82–98)
MONOCYTES # BLD AUTO: 0.57 THOUSAND/ΜL (ref 0.17–1.22)
MONOCYTES NFR BLD AUTO: 9 % (ref 4–12)
NEUTROPHILS # BLD AUTO: 4.18 THOUSANDS/ΜL (ref 1.85–7.62)
NEUTS SEG NFR BLD AUTO: 64 % (ref 43–75)
NRBC BLD AUTO-RTO: 0 /100 WBCS
PLATELET # BLD AUTO: 252 THOUSANDS/UL (ref 149–390)
PMV BLD AUTO: 10.5 FL (ref 8.9–12.7)
POTASSIUM SERPL-SCNC: 4.2 MMOL/L (ref 3.5–5.3)
PROT SERPL-MCNC: 7.7 G/DL (ref 6.4–8.2)
RBC # BLD AUTO: 3.82 MILLION/UL (ref 3.81–5.12)
SODIUM SERPL-SCNC: 140 MMOL/L (ref 136–145)
T4 FREE SERPL-MCNC: 0.93 NG/DL (ref 0.76–1.46)
TRIGL SERPL-MCNC: 189 MG/DL
TSH SERPL DL<=0.05 MIU/L-ACNC: 4.39 UIU/ML (ref 0.36–3.74)
WBC # BLD AUTO: 6.56 THOUSAND/UL (ref 4.31–10.16)

## 2020-12-04 PROCEDURE — 85025 COMPLETE CBC W/AUTO DIFF WBC: CPT

## 2020-12-04 PROCEDURE — 80061 LIPID PANEL: CPT

## 2020-12-04 PROCEDURE — 84439 ASSAY OF FREE THYROXINE: CPT

## 2020-12-04 PROCEDURE — 82306 VITAMIN D 25 HYDROXY: CPT

## 2020-12-04 PROCEDURE — 84443 ASSAY THYROID STIM HORMONE: CPT

## 2020-12-04 PROCEDURE — 80053 COMPREHEN METABOLIC PANEL: CPT

## 2020-12-04 PROCEDURE — 36415 COLL VENOUS BLD VENIPUNCTURE: CPT

## 2020-12-08 ENCOUNTER — OFFICE VISIT (OUTPATIENT)
Dept: FAMILY MEDICINE CLINIC | Facility: CLINIC | Age: 65
End: 2020-12-08
Payer: MEDICARE

## 2020-12-08 VITALS
DIASTOLIC BLOOD PRESSURE: 74 MMHG | BODY MASS INDEX: 43.17 KG/M2 | HEART RATE: 81 BPM | OXYGEN SATURATION: 98 % | WEIGHT: 234.6 LBS | TEMPERATURE: 97.8 F | HEIGHT: 62 IN | SYSTOLIC BLOOD PRESSURE: 124 MMHG

## 2020-12-08 DIAGNOSIS — Z12.11 SCREENING FOR COLORECTAL CANCER: ICD-10-CM

## 2020-12-08 DIAGNOSIS — L98.9 SKIN LESION: ICD-10-CM

## 2020-12-08 DIAGNOSIS — Z78.0 POST-MENOPAUSAL: ICD-10-CM

## 2020-12-08 DIAGNOSIS — Z12.12 SCREENING FOR COLORECTAL CANCER: ICD-10-CM

## 2020-12-08 DIAGNOSIS — Z23 NEED FOR INFLUENZA VACCINATION: ICD-10-CM

## 2020-12-08 DIAGNOSIS — Z13.29 SCREENING FOR THYROID DISORDER: ICD-10-CM

## 2020-12-08 DIAGNOSIS — Z00.00 ENCOUNTER FOR MEDICARE ANNUAL WELLNESS EXAM: Primary | ICD-10-CM

## 2020-12-08 DIAGNOSIS — E55.9 VITAMIN D DEFICIENCY: ICD-10-CM

## 2020-12-08 DIAGNOSIS — Z13.820 SCREENING FOR OSTEOPOROSIS: ICD-10-CM

## 2020-12-08 DIAGNOSIS — E66.01 CLASS 3 SEVERE OBESITY DUE TO EXCESS CALORIES WITHOUT SERIOUS COMORBIDITY WITH BODY MASS INDEX (BMI) OF 40.0 TO 44.9 IN ADULT (HCC): ICD-10-CM

## 2020-12-08 DIAGNOSIS — E78.2 MIXED HYPERLIPIDEMIA: ICD-10-CM

## 2020-12-08 DIAGNOSIS — R79.89 ELEVATED TSH: ICD-10-CM

## 2020-12-08 DIAGNOSIS — Z13.31 DEPRESSION SCREENING NEGATIVE: ICD-10-CM

## 2020-12-08 PROCEDURE — 88305 TISSUE EXAM BY PATHOLOGIST: CPT | Performed by: PATHOLOGY

## 2020-12-08 PROCEDURE — 1123F ACP DISCUSS/DSCN MKR DOCD: CPT

## 2020-12-08 PROCEDURE — 90662 IIV NO PRSV INCREASED AG IM: CPT

## 2020-12-08 PROCEDURE — 99213 OFFICE O/P EST LOW 20 MIN: CPT

## 2020-12-08 PROCEDURE — G0439 PPPS, SUBSEQ VISIT: HCPCS

## 2020-12-08 PROCEDURE — G0008 ADMIN INFLUENZA VIRUS VAC: HCPCS

## 2020-12-08 PROCEDURE — 11300 SHAVE SKIN LESION 0.5 CM/<: CPT

## 2020-12-09 ENCOUNTER — TELEPHONE (OUTPATIENT)
Dept: GASTROENTEROLOGY | Facility: CLINIC | Age: 65
End: 2020-12-09

## 2020-12-15 ENCOUNTER — OFFICE VISIT (OUTPATIENT)
Dept: FAMILY MEDICINE CLINIC | Facility: CLINIC | Age: 65
End: 2020-12-15
Payer: MEDICARE

## 2020-12-15 VITALS
TEMPERATURE: 97.6 F | HEART RATE: 93 BPM | HEIGHT: 62 IN | SYSTOLIC BLOOD PRESSURE: 120 MMHG | BODY MASS INDEX: 43.06 KG/M2 | OXYGEN SATURATION: 96 % | DIASTOLIC BLOOD PRESSURE: 76 MMHG | WEIGHT: 234 LBS

## 2020-12-15 DIAGNOSIS — L82.1 SEBORRHEIC KERATOSIS: ICD-10-CM

## 2020-12-15 DIAGNOSIS — Z51.89 VISIT FOR WOUND CHECK: Primary | ICD-10-CM

## 2020-12-15 PROCEDURE — 99213 OFFICE O/P EST LOW 20 MIN: CPT | Performed by: NURSE PRACTITIONER

## 2021-01-06 DIAGNOSIS — G89.29 OTHER CHRONIC PAIN: ICD-10-CM

## 2021-01-06 RX ORDER — TRAMADOL HYDROCHLORIDE 50 MG/1
50 TABLET ORAL EVERY 6 HOURS PRN
Qty: 120 TABLET | Refills: 0 | Status: SHIPPED | OUTPATIENT
Start: 2021-01-06 | End: 2021-02-03 | Stop reason: SDUPTHER

## 2021-02-03 DIAGNOSIS — G89.29 OTHER CHRONIC PAIN: ICD-10-CM

## 2021-02-03 RX ORDER — TRAMADOL HYDROCHLORIDE 50 MG/1
50 TABLET ORAL EVERY 6 HOURS PRN
Qty: 120 TABLET | Refills: 0 | Status: SHIPPED | OUTPATIENT
Start: 2021-02-05 | End: 2021-03-01 | Stop reason: SDUPTHER

## 2021-03-01 DIAGNOSIS — G89.29 OTHER CHRONIC PAIN: ICD-10-CM

## 2021-03-01 RX ORDER — TRAMADOL HYDROCHLORIDE 50 MG/1
50 TABLET ORAL EVERY 6 HOURS PRN
Qty: 120 TABLET | Refills: 0 | Status: SHIPPED | OUTPATIENT
Start: 2021-03-01 | End: 2021-04-05 | Stop reason: SDUPTHER

## 2021-04-05 DIAGNOSIS — G89.29 OTHER CHRONIC PAIN: ICD-10-CM

## 2021-04-05 RX ORDER — TRAMADOL HYDROCHLORIDE 50 MG/1
50 TABLET ORAL EVERY 6 HOURS PRN
Qty: 120 TABLET | Refills: 0 | Status: SHIPPED | OUTPATIENT
Start: 2021-04-05 | End: 2021-05-07 | Stop reason: SDUPTHER

## 2021-05-07 DIAGNOSIS — G89.29 OTHER CHRONIC PAIN: ICD-10-CM

## 2021-05-07 RX ORDER — TRAMADOL HYDROCHLORIDE 50 MG/1
50 TABLET ORAL EVERY 6 HOURS PRN
Qty: 120 TABLET | Refills: 0 | Status: SHIPPED | OUTPATIENT
Start: 2021-05-07 | End: 2021-06-08 | Stop reason: SDUPTHER

## 2021-06-08 ENCOUNTER — OFFICE VISIT (OUTPATIENT)
Dept: FAMILY MEDICINE CLINIC | Facility: CLINIC | Age: 66
End: 2021-06-08
Payer: MEDICARE

## 2021-06-08 VITALS
BODY MASS INDEX: 43.43 KG/M2 | SYSTOLIC BLOOD PRESSURE: 142 MMHG | TEMPERATURE: 97.8 F | DIASTOLIC BLOOD PRESSURE: 80 MMHG | WEIGHT: 236 LBS | HEIGHT: 62 IN | OXYGEN SATURATION: 97 % | HEART RATE: 81 BPM

## 2021-06-08 DIAGNOSIS — E66.01 CLASS 3 SEVERE OBESITY DUE TO EXCESS CALORIES WITHOUT SERIOUS COMORBIDITY WITH BODY MASS INDEX (BMI) OF 40.0 TO 44.9 IN ADULT (HCC): ICD-10-CM

## 2021-06-08 DIAGNOSIS — Z13.0 SCREENING FOR DEFICIENCY ANEMIA: ICD-10-CM

## 2021-06-08 DIAGNOSIS — Z13.29 SCREENING FOR THYROID DISORDER: ICD-10-CM

## 2021-06-08 DIAGNOSIS — R94.6 ABNORMAL RESULTS OF THYROID FUNCTION STUDIES: ICD-10-CM

## 2021-06-08 DIAGNOSIS — E78.2 MIXED HYPERLIPIDEMIA: Primary | ICD-10-CM

## 2021-06-08 DIAGNOSIS — Z13.1 SCREENING FOR DIABETES MELLITUS: ICD-10-CM

## 2021-06-08 DIAGNOSIS — G89.29 OTHER CHRONIC PAIN: ICD-10-CM

## 2021-06-08 PROCEDURE — 99213 OFFICE O/P EST LOW 20 MIN: CPT | Performed by: NURSE PRACTITIONER

## 2021-06-08 RX ORDER — TRAMADOL HYDROCHLORIDE 50 MG/1
50 TABLET ORAL EVERY 6 HOURS PRN
Qty: 120 TABLET | Refills: 0 | Status: SHIPPED | OUTPATIENT
Start: 2021-06-08 | End: 2021-07-08 | Stop reason: SDUPTHER

## 2021-06-08 NOTE — PROGRESS NOTES
Assessment/Plan:    Problem List Items Addressed This Visit     Mixed hyperlipidemia - Primary    Relevant Orders    Lipid Panel with Direct LDL reflex    Lipid Panel with Direct LDL reflex    Class 3 severe obesity without serious comorbidity with body mass index (BMI) of 40 0 to 44 9 in [facility])      Other Visit Diagnoses     Screening for thyroid disorder        Relevant Orders    TSH, 3rd generation with Free T4 reflex    Screening for deficiency anemia        Relevant Orders    CBC and differential    Screening for diabetes mellitus        Relevant Orders    Comprehensive metabolic panel    Other chronic pain        Relevant Medications    traMADol (ULTRAM) 50 mg tablet    Abnormal results of thyroid function studies         repeat thyroid studies    Relevant Orders    TSH, 3rd generation with Free T4 reflex           Diagnoses and all orders for this visit:    Mixed hyperlipidemia  Comments:  started juicing last week for weight loss  Orders:  -     Lipid Panel with Direct LDL reflex; Future  -     Lipid Panel with Direct LDL reflex; Future    Screening for thyroid disorder  -     TSH, 3rd generation with Free T4 reflex; Future    Screening for deficiency anemia  -     CBC and differential; Future    Screening for diabetes mellitus  -     Comprehensive metabolic panel; Future    Other chronic pain  -     traMADol (ULTRAM) 50 mg tablet; Take 1 tablet (50 mg total) by mouth every 6 (six) hours as needed for moderate pain    Class 3 severe obesity due to excess calories without serious comorbidity with body mass index (BMI) of 40 0 to 44 9 in [facility])    Abnormal results of thyroid function studies   Comments:  repeat thyroid studies  Orders:  -     TSH, 3rd generation with Free T4 reflex; Future    Other orders  -     Cancel: Lipid panel; Future        No problem-specific Assessment & Plan notes found for this encounter  Subjective:      Patient ID: Syl Barcenas is a 74 y o  female      Presents for a routine 6 month visit  She started Juicing to lose weight  She has done this before and lost 90+ lbs  She has noted since starting the arthralgias have decreased  Thyroid studies were slightly abnormal 6 months ago  Will get repeat along with Lipids today  Pt doing well overall, offers no acute complaints today  Prior to prescribing the controlled substance, a patient search was performed on the [place]MP [place] drug monitoring [place] in [place]  There was no evidence of diversion or misuse  Prescription is provided  The following portions of the patient's history were reviewed and updated as appropriate:   She has a past medical history of Abnormal blood chemistry, Abscess of groin, Arthritis, Cataracts, bilateral, Chronic pain, Obesity, Preauricular (1953), Spinal stenosis, and Uterine cancer ([facility] [address] )  ,  does not have any pertinent problems on file  ,   has a past surgical history that includes Cholecystectomy; Hysterectomy; Epidural block injection; Abscess drainage; and Cataract extraction, bilateral ,  family history includes Cancer in her father and mother; Diabetes in her family; Ovarian cancer in her mother; Pancreatic cancer in her father  ,   reports that she has never smoked  She has never used smokeless tobacco  She reports current alcohol use  She reports that she does not use drugs  ,  is allergic to paclitaxel     Current Outpatient Medications   Medication Sig Dispense Refill    acetaminophen (TYLENOL) 325 mg tablet Take 2 tablets (650 mg total) by mouth every 6 (six) hours as needed for mild pain 30 tablet 0    naproxen (NAPROSYN) 250 mg tablet Take 220 mg by mouth 2 (two) times a day with meals      traMADol (ULTRAM) 50 mg tablet Take 1 tablet (50 mg total) by mouth every 6 (six) hours as needed for moderate pain 120 tablet 0     No current facility-administered medications for this visit  BMI Counseling: Body mass index is 43 16 kg/m²   The BMI is above normal  Nutrition recommendations include decreasing portion sizes, consuming healthier snacks, limiting drinks that contain sugar, moderation in carbohydrate intake and reducing intake of cholesterol  Exercise recommendations include exercising 3-5 times per week  No pharmacotherapy was ordered  Depression Screening and Follow-up Plan: Patient's depression screening was positive with a PHQ-2 score of 0  Clincally patient does not have depression  No treatment is required  Falls Plan of Care: balance, strength, and gait training instructions were provided  Medications that increase falls were reviewed  Assessed feet and footwear  Review of Systems   Constitutional: Negative  HENT: Negative  Eyes: Negative  Respiratory: Negative  Cardiovascular: Negative  Gastrointestinal: Negative  Endocrine: Negative  Genitourinary: Negative  Musculoskeletal: Negative  Skin: Negative  Allergic/Immunologic: Negative  Neurological: Negative  Hematological: Negative  Psychiatric/Behavioral: Negative  Objective:  Vitals:    06/08/21 1033   BP: 142/80   BP Location: Left arm   Patient Position: Sitting   Cuff Size: Large   Pulse: 81   Temp: 97 8 °F (36 6 °C)   TempSrc: Tympanic   SpO2: 97%   Weight: 107 kg (236 lb)   Height: 5' 2" (1 575 m)     Body mass index is 43 16 kg/m²  Physical Exam  Vitals signs and nursing note reviewed  Constitutional:       Appearance: Normal appearance  She is well-developed  HENT:      Head: Normocephalic and atraumatic  Right Ear: Tympanic membrane, ear canal and external ear normal       Left Ear: Tympanic membrane, ear canal and external ear normal       Nose: Nose normal       Mouth/Throat:      Mouth: Mucous membranes are moist       Pharynx: Uvula midline  Eyes:      General: Lids are normal       Conjunctiva/sclera: Conjunctivae normal       Pupils: Pupils are equal, round, and reactive to light     Neck:      Musculoskeletal: Full passive range of motion without pain, normal range of motion and neck supple  Thyroid: No thyroid mass or thyromegaly  Vascular: No JVD  Trachea: Trachea and phonation normal    Cardiovascular:      Rate and Rhythm: Normal rate and regular rhythm  Pulses: Normal pulses  Heart sounds: Normal heart sounds, S1 normal and S2 normal  No murmur  No friction rub  No gallop  Pulmonary:      Effort: Pulmonary effort is normal       Breath sounds: Normal breath sounds  Abdominal:      General: Bowel sounds are normal       Palpations: Abdomen is soft  Tenderness: There is no abdominal tenderness  Genitourinary:     Comments: Deferred   Musculoskeletal: Normal range of motion  Right lower leg: No edema  Left lower leg: No edema  Lymphadenopathy:      Head:      Right side of head: No submental, submandibular, tonsillar, preauricular, posterior auricular or occipital adenopathy  Left side of head: No submental, submandibular, tonsillar, preauricular, posterior auricular or occipital adenopathy  Cervical: No cervical adenopathy  Skin:     General: Skin is warm and dry  Capillary Refill: Capillary refill takes less than 2 seconds  Neurological:      General: No focal deficit present  Mental Status: She is alert and oriented to person, place, and time  Cranial Nerves: Cranial nerves are intact  No cranial nerve deficit  Sensory: Sensation is intact  Motor: Motor function is intact  Coordination: Coordination is intact  Gait: Gait is intact  Deep Tendon Reflexes: Reflexes are normal and symmetric  Psychiatric:         Attention and Perception: Attention and perception normal          Mood and Affect: Mood and affect normal          Speech: Speech normal          Behavior: Behavior normal  Behavior is cooperative  Thought Content:  Thought content normal          Cognition and Memory: Cognition normal          Judgment: Judgment normal

## 2021-06-10 ENCOUNTER — LAB (OUTPATIENT)
Dept: LAB | Facility: CLINIC | Age: 66
End: 2021-06-10
Payer: MEDICARE

## 2021-06-10 ENCOUNTER — TRANSCRIBE ORDERS (OUTPATIENT)
Dept: LAB | Facility: CLINIC | Age: 66
End: 2021-06-10

## 2021-06-10 DIAGNOSIS — R94.6 ABNORMAL RESULTS OF THYROID FUNCTION STUDIES: ICD-10-CM

## 2021-06-10 DIAGNOSIS — E78.2 MIXED HYPERLIPIDEMIA: ICD-10-CM

## 2021-06-10 LAB
CHOLEST SERPL-MCNC: 220 MG/DL (ref 50–200)
HDLC SERPL-MCNC: 47 MG/DL
LDLC SERPL CALC-MCNC: 137 MG/DL (ref 0–100)
TRIGL SERPL-MCNC: 179 MG/DL
TSH SERPL DL<=0.05 MIU/L-ACNC: 3.02 UIU/ML (ref 0.36–3.74)

## 2021-06-10 PROCEDURE — 36415 COLL VENOUS BLD VENIPUNCTURE: CPT

## 2021-06-10 PROCEDURE — 84443 ASSAY THYROID STIM HORMONE: CPT

## 2021-06-10 PROCEDURE — 80061 LIPID PANEL: CPT

## 2021-06-11 NOTE — RESULT ENCOUNTER NOTE
Please call the patient regarding her abnormal result  Euthyroid state  LDL, TG, total cholesterol is elevated - Pt started Jucing program - will recheck with routine serology and evaluate effectiveness of diet change

## 2021-07-08 DIAGNOSIS — G89.29 OTHER CHRONIC PAIN: ICD-10-CM

## 2021-07-08 RX ORDER — TRAMADOL HYDROCHLORIDE 50 MG/1
50 TABLET ORAL EVERY 6 HOURS PRN
Qty: 120 TABLET | Refills: 0 | Status: SHIPPED | OUTPATIENT
Start: 2021-07-08 | End: 2021-08-09 | Stop reason: SDUPTHER

## 2021-08-09 DIAGNOSIS — G89.29 OTHER CHRONIC PAIN: ICD-10-CM

## 2021-08-09 RX ORDER — TRAMADOL HYDROCHLORIDE 50 MG/1
50 TABLET ORAL EVERY 6 HOURS PRN
Qty: 120 TABLET | Refills: 0 | Status: SHIPPED | OUTPATIENT
Start: 2021-08-09 | End: 2021-09-09 | Stop reason: SDUPTHER

## 2021-09-09 DIAGNOSIS — G89.29 OTHER CHRONIC PAIN: ICD-10-CM

## 2021-09-10 RX ORDER — TRAMADOL HYDROCHLORIDE 50 MG/1
50 TABLET ORAL EVERY 6 HOURS PRN
Qty: 120 TABLET | Refills: 0 | Status: SHIPPED | OUTPATIENT
Start: 2021-09-10 | End: 2021-10-08 | Stop reason: SDUPTHER

## 2021-10-08 DIAGNOSIS — G89.29 OTHER CHRONIC PAIN: ICD-10-CM

## 2021-10-08 RX ORDER — TRAMADOL HYDROCHLORIDE 50 MG/1
50 TABLET ORAL EVERY 6 HOURS PRN
Qty: 120 TABLET | Refills: 0 | Status: SHIPPED | OUTPATIENT
Start: 2021-10-08 | End: 2021-11-08 | Stop reason: SDUPTHER

## 2021-11-08 DIAGNOSIS — G89.29 OTHER CHRONIC PAIN: ICD-10-CM

## 2021-11-08 RX ORDER — TRAMADOL HYDROCHLORIDE 50 MG/1
50 TABLET ORAL EVERY 6 HOURS PRN
Qty: 120 TABLET | Refills: 0 | Status: SHIPPED | OUTPATIENT
Start: 2021-11-08 | End: 2021-12-08 | Stop reason: SDUPTHER

## 2021-12-07 ENCOUNTER — APPOINTMENT (OUTPATIENT)
Dept: LAB | Facility: CLINIC | Age: 66
End: 2021-12-07
Payer: MEDICARE

## 2021-12-07 DIAGNOSIS — Z13.29 SCREENING FOR THYROID DISORDER: ICD-10-CM

## 2021-12-07 DIAGNOSIS — E78.2 MIXED HYPERLIPIDEMIA: ICD-10-CM

## 2021-12-07 DIAGNOSIS — Z13.0 SCREENING FOR DEFICIENCY ANEMIA: ICD-10-CM

## 2021-12-07 DIAGNOSIS — Z13.1 SCREENING FOR DIABETES MELLITUS: ICD-10-CM

## 2021-12-07 LAB
ALBUMIN SERPL BCP-MCNC: 3.9 G/DL (ref 3.5–5)
ALP SERPL-CCNC: 58 U/L (ref 46–116)
ALT SERPL W P-5'-P-CCNC: 24 U/L (ref 12–78)
ANION GAP SERPL CALCULATED.3IONS-SCNC: 11 MMOL/L (ref 4–13)
AST SERPL W P-5'-P-CCNC: 19 U/L (ref 5–45)
BILIRUB SERPL-MCNC: 0.49 MG/DL (ref 0.2–1)
BUN SERPL-MCNC: 16 MG/DL (ref 5–25)
CALCIUM SERPL-MCNC: 9.9 MG/DL (ref 8.3–10.1)
CHLORIDE SERPL-SCNC: 102 MMOL/L (ref 100–108)
CHOLEST SERPL-MCNC: 225 MG/DL
CO2 SERPL-SCNC: 24 MMOL/L (ref 21–32)
CREAT SERPL-MCNC: 0.94 MG/DL (ref 0.6–1.3)
GFR SERPL CREATININE-BSD FRML MDRD: 63 ML/MIN/1.73SQ M
GLUCOSE P FAST SERPL-MCNC: 84 MG/DL (ref 65–99)
HDLC SERPL-MCNC: 51 MG/DL
LDLC SERPL CALC-MCNC: 131 MG/DL (ref 0–100)
POTASSIUM SERPL-SCNC: 3.9 MMOL/L (ref 3.5–5.3)
PROT SERPL-MCNC: 8.3 G/DL (ref 6.4–8.2)
SODIUM SERPL-SCNC: 137 MMOL/L (ref 136–145)
TRIGL SERPL-MCNC: 213 MG/DL
TSH SERPL DL<=0.05 MIU/L-ACNC: 3.84 UIU/ML (ref 0.36–3.74)

## 2021-12-07 PROCEDURE — 80061 LIPID PANEL: CPT

## 2021-12-07 PROCEDURE — 85025 COMPLETE CBC W/AUTO DIFF WBC: CPT

## 2021-12-07 PROCEDURE — 84443 ASSAY THYROID STIM HORMONE: CPT

## 2021-12-07 PROCEDURE — 80053 COMPREHEN METABOLIC PANEL: CPT

## 2021-12-07 PROCEDURE — 84439 ASSAY OF FREE THYROXINE: CPT

## 2021-12-07 PROCEDURE — 36415 COLL VENOUS BLD VENIPUNCTURE: CPT

## 2021-12-08 DIAGNOSIS — G89.29 OTHER CHRONIC PAIN: ICD-10-CM

## 2021-12-08 LAB
BASOPHILS # BLD AUTO: 0.06 THOUSANDS/ΜL (ref 0–0.1)
BASOPHILS NFR BLD AUTO: 1 % (ref 0–1)
EOSINOPHIL # BLD AUTO: 0.18 THOUSAND/ΜL (ref 0–0.61)
EOSINOPHIL NFR BLD AUTO: 3 % (ref 0–6)
ERYTHROCYTE [DISTWIDTH] IN BLOOD BY AUTOMATED COUNT: 12.3 % (ref 11.6–15.1)
HCT VFR BLD AUTO: 39.5 % (ref 34.8–46.1)
HGB BLD-MCNC: 12.7 G/DL (ref 11.5–15.4)
IMM GRANULOCYTES # BLD AUTO: 0.02 THOUSAND/UL (ref 0–0.2)
IMM GRANULOCYTES NFR BLD AUTO: 0 % (ref 0–2)
LYMPHOCYTES # BLD AUTO: 1.72 THOUSANDS/ΜL (ref 0.6–4.47)
LYMPHOCYTES NFR BLD AUTO: 24 % (ref 14–44)
MCH RBC QN AUTO: 32.3 PG (ref 26.8–34.3)
MCHC RBC AUTO-ENTMCNC: 32.2 G/DL (ref 31.4–37.4)
MCV RBC AUTO: 101 FL (ref 82–98)
MONOCYTES # BLD AUTO: 0.52 THOUSAND/ΜL (ref 0.17–1.22)
MONOCYTES NFR BLD AUTO: 7 % (ref 4–12)
NEUTROPHILS # BLD AUTO: 4.7 THOUSANDS/ΜL (ref 1.85–7.62)
NEUTS SEG NFR BLD AUTO: 65 % (ref 43–75)
NRBC BLD AUTO-RTO: 0 /100 WBCS
PLATELET # BLD AUTO: 272 THOUSANDS/UL (ref 149–390)
PMV BLD AUTO: 10.6 FL (ref 8.9–12.7)
RBC # BLD AUTO: 3.93 MILLION/UL (ref 3.81–5.12)
T4 FREE SERPL-MCNC: 0.92 NG/DL (ref 0.76–1.46)
WBC # BLD AUTO: 7.2 THOUSAND/UL (ref 4.31–10.16)

## 2021-12-08 RX ORDER — TRAMADOL HYDROCHLORIDE 50 MG/1
50 TABLET ORAL EVERY 6 HOURS PRN
Qty: 120 TABLET | Refills: 0 | Status: SHIPPED | OUTPATIENT
Start: 2021-12-08 | End: 2022-01-07 | Stop reason: SDUPTHER

## 2021-12-10 ENCOUNTER — OFFICE VISIT (OUTPATIENT)
Dept: FAMILY MEDICINE CLINIC | Facility: CLINIC | Age: 66
End: 2021-12-10
Payer: MEDICARE

## 2021-12-10 VITALS
HEART RATE: 76 BPM | WEIGHT: 222 LBS | BODY MASS INDEX: 40.85 KG/M2 | HEIGHT: 62 IN | OXYGEN SATURATION: 96 % | TEMPERATURE: 96.4 F | DIASTOLIC BLOOD PRESSURE: 72 MMHG | SYSTOLIC BLOOD PRESSURE: 118 MMHG

## 2021-12-10 DIAGNOSIS — Z13.29 SCREENING FOR THYROID DISORDER: ICD-10-CM

## 2021-12-10 DIAGNOSIS — E78.2 MIXED HYPERLIPIDEMIA: ICD-10-CM

## 2021-12-10 DIAGNOSIS — R25.2 CRAMP OF BOTH LOWER EXTREMITIES: ICD-10-CM

## 2021-12-10 DIAGNOSIS — C54.1 ENDOMETRIAL CANCER (HCC): ICD-10-CM

## 2021-12-10 DIAGNOSIS — Z13.31 DEPRESSION SCREENING NEGATIVE: ICD-10-CM

## 2021-12-10 DIAGNOSIS — E89.40 POSTSURGICAL MENOPAUSE: ICD-10-CM

## 2021-12-10 DIAGNOSIS — Z78.0 POST-MENOPAUSAL: ICD-10-CM

## 2021-12-10 DIAGNOSIS — E66.01 CLASS 3 SEVERE OBESITY DUE TO EXCESS CALORIES WITHOUT SERIOUS COMORBIDITY WITH BODY MASS INDEX (BMI) OF 40.0 TO 44.9 IN ADULT (HCC): ICD-10-CM

## 2021-12-10 DIAGNOSIS — M51.37 DEGENERATION OF INTERVERTEBRAL DISC OF LUMBOSACRAL REGION: ICD-10-CM

## 2021-12-10 DIAGNOSIS — Z13.820 SCREENING FOR OSTEOPOROSIS: ICD-10-CM

## 2021-12-10 DIAGNOSIS — C54.9 MALIGNANT NEOPLASM OF BODY OF UTERUS, UNSPECIFIED SITE (HCC): ICD-10-CM

## 2021-12-10 DIAGNOSIS — Z23 FLU VACCINE NEED: ICD-10-CM

## 2021-12-10 DIAGNOSIS — Z00.00 ENCOUNTER FOR MEDICARE ANNUAL WELLNESS EXAM: Primary | ICD-10-CM

## 2021-12-10 DIAGNOSIS — E78.5 ELEVATED FASTING LIPID PROFILE: ICD-10-CM

## 2021-12-10 PROCEDURE — 90682 RIV4 VACC RECOMBINANT DNA IM: CPT

## 2021-12-10 PROCEDURE — G0008 ADMIN INFLUENZA VIRUS VAC: HCPCS

## 2021-12-10 PROCEDURE — G0439 PPPS, SUBSEQ VISIT: HCPCS | Performed by: NURSE PRACTITIONER

## 2021-12-16 ENCOUNTER — HOSPITAL ENCOUNTER (OUTPATIENT)
Dept: BONE DENSITY | Facility: HOSPITAL | Age: 66
Discharge: HOME | End: 2021-12-16
Payer: MEDICARE

## 2021-12-16 DIAGNOSIS — E89.40 POSTSURGICAL MENOPAUSE: ICD-10-CM

## 2021-12-16 DIAGNOSIS — Z13.820 SCREENING FOR OSTEOPOROSIS: ICD-10-CM

## 2021-12-16 PROCEDURE — 77080 DXA BONE DENSITY AXIAL: CPT

## 2022-01-07 DIAGNOSIS — G89.29 OTHER CHRONIC PAIN: ICD-10-CM

## 2022-01-07 RX ORDER — TRAMADOL HYDROCHLORIDE 50 MG/1
50 TABLET ORAL EVERY 6 HOURS PRN
Qty: 120 TABLET | Refills: 0 | Status: SHIPPED | OUTPATIENT
Start: 2022-01-07 | End: 2022-02-08 | Stop reason: SDUPTHER

## 2022-02-08 DIAGNOSIS — G89.29 OTHER CHRONIC PAIN: ICD-10-CM

## 2022-02-08 RX ORDER — TRAMADOL HYDROCHLORIDE 50 MG/1
50 TABLET ORAL EVERY 6 HOURS PRN
Qty: 120 TABLET | Refills: 0 | Status: SHIPPED | OUTPATIENT
Start: 2022-02-08 | End: 2022-03-11 | Stop reason: SDUPTHER

## 2022-03-11 DIAGNOSIS — G89.29 OTHER CHRONIC PAIN: ICD-10-CM

## 2022-03-11 RX ORDER — TRAMADOL HYDROCHLORIDE 50 MG/1
50 TABLET ORAL EVERY 6 HOURS PRN
Qty: 120 TABLET | Refills: 0 | Status: SHIPPED | OUTPATIENT
Start: 2022-03-11 | End: 2022-04-11 | Stop reason: SDUPTHER

## 2022-04-11 DIAGNOSIS — G89.29 OTHER CHRONIC PAIN: ICD-10-CM

## 2022-04-11 RX ORDER — TRAMADOL HYDROCHLORIDE 50 MG/1
50 TABLET ORAL EVERY 6 HOURS PRN
Qty: 120 TABLET | Refills: 0 | Status: SHIPPED | OUTPATIENT
Start: 2022-04-11 | End: 2022-05-11 | Stop reason: SDUPTHER

## 2022-05-11 DIAGNOSIS — G89.29 OTHER CHRONIC PAIN: ICD-10-CM

## 2022-05-12 RX ORDER — TRAMADOL HYDROCHLORIDE 50 MG/1
50 TABLET ORAL EVERY 6 HOURS PRN
Qty: 120 TABLET | Refills: 0 | Status: SHIPPED | OUTPATIENT
Start: 2022-05-12 | End: 2022-06-10 | Stop reason: SDUPTHER

## 2022-05-23 ENCOUNTER — RA CDI HCC (OUTPATIENT)
Dept: OTHER | Facility: HOSPITAL | Age: 67
End: 2022-05-23

## 2022-05-23 NOTE — PROGRESS NOTES
[facility] [address]  coding opportunities       Chart reviewed, no opportunity found: CHART REVIEWED, NO OPPORTUNITY FOUND        Patients Insurance     Medicare Insurance: Medicare

## 2022-06-09 ENCOUNTER — APPOINTMENT (OUTPATIENT)
Dept: LAB | Facility: CLINIC | Age: 67
End: 2022-06-09
Payer: MEDICARE

## 2022-06-09 DIAGNOSIS — Z13.29 SCREENING FOR THYROID DISORDER: ICD-10-CM

## 2022-06-09 DIAGNOSIS — E78.2 MIXED HYPERLIPIDEMIA: ICD-10-CM

## 2022-06-09 DIAGNOSIS — E78.5 ELEVATED FASTING LIPID PROFILE: ICD-10-CM

## 2022-06-09 LAB
CHOLEST SERPL-MCNC: 202 MG/DL
HDLC SERPL-MCNC: 44 MG/DL
LDLC SERPL CALC-MCNC: 117 MG/DL (ref 0–100)
T4 FREE SERPL-MCNC: 0.95 NG/DL (ref 0.76–1.46)
TRIGL SERPL-MCNC: 205 MG/DL
TSH SERPL DL<=0.05 MIU/L-ACNC: 3.46 UIU/ML (ref 0.45–4.5)

## 2022-06-09 PROCEDURE — 36415 COLL VENOUS BLD VENIPUNCTURE: CPT

## 2022-06-09 PROCEDURE — 80061 LIPID PANEL: CPT

## 2022-06-09 PROCEDURE — 84439 ASSAY OF FREE THYROXINE: CPT

## 2022-06-09 PROCEDURE — 84443 ASSAY THYROID STIM HORMONE: CPT

## 2022-06-10 DIAGNOSIS — G89.29 OTHER CHRONIC PAIN: ICD-10-CM

## 2022-06-13 ENCOUNTER — OFFICE VISIT (OUTPATIENT)
Dept: FAMILY MEDICINE CLINIC | Facility: CLINIC | Age: 67
End: 2022-06-13
Payer: MEDICARE

## 2022-06-13 VITALS
DIASTOLIC BLOOD PRESSURE: 78 MMHG | BODY MASS INDEX: 42.51 KG/M2 | TEMPERATURE: 97.7 F | WEIGHT: 231 LBS | SYSTOLIC BLOOD PRESSURE: 154 MMHG | OXYGEN SATURATION: 97 % | HEIGHT: 62 IN | HEART RATE: 91 BPM

## 2022-06-13 DIAGNOSIS — M51.37 DEGENERATION OF INTERVERTEBRAL DISC OF LUMBOSACRAL REGION: ICD-10-CM

## 2022-06-13 DIAGNOSIS — F11.90 CHRONIC NARCOTIC USE: ICD-10-CM

## 2022-06-13 DIAGNOSIS — E78.2 MIXED HYPERLIPIDEMIA: ICD-10-CM

## 2022-06-13 DIAGNOSIS — Z13.0 SCREENING FOR DEFICIENCY ANEMIA: ICD-10-CM

## 2022-06-13 DIAGNOSIS — Z13.1 SCREENING FOR DIABETES MELLITUS: ICD-10-CM

## 2022-06-13 DIAGNOSIS — Z13.29 SCREENING FOR THYROID DISORDER: ICD-10-CM

## 2022-06-13 DIAGNOSIS — F11.20 CONTINUOUS OPIOID DEPENDENCE (HCC): ICD-10-CM

## 2022-06-13 DIAGNOSIS — Z12.31 ENCOUNTER FOR SCREENING MAMMOGRAM FOR MALIGNANT NEOPLASM OF BREAST: Primary | ICD-10-CM

## 2022-06-13 DIAGNOSIS — E66.01 CLASS 3 SEVERE OBESITY DUE TO EXCESS CALORIES WITHOUT SERIOUS COMORBIDITY WITH BODY MASS INDEX (BMI) OF 40.0 TO 44.9 IN ADULT (HCC): ICD-10-CM

## 2022-06-13 PROCEDURE — 99213 OFFICE O/P EST LOW 20 MIN: CPT | Performed by: NURSE PRACTITIONER

## 2022-06-13 RX ORDER — TRAMADOL HYDROCHLORIDE 50 MG/1
50 TABLET ORAL EVERY 6 HOURS PRN
Qty: 120 TABLET | Refills: 0 | Status: SHIPPED | OUTPATIENT
Start: 2022-06-13 | End: 2022-07-11 | Stop reason: SDUPTHER

## 2022-06-13 NOTE — PROGRESS NOTES
Assessment/Plan:    Problem List Items Addressed This Visit     Degeneration of intervertebral disc of lumbosacral region    Mixed hyperlipidemia    Relevant Orders    Lipid Panel with Direct LDL reflex    Class 3 severe obesity without serious comorbidity with body mass index (BMI) of 40 0 to 44 9 in [facility])    Chronic narcotic use    Continuous opioid dependence ([facility] [address] )      Other Visit Diagnoses     Encounter for screening mammogram for malignant neoplasm of breast    -  Primary    Relevant Orders    Mammo screening bilateral w 3d & cad    Screening for deficiency anemia        Relevant Orders    CBC and differential    Screening for thyroid disorder        Relevant Orders    TSH, 3rd generation with Free T4 reflex    Screening for diabetes mellitus        Relevant Orders    Comprehensive metabolic panel           Diagnoses and all orders for this visit:    Encounter for screening mammogram for malignant neoplasm of breast  -     Mammo screening bilateral w 3d & cad; Future    Mixed hyperlipidemia  -     Lipid Panel with Direct LDL reflex; Future    Screening for deficiency anemia  -     CBC and differential; Future    Screening for thyroid disorder  -     TSH, 3rd generation with Free T4 reflex; Future    Screening for diabetes mellitus  -     Comprehensive metabolic panel; Future    Continuous opioid dependence (HCC)    Class 3 severe obesity due to excess calories without serious comorbidity with body mass index (BMI) of 40 0 to 44 9 in [facility])    Degeneration of intervertebral disc of lumbosacral region    Chronic narcotic use      No problem-specific Assessment & Plan notes found for this encounter  Subjective:      Patient ID: Prabhu Kate is a 69 y o  female  Hyperlipidemia  This is a chronic problem  Condition status: improving  Recent lipid tests were reviewed and are variable  Exacerbating diseases include obesity  There are no known factors aggravating her hyperlipidemia   Current antihyperlipidemic treatment includes diet change and exercise  The current treatment provides moderate improvement of lipids  There are no compliance problems  Risk factors for coronary artery disease include dyslipidemia, obesity and post-menopausal    Arthritis  Presents for follow-up visit  The symptoms have been stable  Affected locations include the right knee and left ankle (L-Spine)  Compliance with total regimen is %  Compliance with medications is %  The following portions of the patient's history were reviewed and updated as appropriate:   She has a past medical history of Abnormal blood chemistry, Abscess of groin, Arthritis, Cataracts, bilateral, Chronic pain, Diverticulosis, Endometrial cancer ([facility] [address] ) (9/7/2018), Malignant neoplasm of uterus ([facility] ) (5/19/2016), Obesity, Preauricular (1953), Spinal stenosis, and Uterine cancer ([facility] [address] )  ,  does not have any pertinent problems on file  ,   has a past surgical history that includes Cholecystectomy; Hysterectomy; Epidural block injection; Abscess drainage; Cataract extraction, bilateral; and EYE SURGERY (Left, 05/2022)  ,  family history includes Cancer in her father and mother; Diabetes in her family; Ovarian cancer in her mother; Pancreatic cancer in her father  ,   reports that she has never smoked  She has never used smokeless tobacco  She reports current alcohol use  She reports that she does not use drugs  ,  is allergic to paclitaxel     Current Outpatient Medications   Medication Sig Dispense Refill    acetaminophen (TYLENOL) 325 mg tablet Take 2 tablets (650 mg total) by mouth every 6 (six) hours as needed for mild pain 30 tablet 0    naproxen (NAPROSYN) 250 mg tablet Take 220 mg by mouth as needed      traMADol (ULTRAM) 50 mg tablet Take 1 tablet (50 mg total) by mouth every 6 (six) hours as needed for moderate pain 120 tablet 0     No current facility-administered medications for this visit  BMI Counseling:  Body mass index is 42 25 kg/m²  The BMI is above normal  Nutrition recommendations include decreasing portion sizes, consuming healthier snacks, limiting drinks that contain sugar, moderation in carbohydrate intake and reducing intake of cholesterol  Exercise recommendations include exercising 3-5 times per week  No pharmacotherapy was ordered  Rationale for BMI follow-up plan is due to patient being overweight or obese  Depression Screening and Follow-up Plan: Patient was screened for depression during today's encounter  They screened negative with a PHQ-2 score of 0  Review of Systems   Constitutional: Negative  HENT: Negative  Eyes: Negative  Respiratory: Negative  Cardiovascular: Negative  Gastrointestinal: Negative  Endocrine: Negative  Genitourinary: Negative  Musculoskeletal: Positive for arthritis  Skin: Negative  Allergic/Immunologic: Negative  Neurological: Negative  Hematological: Negative  Psychiatric/Behavioral: Negative  Objective:  Vitals:    06/13/22 1037   BP: 154/78   BP Location: Left arm   Patient Position: Sitting   Cuff Size: Large   Pulse: 91   Temp: 97 7 °F (36 5 °C)   TempSrc: Tympanic   SpO2: 97%   Weight: 105 kg (231 lb)   Height: 5' 2" (1 575 m)     Body mass index is 42 25 kg/m²  Physical Exam  Vitals and nursing note reviewed  Constitutional:       Appearance: Normal appearance  She is well-developed  Interventions: Face mask in place  HENT:      Head: Normocephalic and atraumatic  Right Ear: Tympanic membrane, ear canal and external ear normal       Left Ear: Tympanic membrane, ear canal and external ear normal       Nose: Nose normal       Mouth/Throat:      Mouth: Mucous membranes are moist       Pharynx: Uvula midline  Eyes:      General: Lids are normal       Conjunctiva/sclera: Conjunctivae normal       Pupils: Pupils are equal, round, and reactive to light  Neck:      Thyroid: No thyroid mass or thyromegaly        Vascular: No JVD  Trachea: Trachea and phonation normal    Cardiovascular:      Rate and Rhythm: Normal rate and regular rhythm  Pulses: Normal pulses  Heart sounds: Normal heart sounds, S1 normal and S2 normal  No murmur heard  No friction rub  No gallop  Pulmonary:      Effort: Pulmonary effort is normal       Breath sounds: Normal breath sounds  Abdominal:      General: Bowel sounds are normal       Palpations: Abdomen is soft  Tenderness: There is no abdominal tenderness  Genitourinary:     Comments: Deferred   Musculoskeletal:         General: Normal range of motion  Cervical back: Full passive range of motion without pain, normal range of motion and neck supple  Right lower leg: No edema  Left lower leg: No edema  Lymphadenopathy:      Head:      Right side of head: No submental, submandibular, tonsillar, preauricular, posterior auricular or occipital adenopathy  Left side of head: No submental, submandibular, tonsillar, preauricular, posterior auricular or occipital adenopathy  Cervical: No cervical adenopathy  Skin:     General: Skin is warm and dry  Capillary Refill: Capillary refill takes less than 2 seconds  Neurological:      General: No focal deficit present  Mental Status: She is alert and oriented to person, place, and time  Cranial Nerves: Cranial nerves are intact  No cranial nerve deficit  Sensory: Sensation is intact  Motor: Motor function is intact  Coordination: Coordination is intact  Gait: Gait is intact  Deep Tendon Reflexes: Reflexes are normal and symmetric  Psychiatric:         Attention and Perception: Attention and perception normal          Mood and Affect: Mood and affect normal          Speech: Speech normal          Behavior: Behavior normal  Behavior is cooperative  Thought Content:  Thought content normal          Cognition and Memory: Cognition normal          Judgment: Judgment normal            Appointment on 06/09/2022   Component Date Value Ref Range Status    TSH 3RD GENERATON 06/09/2022 3 460  0 450 - 4 500 uIU/mL Final    The recommended reference ranges for TSH during pregnancy are as follows:   First trimester 0 1 to 2 5 uIU/mL   Second trimester  0 2 to 3 0 uIU/mL   Third trimester 0 3 to 3 0 uIU/m    Note: Normal ranges may not apply to patients who are transgender, non-binary, or whose legal sex, sex at birth, and gender identity differ  Adult TSH (3rd generation) reference range follows the recommended guidelines of the American Thyroid Association, January, 2020   Free T4 06/09/2022 0 95  0 76 - 1 46 ng/dL Final    Specimen collection should occur prior to Sulfasalazine administration due to the potential for falsely elevated results   Cholesterol 06/09/2022 202 (A) See Comment mg/dL Final    Cholesterol:         Pediatric <18 Years        Desirable          <170 mg/dL      Borderline High    170-199 mg/dL      High               >=200 mg/dL        Adult >=18 Years            Desirable         <200 mg/dL      Borderline High   200-239 mg/dL      High              >239 mg/dL      Triglycerides 06/09/2022 205 (A) See Comment mg/dL Final    Triglyceride:     0-9Y            <75mg/dL     10Y-17Y         <90 mg/dL       >=18Y     Normal          <150 mg/dL     Borderline High 150-199 mg/dL     High            200-499 mg/dL        Very High       >499 mg/dL    Specimen collection should occur prior to N-Acetylcysteine or Metamizole administration due to the potential for falsely depressed results   HDL, Direct 06/09/2022 44 (A) >=50 mg/dL Final    Specimen collection should occur prior to Metamizole administration due to the potential for falsley depressed results      LDL Calculated 06/09/2022 117 (A) 0 - 100 mg/dL Final    LDL Cholesterol:     Optimal           <100 mg/dl     Near Optimal      100-129 mg/dl     Above Optimal       Borderline High 130-159 mg/dl       High            160-189 mg/dl       Very High       >189 mg/dl         This screening LDL is a calculated result  It does not have the accuracy of the Direct Measured LDL in the monitoring of patients with hyperlipidemia and/or statin therapy  Direct Measure LDL (DPS137) must be ordered separately in these patients  I have reviewed all the lab results  There are some abnormalities that are not critical to the patient's health, I have discussed these in person at this office appointment

## 2022-07-11 DIAGNOSIS — G89.29 OTHER CHRONIC PAIN: ICD-10-CM

## 2022-07-11 RX ORDER — TRAMADOL HYDROCHLORIDE 50 MG/1
50 TABLET ORAL EVERY 6 HOURS PRN
Qty: 120 TABLET | Refills: 0 | Status: SHIPPED | OUTPATIENT
Start: 2022-07-11 | End: 2022-08-15 | Stop reason: SDUPTHER

## 2022-08-15 DIAGNOSIS — G89.29 OTHER CHRONIC PAIN: ICD-10-CM

## 2022-08-16 RX ORDER — TRAMADOL HYDROCHLORIDE 50 MG/1
50 TABLET ORAL EVERY 6 HOURS PRN
Qty: 120 TABLET | Refills: 0 | Status: SHIPPED | OUTPATIENT
Start: 2022-08-16 | End: 2022-09-16 | Stop reason: SDUPTHER

## 2022-09-02 DIAGNOSIS — Z12.31 ENCOUNTER FOR SCREENING MAMMOGRAM FOR MALIGNANT NEOPLASM OF BREAST: ICD-10-CM

## 2022-09-16 DIAGNOSIS — G89.29 OTHER CHRONIC PAIN: ICD-10-CM

## 2022-09-19 RX ORDER — TRAMADOL HYDROCHLORIDE 50 MG/1
50 TABLET ORAL EVERY 6 HOURS PRN
Qty: 120 TABLET | Refills: 0 | Status: SHIPPED | OUTPATIENT
Start: 2022-09-19 | End: 2022-10-17 | Stop reason: SDUPTHER

## 2022-10-17 DIAGNOSIS — G89.29 OTHER CHRONIC PAIN: ICD-10-CM

## 2022-10-17 RX ORDER — TRAMADOL HYDROCHLORIDE 50 MG/1
50 TABLET ORAL EVERY 6 HOURS PRN
Qty: 120 TABLET | Refills: 0 | Status: SHIPPED | OUTPATIENT
Start: 2022-10-17

## 2022-11-17 DIAGNOSIS — G89.29 OTHER CHRONIC PAIN: ICD-10-CM

## 2022-11-18 RX ORDER — TRAMADOL HYDROCHLORIDE 50 MG/1
50 TABLET ORAL EVERY 6 HOURS PRN
Qty: 120 TABLET | Refills: 0 | Status: SHIPPED | OUTPATIENT
Start: 2022-11-18

## 2022-12-19 DIAGNOSIS — G89.29 OTHER CHRONIC PAIN: ICD-10-CM

## 2022-12-21 RX ORDER — TRAMADOL HYDROCHLORIDE 50 MG/1
50 TABLET ORAL EVERY 6 HOURS PRN
Qty: 120 TABLET | Refills: 0 | Status: SHIPPED | OUTPATIENT
Start: 2022-12-21

## 2022-12-22 ENCOUNTER — TELEPHONE (OUTPATIENT)
Dept: FAMILY MEDICINE CLINIC | Facility: CLINIC | Age: 67
End: 2022-12-22

## 2022-12-23 ENCOUNTER — RA CDI HCC (OUTPATIENT)
Dept: OTHER | Facility: HOSPITAL | Age: 67
End: 2022-12-23

## 2022-12-28 ENCOUNTER — APPOINTMENT (OUTPATIENT)
Dept: LAB | Facility: CLINIC | Age: 67
End: 2022-12-28

## 2022-12-28 DIAGNOSIS — Z13.29 SCREENING FOR THYROID DISORDER: ICD-10-CM

## 2022-12-28 DIAGNOSIS — Z13.0 SCREENING FOR DEFICIENCY ANEMIA: ICD-10-CM

## 2022-12-28 DIAGNOSIS — E78.2 MIXED HYPERLIPIDEMIA: ICD-10-CM

## 2022-12-28 DIAGNOSIS — Z13.1 SCREENING FOR DIABETES MELLITUS: ICD-10-CM

## 2022-12-28 LAB
ALBUMIN SERPL BCP-MCNC: 3.9 G/DL (ref 3.5–5)
ALP SERPL-CCNC: 59 U/L (ref 46–116)
ALT SERPL W P-5'-P-CCNC: 23 U/L (ref 12–78)
ANION GAP SERPL CALCULATED.3IONS-SCNC: 5 MMOL/L (ref 4–13)
AST SERPL W P-5'-P-CCNC: 16 U/L (ref 5–45)
BASOPHILS # BLD AUTO: 0.04 THOUSANDS/ÂΜL (ref 0–0.1)
BASOPHILS NFR BLD AUTO: 1 % (ref 0–1)
BILIRUB SERPL-MCNC: 0.45 MG/DL (ref 0.2–1)
BUN SERPL-MCNC: 17 MG/DL (ref 5–25)
CALCIUM SERPL-MCNC: 9.7 MG/DL (ref 8.3–10.1)
CHLORIDE SERPL-SCNC: 104 MMOL/L (ref 96–108)
CHOLEST SERPL-MCNC: 226 MG/DL
CO2 SERPL-SCNC: 28 MMOL/L (ref 21–32)
CREAT SERPL-MCNC: 0.89 MG/DL (ref 0.6–1.3)
EOSINOPHIL # BLD AUTO: 0.22 THOUSAND/ÂΜL (ref 0–0.61)
EOSINOPHIL NFR BLD AUTO: 3 % (ref 0–6)
ERYTHROCYTE [DISTWIDTH] IN BLOOD BY AUTOMATED COUNT: 12.2 % (ref 11.6–15.1)
GFR SERPL CREATININE-BSD FRML MDRD: 66 ML/MIN/1.73SQ M
GLUCOSE P FAST SERPL-MCNC: 103 MG/DL (ref 65–99)
HCT VFR BLD AUTO: 39.8 % (ref 34.8–46.1)
HDLC SERPL-MCNC: 49 MG/DL
HGB BLD-MCNC: 12.7 G/DL (ref 11.5–15.4)
IMM GRANULOCYTES # BLD AUTO: 0.02 THOUSAND/UL (ref 0–0.2)
IMM GRANULOCYTES NFR BLD AUTO: 0 % (ref 0–2)
LDLC SERPL CALC-MCNC: 145 MG/DL (ref 0–100)
LYMPHOCYTES # BLD AUTO: 1.64 THOUSANDS/ÂΜL (ref 0.6–4.47)
LYMPHOCYTES NFR BLD AUTO: 19 % (ref 14–44)
MCH RBC QN AUTO: 30.9 PG (ref 26.8–34.3)
MCHC RBC AUTO-ENTMCNC: 31.9 G/DL (ref 31.4–37.4)
MCV RBC AUTO: 97 FL (ref 82–98)
MONOCYTES # BLD AUTO: 0.56 THOUSAND/ÂΜL (ref 0.17–1.22)
MONOCYTES NFR BLD AUTO: 7 % (ref 4–12)
NEUTROPHILS # BLD AUTO: 6.04 THOUSANDS/ÂΜL (ref 1.85–7.62)
NEUTS SEG NFR BLD AUTO: 70 % (ref 43–75)
NRBC BLD AUTO-RTO: 0 /100 WBCS
PLATELET # BLD AUTO: 299 THOUSANDS/UL (ref 149–390)
PMV BLD AUTO: 10.5 FL (ref 8.9–12.7)
POTASSIUM SERPL-SCNC: 3.8 MMOL/L (ref 3.5–5.3)
PROT SERPL-MCNC: 8.3 G/DL (ref 6.4–8.4)
RBC # BLD AUTO: 4.11 MILLION/UL (ref 3.81–5.12)
SODIUM SERPL-SCNC: 137 MMOL/L (ref 135–147)
TRIGL SERPL-MCNC: 159 MG/DL
TSH SERPL DL<=0.05 MIU/L-ACNC: 3.13 UIU/ML (ref 0.45–4.5)
WBC # BLD AUTO: 8.52 THOUSAND/UL (ref 4.31–10.16)

## 2023-01-03 ENCOUNTER — OFFICE VISIT (OUTPATIENT)
Dept: FAMILY MEDICINE CLINIC | Facility: CLINIC | Age: 68
End: 2023-01-03

## 2023-01-03 VITALS
BODY MASS INDEX: 42.51 KG/M2 | SYSTOLIC BLOOD PRESSURE: 128 MMHG | TEMPERATURE: 96.4 F | HEART RATE: 78 BPM | OXYGEN SATURATION: 98 % | HEIGHT: 62 IN | DIASTOLIC BLOOD PRESSURE: 80 MMHG | WEIGHT: 231 LBS

## 2023-01-03 DIAGNOSIS — E66.01 CLASS 3 SEVERE OBESITY DUE TO EXCESS CALORIES WITHOUT SERIOUS COMORBIDITY WITH BODY MASS INDEX (BMI) OF 40.0 TO 44.9 IN ADULT (HCC): ICD-10-CM

## 2023-01-03 DIAGNOSIS — Z23 ENCOUNTER FOR IMMUNIZATION: ICD-10-CM

## 2023-01-03 DIAGNOSIS — M51.37 DEGENERATION OF INTERVERTEBRAL DISC OF LUMBOSACRAL REGION: ICD-10-CM

## 2023-01-03 DIAGNOSIS — F11.20 CONTINUOUS OPIOID DEPENDENCE (HCC): ICD-10-CM

## 2023-01-03 DIAGNOSIS — E78.2 MIXED HYPERLIPIDEMIA: ICD-10-CM

## 2023-01-03 DIAGNOSIS — Z00.00 ENCOUNTER FOR MEDICARE ANNUAL WELLNESS EXAM: Primary | ICD-10-CM

## 2023-01-03 DIAGNOSIS — Z78.0 POST-MENOPAUSAL: ICD-10-CM

## 2023-01-03 NOTE — PROGRESS NOTES
Assessment and Plan:     Problem List Items Addressed This Visit     Degeneration of intervertebral disc of lumbosacral region    Mixed hyperlipidemia    Relevant Orders    Lipid Panel with Direct LDL reflex    Class 3 severe obesity without serious comorbidity with body mass index (BMI) of 40 0 to 44 9 in adult [facility])    Post-menopausal    Continuous opioid dependence ([facility] [address] )   Other Visit Diagnoses     Encounter for Medicare annual wellness exam    -  Primary    Encounter for immunization        Relevant Orders    influenza vaccine, high-dose, PF 0 7 mL (FLUZONE HIGH-DOSE) (Completed)        BMI Counseling: Body mass index is 42 25 kg/m²  The BMI is above normal  Nutrition recommendations include decreasing portion sizes, consuming healthier snacks, limiting drinks that contain sugar, moderation in carbohydrate intake, increasing intake of lean protein, reducing intake of saturated and trans fat and reducing intake of cholesterol  Exercise recommendations include exercising 3-5 times per week  No pharmacotherapy was ordered  Rationale for BMI follow-up plan is due to patient being overweight or obese  Urinary Incontinence Plan of Care: counseling topics discussed: practice Kegel (pelvic floor strengthening) exercises and weight loss  Preventive health issues were discussed with patient, and age appropriate screening tests were ordered as noted in patient's After Visit Summary  Personalized health advice and appropriate referrals for health education or preventive services given if needed, as noted in patient's After Visit Summary  History of Present Illness:     Patient presents for a Medicare Wellness Visit    Hyperlipidemia  This is a chronic problem  Recent lipid tests were reviewed and are variable  There are no known factors aggravating her hyperlipidemia  Current antihyperlipidemic treatment includes diet change  The current treatment provides mild improvement of lipids   There are no compliance problems  Risk factors for coronary artery disease include dyslipidemia, obesity and post-menopausal       Patient Care Team:  Lianna Quinones as PCP - General (Internal Medicine)     Review of Systems:     Review of Systems   All other systems reviewed and are negative  Problem List:     Patient Active Problem List   Diagnosis   • Degeneration of intervertebral disc of lumbosacral region   • Mixed hyperlipidemia   • Class 3 severe obesity without serious comorbidity with body mass index (BMI) of 40 0 to 44 9 in adult [facility])   • Chronic narcotic use   • Abscess of preauricular sinus   • Post-menopausal   • Vitamin D deficiency   • Tingling   • Cramp of both lower extremities   • Continuous opioid dependence [facility])      Past Medical and Surgical History:     Past Medical History:   Diagnosis Date   • Abnormal blood chemistry     resolved: 2/9/2017   • Abscess of groin     last assessed: 10/11/2013   • Allergic 12/1955   • Arthritis    • Cancer ([address] ) 3/2014   • Cataracts, bilateral    • Chronic pain    • Diverticulitis of colon 2018   • Diverticulosis    • Endometrial cancer ([address] ) 09/07/2018   • Malignant neoplasm of uterus ([address] ) 05/19/2016   • Obesity    • Preauricular 1953   • Spinal stenosis    • Uterine cancer ([address] )     uterine     Past Surgical History:   Procedure Laterality Date   • ABCESS DRAINAGE      right ear   • CATARACT EXTRACTION, BILATERAL     • CHOLECYSTECTOMY     • EPIDURAL BLOCK INJECTION     • EYE SURGERY Left 05/2022   • EYE SURGERY  10/2018    Cataract  Both eyes   • HYSTERECTOMY      Total: With removal of both tubes and both ovaries      Family History:     Family History   Problem Relation Age of Onset   • Ovarian cancer Mother    • Cancer Mother    • Pancreatic cancer Father    • Cancer Father    • Diabetes Father    • Diabetes Family         mellitus   • Cancer Paternal Grandmother       Social History:     Social History     Socioeconomic History   • Marital status:   Spouse name: None   • Number of children: None   • Years of education: None   • Highest education level: None   Occupational History   • Occupation: underwear salesperson     Comment: retired   Tobacco Use   • Smoking status: Never   • Smokeless tobacco: Never   Vaping Use   • Vaping Use: Never used   Substance and Sexual Activity   • Alcohol use: Yes     Comment: Social events   • Drug use: No     Comment: chronic narcotic use ( as per allscripts)   • Sexual activity: Yes     Partners: Male   Other Topics Concern   • None   Social History Narrative    Single- Engaged    Retired    Lives at home with spouse     Engaged to be      Social Determinants of Health     Financial Resource Strain: Not on file   Food Insecurity: Not on file   Transportation Needs: Not on file   Physical Activity: Not on file   Stress: Not on file   Social Connections: Not on file   Intimate Partner Violence: Not on file   Housing Stability: Not on file      Medications and Allergies:     Current Outpatient Medications   Medication Sig Dispense Refill   • acetaminophen (TYLENOL) 325 mg tablet Take 2 tablets (650 mg total) by mouth every 6 (six) hours as needed for mild pain 30 tablet 0   • naproxen (NAPROSYN) 250 mg tablet Take 220 mg by mouth as needed     • traMADol (ULTRAM) 50 mg tablet Take 1 tablet (50 mg total) by mouth every 6 (six) hours as needed for moderate pain 120 tablet 0     No current facility-administered medications for this visit       Allergies   Allergen Reactions   • Paclitaxel Anaphylaxis     Anaphylaxis      Immunizations:     Immunization History   Administered Date(s) Administered   • H1N1 Inj 09/20/2018   • H1N1, All Formulations 09/20/2018   • Influenza, high dose seasonal 0 7 mL 11/12/2019, 12/08/2020, 01/03/2023   • Influenza, recombinant, quadrivalent,injectable, preservative free 12/10/2021   • Pneumococcal Conjugate 13-Valent 07/25/2018   • Pneumococcal Polysaccharide PPV23 04/11/2014, 08/02/2019   • Tdap 05/19/2016      Health Maintenance:         Topic Date Due   • Cervical Cancer Screening  07/05/2022   • DXA SCAN  12/16/2023   • Breast Cancer Screening: Mammogram  08/19/2024   • Colorectal Cancer Screening  11/11/2025   • Hepatitis C Screening  Completed         Topic Date Due   • Hepatitis B Vaccine (1 of 3 - 3-dose series) Never done   • COVID-19 Vaccine (1) Never done      Medicare Screening Tests and Risk Assessments: Ananda Anaya is here for her Subsequent Wellness visit  Last Medicare Wellness visit information reviewed, patient interviewed and updates made to the record today  Health Risk Assessment:   Patient rates overall health as good  Patient feels that their physical health rating is same  Patient is satisfied with their life  Eyesight was rated as same  Hearing was rated as same  Patient feels that their emotional and mental health rating is same  Patients states they are never, rarely angry  Patient states they are sometimes unusually tired/fatigued  Pain experienced in the last 7 days has been some  Patient's pain rating has been 5/10  Patient states that she has experienced no weight loss or gain in last 6 months  Fall Risk Screening: In the past year, patient has experienced: no history of falling in past year      Urinary Incontinence Screening:   Patient has leaked urine accidently in the last six months  Home Safety:  Patient has trouble with stairs inside or outside of their home  Patient has working smoke alarms and has working carbon monoxide detector  Home safety hazards include: none  Nutrition:   Current diet is Regular  Medications:   Patient is currently taking over-the-counter supplements  OTC medications include: see medication list  Patient is able to manage medications       Activities of Daily Living (ADLs)/Instrumental Activities of Daily Living (IADLs):   Walk and transfer into and out of bed and chair?: Yes  Dress and groom yourself?: Yes    Bathe or shower yourself?: Yes    Feed yourself?  Yes  Do your laundry/housekeeping?: Yes  Manage your money, pay your bills and track your expenses?: Yes  Make your own meals?: Yes    Do your own shopping?: Yes    Previous Hospitalizations:   Any hospitalizations or ED visits within the last 12 months?: No      Advance Care Planning:   Living will: No    Durable POA for healthcare: No    Advanced directive: No    Advanced directive counseling given: Yes    Five wishes given: Yes    Patient declined ACP directive: No    End of Life Decisions reviewed with patient: Yes    Provider agrees with end of life decisions: Yes      Cognitive Screening:   Provider or family/friend/caregiver concerned regarding cognition?: No    PREVENTIVE SCREENINGS      Cardiovascular Screening:    General: Screening Not Indicated and History Lipid Disorder      Diabetes Screening:     General: Screening Current      Colorectal Cancer Screening:     General: Screening Current      Breast Cancer Screening:     General: Screening Current      Cervical Cancer Screening:    General: Screening Not Indicated      Osteoporosis Screening:    General: Screening Current      Abdominal Aortic Aneurysm (AAA) Screening:        General: Screening Not Indicated      Lung Cancer Screening:     General: Screening Not Indicated      Hepatitis C Screening:    General: Screening Current    Screening, Brief Intervention, and Referral to Treatment (SBIRT)    Screening      Single Item Drug Screening:  How often have you used an illegal drug (including marijuana) or a prescription medication for non-medical reasons in the past year? never    Single Item Drug Screen Score: 0  Interpretation: Negative screen for possible drug use disorder    Review of Current Opioid Use    Opioid Risk Tool (ORT) Interpretation: Complete Opioid Risk Tool (ORT)    Other Counseling Topics:   Car/seat belt/driving safety, skin self-exam, sunscreen and calcium and vitamin D intake and regular weightbearing exercise  No results found  Physical Exam:     /80 (BP Location: Left arm, Patient Position: Sitting, Cuff Size: Adult)   Pulse 78   Temp (!) 96 4 °F (35 8 °C) (Tympanic)   Ht 5' 2" (1 575 m)   Wt 105 kg (231 lb)   SpO2 98%   BMI 42 25 kg/m²     Physical Exam  Vitals and nursing note reviewed  Constitutional:       Appearance: Normal appearance  She is well-developed  HENT:      Head: Normocephalic and atraumatic  Right Ear: Tympanic membrane, ear canal and external ear normal       Left Ear: Tympanic membrane, ear canal and external ear normal       Nose: Nose normal       Mouth/Throat:      Mouth: Mucous membranes are moist    Eyes:      General: Lids are normal  Vision grossly intact  Conjunctiva/sclera: Conjunctivae normal       Pupils: Pupils are equal, round, and reactive to light  Cardiovascular:      Rate and Rhythm: Normal rate and regular rhythm  Pulses: Normal pulses  Heart sounds: Normal heart sounds, S1 normal and S2 normal      No friction rub  No gallop  No S3 sounds  Pulmonary:      Effort: Pulmonary effort is normal       Breath sounds: Normal breath sounds  Abdominal:      General: Bowel sounds are normal       Palpations: Abdomen is soft  Tenderness: There is no abdominal tenderness  Genitourinary:     Comments: Deferred  Musculoskeletal:         General: Normal range of motion  Cervical back: Normal range of motion and neck supple  Right lower leg: No edema  Left lower leg: No edema  Lymphadenopathy:      Cervical: No cervical adenopathy  Skin:     General: Skin is warm and dry  Capillary Refill: Capillary refill takes less than 2 seconds  Neurological:      General: No focal deficit present  Mental Status: She is alert and oriented to person, place, and time  Motor: Motor function is intact  Gait: Gait is intact     Psychiatric:         Attention and Perception: Attention and perception normal          Mood and Affect: Mood and affect normal          Speech: Speech normal          Behavior: Behavior normal  Behavior is cooperative  Thought Content:  Thought content normal          Cognition and Memory: Cognition and memory normal          Judgment: Judgment normal           LINA Gonzales

## 2023-01-03 NOTE — PATIENT INSTRUCTIONS
Medicare Preventive Visit Patient Instructions  Thank you for completing your Welcome to Medicare Visit or Medicare Annual Wellness Visit today  Your next wellness visit will be due in one year (1/4/2024)  The screening/preventive services that you may require over the next 5-10 years are detailed below  Some tests may not apply to you based off risk factors and/or age  Screening tests ordered at today's visit but not completed yet may show as past due  Also, please note that scanned in results may not display below  Preventive Screenings:  Service Recommendations Previous Testing/Comments   Colorectal Cancer Screening  * Colonoscopy    * Fecal Occult Blood Test (FOBT)/Fecal Immunochemical Test (FIT)  * Fecal DNA/Cologuard Test  * Flexible Sigmoidoscopy Age: 39-70 years old   Colonoscopy: every 10 years (may be performed more frequently if at higher risk)  OR  FOBT/FIT: every 1 year  OR  Cologuard: every 3 years  OR  Sigmoidoscopy: every 5 years  Screening may be recommended earlier than age 39 if at higher risk for colorectal cancer  Also, an individualized decision between you and your healthcare provider will decide whether screening between the ages of 74-80 would be appropriate  Colonoscopy: 11/11/2015  FOBT/FIT: Not on file  Cologuard: Not on file  Sigmoidoscopy: Not on file    Screening Current     Breast Cancer Screening Age: 36 years old  Frequency: every 1-2 years  Not required if history of left and right mastectomy Mammogram: 08/19/2022    Screening Current   Cervical Cancer Screening Between the ages of 21-29, pap smear recommended once every 3 years  Between the ages of 33-67, can perform pap smear with HPV co-testing every 5 years     Recommendations may differ for women with a history of total hysterectomy, cervical cancer, or abnormal pap smears in past  Pap Smear: 07/05/2019    Screening Not Indicated   Hepatitis C Screening Once for adults born between 1945 and 1965  More frequently in patients at high risk for Hepatitis C Hep C Antibody: 10/04/2018    Screening Current   Diabetes Screening 1-2 times per year if you're at risk for diabetes or have pre-diabetes Fasting glucose: 103 mg/dL (12/28/2022)  A1C: 5 7 % (10/4/2018)  Screening Current   Cholesterol Screening Once every 5 years if you don't have a lipid disorder  May order more often based on risk factors  Lipid panel: 12/28/2022    Screening Not Indicated  History Lipid Disorder     Other Preventive Screenings Covered by Medicare:  Abdominal Aortic Aneurysm (AAA) Screening: covered once if your at risk  You're considered to be at risk if you have a family history of AAA  Lung Cancer Screening: covers low dose CT scan once per year if you meet all of the following conditions: (1) Age 50-69; (2) No signs or symptoms of lung cancer; (3) Current smoker or have quit smoking within the last 15 years; (4) You have a tobacco smoking history of at least 20 pack years (packs per day multiplied by number of years you smoked); (5) You get a written order from a healthcare provider  Glaucoma Screening: covered annually if you're considered high risk: (1) You have diabetes OR (2) Family history of glaucoma OR (3)  aged 48 and older OR (3)  American aged 72 and older  Osteoporosis Screening: covered every 2 years if you meet one of the following conditions: (1) You're estrogen deficient and at risk for osteoporosis based off medical history and other findings; (2) Have a vertebral abnormality; (3) On glucocorticoid therapy for more than 3 months; (4) Have primary hyperparathyroidism; (5) On osteoporosis medications and need to assess response to drug therapy  Last bone density test (DXA Scan): 12/16/2021  HIV Screening: covered annually if you're between the age of 12-76  Also covered annually if you are younger than 13 and older than 72 with risk factors for HIV infection   For pregnant patients, it is covered up to 3 times per pregnancy  Immunizations:  Immunization Recommendations   Influenza Vaccine Annual influenza vaccination during flu season is recommended for all persons aged >= 6 months who do not have contraindications   Pneumococcal Vaccine   * Pneumococcal conjugate vaccine = PCV13 (Prevnar 13), PCV15 (Vaxneuvance), PCV20 (Prevnar 20)  * Pneumococcal polysaccharide vaccine = PPSV23 (Pneumovax) Adults 25-60 years old: 1-3 doses may be recommended based on certain risk factors  Adults 72 years old: 1-2 doses may be recommended based off what pneumonia vaccine you previously received   Hepatitis B Vaccine 3 dose series if at intermediate or high risk (ex: diabetes, end stage renal disease, liver disease)   Tetanus (Td) Vaccine - COST NOT COVERED BY MEDICARE PART B Following completion of primary series, a booster dose should be given every 10 years to maintain immunity against tetanus  Td may also be given as tetanus wound prophylaxis  Tdap Vaccine - COST NOT COVERED BY MEDICARE PART B Recommended at least once for all adults  For pregnant patients, recommended with each pregnancy  Shingles Vaccine (Shingrix) - COST NOT COVERED BY MEDICARE PART B  2 shot series recommended in those aged 48 and above     Health Maintenance Due:      Topic Date Due    Cervical Cancer Screening  07/05/2022    DXA SCAN  12/16/2023    Breast Cancer Screening: Mammogram  08/19/2024    Colorectal Cancer Screening  11/11/2025    Hepatitis C Screening  Completed     Immunizations Due:      Topic Date Due    Hepatitis B Vaccine (1 of 3 - 3-dose series) Never done    COVID-19 Vaccine (1) Never done    Influenza Vaccine (1) 09/01/2022     Advance Directives   What are advance directives? Advance directives are legal documents that state your wishes and plans for medical care  These plans are made ahead of time in case you lose your ability to make decisions for yourself   Advance directives can apply to any medical decision, such as the treatments you want, and if you want to donate organs  What are the types of advance directives? There are many types of advance directives, and each state has rules about how to use them  You may choose a combination of any of the following:  Living will: This is a written record of the treatment you want  You can also choose which treatments you do not want, which to limit, and which to stop at a certain time  This includes surgery, medicine, IV fluid, and tube feedings  Durable power of  for healthcare [facility]): This is a written record that states who you want to make healthcare choices for you when you are unable to make them for yourself  This person, called a proxy, is usually a family member or a friend  You may choose more than 1 proxy  Do not resuscitate (DNR) order:  A DNR order is used in case your heart stops beating or you stop breathing  It is a request not to have certain forms of treatment, such as CPR  A DNR order may be included in other types of advance directives  Medical directive: This covers the care that you want if you are in a coma, near death, or unable to make decisions for yourself  You can list the treatments you want for each condition  Treatment may include pain medicine, surgery, blood transfusions, dialysis, IV or tube feedings, and a ventilator (breathing machine)  Values history: This document has questions about your views, beliefs, and how you feel and think about life  This information can help others choose the care that you would choose  Why are advance directives important? An advance directive helps you control your care  Although spoken wishes may be used, it is better to have your wishes written down  Spoken wishes can be misunderstood, or not followed  Treatments may be given even if you do not want them  An advance directive may make it easier for your family to make difficult choices about your care     Urinary Incontinence   Urinary incontinence (UI)  is when you lose control of your bladder  UI develops because your bladder cannot store or empty urine properly  The 3 most common types of UI are stress incontinence, urge incontinence, or both  Medicines:   May be given to help strengthen your bladder control  Report any side effects of medication to your healthcare provider  Do pelvic muscle exercises often:  Your pelvic muscles help you stop urinating  Squeeze these muscles tight for 5 seconds, then relax for 5 seconds  Gradually work up to squeezing for 10 seconds  Do 3 sets of 15 repetitions a day, or as directed  This will help strengthen your pelvic muscles and improve bladder control  Train your bladder:  Go to the bathroom at set times, such as every 2 hours, even if you do not feel the urge to go  You can also try to hold your urine when you feel the urge to go  For example, hold your urine for 5 minutes when you feel the urge to go  As that becomes easier, hold your urine for 10 minutes  Self-care:   Keep a UI record  Write down how often you leak urine and how much you leak  Make a note of what you were doing when you leaked urine  Drink liquids as directed  You may need to limit the amount of liquid you drink to help control your urine leakage  Do not drink any liquid right before you go to bed  Limit or do not have drinks that contain caffeine or alcohol  Prevent constipation  Eat a variety of high-fiber foods  Good examples are high-fiber cereals, beans, vegetables, and whole-grain breads  Walking is the best way to trigger your intestines to have a bowel movement  Exercise regularly and maintain a healthy weight  Weight loss and exercise will decrease pressure on your bladder and help you control your leakage  Use a catheter as directed  to help empty your bladder  A catheter is a tiny, plastic tube that is put into your bladder to drain your urine  Go to behavior therapy as directed    Behavior therapy may be used to help you learn to control your urge to urinate  Weight Management   Why it is important to manage your weight:  Being overweight increases your risk of health conditions such as heart disease, high blood pressure, type 2 diabetes, and certain types of cancer  It can also increase your risk for osteoarthritis, sleep apnea, and other respiratory problems  Aim for a slow, steady weight loss  Even a small amount of weight loss can lower your risk of health problems  How to lose weight safely:  A safe and healthy way to lose weight is to eat fewer calories and get regular exercise  You can lose up about 1 pound a week by decreasing the number of calories you eat by 500 calories each day  Healthy meal plan for weight management:  A healthy meal plan includes a variety of foods, contains fewer calories, and helps you stay healthy  A healthy meal plan includes the following:  Eat whole-grain foods more often  A healthy meal plan should contain fiber  Fiber is the part of grains, fruits, and vegetables that is not broken down by your body  Whole-grain foods are healthy and provide extra fiber in your diet  Some examples of whole-grain foods are whole-wheat breads and pastas, oatmeal, brown rice, and bulgur  Eat a variety of vegetables every day  Include dark, leafy greens such as spinach, kale, piper greens, and mustard greens  Eat yellow and orange vegetables such as carrots, sweet potatoes, and winter squash  Eat a variety of fruits every day  Choose fresh or canned fruit (canned in its own juice or light syrup) instead of juice  Fruit juice has very little or no fiber  Eat low-fat dairy foods  Drink fat-free (skim) milk or 1% milk  Eat fat-free yogurt and low-fat cottage cheese  Try low-fat cheeses such as mozzarella and other reduced-fat cheeses  Choose meat and other protein foods that are low in fat  Choose beans or other legumes such as split peas or lentils   Choose fish, skinless poultry (chicken or turkey), or lean cuts of red meat (beef or pork)  Before you cook meat or poultry, cut off any visible fat  Use less fat and oil  Try baking foods instead of frying them  Add less fat, such as margarine, sour cream, regular salad dressing and mayonnaise to foods  Eat fewer high-fat foods  Some examples of high-fat foods include french fries, doughnuts, ice cream, and cakes  Eat fewer sweets  Limit foods and drinks that are high in sugar  This includes candy, cookies, regular soda, and sweetened drinks  Exercise:  Exercise at least 30 minutes per day on most days of the week  Some examples of exercise include walking, biking, dancing, and swimming  You can also fit in more physical activity by taking the stairs instead of the elevator or parking farther away from stores  Ask your healthcare provider about the best exercise plan for you  Narcotic (Opioid) Safety    Use narcotics safely:  Take prescribed narcotics exactly as directed  Do not give narcotics to others or take narcotics that belong to someone else  Do not mix narcotics without medicines or alcohol  Do not drive or operate heavy machinery after you take the narcotic  Monitor for side effects and notify your healthcare provider if you experienced side effects such as nausea, sleepiness, itching, or trouble thinking clearly  Manage constipation:    Constipation is the most common side effect of narcotic medicine  Constipation is when you have hard, dry bowel movements, or you go longer than usual between bowel movements  Tell your healthcare provider about all changes in your bowel movements while you are taking narcotics  He or she may recommend laxative medicine to help you have a bowel movement  He or she may also change the kind of narcotic you are taking, or change when you take it  The following are more ways you can prevent or relieve constipation:    Drink liquids as directed  You may need to drink extra liquids to help soften and move your bowels   Ask how much liquid to drink each day and which liquids are best for you  Eat high-fiber foods  This may help decrease constipation by adding bulk to your bowel movements  High-fiber foods include fruits, vegetables, whole-grain breads and cereals, and beans  Your healthcare provider or dietitian can help you create a high-fiber meal plan  Your provider may also recommend a fiber supplement if you cannot get enough fiber from food  Exercise regularly  Regular physical activity can help stimulate your intestines  Walking is a good exercise to prevent or relieve constipation  Ask which exercises are best for you  Schedule a time each day to have a bowel movement  This may help train your body to have regular bowel movements  Bend forward while you are on the toilet to help move the bowel movement out  Sit on the toilet for at least 10 minutes, even if you do not have a bowel movement  Store narcotics safely:   Store narcotics where others cannot easily get them  Keep them in a locked cabinet or secure area  Do not  keep them in a purse or other bag you carry with you  A person may be looking for something else and find the narcotics  Make sure narcotics are stored out of the reach of children  A child can easily overdose on narcotics  Narcotics may look like candy to a small child  The best way to dispose of narcotics: The laws vary by country and area  In the [place], the best way is to return the narcotics through a take-back program  This program is offered by the [employer])  The following are options for using the program:  Take the narcotics to a VIPUL collection site  The site is often a law enforcement center  Call your local law enforcement center for scheduled take-back days in your area  You will be given information on where to go if the collection site is in a different location    Take the narcotics to an approved pharmacy or hospital   A pharmacy or hospital may be set up as a collection site  You will need to ask if it is a VIPUL collection site if you were not directed there  A pharmacy or doctor's office may not be able to take back narcotics unless it is a VIPUL site  Use a mail-back system  This means you are given containers to put the narcotics into  You will then mail them in the containers  Use a take-back drop box  This is a place to leave the narcotics at any time  People and animals will not be able to get into the box  Your local law enforcement agency can tell you where to find a drop box in your area  Other ways to manage pain:   Ask your healthcare provider about non-narcotic medicines to control pain  Nonprescription medicines include NSAIDs (such as ibuprofen) and acetaminophen  Prescription medicines include muscle relaxers, antidepressants, and steroids  Pain may be managed without any medicines  Some ways to relieve pain include massage, aromatherapy, or meditation  Physical or occupational therapy may also help  For more information:   Drug Enforcement Administration  [facility] Yovana Montes [facility]  Phone: 1- [phone] - [phone]  Web Address: [facility]/drug_disposal/    [address]  Phone: 9- 430 - [phone]  Web Address: http://[employer]/     © Copyright [employer] 2018 Information is for End User's use only and may not be sold, redistributed or otherwise used for commercial purposes  All illustrations and images included in CareNotes® are the copyrighted property of A D A M , Inc  or [address] [place], [place] [address]francisca Martines       Cholesterol and Your Health   AMBULATORY CARE:   Cholesterol  is a waxy, fat-like substance  Cholesterol is made by your body, but also comes from certain foods you eat  Your body uses cholesterol to make hormones and new cells  Your body also uses cholesterol to protect nerves   Cholesterol comes from foods such as meat and dairy products  Your total cholesterol level is made up by LDL cholesterol, HDL cholesterol, and triglycerides:  LDL cholesterol  is called bad cholesterol  because it forms plaque in your arteries  As plaque builds up, your arteries become narrow, and less blood flows through  When plaque decreases blood flow to your heart, you may have chest pain  If plaque completely blocks an artery that bring blood to your heart, you may have a heart attack  Plaque can break off and form blood clots  Blood clots may block arteries in your brain and cause a stroke  HDL cholesterol  is called good cholesterol  because it helps remove LDL cholesterol from your arteries  It does this by attaching to LDL cholesterol and carrying it to your liver  Your liver breaks down LDL cholesterol so your body can get rid of it  High levels of HDL cholesterol can help prevent a heart attack and stroke  Low levels of HDL cholesterol can increase your risk for heart disease, heart attack, and stroke  Triglycerides  are a type of fat that store energy from foods you eat  High levels of triglycerides also cause plaque buildup  This can increase your risk for a heart attack or stroke  If your triglyceride level is high, your LDL cholesterol level may also be high  How food affects your cholesterol levels:   Unhealthy fats  increase LDL cholesterol and triglyceride levels in your blood  They are found in foods high in cholesterol, saturated fat, and trans fat:     Cholesterol  is found in eggs, dairy, and meat  Saturated fat  is found in butter, cheese, ice cream, whole milk, and coconut oil  Saturated fat is also found in meat, such as sausage, hot dogs, and bologna  Trans fat  is found in liquid oils and is used in fried and baked foods  Foods that contain trans fats include chips, crackers, muffins, sweet rolls, microwave popcorn, and cookies      Healthy fats,  also called unsaturated fats, help lower LDL cholesterol and triglyceride levels  Healthy fats include the following:     Monounsaturated fats  are found in foods such as olive oil, canola oil, avocado, nuts, and olives  Polyunsaturated fats,  such as omega 3 fats, are found in fish, such as salmon, trout, and tuna  They can also be found in plant foods such as flaxseed, walnuts, and soybeans  Other things that affect your cholesterol levels:   Smoking cigarettes    Being overweight or obese     Drinking large amounts of alcohol    Not enough exercise or no exercise    Certain genes passed from your parents to you  What you need to know about having your cholesterol levels checked: Adults 21to 39years of age should have their cholesterol levels checked every 4 to 6 years  Adults 45 years and older should have their cholesterol checked every 1 to 2 years  You may need your cholesterol checked more often, or at a younger age, if you have risk factors for heart disease  You may also need to have your cholesterol checked more often if you have other health conditions, such as diabetes  Blood tests are used to check cholesterol levels  Blood tests measure your levels of triglycerides, LDL cholesterol, and HDL cholesterol  Cholesterol level goals: Your cholesterol level goal may depend on your risk for heart disease  It may also depend on your age and other health conditions  Ask your healthcare provider if the following goals are right for you: Your total cholesterol level  should be less than 200 mg/dL  This number may also depend on your HDL and LDL cholesterol goals  Your LDL cholesterol level  should be less than 130 mg/dL  Your HDL cholesterol level  should be 60 mg/dL or higher  Your triglyceride level  should be less than 150 mg/dL  Treatment for high cholesterol:  Treatment for high cholesterol will also decrease your risk of heart disease, heart attack, and stroke   Treatment may include any of the following:  Medicines  may be given to lower your LDL cholesterol, triglyceride levels, or total cholesterol level  You may need medicines to lower your cholesterol if any of the following is true:     You have a history of stroke, TIA, unstable angina, or a heart attack    Your LDL cholesterol level is 190 mg/dL or higher    You are age 36to 76years of age, have diabetes, and your LDL cholesterol is 70 mg/dL or higher    You are age 36to 76years of age, have risk factors for heart disease, and your LDL cholesterol is 70 mg/dL or higher    Lifestyle changes  include changes to your diet, exercise, weight loss, and quitting smoking  It also includes decreasing the amount of alcohol you drink  Supplements  include fish oil, red yeast rice, and garlic  Fish oil may help lower your triglyceride and LDL cholesterol levels  It may also increase your HDL cholesterol level  Red yeast rice may help decrease your total cholesterol level and LDL cholesterol level  Garlic may help lower your total cholesterol level  Do not take these supplements without talking to your healthcare provider  Nutrition to help lower your cholesterol levels:  A registered dietitian can help you create a healthy eating plan  Read food labels and choose foods low in saturated fat, trans fats, and cholesterol  Decrease the total amount of fat you eat  Choose lean meats, fat-free or 1% fat milk, and low-fat dairy products, such as yogurt and cheese  Try to limit or avoid red meats  Limit or do not eat fried foods or baked goods such as cookies  Replace unhealthy fats with healthy fats  Cook foods in olive oil or canola oil  Choose soft margarines that are low in saturated fat and trans fat  Seeds, nuts, and avocados are other examples of healthy fats  Eat foods with omega-3 fats  Examples include salmon, tuna, mackerel, walnuts, and flaxseed  Eat fish 2 times per week   Children and pregnant women should not eat fish that have high levels of mercury, such as shark, swordfish, and werner mackerel  Increase the amount of plant-based foods you eat  Plant-based foods are low in cholesterol and fat  Eating more of these foods may help lower your cholesterol and help you lose weight  Examples of plant-based foods includes fruits, vegetables, legumes, and whole grains  Replace milk that contains dairy with almond, soy, or coconut milk  Eat beans and foods with soy for protein instead of meat  Ask your healthcare provider or dietitian for more information on plant-based foods  Increase the amount of fiber you eat  High-fiber foods can help lower your LDL cholesterol  You should eat between 20 and 30 grams of fiber each day  Eat at least 5 servings of fruits and vegetables each day  Other examples of high-fiber foods include whole-grain or whole-wheat breads, pastas, or cereals, and brown rice  Eat 3 ounces of whole-grain foods each day  Increase fiber slowly  You may have abdominal discomfort, bloating, and gas if you add fiber to your diet too quickly  Lifestyle changes you can make to help lower your cholesterol levels:   Maintain a healthy weight  Ask your healthcare provider how much you should weigh  Ask him or her to help you create a weight loss plan if you are overweight  Weight loss can decrease your total cholesterol and triglyceride levels  Exercise regularly  Exercise can help lower your total cholesterol level and maintain a healthy weight  Exercise can also help increase your HDL cholesterol level  Work with your healthcare provider to create an exercise program that is right for you  Get at least 30 minutes of moderate exercise most days of the week  Examples of exercise include brisk walking, swimming, or biking  Do not smoke  Nicotine and other chemicals in cigarettes and cigars can damage your lungs, heart, and blood vessels  They can also raise your triglyceride levels  Ask your healthcare provider for information if you currently smoke and need help to quit  E-cigarettes or smokeless tobacco still contain nicotine  Talk to your healthcare provider before you use these products  Limit or do not drink alcohol  Alcohol can increase your triglyceride levels  Ask your healthcare provider if it is safe for you to drink alcohol  Also ask how much is safe for you to drink each day  © 2017 [address] [employer] Information is for End User's use only and may not be sold, redistributed or otherwise used for commercial purposes  All illustrations and images included in CareNotes® are the copyrighted property of [employer]  or Riccardo Ellison  The above information is an  only  It is not intended as medical advice for individual conditions or treatments  Talk to your doctor, nurse or pharmacist before following any medical regimen to see if it is safe and effective for you  Lipid Profile   AMBULATORY CARE:   A lipid profile,  or lipid panel, is a blood test to check your lipid levels  Lipids are fats that cannot dissolve in blood  High lipid levels increase your risk for heart disease and a heart attack or stroke  A lipid profile includes the following: Total cholesterol  is the main number used for cholesterol values  Goal: Less than 200 mg/dL    Borderline high: 200 to 239 mg/dL    High: 240 mg/dL or higher    LDL (bad) cholesterol  carries cholesterol and deposits it in the arteries  This can cause a blockage  Goal: 100 mg/dL or lower    Near goal: 100 to 129 mg/dL     Borderline high: 130 to 159 mg/dL    High: 160 to 189 mg/dL    Very high: 190 mg/dL or higher    HDL (good) cholesterol  removes cholesterol from your body  Goal: 60 mg/dL or higher    Borderline risk: 40 to 59 mg/dL    High risk: 40 mg/dL or lower    Triglycerides  are a different kind of fat than cholesterol       Goal: 150 mg/dL or lower    Borderline high: 150 to 199 mg/dL    High: 200 to 499 mg/dL    Very high: 500 mg/dL or higher  How to prepare for the test:  Do not eat or drink anything, except water, for 12 to 14 hours before the test  Ask your healthcare provider if you should take your medicines on the day of your test    What you need to know about your test results: Your healthcare provider will discuss your test results with you  If your test results are abnormal, you may need treatment to decrease your risk for heart disease  © 2017 [address] [employer] Information is for End User's use only and may not be sold, redistributed or otherwise used for commercial purposes  All illustrations and images included in CareNotes® are the copyrighted property of [employer] M [employer]  or Riccardo Ellison  The above information is an  only  It is not intended as medical advice for individual conditions or treatments  Talk to your doctor, nurse or pharmacist before following any medical regimen to see if it is safe and effective for you  Low Fat Diet   AMBULATORY CARE:   A low-fat diet  is an eating plan that is low in total fat, unhealthy fat, and cholesterol  You may need to follow a low-fat diet if you have trouble digesting or absorbing fat  You may also need to follow this diet if you have high cholesterol  You can also lower your cholesterol by increasing the amount of fiber in your diet  Soluble fiber is a type of fiber that helps to decrease cholesterol levels  Different types of fat in food:   Limit unhealthy fats  A diet that is high in cholesterol, saturated fat, and trans fat may cause unhealthy cholesterol levels  Unhealthy cholesterol levels increase your risk of heart disease  Cholesterol:  Limit intake of cholesterol to less than 200 mg per day  Cholesterol is found in meat, eggs, and dairy  Saturated fat:  Limit saturated fat to less than 7% of your total daily calories  Ask your dietitian how many calories you need each day  Saturated fat is found in butter, cheese, ice cream, whole milk, and palm oil   Saturated fat is also found in meat, such as beef, pork, chicken skin, and processed meats  Processed meats include sausage, hot dogs, and bologna  Trans fat:  Avoid trans fat as much as possible  Trans fat is used in fried and baked foods  Foods that say trans fat free on the label may still have up to 0 5 grams of trans fat per serving  Include healthy fats  Replace foods that are high in saturated and trans fat with foods high in healthy fats  This may help to decrease high cholesterol levels  Monounsaturated fats: These are found in avocados, nuts, and vegetable oils, such as olive, canola, and sunflower oil  Polyunsaturated fats: These can be found in vegetable oils, such as soybean or corn oil  Omega-3 fats can help to decrease the risk of heart disease  Omega-3 fats are found in fish, such as salmon, herring, trout, and tuna  Omega-3 fats can also be found in plant foods, such as walnuts, flaxseed, soybeans, and canola oil    Foods to limit or avoid:   Grains:      Snacks that are made with partially hydrogenated oils, such as chips, regular crackers, and butter-flavored popcorn    High-fat baked goods, such as biscuits, croissants, doughnuts, pies, cookies, and pastries    Dairy:      Whole milk, 2% milk, and yogurt and ice cream made with whole milk    Half and half creamer, heavy cream, and whipping cream    Cheese, cream cheese, and sour cream    Meats and proteins:      High-fat cuts of meat (T-bone steak, regular hamburger, and ribs)    Fried meat, poultry (turkey and chicken), and fish    Poultry (chicken and turkey) with skin    Cold cuts (salami or bologna), hot dogs, da silva, and sausage    Whole eggs and egg yolks    Vegetables and fruits with added fat:      Fried vegetables or vegetables in butter or high-fat sauces, such as cream or cheese sauces    Fried fruit or fruit served with butter or cream    Fats:      Butter, stick margarine, and shortening    Coconut, palm oil, and palm kernel oil  Foods to include:   Grains:      Whole-grain breads, cereals, pasta, and brown rice    Low-fat crackers and pretzels    Vegetables and fruits:      Fresh, frozen, or canned vegetables (no salt or low-sodium)    Fresh, frozen, dried, or canned fruit (canned in light syrup or fruit juice)    Avocado    Low-fat dairy products:      Nonfat (skim) or 1% milk    Nonfat or low-fat cheese, yogurt, and cottage cheese    Meats and proteins:      Chicken or turkey with no skin    Baked or broiled fish    Lean beef and pork (loin, round, extra lean hamburger)    Beans and peas, unsalted nuts, soy products    Egg whites and substitutes    Seeds and nuts    Fats:      Unsaturated oil, such as canola, olive, peanut, soybean, or sunflower oil    Soft or liquid margarine and vegetable oil spread    Low-fat salad dressing  Other ways to decrease fat:   Read food labels before you buy foods  Choose foods that have less than 30% of calories from fat  Choose low-fat or fat-free dairy products  Remember that fat free does not mean calorie free  These foods still contain calories, and too many calories can lead to weight gain  Trim fat from meat and avoid fried food  Trim all visible fat from meat before you cook it  Remove the skin from poultry  Do not jarvis meat, fish, or poultry  Bake, roast, boil, or broil these foods instead  Avoid fried foods  Eat a baked potato instead of [place] fries  Steam vegetables instead of sautéing them in butter  Add less fat to foods  Use imitation da silva bits on salads and baked potatoes instead of regular da silva bits  Use fat-free or low-fat salad dressings instead of regular dressings  Use low-fat or nonfat butter-flavored topping instead of regular butter or margarine on popcorn and other foods  Ways to decrease fat in recipes:  Replace high-fat ingredients with low-fat or nonfat ones   This may cause baked goods to be drier than usual  You may need to use nonfat cooking spray on pans to prevent food from sticking  You also may need to change the amount of other ingredients, such as water, in the recipe  Try the following:  Use low-fat or light margarine instead of regular margarine or shortening  Use lean ground turkey breast or chicken, or lean ground beef (less than 5% fat) instead of hamburger  Add 1 teaspoon of canola oil to 8 ounces of skim milk instead of using cream or half and half  Use grated zucchini, carrots, or apples in breads instead of coconut  Use blenderized, low-fat cottage cheese, plain tofu, or low-fat ricotta cheese instead of cream cheese  Use 1 egg white and 1 teaspoon of canola oil, or use ¼ cup (2 ounces) of fat-free egg substitute instead of a whole egg  Replace half of the oil that is called for in a recipe with applesauce when you bake  Use 3 tablespoons of cocoa powder and 1 tablespoon of canola oil instead of a square of baking chocolate  How to increase fiber:  Eat enough high-fiber foods to get 20 to 30 grams of fiber every day  Slowly increase your fiber intake to avoid stomach cramps, gas, and other problems  Eat 3 ounces of whole-grain foods each day  An ounce is about 1 slice of bread  Eat whole-grain breads, such as whole-wheat bread  Whole wheat, whole-wheat flour, or other whole grains should be listed as the first ingredient on the food label  Replace white flour with whole-grain flour or use half of each in recipes  Whole-grain flour is heavier than white flour, so you may have to add more yeast or baking powder  Eat a high-fiber cereal for breakfast   Oatmeal is a good source of soluble fiber  Look for cereals that have bran or fiber in the name  Choose whole-grain products, such as brown rice, barley, and whole-wheat pasta  Eat more beans, peas, and lentils  For example, add beans to soups or salads  Eat at least 5 cups of fruits and vegetables each day   Eat fruits and vegetables with the peel because the peel is high in fiber   © 2017 [address] [facility] Information is for End User's use only and may not be sold, redistributed or otherwise used for commercial purposes  All illustrations and images included in CareNotes® are the copyrighted property of [employer]  or Riccardo Ellison  The above information is an  only  It is not intended as medical advice for individual conditions or treatments  Talk to your doctor, nurse or pharmacist before following any medical regimen to see if it is safe and effective for you  Cholesterol tips     Foods to avoid:  ·         Cut back on saturated fats and trans fats - also called hydrogenated fats  ·         Fatty meats, cold cuts, da silva, sausage  ·         Creamy sauces and fatty gravies  ·         Fried foods  ·         Egg yolks  ·         Shrimp  ·         Coconuts  ·         Shortening, butter, coconut oil, palm oil, hydrogenated oils, partially       hydrogenated margarine and lard   ·         High fat dairy products such as whole milk, cheese, ice cream  ·         Simple sugars (found in soft drinks, candy, cakes, cookies and other     baked goods)      Healthier Choices:  ·         Lean beef, skinless white meat poultry, fish  ·         Tomato sauce, vegetable purée  ·         Dried fruit, bagels, bread with jam  ·         Baked, boiled, steamed, roasted foods  ·         Egg whites or egg substitute  ·         Canola-based margarines (e g  Benacol, I can't Believe it's not Butter,   Smart Balance)    ·         Low-fat or nonfat dairy products such as 1% or fat free milk, reduced fat           cheese, nonfat frozen yogurt     Foods that will help lower cholesterol:  ·         High-fiber foods that contain lots of oats, barley, whole grains  ·         Beans  ·         Foods rich in antioxidants, such as fruits and vegetables - try for 5 -6     servings per day   ·         Cornmeal, popcorn  ·         Fatty fish such as salmon, garrett, white albacore tuna, sardines,        mackerel, tilapia  ·         Foods rich in plant sterols, such as nuts like walnuts and almonds  ·         Pumpkin, sesame, or sunflower seeds   ·         Foods high in omega-3 fatty acids, such as avocado, flax seeds, olive oil          and canola oil      Increase your exercise  ·         To keep your heart healthy try for  30 minutes of repetitive exercise daily,         5 days per week (walking, running, cycling, stationary bike,    elliptical,         stair-stepper, swimming, etc ) or 25 minutes of high intensity           exercise 3      days per week  ·         If you need to lose weight or lower your blood pressure, your goal should         be 40 minutes or moderate to high intensity exercise 3 -4 times a week  ·         You can start this slowly with a few 10 minute sessions  ·         Adapt exercise routine to orthopedic limitations  ·         Stay aerobic  ·         You should be able to talk to the person next to you  Or, if alone, you    should be able to whistle or sing  ·         Remember some activity is better than none! ·         Start slow but keep it up! Examples of Moderate Intensity:  Walking briskly (3 miles per hour or faster, but not race-walking)   Water aerobics   Bicycling slower than 10 miles per hour   Tennis (doubles)   Ballroom dancing   General gardening                          Examples of Vigorous Intensity:  Race walking, jogging, or running   Swimming laps   Tennis (singles)   Aerobic dancing   Bicycling 10 miles per hour or faster   Jumping rope   Heavy gardening (continuous digging or hoeing)   Hiking uphill or with a heavy backpack     -Go to www heart  org [heart  org] for more tips

## 2023-01-23 DIAGNOSIS — G89.29 OTHER CHRONIC PAIN: ICD-10-CM

## 2023-01-23 RX ORDER — TRAMADOL HYDROCHLORIDE 50 MG/1
50 TABLET ORAL EVERY 6 HOURS PRN
Qty: 120 TABLET | Refills: 0 | Status: SHIPPED | OUTPATIENT
Start: 2023-01-23 | End: 2023-02-21 | Stop reason: SDUPTHER

## 2023-01-23 RX ORDER — TRAMADOL HYDROCHLORIDE 50 MG/1
50 TABLET ORAL EVERY 6 HOURS PRN
Qty: 120 TABLET | Refills: 0 | Status: SHIPPED | OUTPATIENT
Start: 2023-01-23 | End: 2023-01-23 | Stop reason: SDUPTHER

## 2023-02-21 DIAGNOSIS — G89.29 OTHER CHRONIC PAIN: ICD-10-CM

## 2023-02-22 RX ORDER — TRAMADOL HYDROCHLORIDE 50 MG/1
50 TABLET ORAL EVERY 6 HOURS PRN
Qty: 120 TABLET | Refills: 0 | Status: SHIPPED | OUTPATIENT
Start: 2023-02-22

## 2023-03-23 DIAGNOSIS — G89.29 OTHER CHRONIC PAIN: ICD-10-CM

## 2023-03-23 RX ORDER — TRAMADOL HYDROCHLORIDE 50 MG/1
50 TABLET ORAL EVERY 6 HOURS PRN
Qty: 120 TABLET | Refills: 0 | Status: SHIPPED | OUTPATIENT
Start: 2023-03-23

## 2023-04-21 DIAGNOSIS — G89.29 OTHER CHRONIC PAIN: ICD-10-CM

## 2023-04-24 RX ORDER — TRAMADOL HYDROCHLORIDE 50 MG/1
50 TABLET ORAL EVERY 6 HOURS PRN
Qty: 120 TABLET | Refills: 0 | Status: SHIPPED | OUTPATIENT
Start: 2023-04-24

## 2023-05-24 DIAGNOSIS — G89.29 OTHER CHRONIC PAIN: ICD-10-CM

## 2023-05-24 NOTE — TELEPHONE ENCOUNTER
Patient left a voicemail requesting a refill on her Tramadol 50mg to be sent to [facility] in [place]

## 2023-05-25 RX ORDER — TRAMADOL HYDROCHLORIDE 50 MG/1
50 TABLET ORAL EVERY 6 HOURS PRN
Qty: 120 TABLET | Refills: 0 | Status: SHIPPED | OUTPATIENT
Start: 2023-05-25

## 2023-05-26 ENCOUNTER — HOSPITAL ENCOUNTER (EMERGENCY)
Facility: HOSPITAL | Age: 68
Discharge: HOME | End: 2023-05-26
Attending: EMERGENCY MEDICINE

## 2023-05-26 VITALS
BODY MASS INDEX: 42.51 KG/M2 | OXYGEN SATURATION: 96 % | RESPIRATION RATE: 20 BRPM | HEIGHT: 62 IN | HEART RATE: 87 BPM | DIASTOLIC BLOOD PRESSURE: 81 MMHG | SYSTOLIC BLOOD PRESSURE: 174 MMHG | TEMPERATURE: 97.4 F | WEIGHT: 231 LBS

## 2023-05-26 DIAGNOSIS — K04.7 DENTAL INFECTION: Primary | ICD-10-CM

## 2023-05-26 RX ORDER — AMOXICILLIN 875 MG/1
875 TABLET, COATED ORAL 2 TIMES DAILY
Qty: 14 TABLET | Refills: 0 | Status: SHIPPED | OUTPATIENT
Start: 2023-05-26 | End: 2023-06-02

## 2023-05-26 RX ORDER — AMOXICILLIN 500 MG/1
500 CAPSULE ORAL ONCE
Status: COMPLETED | OUTPATIENT
Start: 2023-05-26 | End: 2023-05-26

## 2023-05-26 RX ORDER — OXYCODONE HYDROCHLORIDE AND ACETAMINOPHEN 5; 325 MG/1; MG/1
1 TABLET ORAL ONCE
Status: COMPLETED | OUTPATIENT
Start: 2023-05-26 | End: 2023-05-26

## 2023-05-26 RX ADMIN — AMOXICILLIN 500 MG: 500 CAPSULE ORAL at 21:16

## 2023-05-26 RX ADMIN — OXYCODONE HYDROCHLORIDE AND ACETAMINOPHEN 1 TABLET: 5; 325 TABLET ORAL at 21:16

## 2023-05-27 NOTE — DISCHARGE INSTRUCTIONS
Take the antibiotic as prescribed  Return if symptoms worsen or if new symptoms develop  Follow up with the dental clinic

## 2023-05-27 NOTE — ED PROVIDER NOTES
History  Chief Complaint   Patient presents with   • Facial Numbness     Pt reports tooth pain and sudden numbness on Monday and then today around 1 5 hours ago     70-year-old female with prior history of cancer, chronic pain presenting with left-sided lower dental pain  Patient says symptoms been worsening over the last several days, she is taken tramadol with minimal relief of her symptoms  She did take Orajel earlier  She did have associated numbness which is since resolved  Prior to Admission Medications   Prescriptions Last Dose Informant Patient Reported?  Taking?   acetaminophen (TYLENOL) 325 mg tablet 5/26/2023  No Yes   Sig: Take 2 tablets (650 mg total) by mouth every 6 (six) hours as needed for mild pain   naproxen (NAPROSYN) 250 mg tablet 5/26/2023 Self Yes Yes   Sig: Take 220 mg by mouth as needed   traMADol (ULTRAM) 50 mg tablet 5/26/2023  No Yes   Sig: Take 1 tablet (50 mg total) by mouth every 6 (six) hours as needed for moderate pain      Facility-Administered Medications: None       Past Medical History:   Diagnosis Date   • Abnormal blood chemistry     resolved: 2/9/2017   • Abscess of groin     last assessed: 10/11/2013   • Allergic [birth year]/1953   • Arthritis    • Cancer ([address] ) 3/2014   • Cataracts, bilateral    • Chronic pain    • Diverticulitis of colon 2018   • Diverticulosis    • Endometrial cancer ([facility] [address] ) 09/07/2018   • Malignant neoplasm of uterus ([address] ) 05/19/2016   • Obesity    • Preauricular 1953   • Spinal stenosis    • Uterine cancer ([facility] [address] )     uterine       Past Surgical History:   Procedure Laterality Date   • ABCESS DRAINAGE      right ear   • CATARACT EXTRACTION, BILATERAL     • CHOLECYSTECTOMY     • EPIDURAL BLOCK INJECTION     • EYE SURGERY Left 05/2022   • EYE SURGERY  10/2018    Cataract  Both eyes   • HYSTERECTOMY      Total: With removal of both tubes and both ovaries       Family History   Problem Relation Age of Onset   • Ovarian cancer Mother    • Cancer Mother    • Pancreatic cancer Father    • Cancer Father    • Diabetes Father    • Diabetes Family         mellitus   • Cancer Paternal Grandmother      I have reviewed and agree with the history as documented  E-Cigarette/Vaping   • E-Cigarette Use Never User      E-Cigarette/Vaping Substances   • Nicotine No    • THC No    • CBD No    • Flavoring No    • Other No    • Unknown No      Social History     Tobacco Use   • Smoking status: Never   • Smokeless tobacco: Never   Vaping Use   • Vaping Use: Never used   Substance Use Topics   • Alcohol use: Yes     Comment: Social events   • Drug use: No     Comment: chronic narcotic use ( as per allscripts)       Review of Systems    Physical Exam  Physical Exam  Vitals and nursing note reviewed  Constitutional:       Appearance: She is normal weight  HENT:      Head: Normocephalic and atraumatic  Mouth/Throat:        Comments: Large dental caries  Mild gingival swelling  No drainable abscess  Eyes:      Conjunctiva/sclera: Conjunctivae normal       Pupils: Pupils are equal, round, and reactive to light  Cardiovascular:      Rate and Rhythm: Normal rate and regular rhythm  Pulmonary:      Effort: Pulmonary effort is normal  No respiratory distress  Abdominal:      General: Abdomen is flat  There is no distension  Musculoskeletal:         General: No swelling or deformity  Cervical back: Normal range of motion and neck supple  Skin:     General: Skin is dry  Coloration: Skin is not jaundiced  Neurological:      General: No focal deficit present  Mental Status: She is alert and oriented to person, place, and time  Psychiatric:         Mood and Affect: Mood normal          Thought Content:  Thought content normal          Vital Signs  ED Triage Vitals   Temperature Pulse Respirations Blood Pressure SpO2   05/26/23 2057 05/26/23 2057 05/26/23 2057 05/26/23 2058 05/26/23 2057   (!) 97 4 °F (36 3 °C) 94 18 (!) 174/81 96 %      Temp Source Heart Rate Source Patient Position - Orthostatic VS BP Location FiO2 (%)   05/26/23 2057 05/26/23 2057 05/26/23 2057 05/26/23 2057 --   Tympanic Monitor Lying Right arm       Pain Score       05/26/23 2057       10 - Worst Possible Pain           Vitals:    05/26/23 2057 05/26/23 2058 05/26/23 2100   BP:  (!) 174/81 (!) 174/81   Pulse: 94  87   Patient Position - Orthostatic VS: Lying           Visual Acuity  Visual Acuity    Flowsheet Row Most Recent Value   L Pupil Size (mm) 3   R Pupil Size (mm) 3          ED Medications  Medications   amoxicillin (AMOXIL) capsule 500 mg (500 mg Oral Given 5/26/23 2116)   oxyCODONE-acetaminophen (PERCOCET) 5-325 mg per tablet 1 tablet (1 tablet Oral Given 5/26/23 2116)       Diagnostic Studies  Results Reviewed     None                 No orders to display              Procedures  Procedures         ED Course                               SBIRT 22yo+    Flowsheet Row Most Recent Value   Initial Alcohol Screen: US AUDIT-C     1  How often do you have a drink containing alcohol? 0 Filed at: 05/26/2023 2057   2  How many drinks containing alcohol do you have on a typical day you are drinking? 0 Filed at: 05/26/2023 2057   3b  FEMALE Any Age, or MALE 65+: How often do you have 4 or more drinks on one occassion? 0 Filed at: 05/26/2023 2057   Audit-C Score 0 Filed at: 05/26/2023 2057   TALHA: How many times in the past year have you    Used an illegal drug or used a prescription medication for non-medical reasons? Never Filed at: 05/26/2023 2057                    Medical Decision Making  68-year-old female presenting with left-sided dental pain  Differential includes a caries, periapical abscess  She did have associated numbness; obviously facial numbness raises concern for acute CVA however this appears to be related to the Orajel that she has been using      We will start patient on amoxicillin for her dental infection, patient will follow-up with the dental clinic early next week and she was given return precautions  She is comfortable with this plan  She has Ultram at home, antibiotic should improve pain as well  Dental infection: acute illness or injury  Risk  Prescription drug management  Disposition  Final diagnoses:   Dental infection     Time reflects when diagnosis was documented in both MDM as applicable and the Disposition within this note     Time User Action Codes Description Comment    5/26/2023  9:08 PM Daryl Landis Add [K04 7] Dental infection       ED Disposition     ED Disposition   Discharge    Condition   Stable    Date/Time   Fri May 26, 2023  9:08 PM    [address] [facility] discharge to home/self care  Follow-up Information     Follow up With Specialties Details Why [address] [id number] [phone]          Discharge Medication List as of 5/26/2023  9:10 PM      START taking these medications    Details   amoxicillin (AMOXIL) 875 mg tablet Take 1 tablet (875 mg total) by mouth 2 (two) times a day for 7 days, Starting Fri 5/26/2023, Until Fri 6/2/2023, Normal         CONTINUE these medications which have NOT CHANGED    Details   acetaminophen (TYLENOL) 325 mg tablet Take 2 tablets (650 mg total) by mouth every 6 (six) hours as needed for mild pain, Starting Mon 5/21/2018, Print      naproxen (NAPROSYN) 250 mg tablet Take 220 mg by mouth as needed, Historical Med      traMADol (ULTRAM) 50 mg tablet Take 1 tablet (50 mg total) by mouth every 6 (six) hours as needed for moderate pain, Starting Thu 5/25/2023, Normal             No discharge procedures on file      PDMP Review       Value Time User    PDMP Reviewed  Yes 6/13/2022  7:07 AM Rashida Griffiths, [address]           ED Provider  Electronically Signed by           Mata Gore DO  05/27/23 [id number]

## 2023-06-06 ENCOUNTER — TELEPHONE (OUTPATIENT)
Dept: FAMILY MEDICINE CLINIC | Facility: CLINIC | Age: 68
End: 2023-06-06

## 2023-06-06 NOTE — TELEPHONE ENCOUNTER
Patient called stating she recently got a tooth infection and was prescribed amoxicillin by the ER for ten days  Then she got in with her dentist who told her she still has the infection and to take amoxicillin again for another 10 days  She is already having diarrhea and she is afraid she will get a yeast infection with all of the amoxicillin she is taking  Is there anything over the counter she can take for the diarrhea and to prevent a yeast infection?

## 2023-06-23 DIAGNOSIS — G89.29 OTHER CHRONIC PAIN: ICD-10-CM

## 2023-06-23 NOTE — TELEPHONE ENCOUNTER
Patient called requesting a refill for tramadol 50mg to be sent to [facility] in [place]

## 2023-06-25 RX ORDER — TRAMADOL HYDROCHLORIDE 50 MG/1
50 TABLET ORAL EVERY 6 HOURS PRN
Qty: 120 TABLET | Refills: 0 | Status: SHIPPED | OUTPATIENT
Start: 2023-06-25

## 2023-07-03 ENCOUNTER — RA CDI HCC (OUTPATIENT)
Dept: OTHER | Facility: HOSPITAL | Age: 68
End: 2023-07-03

## 2023-07-03 NOTE — PROGRESS NOTES
[address] [facility] coding opportunities       Chart reviewed, no opportunity found: CHART REVIEWED, NO OPPORTUNITY FOUND        Patients Insurance     Medicare Insurance: Medicare

## 2023-07-05 ENCOUNTER — APPOINTMENT (OUTPATIENT)
Dept: LAB | Facility: CLINIC | Age: 68
End: 2023-07-05
Payer: MEDICARE

## 2023-07-05 DIAGNOSIS — E78.2 MIXED HYPERLIPIDEMIA: ICD-10-CM

## 2023-07-05 LAB
CHOLEST SERPL-MCNC: 216 MG/DL
HDLC SERPL-MCNC: 48 MG/DL
LDLC SERPL CALC-MCNC: 131 MG/DL (ref 0–100)
TRIGL SERPL-MCNC: 185 MG/DL

## 2023-07-05 PROCEDURE — 36415 COLL VENOUS BLD VENIPUNCTURE: CPT

## 2023-07-05 PROCEDURE — 80061 LIPID PANEL: CPT

## 2023-07-10 ENCOUNTER — OFFICE VISIT (OUTPATIENT)
Dept: FAMILY MEDICINE CLINIC | Facility: CLINIC | Age: 68
End: 2023-07-10
Payer: MEDICARE

## 2023-07-10 VITALS
WEIGHT: 236 LBS | SYSTOLIC BLOOD PRESSURE: 126 MMHG | TEMPERATURE: 96.8 F | DIASTOLIC BLOOD PRESSURE: 76 MMHG | HEIGHT: 62 IN | BODY MASS INDEX: 43.43 KG/M2 | OXYGEN SATURATION: 100 % | HEART RATE: 75 BPM

## 2023-07-10 DIAGNOSIS — Z13.29 SCREENING FOR THYROID DISORDER: ICD-10-CM

## 2023-07-10 DIAGNOSIS — E78.2 MIXED HYPERLIPIDEMIA: ICD-10-CM

## 2023-07-10 DIAGNOSIS — Z13.0 SCREENING FOR DEFICIENCY ANEMIA: Primary | ICD-10-CM

## 2023-07-10 DIAGNOSIS — Z13.1 SCREENING FOR DIABETES MELLITUS: ICD-10-CM

## 2023-07-10 PROCEDURE — 99214 OFFICE O/P EST MOD 30 MIN: CPT | Performed by: NURSE PRACTITIONER

## 2023-07-10 NOTE — PROGRESS NOTES
Assessment/Plan:    Problem List Items Addressed This Visit     Mixed hyperlipidemia    Relevant Orders    Lipid Panel with Direct LDL reflex   Other Visit Diagnoses     Screening for deficiency anemia    -  Primary    Relevant Orders    CBC and differential    Screening for diabetes mellitus        Relevant Orders    Comprehensive metabolic panel    Screening for thyroid disorder        Relevant Orders    TSH, 3rd generation with Free T4 reflex           Diagnoses and all orders for this visit:    Screening for deficiency anemia  -     CBC and differential; Future    Screening for diabetes mellitus  -     Comprehensive metabolic panel; Future    Screening for thyroid disorder  -     TSH, 3rd generation with Free T4 reflex; Future    Mixed hyperlipidemia  -     Lipid Panel with Direct LDL reflex; Future        No problem-specific Assessment & Plan notes found for this encounter. Subjective:      Patient ID: Shahriar Rosario is a 77 y.o. female. Hyperlipidemia  This is a chronic problem. The problem is controlled. Recent lipid tests were reviewed and are variable. There are no known factors aggravating her hyperlipidemia. Pertinent negatives include no leg pain. Current antihyperlipidemic treatment includes diet change. The current treatment provides significant improvement of lipids. There are no compliance problems. Back Pain  This is a chronic problem. Progression since onset: stable with current regimen. The pain is present in the lumbar spine. Pertinent negatives include no bladder incontinence, bowel incontinence, leg pain, numbness, paresis, paresthesias, pelvic pain, perianal numbness, tingling or weakness.        The following portions of the patient's history were reviewed and updated as appropriate:   She has a past medical history of Abnormal blood chemistry, Abscess of groin, Allergic (12/1955), Arthritis, Cancer ([address]) (3/2014), Cataracts, bilateral, Chronic pain, Diverticulitis of colon (2018), Diverticulosis, Endometrial cancer ([address]) (09/07/2018), Malignant neoplasm of uterus ([address]) (05/19/2016), Obesity, Preauricular (1955), Spinal stenosis, and Uterine cancer ([address]). ,  does not have any pertinent problems on file. ,   has a past surgical history that includes Cholecystectomy; Hysterectomy; Epidural block injection; Abscess drainage; Cataract extraction, bilateral; EYE SURGERY (Left, 05/2022); and Eye surgery (10/2018). ,  family history includes Cancer in her father, mother, and paternal grandmother; Diabetes in her family and father; Ovarian cancer in her mother; Pancreatic cancer in her father. ,   reports that she has never smoked. She has never used smokeless tobacco. She reports current alcohol use. She reports that she does not use drugs. ,  is allergic to paclitaxel. .  Current Outpatient Medications   Medication Sig Dispense Refill   • acetaminophen (TYLENOL) 325 mg tablet Take 2 tablets (650 mg total) by mouth every 6 (six) hours as needed for mild pain 30 tablet 0   • naproxen (NAPROSYN) 250 mg tablet Take 220 mg by mouth as needed     • traMADol (ULTRAM) 50 mg tablet Take 1 tablet (50 mg total) by mouth every 6 (six) hours as needed for moderate pain 120 tablet 0     No current facility-administered medications for this visit. Depression Screening and Follow-up Plan: Patient was screened for depression during today's encounter. They screened negative with a PHQ-2 score of 0. Review of Systems   Gastrointestinal: Negative for bowel incontinence. Genitourinary: Negative for bladder incontinence and pelvic pain. Musculoskeletal: Positive for back pain. Neurological: Negative for tingling, weakness, numbness and paresthesias. All other systems reviewed and are negative.         Objective:  Vitals:    07/10/23 0920   BP: 126/76   BP Location: Left arm   Patient Position: Sitting   Cuff Size: Adult   Pulse: 75   Temp: (!) 96.8 °F (36 °C)   TempSrc: Tympanic   SpO2: 100% Weight: 107 kg (236 lb)   Height: 5' 2" (1.575 m)     Body mass index is 43.16 kg/m². Physical Exam  Vitals and nursing note reviewed. Constitutional:       Appearance: Normal appearance. She is well-developed. She is obese. HENT:      Head: Normocephalic and atraumatic. Right Ear: Tympanic membrane, ear canal and external ear normal.      Left Ear: Tympanic membrane, ear canal and external ear normal.      Nose: Nose normal.      Mouth/Throat:      Mouth: Mucous membranes are moist.      Pharynx: Uvula midline. Eyes:      General: Lids are normal.      Conjunctiva/sclera: Conjunctivae normal.      Pupils: Pupils are equal, round, and reactive to light. Neck:      Thyroid: No thyroid mass or thyromegaly. Vascular: No JVD. Trachea: Trachea and phonation normal.   Cardiovascular:      Rate and Rhythm: Normal rate and regular rhythm. Pulses: Normal pulses. Heart sounds: Normal heart sounds, S1 normal and S2 normal. No murmur heard. No friction rub. No gallop. Pulmonary:      Effort: Pulmonary effort is normal.      Breath sounds: Normal breath sounds. Abdominal:      General: Bowel sounds are normal.      Palpations: Abdomen is soft. Tenderness: There is no abdominal tenderness. Genitourinary:     Comments: Deferred   Musculoskeletal:         General: Normal range of motion. Cervical back: Full passive range of motion without pain, normal range of motion and neck supple. Right lower leg: No edema. Left lower leg: No edema. Lymphadenopathy:      Head:      Right side of head: No submental, submandibular, tonsillar, preauricular, posterior auricular or occipital adenopathy. Left side of head: No submental, submandibular, tonsillar, preauricular, posterior auricular or occipital adenopathy. Cervical: No cervical adenopathy. Skin:     General: Skin is warm and dry. Capillary Refill: Capillary refill takes less than 2 seconds. Neurological:      General: No focal deficit present. Mental Status: She is alert and oriented to person, place, and time. Cranial Nerves: No cranial nerve deficit. Sensory: Sensation is intact. Motor: Motor function is intact. Coordination: Coordination is intact. Gait: Gait is intact. Deep Tendon Reflexes: Reflexes are normal and symmetric. Psychiatric:         Attention and Perception: Attention and perception normal.         Mood and Affect: Mood and affect normal.         Speech: Speech normal.         Behavior: Behavior normal. Behavior is cooperative. Thought Content: Thought content normal.         Cognition and Memory: Cognition normal.         Judgment: Judgment normal.           Portions of the record may have been created with voice recognition software. Occasional wrong word or "sound a like" substitutions may have occurred due to the inherent limitations of voice recognition software. Read the chart carefully and recognize, using context, where substitutions have occurred. Contact me with any questions.

## 2023-07-10 NOTE — PATIENT INSTRUCTIONS
Cholesterol and Your Health   AMBULATORY CARE:   Cholesterol  is a waxy, fat-like substance. Cholesterol is made by your body, but also comes from certain foods you eat. Your body uses cholesterol to make hormones and new cells. Your body also uses cholesterol to protect nerves. Cholesterol comes from foods such as meat and dairy products. Your total cholesterol level is made up by LDL cholesterol, HDL cholesterol, and triglycerides:  LDL cholesterol  is called bad cholesterol  because it forms plaque in your arteries. As plaque builds up, your arteries become narrow, and less blood flows through. When plaque decreases blood flow to your heart, you may have chest pain. If plaque completely blocks an artery that bring blood to your heart, you may have a heart attack. Plaque can break off and form blood clots. Blood clots may block arteries in your brain and cause a stroke. HDL cholesterol  is called good cholesterol  because it helps remove LDL cholesterol from your arteries. It does this by attaching to LDL cholesterol and carrying it to your liver. Your liver breaks down LDL cholesterol so your body can get rid of it. High levels of HDL cholesterol can help prevent a heart attack and stroke. Low levels of HDL cholesterol can increase your risk for heart disease, heart attack, and stroke. Triglycerides  are a type of fat that store energy from foods you eat. High levels of triglycerides also cause plaque buildup. This can increase your risk for a heart attack or stroke. If your triglyceride level is high, your LDL cholesterol level may also be high. How food affects your cholesterol levels:   Unhealthy fats  increase LDL cholesterol and triglyceride levels in your blood. They are found in foods high in cholesterol, saturated fat, and trans fat:     Cholesterol  is found in eggs, dairy, and meat. Saturated fat  is found in butter, cheese, ice cream, whole milk, and coconut oil.  Saturated fat is also found in meat, such as sausage, hot dogs, and bologna. Trans fat  is found in liquid oils and is used in fried and baked foods. Foods that contain trans fats include chips, crackers, muffins, sweet rolls, microwave popcorn, and cookies. Healthy fats,  also called unsaturated fats, help lower LDL cholesterol and triglyceride levels. Healthy fats include the following:     Monounsaturated fats  are found in foods such as olive oil, canola oil, avocado, nuts, and olives. Polyunsaturated fats,  such as omega 3 fats, are found in fish, such as salmon, trout, and tuna. They can also be found in plant foods such as flaxseed, walnuts, and soybeans. Other things that affect your cholesterol levels:   Smoking cigarettes    Being overweight or obese     Drinking large amounts of alcohol    Not enough exercise or no exercise    Certain genes passed from your parents to you  What you need to know about having your cholesterol levels checked: Adults 21to 39years of age should have their cholesterol levels checked every 4 to 6 years. Adults 45 years and older should have their cholesterol checked every 1 to 2 years. You may need your cholesterol checked more often, or at a younger age, if you have risk factors for heart disease. You may also need to have your cholesterol checked more often if you have other health conditions, such as diabetes. Blood tests are used to check cholesterol levels. Blood tests measure your levels of triglycerides, LDL cholesterol, and HDL cholesterol. Cholesterol level goals: Your cholesterol level goal may depend on your risk for heart disease. It may also depend on your age and other health conditions. Ask your healthcare provider if the following goals are right for you: Your total cholesterol level  should be less than 200 mg/dL. This number may also depend on your HDL and LDL cholesterol goals. Your LDL cholesterol level  should be less than 130 mg/dL.      Your HDL cholesterol level  should be 60 mg/dL or higher. Your triglyceride level  should be less than 150 mg/dL. Treatment for high cholesterol:  Treatment for high cholesterol will also decrease your risk of heart disease, heart attack, and stroke. Treatment may include any of the following:  Medicines  may be given to lower your LDL cholesterol, triglyceride levels, or total cholesterol level. You may need medicines to lower your cholesterol if any of the following is true:     You have a history of stroke, TIA, unstable angina, or a heart attack    Your LDL cholesterol level is 190 mg/dL or higher    You are age 36to 76years of age, have diabetes, and your LDL cholesterol is 70 mg/dL or higher    You are age 36to 76years of age, have risk factors for heart disease, and your LDL cholesterol is 70 mg/dL or higher    Lifestyle changes  include changes to your diet, exercise, weight loss, and quitting smoking. It also includes decreasing the amount of alcohol you drink. Supplements  include fish oil, red yeast rice, and garlic. Fish oil may help lower your triglyceride and LDL cholesterol levels. It may also increase your HDL cholesterol level. Red yeast rice may help decrease your total cholesterol level and LDL cholesterol level. Garlic may help lower your total cholesterol level. Do not take these supplements without talking to your healthcare provider. Nutrition to help lower your cholesterol levels:  A registered dietitian can help you create a healthy eating plan. Read food labels and choose foods low in saturated fat, trans fats, and cholesterol. Decrease the total amount of fat you eat. Choose lean meats, fat-free or 1% fat milk, and low-fat dairy products, such as yogurt and cheese. Try to limit or avoid red meats. Limit or do not eat fried foods or baked goods such as cookies. Replace unhealthy fats with healthy fats. Cook foods in olive oil or canola oil.  Choose soft margarines that are low in saturated fat and trans fat. Seeds, nuts, and avocados are other examples of healthy fats. Eat foods with omega-3 fats. Examples include salmon, tuna, mackerel, walnuts, and flaxseed. Eat fish 2 times per week. Children and pregnant women should not eat fish that have high levels of mercury, such as shark, swordfish, and werner mackerel. Increase the amount of plant-based foods you eat. Plant-based foods are low in cholesterol and fat. Eating more of these foods may help lower your cholesterol and help you lose weight. Examples of plant-based foods includes fruits, vegetables, legumes, and whole grains. Replace milk that contains dairy with almond, soy, or coconut milk. Eat beans and foods with soy for protein instead of meat. Ask your healthcare provider or dietitian for more information on plant-based foods. Increase the amount of fiber you eat. High-fiber foods can help lower your LDL cholesterol. You should eat between 20 and 30 grams of fiber each day. Eat at least 5 servings of fruits and vegetables each day. Other examples of high-fiber foods include whole-grain or whole-wheat breads, pastas, or cereals, and brown rice. Eat 3 ounces of whole-grain foods each day. Increase fiber slowly. You may have abdominal discomfort, bloating, and gas if you add fiber to your diet too quickly. Lifestyle changes you can make to help lower your cholesterol levels:   Maintain a healthy weight. Ask your healthcare provider how much you should weigh. Ask him or her to help you create a weight loss plan if you are overweight. Weight loss can decrease your total cholesterol and triglyceride levels. Exercise regularly. Exercise can help lower your total cholesterol level and maintain a healthy weight. Exercise can also help increase your HDL cholesterol level. Work with your healthcare provider to create an exercise program that is right for you.  Get at least 30 minutes of moderate exercise most days of the week. Examples of exercise include brisk walking, swimming, or biking. Do not smoke. Nicotine and other chemicals in cigarettes and cigars can damage your lungs, heart, and blood vessels. They can also raise your triglyceride levels. Ask your healthcare provider for information if you currently smoke and need help to quit. E-cigarettes or smokeless tobacco still contain nicotine. Talk to your healthcare provider before you use these products. Limit or do not drink alcohol. Alcohol can increase your triglyceride levels. Ask your healthcare provider if it is safe for you to drink alcohol. Also ask how much is safe for you to drink each day. © 2017 5 [facility] Information is for End User's use only and may not be sold, redistributed or otherwise used for commercial purposes. All illustrations and images included in CareNotes® are the copyrighted property of [employer] or [employer]. The above information is an  only. It is not intended as medical advice for individual conditions or treatments. Talk to your doctor, nurse or pharmacist before following any medical regimen to see if it is safe and effective for you. Lipid Profile   AMBULATORY CARE:   A lipid profile,  or lipid panel, is a blood test to check your lipid levels. Lipids are fats that cannot dissolve in blood. High lipid levels increase your risk for heart disease and a heart attack or stroke. A lipid profile includes the following: Total cholesterol  is the main number used for cholesterol values. Goal: Less than 200 mg/dL    Borderline high: 200 to 239 mg/dL    High: 240 mg/dL or higher    LDL (bad) cholesterol  carries cholesterol and deposits it in the arteries. This can cause a blockage.     Goal: 100 mg/dL or lower    Near goal: 100 to 129 mg/dL     Borderline high: 130 to 159 mg/dL    High: 160 to 189 mg/dL    Very high: 190 mg/dL or higher    HDL (good) cholesterol  removes cholesterol from your body. Goal: 60 mg/dL or higher    Borderline risk: 40 to 59 mg/dL    High risk: 40 mg/dL or lower    Triglycerides  are a different kind of fat than cholesterol. Goal: 150 mg/dL or lower    Borderline high: 150 to 199 mg/dL    High: 200 to 499 mg/dL    Very high: 500 mg/dL or higher  How to prepare for the test:  Do not eat or drink anything, except water, for 12 to 14 hours before the test. Ask your healthcare provider if you should take your medicines on the day of your test.   What you need to know about your test results: Your healthcare provider will discuss your test results with you. If your test results are abnormal, you may need treatment to decrease your risk for heart disease. © 2017 [address] [facility] Information is for End User's use only and may not be sold, redistributed or otherwise used for commercial purposes. All illustrations and images included in CareNotes® are the copyrighted property of [employer]. or [facility]. The above information is an  only. It is not intended as medical advice for individual conditions or treatments. Talk to your doctor, nurse or pharmacist before following any medical regimen to see if it is safe and effective for you. Low Fat Diet   AMBULATORY CARE:   A low-fat diet  is an eating plan that is low in total fat, unhealthy fat, and cholesterol. You may need to follow a low-fat diet if you have trouble digesting or absorbing fat. You may also need to follow this diet if you have high cholesterol. You can also lower your cholesterol by increasing the amount of fiber in your diet. Soluble fiber is a type of fiber that helps to decrease cholesterol levels. Different types of fat in food:   Limit unhealthy fats. A diet that is high in cholesterol, saturated fat, and trans fat may cause unhealthy cholesterol levels. Unhealthy cholesterol levels increase your risk of heart disease.     Cholesterol:  Limit intake of cholesterol to less than 200 mg per day. Cholesterol is found in meat, eggs, and dairy. Saturated fat:  Limit saturated fat to less than 7% of your total daily calories. Ask your dietitian how many calories you need each day. Saturated fat is found in butter, cheese, ice cream, whole milk, and palm oil. Saturated fat is also found in meat, such as beef, pork, chicken skin, and processed meats. Processed meats include sausage, hot dogs, and bologna. Trans fat:  Avoid trans fat as much as possible. Trans fat is used in fried and baked foods. Foods that say trans fat free on the label may still have up to 0.5 grams of trans fat per serving. Include healthy fats. Replace foods that are high in saturated and trans fat with foods high in healthy fats. This may help to decrease high cholesterol levels. Monounsaturated fats: These are found in avocados, nuts, and vegetable oils, such as olive, canola, and sunflower oil. Polyunsaturated fats: These can be found in vegetable oils, such as soybean or corn oil. Omega-3 fats can help to decrease the risk of heart disease. Omega-3 fats are found in fish, such as salmon, herring, trout, and tuna. Omega-3 fats can also be found in plant foods, such as walnuts, flaxseed, soybeans, and canola oil.   Foods to limit or avoid:   Grains:      Snacks that are made with partially hydrogenated oils, such as chips, regular crackers, and butter-flavored popcorn    High-fat baked goods, such as biscuits, croissants, doughnuts, pies, cookies, and pastries    Dairy:      Whole milk, 2% milk, and yogurt and ice cream made with whole milk    Half and half creamer, heavy cream, and whipping cream    Cheese, cream cheese, and sour cream    Meats and proteins:      High-fat cuts of meat (T-bone steak, regular hamburger, and ribs)    Fried meat, poultry (turkey and chicken), and fish    Poultry (chicken and turkey) with skin    Cold cuts (salami or bologna), hot dogs, da silva, and sausage    Whole eggs and egg yolks    Vegetables and fruits with added fat:      Fried vegetables or vegetables in butter or high-fat sauces, such as cream or cheese sauces    Fried fruit or fruit served with butter or cream    Fats:      Butter, stick margarine, and shortening    Coconut, palm oil, and palm kernel oil  Foods to include:   Grains:      Whole-grain breads, cereals, pasta, and brown rice    Low-fat crackers and pretzels    Vegetables and fruits:      Fresh, frozen, or canned vegetables (no salt or low-sodium)    Fresh, frozen, dried, or canned fruit (canned in light syrup or fruit juice)    Avocado    Low-fat dairy products:      Nonfat (skim) or 1% milk    Nonfat or low-fat cheese, yogurt, and cottage cheese    Meats and proteins:      Chicken or turkey with no skin    Baked or broiled fish    Lean beef and pork (loin, round, extra lean hamburger)    Beans and peas, unsalted nuts, soy products    Egg whites and substitutes    Seeds and nuts    Fats:      Unsaturated oil, such as canola, olive, peanut, soybean, or sunflower oil    Soft or liquid margarine and vegetable oil spread    Low-fat salad dressing  Other ways to decrease fat:   Read food labels before you buy foods. Choose foods that have less than 30% of calories from fat. Choose low-fat or fat-free dairy products. Remember that fat free does not mean calorie free. These foods still contain calories, and too many calories can lead to weight gain. Trim fat from meat and avoid fried food. Trim all visible fat from meat before you cook it. Remove the skin from poultry. Do not jarvis meat, fish, or poultry. Bake, roast, boil, or broil these foods instead. Avoid fried foods. Eat a baked potato instead of [place] fries. Steam vegetables instead of sautéing them in butter. Add less fat to foods. Use imitation da silva bits on salads and baked potatoes instead of regular da silva bits.  Use fat-free or low-fat salad dressings instead of regular dressings. Use low-fat or nonfat butter-flavored topping instead of regular butter or margarine on popcorn and other foods. Ways to decrease fat in recipes:  Replace high-fat ingredients with low-fat or nonfat ones. This may cause baked goods to be drier than usual. You may need to use nonfat cooking spray on pans to prevent food from sticking. You also may need to change the amount of other ingredients, such as water, in the recipe. Try the following:  Use low-fat or light margarine instead of regular margarine or shortening. Use lean ground turkey breast or chicken, or lean ground beef (less than 5% fat) instead of hamburger. Add 1 teaspoon of canola oil to 8 ounces of skim milk instead of using cream or half and half. Use grated zucchini, carrots, or apples in breads instead of coconut. Use blenderized, low-fat cottage cheese, plain tofu, or low-fat ricotta cheese instead of cream cheese. Use 1 egg white and 1 teaspoon of canola oil, or use ¼ cup (2 ounces) of fat-free egg substitute instead of a whole egg. Replace half of the oil that is called for in a recipe with applesauce when you bake. Use 3 tablespoons of cocoa powder and 1 tablespoon of canola oil instead of a square of baking chocolate. How to increase fiber:  Eat enough high-fiber foods to get 20 to 30 grams of fiber every day. Slowly increase your fiber intake to avoid stomach cramps, gas, and other problems. Eat 3 ounces of whole-grain foods each day. An ounce is about 1 slice of bread. Eat whole-grain breads, such as whole-wheat bread. Whole wheat, whole-wheat flour, or other whole grains should be listed as the first ingredient on the food label. Replace white flour with whole-grain flour or use half of each in recipes. Whole-grain flour is heavier than white flour, so you may have to add more yeast or baking powder. Eat a high-fiber cereal for breakfast.  Oatmeal is a good source of soluble fiber.  Look for cereals that have bran or fiber in the name. Choose whole-grain products, such as brown rice, barley, and whole-wheat pasta. Eat more beans, peas, and lentils. For example, add beans to soups or salads. Eat at least 5 cups of fruits and vegetables each day. Eat fruits and vegetables with the peel because the peel is high in fiber. © 2017 5 [facility] [place] Information is for End User's use only and may not be sold, redistributed or otherwise used for commercial purposes. All illustrations and images included in CareNotes® are the copyrighted property of [employer]. or [employer]. The above information is an  only. It is not intended as medical advice for individual conditions or treatments. Talk to your doctor, nurse or pharmacist before following any medical regimen to see if it is safe and effective for you. Cholesterol tips     Foods to avoid:  ·         Cut back on saturated fats and trans fats - also called hydrogenated fats  ·         Fatty meats, cold cuts, da silva, sausage  ·         Creamy sauces and fatty gravies  ·         Fried foods  ·         Egg yolks  ·         Shrimp  ·         Coconuts  ·         Shortening, butter, coconut oil, palm oil, hydrogenated oils, partially       hydrogenated margarine and lard   ·         High fat dairy products such as whole milk, cheese, ice cream  ·         Simple sugars (found in soft drinks, candy, cakes, cookies and other     baked goods)      Healthier Choices:  ·         Lean beef, skinless white meat poultry, fish  ·         Tomato sauce, vegetable purée  ·         Dried fruit, bagels, bread with jam  ·         Baked, boiled, steamed, roasted foods  ·         Egg whites or egg substitute  ·         Canola-based margarines (e.g. Benacol, I can't Believe it's not Butter,   Smart Balance).   ·         Low-fat or nonfat dairy products such as 1% or fat free milk, reduced fat           cheese, nonfat frozen yogurt Foods that will help lower cholesterol:  ·         High-fiber foods that contain lots of oats, barley, whole grains  ·         Beans  ·         Foods rich in antioxidants, such as fruits and vegetables - try for 5 -6     servings per day   ·         Cornmeal, popcorn  ·         Fatty fish such as salmon, garrett, white albacore tuna, sardines,        mackerel, tilapia  ·         Foods rich in plant sterols, such as nuts like walnuts and almonds  ·         Pumpkin, sesame, or sunflower seeds   ·         Foods high in omega-3 fatty acids, such as avocado, flax seeds, olive oil          and canola oil      Increase your exercise  ·         To keep your heart healthy try for  30 minutes of repetitive exercise daily,         5 days per week (walking, running, cycling, stationary bike,    elliptical,         stair-stepper, swimming, etc.) or 25 minutes of high intensity           exercise 3      days per week. ·         If you need to lose weight or lower your blood pressure, your goal should         be 40 minutes or moderate to high intensity exercise 3 -4 times a week. ·         You can start this slowly with a few 10 minute sessions. ·         Adapt exercise routine to orthopedic limitations. ·         Stay aerobic. ·         You should be able to talk to the person next to you. Or, if alone, you    should be able to whistle or sing. ·         Remember some activity is better than none! ·         Start slow but keep it up!                       Examples of Moderate Intensity:  Walking briskly (3 miles per hour or faster, but not race-walking)   Water aerobics   Bicycling slower than 10 miles per hour   Tennis (doubles)   Ballroom dancing   General gardening                          Examples of Vigorous Intensity:  Race walking, jogging, or running   Swimming laps   Tennis (singles)   Aerobic dancing   Bicycling 10 miles per hour or faster   Jumping rope   Heavy gardening (continuous digging or hoeing) Hiking uphill or with a heavy backpack     -Go to www.heart. org [heart. org] for more tips

## 2023-07-27 DIAGNOSIS — G89.29 OTHER CHRONIC PAIN: ICD-10-CM

## 2023-07-27 RX ORDER — TRAMADOL HYDROCHLORIDE 50 MG/1
50 TABLET ORAL EVERY 6 HOURS PRN
Qty: 120 TABLET | Refills: 0 | Status: SHIPPED | OUTPATIENT
Start: 2023-07-27

## 2023-08-28 DIAGNOSIS — G89.29 OTHER CHRONIC PAIN: ICD-10-CM

## 2023-08-28 RX ORDER — TRAMADOL HYDROCHLORIDE 50 MG/1
50 TABLET ORAL EVERY 6 HOURS PRN
Qty: 120 TABLET | Refills: 0 | Status: SHIPPED | OUTPATIENT
Start: 2023-08-28

## 2023-09-27 DIAGNOSIS — G89.29 OTHER CHRONIC PAIN: ICD-10-CM

## 2023-09-27 RX ORDER — TRAMADOL HYDROCHLORIDE 50 MG/1
50 TABLET ORAL EVERY 6 HOURS PRN
Qty: 120 TABLET | Refills: 0 | Status: SHIPPED | OUTPATIENT
Start: 2023-09-27

## 2023-10-27 DIAGNOSIS — G89.29 OTHER CHRONIC PAIN: ICD-10-CM

## 2023-10-28 RX ORDER — TRAMADOL HYDROCHLORIDE 50 MG/1
50 TABLET ORAL EVERY 6 HOURS PRN
Qty: 120 TABLET | Refills: 0 | Status: SHIPPED | OUTPATIENT
Start: 2023-10-28 | End: 2023-10-31 | Stop reason: SDUPTHER

## 2023-10-31 DIAGNOSIS — G89.29 OTHER CHRONIC PAIN: ICD-10-CM

## 2023-11-01 RX ORDER — TRAMADOL HYDROCHLORIDE 50 MG/1
50 TABLET ORAL EVERY 6 HOURS PRN
Qty: 120 TABLET | Refills: 0 | Status: SHIPPED | OUTPATIENT
Start: 2023-11-01

## 2023-11-27 DIAGNOSIS — G89.29 OTHER CHRONIC PAIN: ICD-10-CM

## 2023-11-27 RX ORDER — TRAMADOL HYDROCHLORIDE 50 MG/1
50 TABLET ORAL EVERY 6 HOURS PRN
Qty: 120 TABLET | Refills: 0 | Status: SHIPPED | OUTPATIENT
Start: 2023-11-27

## 2023-12-28 DIAGNOSIS — G89.29 OTHER CHRONIC PAIN: ICD-10-CM

## 2023-12-29 RX ORDER — TRAMADOL HYDROCHLORIDE 50 MG/1
50 TABLET ORAL EVERY 6 HOURS PRN
Qty: 120 TABLET | Refills: 0 | Status: SHIPPED | OUTPATIENT
Start: 2023-12-29

## 2024-01-12 ENCOUNTER — APPOINTMENT (OUTPATIENT)
Dept: LAB | Facility: CLINIC | Age: 69
End: 2024-01-12
Payer: MEDICARE

## 2024-01-12 DIAGNOSIS — E78.2 MIXED HYPERLIPIDEMIA: ICD-10-CM

## 2024-01-12 DIAGNOSIS — Z13.29 SCREENING FOR THYROID DISORDER: ICD-10-CM

## 2024-01-12 DIAGNOSIS — Z13.0 SCREENING FOR DEFICIENCY ANEMIA: ICD-10-CM

## 2024-01-12 DIAGNOSIS — Z13.1 SCREENING FOR DIABETES MELLITUS: ICD-10-CM

## 2024-01-12 LAB
ALBUMIN SERPL BCP-MCNC: 4.2 G/DL (ref 3.5–5)
ALP SERPL-CCNC: 48 U/L (ref 34–104)
ALT SERPL W P-5'-P-CCNC: 17 U/L (ref 7–52)
ANION GAP SERPL CALCULATED.3IONS-SCNC: 11 MMOL/L
AST SERPL W P-5'-P-CCNC: 21 U/L (ref 13–39)
BASOPHILS # BLD AUTO: 0.05 THOUSANDS/ÂΜL (ref 0–0.1)
BASOPHILS NFR BLD AUTO: 1 % (ref 0–1)
BILIRUB SERPL-MCNC: 0.51 MG/DL (ref 0.2–1)
BUN SERPL-MCNC: 18 MG/DL (ref 5–25)
CALCIUM SERPL-MCNC: 9.1 MG/DL (ref 8.4–10.2)
CHLORIDE SERPL-SCNC: 102 MMOL/L (ref 96–108)
CHOLEST SERPL-MCNC: 222 MG/DL
CO2 SERPL-SCNC: 27 MMOL/L (ref 21–32)
CREAT SERPL-MCNC: 0.77 MG/DL (ref 0.6–1.3)
EOSINOPHIL # BLD AUTO: 0.23 THOUSAND/ÂΜL (ref 0–0.61)
EOSINOPHIL NFR BLD AUTO: 3 % (ref 0–6)
ERYTHROCYTE [DISTWIDTH] IN BLOOD BY AUTOMATED COUNT: 12.1 % (ref 11.6–15.1)
GFR SERPL CREATININE-BSD FRML MDRD: 78 ML/MIN/1.73SQ M
GLUCOSE P FAST SERPL-MCNC: 96 MG/DL (ref 65–99)
HCT VFR BLD AUTO: 39.8 % (ref 34.8–46.1)
HDLC SERPL-MCNC: 53 MG/DL
HGB BLD-MCNC: 13 G/DL (ref 11.5–15.4)
IMM GRANULOCYTES # BLD AUTO: 0.04 THOUSAND/UL (ref 0–0.2)
IMM GRANULOCYTES NFR BLD AUTO: 1 % (ref 0–2)
LDLC SERPL CALC-MCNC: 132 MG/DL (ref 0–100)
LYMPHOCYTES # BLD AUTO: 1.51 THOUSANDS/ÂΜL (ref 0.6–4.47)
LYMPHOCYTES NFR BLD AUTO: 20 % (ref 14–44)
MCH RBC QN AUTO: 31.6 PG (ref 26.8–34.3)
MCHC RBC AUTO-ENTMCNC: 32.7 G/DL (ref 31.4–37.4)
MCV RBC AUTO: 97 FL (ref 82–98)
MONOCYTES # BLD AUTO: 0.51 THOUSAND/ÂΜL (ref 0.17–1.22)
MONOCYTES NFR BLD AUTO: 7 % (ref 4–12)
NEUTROPHILS # BLD AUTO: 5.28 THOUSANDS/ÂΜL (ref 1.85–7.62)
NEUTS SEG NFR BLD AUTO: 68 % (ref 43–75)
NRBC BLD AUTO-RTO: 0 /100 WBCS
PLATELET # BLD AUTO: 280 THOUSANDS/UL (ref 149–390)
PMV BLD AUTO: 10.6 FL (ref 8.9–12.7)
POTASSIUM SERPL-SCNC: 4 MMOL/L (ref 3.5–5.3)
PROT SERPL-MCNC: 7.6 G/DL (ref 6.4–8.4)
RBC # BLD AUTO: 4.12 MILLION/UL (ref 3.81–5.12)
SODIUM SERPL-SCNC: 140 MMOL/L (ref 135–147)
TRIGL SERPL-MCNC: 186 MG/DL
TSH SERPL DL<=0.05 MIU/L-ACNC: 3.98 UIU/ML (ref 0.45–4.5)
WBC # BLD AUTO: 7.62 THOUSAND/UL (ref 4.31–10.16)

## 2024-01-12 PROCEDURE — 36415 COLL VENOUS BLD VENIPUNCTURE: CPT

## 2024-01-12 PROCEDURE — 84443 ASSAY THYROID STIM HORMONE: CPT

## 2024-01-12 PROCEDURE — 85025 COMPLETE CBC W/AUTO DIFF WBC: CPT

## 2024-01-12 PROCEDURE — 80053 COMPREHEN METABOLIC PANEL: CPT

## 2024-01-12 PROCEDURE — 80061 LIPID PANEL: CPT

## 2024-01-17 ENCOUNTER — OFFICE VISIT (OUTPATIENT)
Dept: FAMILY MEDICINE CLINIC | Facility: CLINIC | Age: 69
End: 2024-01-17
Payer: MEDICARE

## 2024-01-17 VITALS
BODY MASS INDEX: 43.61 KG/M2 | OXYGEN SATURATION: 98 % | HEART RATE: 96 BPM | DIASTOLIC BLOOD PRESSURE: 70 MMHG | TEMPERATURE: 97.8 F | HEIGHT: 62 IN | WEIGHT: 237 LBS | SYSTOLIC BLOOD PRESSURE: 116 MMHG

## 2024-01-17 DIAGNOSIS — F11.20 CONTINUOUS OPIOID DEPENDENCE (HCC): ICD-10-CM

## 2024-01-17 DIAGNOSIS — Z78.0 ASYMPTOMATIC MENOPAUSAL STATE: ICD-10-CM

## 2024-01-17 DIAGNOSIS — Z23 ENCOUNTER FOR VACCINATION: ICD-10-CM

## 2024-01-17 DIAGNOSIS — E78.2 MIXED HYPERLIPIDEMIA: ICD-10-CM

## 2024-01-17 DIAGNOSIS — Z00.00 ENCOUNTER FOR MEDICARE ANNUAL WELLNESS EXAM: Primary | ICD-10-CM

## 2024-01-17 DIAGNOSIS — E66.01 CLASS 3 SEVERE OBESITY DUE TO EXCESS CALORIES WITHOUT SERIOUS COMORBIDITY WITH BODY MASS INDEX (BMI) OF 40.0 TO 44.9 IN ADULT (HCC): ICD-10-CM

## 2024-01-17 DIAGNOSIS — Z23 ENCOUNTER FOR IMMUNIZATION: ICD-10-CM

## 2024-01-17 PROCEDURE — G0008 ADMIN INFLUENZA VIRUS VAC: HCPCS

## 2024-01-17 PROCEDURE — 90662 IIV NO PRSV INCREASED AG IM: CPT

## 2024-01-17 PROCEDURE — 99213 OFFICE O/P EST LOW 20 MIN: CPT | Performed by: NURSE PRACTITIONER

## 2024-01-17 PROCEDURE — G0439 PPPS, SUBSEQ VISIT: HCPCS | Performed by: NURSE PRACTITIONER

## 2024-01-17 NOTE — PATIENT INSTRUCTIONS
Medicare Preventive Visit Patient Instructions  Thank you for completing your Welcome to Medicare Visit or Medicare Annual Wellness Visit today. Your next wellness visit will be due in one year (1/17/2025).  The screening/preventive services that you may require over the next 5-10 years are detailed below. Some tests may not apply to you based off risk factors and/or age. Screening tests ordered at today's visit but not completed yet may show as past due. Also, please note that scanned in results may not display below.  Preventive Screenings:  Service Recommendations Previous Testing/Comments   Colorectal Cancer Screening  * Colonoscopy    * Fecal Occult Blood Test (FOBT)/Fecal Immunochemical Test (FIT)  * Fecal DNA/Cologuard Test  * Flexible Sigmoidoscopy Age: 45-75 years old   Colonoscopy: every 10 years (may be performed more frequently if at higher risk)  OR  FOBT/FIT: every 1 year  OR  Cologuard: every 3 years  OR  Sigmoidoscopy: every 5 years  Screening may be recommended earlier than age 45 if at higher risk for colorectal cancer. Also, an individualized decision between you and your healthcare provider will decide whether screening between the ages of 76-85 would be appropriate. Colonoscopy: 11/11/2015  FOBT/FIT: Not on file  Cologuard: Not on file  Sigmoidoscopy: 11/11/2015          Breast Cancer Screening Age: 40+ years old  Frequency: every 1-2 years  Not required if history of left and right mastectomy Mammogram: 09/11/2023        Cervical Cancer Screening Between the ages of 21-29, pap smear recommended once every 3 years.   Between the ages of 30-65, can perform pap smear with HPV co-testing every 5 years.   Recommendations may differ for women with a history of total hysterectomy, cervical cancer, or abnormal pap smears in past. Pap Smear: 07/05/2019        Hepatitis C Screening Once for adults born between 1945 and 1965  More frequently in patients at high risk for Hepatitis C Hep C Antibody:  10/04/2018        Diabetes Screening 1-2 times per year if you're at risk for diabetes or have pre-diabetes Fasting glucose: 96 mg/dL (1/12/2024)  A1C: No results in last 5 years (No results in last 5 years)      Cholesterol Screening Once every 5 years if you don't have a lipid disorder. May order more often based on risk factors. Lipid panel: 01/12/2024          Other Preventive Screenings Covered by Medicare:  Abdominal Aortic Aneurysm (AAA) Screening: covered once if your at risk. You're considered to be at risk if you have a family history of AAA.  Lung Cancer Screening: covers low dose CT scan once per year if you meet all of the following conditions: (1) Age 55-77; (2) No signs or symptoms of lung cancer; (3) Current smoker or have quit smoking within the last 15 years; (4) You have a tobacco smoking history of at least 20 pack years (packs per day multiplied by number of years you smoked); (5) You get a written order from a healthcare provider.  Glaucoma Screening: covered annually if you're considered high risk: (1) You have diabetes OR (2) Family history of glaucoma OR (3)  aged 50 and older OR (4)  American aged 65 and older  Osteoporosis Screening: covered every 2 years if you meet one of the following conditions: (1) You're estrogen deficient and at risk for osteoporosis based off medical history and other findings; (2) Have a vertebral abnormality; (3) On glucocorticoid therapy for more than 3 months; (4) Have primary hyperparathyroidism; (5) On osteoporosis medications and need to assess response to drug therapy.   Last bone density test (DXA Scan): 12/16/2021.  HIV Screening: covered annually if you're between the age of 15-65. Also covered annually if you are younger than 15 and older than 65 with risk factors for HIV infection. For pregnant patients, it is covered up to 3 times per pregnancy.    Immunizations:  Immunization Recommendations   Influenza Vaccine Annual  influenza vaccination during flu season is recommended for all persons aged >= 6 months who do not have contraindications   Pneumococcal Vaccine   * Pneumococcal conjugate vaccine = PCV13 (Prevnar 13), PCV15 (Vaxneuvance), PCV20 (Prevnar 20)  * Pneumococcal polysaccharide vaccine = PPSV23 (Pneumovax) Adults 19-63 yo with certain risk factors or if 65+ yo  If never received any pneumonia vaccine: recommend Prevnar 20 (PCV20)  Give PCV20 if previously received 1 dose of PCV13 or PPSV23   Hepatitis B Vaccine 3 dose series if at intermediate or high risk (ex: diabetes, end stage renal disease, liver disease)   Respiratory syncytial virus (RSV) Vaccine - COVERED BY MEDICARE PART D  * RSVPreF3 (Arexvy) CDC recommends that adults 60 years of age and older may receive a single dose of RSV vaccine using shared clinical decision-making (SCDM)   Tetanus (Td) Vaccine - COST NOT COVERED BY MEDICARE PART B Following completion of primary series, a booster dose should be given every 10 years to maintain immunity against tetanus. Td may also be given as tetanus wound prophylaxis.   Tdap Vaccine - COST NOT COVERED BY MEDICARE PART B Recommended at least once for all adults. For pregnant patients, recommended with each pregnancy.   Shingles Vaccine (Shingrix) - COST NOT COVERED BY MEDICARE PART B  2 shot series recommended in those 19 years and older who have or will have weakened immune systems or those 50 years and older     Health Maintenance Due:      Topic Date Due   • DXA SCAN  12/16/2023   • Breast Cancer Screening: Mammogram  09/11/2025   • Colorectal Cancer Screening  11/11/2025   • Hepatitis C Screening  Completed     Immunizations Due:      Topic Date Due   • COVID-19 Vaccine (1) Never done   • Influenza Vaccine (1) 09/01/2023     Advance Directives   What are advance directives?  Advance directives are legal documents that state your wishes and plans for medical care. These plans are made ahead of time in case you lose  your ability to make decisions for yourself. Advance directives can apply to any medical decision, such as the treatments you want, and if you want to donate organs.   What are the types of advance directives?  There are many types of advance directives, and each state has rules about how to use them. You may choose a combination of any of the following:  Living will:  This is a written record of the treatment you want. You can also choose which treatments you do not want, which to limit, and which to stop at a certain time. This includes surgery, medicine, IV fluid, and tube feedings.   Durable power of  for healthcare (DPAHC):  This is a written record that states who you want to make healthcare choices for you when you are unable to make them for yourself. This person, called a proxy, is usually a family member or a friend. You may choose more than 1 proxy.  Do not resuscitate (DNR) order:  A DNR order is used in case your heart stops beating or you stop breathing. It is a request not to have certain forms of treatment, such as CPR. A DNR order may be included in other types of advance directives.  Medical directive:  This covers the care that you want if you are in a coma, near death, or unable to make decisions for yourself. You can list the treatments you want for each condition. Treatment may include pain medicine, surgery, blood transfusions, dialysis, IV or tube feedings, and a ventilator (breathing machine).  Values history:  This document has questions about your views, beliefs, and how you feel and think about life. This information can help others choose the care that you would choose.  Why are advance directives important?  An advance directive helps you control your care. Although spoken wishes may be used, it is better to have your wishes written down. Spoken wishes can be misunderstood, or not followed. Treatments may be given even if you do not want them. An advance directive may make it  easier for your family to make difficult choices about your care.   Urinary Incontinence   Urinary incontinence (UI)  is when you lose control of your bladder. UI develops because your bladder cannot store or empty urine properly. The 3 most common types of UI are stress incontinence, urge incontinence, or both.  Medicines:   May be given to help strengthen your bladder control. Report any side effects of medication to your healthcare provider.  Do pelvic muscle exercises often:  Your pelvic muscles help you stop urinating. Squeeze these muscles tight for 5 seconds, then relax for 5 seconds. Gradually work up to squeezing for 10 seconds. Do 3 sets of 15 repetitions a day, or as directed. This will help strengthen your pelvic muscles and improve bladder control.  Train your bladder:  Go to the bathroom at set times, such as every 2 hours, even if you do not feel the urge to go. You can also try to hold your urine when you feel the urge to go. For example, hold your urine for 5 minutes when you feel the urge to go. As that becomes easier, hold your urine for 10 minutes.   Self-care:   Keep a UI record.  Write down how often you leak urine and how much you leak. Make a note of what you were doing when you leaked urine.  Drink liquids as directed. You may need to limit the amount of liquid you drink to help control your urine leakage. Do not drink any liquid right before you go to bed. Limit or do not have drinks that contain caffeine or alcohol.   Prevent constipation.  Eat a variety of high-fiber foods. Good examples are high-fiber cereals, beans, vegetables, and whole-grain breads. Walking is the best way to trigger your intestines to have a bowel movement.  Exercise regularly and maintain a healthy weight.  Weight loss and exercise will decrease pressure on your bladder and help you control your leakage.   Use a catheter as directed  to help empty your bladder. A catheter is a tiny, plastic tube that is put into  your bladder to drain your urine.   Go to behavior therapy as directed.  Behavior therapy may be used to help you learn to control your urge to urinate.    Weight Management   Why it is important to manage your weight:  Being overweight increases your risk of health conditions such as heart disease, high blood pressure, type 2 diabetes, and certain types of cancer. It can also increase your risk for osteoarthritis, sleep apnea, and other respiratory problems. Aim for a slow, steady weight loss. Even a small amount of weight loss can lower your risk of health problems.  How to lose weight safely:  A safe and healthy way to lose weight is to eat fewer calories and get regular exercise. You can lose up about 1 pound a week by decreasing the number of calories you eat by 500 calories each day.   Healthy meal plan for weight management:  A healthy meal plan includes a variety of foods, contains fewer calories, and helps you stay healthy. A healthy meal plan includes the following:  Eat whole-grain foods more often.  A healthy meal plan should contain fiber. Fiber is the part of grains, fruits, and vegetables that is not broken down by your body. Whole-grain foods are healthy and provide extra fiber in your diet. Some examples of whole-grain foods are whole-wheat breads and pastas, oatmeal, brown rice, and bulgur.  Eat a variety of vegetables every day.  Include dark, leafy greens such as spinach, kale, piper greens, and mustard greens. Eat yellow and orange vegetables such as carrots, sweet potatoes, and winter squash.   Eat a variety of fruits every day.  Choose fresh or canned fruit (canned in its own juice or light syrup) instead of juice. Fruit juice has very little or no fiber.  Eat low-fat dairy foods.  Drink fat-free (skim) milk or 1% milk. Eat fat-free yogurt and low-fat cottage cheese. Try low-fat cheeses such as mozzarella and other reduced-fat cheeses.  Choose meat and other protein foods that are low in  fat.  Choose beans or other legumes such as split peas or lentils. Choose fish, skinless poultry (chicken or turkey), or lean cuts of red meat (beef or pork). Before you cook meat or poultry, cut off any visible fat.   Use less fat and oil.  Try baking foods instead of frying them. Add less fat, such as margarine, sour cream, regular salad dressing and mayonnaise to foods. Eat fewer high-fat foods. Some examples of high-fat foods include french fries, doughnuts, ice cream, and cakes.  Eat fewer sweets.  Limit foods and drinks that are high in sugar. This includes candy, cookies, regular soda, and sweetened drinks.  Exercise:  Exercise at least 30 minutes per day on most days of the week. Some examples of exercise include walking, biking, dancing, and swimming. You can also fit in more physical activity by taking the stairs instead of the elevator or parking farther away from stores. Ask your healthcare provider about the best exercise plan for you.      © Copyright [employer] 2018 Information is for End User's use only and may not be sold, redistributed or otherwise used for commercial purposes. All illustrations and images included in CareNotes® are the copyrighted property of [employer] or [employer]

## 2024-01-17 NOTE — PROGRESS NOTES
Assessment and Plan:     Problem List Items Addressed This Visit     Mixed hyperlipidemia    Class 3 severe obesity without serious comorbidity with body mass index (BMI) of 40.0 to 44.9 in adult (HCC)    Continuous opioid dependence (HCC)   Other Visit Diagnoses     Encounter for Medicare annual wellness exam    -  Primary    Encounter for immunization        Asymptomatic menopausal state        Relevant Orders    DXA bone density spine hip and pelvis    Encounter for vaccination        Relevant Orders    influenza vaccine, high-dose, PF 0.7 mL (FLUZONE HIGH-DOSE) (Completed)          Depression Screening and Follow-up Plan: Patient was screened for depression during today's encounter. They screened negative with a PHQ-2 score of 0.      Preventive health issues were discussed with patient, and age appropriate screening tests were ordered as noted in patient's After Visit Summary.  Personalized health advice and appropriate referrals for health education or preventive services given if needed, as noted in patient's After Visit Summary.     History of Present Illness:     Patient presents for a Medicare Wellness Visit    Patient presents with:  Follow-up: Follow up, and awv. Would like a referral for ortho, she twisted her right knee before Christmas.       Knee Pain   The incident occurred more than 1 week ago. The incident occurred at home. The injury mechanism was a twisting injury. The pain is present in the right knee. The quality of the pain is described as shooting and aching. The pain has been Improving (slight) since onset. Pertinent negatives include no inability to bear weight, loss of motion, loss of sensation, muscle weakness, numbness or tingling. The symptoms are aggravated by movement. Treatments tried: tramadol, keep knee bent. The treatment provided significant relief.   Rash  This is a new problem. Episode onset: 1/12. The affected locations include the left axilla (left breast fold). The problem  is mild. The rash is characterized by itchiness. She was exposed to nothing. Treatments tried: OTC lotramin. The treatment provided moderate relief.      Patient Care Team:  LINA Beckman as PCP - General (Internal Medicine)     Review of Systems:     Review of Systems   Skin:  Positive for rash.   Neurological:  Negative for tingling and numbness.   All other systems reviewed and are negative.       Problem List:     Patient Active Problem List   Diagnosis   • Degeneration of intervertebral disc of lumbosacral region   • Mixed hyperlipidemia   • Class 3 severe obesity without serious comorbidity with body mass index (BMI) of 40.0 to 44.9 in adult (HCC)   • Chronic narcotic use   • Abscess of preauricular sinus   • Post-menopausal   • Vitamin D deficiency   • Tingling   • Cramp of both lower extremities   • Continuous opioid dependence ([facility])      Past Medical and Surgical History:     Past Medical History:   Diagnosis Date   • Abnormal blood chemistry     resolved: 2/9/2017   • Abscess of groin     last assessed: 10/11/2013   • Allergic [birth year]/1953   • Arthritis    • Cancer ([facility]) 3/2014   • Cataracts, bilateral    • Chronic pain    • Diverticulitis of colon 2018   • Diverticulosis    • Endometrial cancer ([facility]) 09/07/2018   • Malignant neoplasm of uterus ([facility]) 05/19/2016   • Obesity    • Preauricular 1953   • Spinal stenosis    • Uterine cancer ([facility])     uterine     Past Surgical History:   Procedure Laterality Date   • ABCESS DRAINAGE      right ear   • CATARACT EXTRACTION, BILATERAL     • CHOLECYSTECTOMY     • EPIDURAL BLOCK INJECTION     • EYE SURGERY Left 05/2022   • EYE SURGERY  10/2018    Cataract  Both eyes   • HYSTERECTOMY      Total: With removal of both tubes and both ovaries      Family History:     Family History   Problem Relation Age of Onset   • Ovarian cancer Mother    • Cancer Mother    • Pancreatic cancer Father    • Cancer Father    • Diabetes Father    • Diabetes Family         mellitus   •  Cancer Paternal Grandmother       Social History:     Social History     Socioeconomic History   • Marital status:      Spouse name: None   • Number of children: None   • Years of education: None   • Highest education level: None   Occupational History   • Occupation: underwear salesperson     Comment: retired   Tobacco Use   • Smoking status: Never   • Smokeless tobacco: Never   Vaping Use   • Vaping status: Never Used   Substance and Sexual Activity   • Alcohol use: Yes     Comment: Social events   • Drug use: No     Comment: chronic narcotic use ( as per allscripts)   • Sexual activity: Yes     Partners: Male   Other Topics Concern   • None   Social History Narrative    Single- Engaged    Retired    Lives at home with spouse     Engaged to be      Social Determinants of Health     Financial Resource Strain: Low Risk  (1/17/2024)    Overall Financial Resource Strain (CARDIA)    • Difficulty of Paying Living Expenses: Not very hard   Food Insecurity: Not on file   Transportation Needs: No Transportation Needs (1/17/2024)    PRAPARE - Transportation    • Lack of Transportation (Medical): No    • Lack of Transportation (Non-Medical): No   Physical Activity: Not on file   Stress: Not on file   Social Connections: Not on file   Intimate Partner Violence: Not on file   Housing Stability: Not on file      Medications and Allergies:     Current Outpatient Medications   Medication Sig Dispense Refill   • acetaminophen (TYLENOL) 325 mg tablet Take 2 tablets (650 mg total) by mouth every 6 (six) hours as needed for mild pain 30 tablet 0   • naproxen (NAPROSYN) 250 mg tablet Take 220 mg by mouth as needed     • traMADol (ULTRAM) 50 mg tablet Take 1 tablet (50 mg total) by mouth every 6 (six) hours as needed for moderate pain 120 tablet 0     No current facility-administered medications for this visit.     Allergies   Allergen Reactions   • Paclitaxel Anaphylaxis     Anaphylaxis      Immunizations:      Immunization History   Administered Date(s) Administered   • H1N1 Inj 09/20/2018   • H1N1, All Formulations 09/20/2018   • INFLUENZA 01/03/2023   • Influenza, high dose seasonal 0.7 mL 11/12/2019, 12/08/2020, 01/03/2023, 01/17/2024   • Influenza, recombinant, quadrivalent,injectable, preservative free 12/10/2021   • Pneumococcal Conjugate 13-Valent 07/25/2018   • Pneumococcal Polysaccharide PPV23 04/11/2014, 08/02/2019   • Tdap 05/19/2016      Health Maintenance:         Topic Date Due   • DXA SCAN  12/16/2023   • Breast Cancer Screening: Mammogram  09/11/2025   • Colorectal Cancer Screening  11/11/2025   • Hepatitis C Screening  Completed         Topic Date Due   • COVID-19 Vaccine (1) Never done      Medicare Screening Tests and Risk Assessments:     Jennifer is here for her Subsequent Wellness visit. Last Medicare Wellness visit information reviewed, patient interviewed and updates made to the record today.      Health Risk Assessment:   Patient rates overall health as good. Patient feels that their physical health rating is slightly worse. Patient is satisfied with their life. Eyesight was rated as same. Hearing was rated as same. Patient feels that their emotional and mental health rating is same. Patients states they are never, rarely angry. Patient states they are sometimes unusually tired/fatigued. Pain experienced in the last 7 days has been some. Patient's pain rating has been 5/10. Patient states that she has experienced no weight loss or gain in last 6 months. Right knee.     Depression Screening:   PHQ-2 Score: 0      Fall Risk Screening:   In the past year, patient has experienced: no history of falling in past year      Urinary Incontinence Screening:   Patient has not leaked urine accidently in the last six months.     Home Safety:  Patient does not have trouble with stairs inside or outside of their home. Patient has working smoke alarms and has working carbon monoxide detector. Home safety hazards  include: none.     Nutrition:   Current diet is Regular.     Medications:   Patient is currently taking over-the-counter supplements. OTC medications include: see medication list. Patient is able to manage medications.     Activities of Daily Living (ADLs)/Instrumental Activities of Daily Living (IADLs):   Walk and transfer into and out of bed and chair?: Yes  Dress and groom yourself?: Yes    Bathe or shower yourself?: Yes    Feed yourself? Yes  Do your laundry/housekeeping?: Yes  Manage your money, pay your bills and track your expenses?: Yes  Make your own meals?: Yes    Do your own shopping?: Yes    Previous Hospitalizations:   Any hospitalizations or ED visits within the last 12 months?: No      Advance Care Planning:   Living will: No    Durable POA for healthcare: No    Advanced directive: No    Advanced directive counseling given: Yes    ACP document given: Yes    Patient declined ACP directive: No      Cognitive Screening:   Provider or family/friend/caregiver concerned regarding cognition?: No    PREVENTIVE SCREENINGS      Cardiovascular Screening:    General: Screening Not Indicated and History Lipid Disorder      Diabetes Screening:     General: Screening Current      Colorectal Cancer Screening:     General: Screening Current      Breast Cancer Screening:     General: Screening Current      Cervical Cancer Screening:    General: Screening Not Indicated      Osteoporosis Screening:    General: Risks and Benefits Discussed    Due for: DXA Appendicular      Abdominal Aortic Aneurysm (AAA) Screening:        General: Screening Not Indicated      Lung Cancer Screening:     General: Screening Not Indicated      Hepatitis C Screening:    General: Screening Current    Screening, Brief Intervention, and Referral to Treatment (SBIRT)    Screening      Single Item Drug Screening:  How often have you used an illegal drug (including marijuana) or a prescription medication for non-medical reasons in the past year?  "never    Single Item Drug Screen Score: 0  Interpretation: Negative screen for possible drug use disorder    No results found.     Physical Exam:     /70   Pulse 96   Temp 97.8 °F (36.6 °C) (Tympanic)   Ht 5' 2\" (1.575 m)   Wt 108 kg (237 lb)   SpO2 98%   BMI 43.35 kg/m²     Physical Exam  Vitals and nursing note reviewed.   Constitutional:       Appearance: Normal appearance. She is well-developed.   HENT:      Head: Normocephalic and atraumatic.      Right Ear: Tympanic membrane, ear canal and external ear normal.      Left Ear: Tympanic membrane, ear canal and external ear normal.      Nose: Nose normal.      Mouth/Throat:      Mouth: Mucous membranes are moist.   Eyes:      General: Lids are normal. Vision grossly intact.      Conjunctiva/sclera: Conjunctivae normal.      Pupils: Pupils are equal, round, and reactive to light.   Cardiovascular:      Rate and Rhythm: Normal rate and regular rhythm.      Pulses: Normal pulses.      Heart sounds: Normal heart sounds, S1 normal and S2 normal.      No friction rub. No gallop. No S3 sounds.   Pulmonary:      Effort: Pulmonary effort is normal.      Breath sounds: Normal breath sounds.   Abdominal:      General: Bowel sounds are normal.      Palpations: Abdomen is soft.      Tenderness: There is no abdominal tenderness.   Genitourinary:     Comments: Deferred  Musculoskeletal:         General: Normal range of motion.      Cervical back: Normal range of motion and neck supple.      Right lower leg: No edema.      Left lower leg: No edema.   Lymphadenopathy:      Cervical: No cervical adenopathy.   Skin:     General: Skin is warm and dry.      Capillary Refill: Capillary refill takes less than 2 seconds.   Neurological:      General: No focal deficit present.      Mental Status: She is alert and oriented to person, place, and time.      Motor: Motor function is intact.      Gait: Gait is intact.   Psychiatric:         Attention and Perception: Attention and " perception normal.         Mood and Affect: Mood and affect normal.         Speech: Speech normal.         Behavior: Behavior normal. Behavior is cooperative.         Thought Content: Thought content normal.         Cognition and Memory: Cognition and memory normal.         Judgment: Judgment normal.          LINA Beckman

## 2024-01-29 DIAGNOSIS — G89.29 OTHER CHRONIC PAIN: ICD-10-CM

## 2024-01-30 RX ORDER — TRAMADOL HYDROCHLORIDE 50 MG/1
50 TABLET ORAL EVERY 6 HOURS PRN
Qty: 120 TABLET | Refills: 0 | Status: SHIPPED | OUTPATIENT
Start: 2024-01-30

## 2024-02-01 NOTE — TELEPHONE ENCOUNTER
PHYSICAL THERAPY EVALUATION:   Referring Physician: Dr. Lizeth Barbour  Diagnosis: Lumbar pain with radiation down both legs (M54.5)  Scoliosis, unspecified scoliosis type, unspecified spinal region (M41.9)  Lumbar degenerative disc disease (M51.36) Date of Lawrnce Dacarie Pt left voicemail regarding the [facility] rejected script for Tramadol 50 mg.  We also received a fax regarding same issue.  Created new pre-authorization through Cover [employer] Med[employer].  Reply could take up to 3 days.  Did speak with Pt over phone and advised.   Past medical history was reviewed with Brandt. :breast mass 2019  Past Medical History:   Diagnosis Date   • Arthritis    • Diverticulosis large intestine w/o perforation or abscess w/o bleeding 12/5/2018   • Diverticulosis of large intestine    • Esophage Brandt  has impaired posture. Fiona Moseley has noted gait deviations and asymmetries  with moderate compensation when managing stairs. Pt demonstrated decreased strength on strength on BLE.  Brandt demonstrated decreased and painful AROM  on lumbar area towards Knee ext (L3) 5/5 4/5        RLE (MMT) LLE (MMT)   Ankle DF(L4) 5/5 5/5   Ankle PF(S1) 3+/5 3+/5     Today’s Treatment and Response: 1. SKTC 10 secs x5:better but no effect on LE:not given as HEP  2. Pronelying x2  mins  3. JENNIFER x2 mins  4.  JENNIFER x10 then Manual Therapy; Therapeutic Exercises; Neuromuscular Re-education;  Therapeutic Activity; Gait Training; Patient education; Home exercise program instruction,self care home management,  Modalities as needed:Electrical stimulation (unattended)    Education o

## 2024-03-01 DIAGNOSIS — G89.29 OTHER CHRONIC PAIN: ICD-10-CM

## 2024-03-01 RX ORDER — TRAMADOL HYDROCHLORIDE 50 MG/1
50 TABLET ORAL EVERY 6 HOURS PRN
Qty: 120 TABLET | Refills: 0 | Status: SHIPPED | OUTPATIENT
Start: 2024-03-01

## 2024-04-02 DIAGNOSIS — G89.29 OTHER CHRONIC PAIN: ICD-10-CM

## 2024-04-03 RX ORDER — TRAMADOL HYDROCHLORIDE 50 MG/1
50 TABLET ORAL EVERY 6 HOURS PRN
Qty: 120 TABLET | Refills: 0 | Status: SHIPPED | OUTPATIENT
Start: 2024-04-03

## 2024-05-03 DIAGNOSIS — G89.29 OTHER CHRONIC PAIN: ICD-10-CM

## 2024-05-03 RX ORDER — TRAMADOL HYDROCHLORIDE 50 MG/1
50 TABLET ORAL EVERY 6 HOURS PRN
Qty: 120 TABLET | Refills: 0 | Status: SHIPPED | OUTPATIENT
Start: 2024-05-03

## 2024-06-03 DIAGNOSIS — G89.29 OTHER CHRONIC PAIN: ICD-10-CM

## 2024-06-03 RX ORDER — TRAMADOL HYDROCHLORIDE 50 MG/1
50 TABLET ORAL EVERY 6 HOURS PRN
Qty: 120 TABLET | Refills: 0 | Status: SHIPPED | OUTPATIENT
Start: 2024-06-03

## 2024-06-03 NOTE — TELEPHONE ENCOUNTER
Reason for call:   [x] Refill   [] Prior Auth  [] Other:     Office: [facility]  [x] PCP/Provider - Norma   [] Specialty/Provider -     Medication: traMADol     Dose/Frequency: 50 mg/ 1 tab every 6 hours PRN     Quantity: 30 day supply    [facility]    Does the patient have enough for 3 days?   [] Yes   [x] No - Send as HP to POD

## 2024-07-03 DIAGNOSIS — G89.29 OTHER CHRONIC PAIN: ICD-10-CM

## 2024-07-03 NOTE — TELEPHONE ENCOUNTER
Patient stated picked up script dated for 6/3/2024. No refills at pharmacy.     Reason for call:   [x] Refill   [] Prior Auth  [] Other:     Office:   [x] PCP/Provider - LINA Bella  [] Specialty/Provider -     Medication: traMADol (ULTRAM) 50 mg tablet     Dose/Frequency: Take 1 tablet (50 mg total) by mouth every 6 (six) hours as needed     Quantity: 120     Pharmacy:   [facility]/pharmacy #[id number]       Does the patient have enough for 3 days?   [] Yes   [x] No - Send as HP to POD

## 2024-07-05 RX ORDER — TRAMADOL HYDROCHLORIDE 50 MG/1
50 TABLET ORAL EVERY 6 HOURS PRN
Qty: 120 TABLET | Refills: 0 | Status: SHIPPED | OUTPATIENT
Start: 2024-07-05

## 2024-07-18 ENCOUNTER — RA CDI HCC (OUTPATIENT)
Dept: OTHER | Facility: HOSPITAL | Age: 69
End: 2024-07-18

## 2024-07-18 NOTE — PROGRESS NOTES
HCC coding opportunities       Chart reviewed, no opportunity found: CHART REVIEWED, NO OPPORTUNITY FOUND        Patients Insurance     Medicare Insurance: Medicare

## 2024-07-25 ENCOUNTER — APPOINTMENT (OUTPATIENT)
Dept: RADIOLOGY | Facility: CLINIC | Age: 69
End: 2024-07-25
Payer: MEDICARE

## 2024-07-25 ENCOUNTER — OFFICE VISIT (OUTPATIENT)
Dept: FAMILY MEDICINE CLINIC | Facility: CLINIC | Age: 69
End: 2024-07-25
Payer: MEDICARE

## 2024-07-25 VITALS
WEIGHT: 241 LBS | DIASTOLIC BLOOD PRESSURE: 84 MMHG | SYSTOLIC BLOOD PRESSURE: 128 MMHG | HEIGHT: 62 IN | BODY MASS INDEX: 44.35 KG/M2 | HEART RATE: 74 BPM | TEMPERATURE: 97.3 F | OXYGEN SATURATION: 97 %

## 2024-07-25 DIAGNOSIS — G89.29 CHRONIC PAIN OF RIGHT KNEE: ICD-10-CM

## 2024-07-25 DIAGNOSIS — Z13.0 SCREENING FOR DEFICIENCY ANEMIA: ICD-10-CM

## 2024-07-25 DIAGNOSIS — E78.2 MIXED HYPERLIPIDEMIA: Primary | ICD-10-CM

## 2024-07-25 DIAGNOSIS — E66.01 CLASS 3 SEVERE OBESITY DUE TO EXCESS CALORIES WITHOUT SERIOUS COMORBIDITY WITH BODY MASS INDEX (BMI) OF 40.0 TO 44.9 IN ADULT (HCC): ICD-10-CM

## 2024-07-25 DIAGNOSIS — M25.561 CHRONIC PAIN OF RIGHT KNEE: ICD-10-CM

## 2024-07-25 DIAGNOSIS — Z13.29 SCREENING FOR THYROID DISORDER: ICD-10-CM

## 2024-07-25 DIAGNOSIS — Z13.1 SCREENING FOR DIABETES MELLITUS: ICD-10-CM

## 2024-07-25 PROCEDURE — G2211 COMPLEX E/M VISIT ADD ON: HCPCS | Performed by: NURSE PRACTITIONER

## 2024-07-25 PROCEDURE — 99214 OFFICE O/P EST MOD 30 MIN: CPT | Performed by: NURSE PRACTITIONER

## 2024-07-25 PROCEDURE — 73562 X-RAY EXAM OF KNEE 3: CPT

## 2024-07-25 NOTE — PROGRESS NOTES
"Assessment/Plan:    Problem List Items Addressed This Visit     Mixed hyperlipidemia - Primary    Relevant Orders    Lipid Panel with Direct LDL reflex    Class 3 severe obesity without serious comorbidity with body mass index (BMI) of 40.0 to 44.9 in adult (HCC)    Chronic pain of right knee    Relevant Orders    XR knee 3 vw right non injury   Other Visit Diagnoses     Screening for thyroid disorder        Relevant Orders    TSH, 3rd generation with Free T4 reflex    Screening for deficiency anemia        Relevant Orders    CBC and differential    Screening for diabetes mellitus        Relevant Orders    Comprehensive metabolic panel             Diagnoses and all orders for this visit:    Mixed hyperlipidemia  -     Lipid Panel with Direct LDL reflex; Future    Class 3 severe obesity due to excess calories without serious comorbidity with body mass index (BMI) of 40.0 to 44.9 in adult (HCC)    Screening for thyroid disorder  -     TSH, 3rd generation with Free T4 reflex; Future    Screening for deficiency anemia  -     CBC and differential; Future    Screening for diabetes mellitus  -     Comprehensive metabolic panel; Future    Chronic pain of right knee  -     XR knee 3 vw right non injury; Future        No problem-specific Assessment & Plan notes found for this encounter.        Subjective:      Patient ID: Jennifer Roa is a 69 y.o. female.    Patient presents with:  Follow-up: 6 month follow up. Pt would like a referral for Ortho.      Pt states when she was in 5th grade she had a direct fall tot he RK. She was seen at the time and advised \"not to move knee, run on RLE, or bend RLE at knee\". She did not have PT at time and no Ortho consolation. Pt states pain with traversing stairs more when going down than up. Reports popping, and at time pain inhibits her form fully extending RLE. Exam reveals tenderness with patellar grind and + M/L Deepa.     Knee Pain   The incident occurred more than 1 week ago. " The incident occurred at home. The injury mechanism was a fall (10-11 years old). The pain is present in the right knee. The quality of the pain is described as aching. Pain severity now: flucuates. Associated symptoms include a loss of motion (at time inability to extend) and muscle weakness (pain when traversing stairs down more than up.). The symptoms are aggravated by movement and weight bearing. She has tried acetaminophen and rest (TOP lidocaine) for the symptoms. The treatment provided moderate relief.   Hyperlipidemia  This is a chronic problem. Exacerbating diseases include obesity. There are no known factors aggravating her hyperlipidemia. Pertinent negatives include no chest pain, focal sensory loss, focal weakness, leg pain, myalgias or shortness of breath. Current antihyperlipidemic treatment includes diet change. There are no compliance problems.  Risk factors for coronary artery disease include dyslipidemia, obesity and post-menopausal.       The following portions of the patient's history were reviewed and updated as appropriate:   She has a past medical history of Abnormal blood chemistry, Abscess of groin, Allergic (12/1953), Arthritis, Cancer (HCC) (3/2014), Cataracts, bilateral, Chronic pain, Diverticulitis of colon (2018), Diverticulosis, Endometrial cancer (HCC) (09/07/2018), Malignant neoplasm of uterus (HCC) (05/19/2016), Obesity, Preauricular (1953), Spinal stenosis, and Uterine cancer ([facility]).,  does not have any pertinent problems on file.,   has a past surgical history that includes Cholecystectomy; Hysterectomy; Epidural block injection; Abscess drainage; Cataract extraction, bilateral; EYE SURGERY (Left, 05/2022); and Eye surgery (10/2018).,  family history includes Cancer in her father, mother, and paternal grandmother; Diabetes in her family and father; Ovarian cancer in her mother; Pancreatic cancer in her father.,   reports that she has never smoked. She has never used smokeless  "tobacco. She reports current alcohol use. She reports that she does not use drugs.,  is allergic to paclitaxel..  Current Outpatient Medications   Medication Sig Dispense Refill   • acetaminophen (TYLENOL) 325 mg tablet Take 2 tablets (650 mg total) by mouth every 6 (six) hours as needed for mild pain 30 tablet 0   • naproxen (NAPROSYN) 250 mg tablet Take 220 mg by mouth as needed     • traMADol (ULTRAM) 50 mg tablet Take 1 tablet (50 mg total) by mouth every 6 (six) hours as needed for moderate pain 120 tablet 0     No current facility-administered medications for this visit.       Depression Screening and Follow-up Plan: Patient was screened for depression during today's encounter. They screened negative with a PHQ-2 score of 0.        Review of Systems   Respiratory:  Negative for shortness of breath.    Cardiovascular:  Negative for chest pain.   Musculoskeletal:  Positive for arthralgias. Negative for myalgias.   Neurological:  Negative for focal weakness.   All other systems reviewed and are negative.        Objective:  Vitals:    07/25/24 0802   BP: 128/84   Pulse: 74   Temp: (!) 97.3 °F (36.3 °C)   TempSrc: Tympanic   SpO2: 97%   Weight: 109 kg (241 lb)   Height: 5' 2\" (1.575 m)     Body mass index is 44.08 kg/m².     Physical Exam  Vitals and nursing note reviewed.   Constitutional:       Appearance: Normal appearance. She is well-developed.   HENT:      Head: Normocephalic and atraumatic.      Right Ear: Tympanic membrane, ear canal and external ear normal.      Left Ear: Tympanic membrane, ear canal and external ear normal.      Nose: Nose normal.      Mouth/Throat:      Mouth: Mucous membranes are moist.      Pharynx: Uvula midline.   Eyes:      General: Lids are normal.      Conjunctiva/sclera: Conjunctivae normal.      Pupils: Pupils are equal, round, and reactive to light.   Neck:      Thyroid: No thyroid mass or thyromegaly.      Vascular: No JVD.      Trachea: Trachea and phonation normal. "   Cardiovascular:      Rate and Rhythm: Normal rate and regular rhythm.      Pulses: Normal pulses.      Heart sounds: Normal heart sounds, S1 normal and S2 normal. No murmur heard.     No friction rub. No gallop.   Pulmonary:      Effort: Pulmonary effort is normal.      Breath sounds: Normal breath sounds.   Abdominal:      General: Bowel sounds are normal.      Palpations: Abdomen is soft.      Tenderness: There is no abdominal tenderness.   Genitourinary:     Comments: Deferred   Musculoskeletal:         General: Normal range of motion.      Cervical back: Full passive range of motion without pain, normal range of motion and neck supple.      Right knee: Crepitus present. No LCL laxity, MCL laxity, ACL laxity or PCL laxity. Abnormal patellar mobility. Normal pulse.      Instability Tests: Anterior drawer test negative. Posterior drawer test negative. Anterior Lachman test negative. Medial Louisa test positive and lateral Louisa test positive.      Right lower leg: No deformity. No edema.      Left lower leg: No edema.   Lymphadenopathy:      Head:      Right side of head: No submental, submandibular, tonsillar, preauricular, posterior auricular or occipital adenopathy.      Left side of head: No submental, submandibular, tonsillar, preauricular, posterior auricular or occipital adenopathy.      Cervical: No cervical adenopathy.   Skin:     General: Skin is warm and dry.      Capillary Refill: Capillary refill takes less than 2 seconds.   Neurological:      General: No focal deficit present.      Mental Status: She is alert and oriented to person, place, and time.      Cranial Nerves: No cranial nerve deficit.      Sensory: Sensation is intact.      Motor: Motor function is intact.      Coordination: Coordination is intact.      Gait: Gait is intact.      Deep Tendon Reflexes: Reflexes are normal and symmetric.   Psychiatric:         Attention and Perception: Attention and perception normal.         Mood and  "Affect: Mood and affect normal.         Speech: Speech normal.         Behavior: Behavior normal. Behavior is cooperative.         Thought Content: Thought content normal.         Cognition and Memory: Cognition normal.         Judgment: Judgment normal.         Portions of the record may have been created with voice recognition software. Occasional wrong word or \"sound a like\" substitutions may have occurred due to the inherent limitations of voice recognition software. Read the chart carefully and recognize, using context, where substitutions have occurred. Contact me with any questions.  "

## 2024-07-26 NOTE — RESULT ENCOUNTER NOTE
Please call the patient regarding her abnormal Knee XR result.    Knee RX revealed There is a small joint effusion.    There is mild medial compartment degenerative narrowing. Small osteophytes in medial lateral tibial plateau and lateral femoral epicondyle. No significant varus angulation. Small superior patellar enthesophyte. Patellofemoral space is significantly   narrowed along the lateral aspect.

## 2024-07-29 ENCOUNTER — OFFICE VISIT (OUTPATIENT)
Dept: FAMILY MEDICINE CLINIC | Facility: CLINIC | Age: 69
End: 2024-07-29
Payer: MEDICARE

## 2024-07-29 VITALS
WEIGHT: 238 LBS | DIASTOLIC BLOOD PRESSURE: 76 MMHG | HEART RATE: 72 BPM | BODY MASS INDEX: 43.79 KG/M2 | HEIGHT: 62 IN | SYSTOLIC BLOOD PRESSURE: 128 MMHG | OXYGEN SATURATION: 98 % | TEMPERATURE: 97.6 F

## 2024-07-29 DIAGNOSIS — E66.01 CLASS 3 SEVERE OBESITY DUE TO EXCESS CALORIES WITHOUT SERIOUS COMORBIDITY WITH BODY MASS INDEX (BMI) OF 40.0 TO 44.9 IN ADULT (HCC): ICD-10-CM

## 2024-07-29 DIAGNOSIS — M17.10 PATELLOFEMORAL ARTHRITIS: ICD-10-CM

## 2024-07-29 DIAGNOSIS — M17.11 ARTHRITIS OF RIGHT KNEE: Primary | ICD-10-CM

## 2024-07-29 PROCEDURE — 99213 OFFICE O/P EST LOW 20 MIN: CPT | Performed by: NURSE PRACTITIONER

## 2024-07-29 PROCEDURE — 20610 DRAIN/INJ JOINT/BURSA W/O US: CPT | Performed by: NURSE PRACTITIONER

## 2024-07-29 RX ORDER — TRIAMCINOLONE ACETONIDE 40 MG/ML
20 INJECTION, SUSPENSION INTRA-ARTICULAR; INTRAMUSCULAR ONCE
Status: COMPLETED | OUTPATIENT
Start: 2024-07-29 | End: 2024-07-29

## 2024-07-29 RX ORDER — LIDOCAINE HYDROCHLORIDE 10 MG/ML
2 INJECTION, SOLUTION INFILTRATION; PERINEURAL ONCE
Status: COMPLETED | OUTPATIENT
Start: 2024-07-29 | End: 2024-07-29

## 2024-07-29 RX ADMIN — TRIAMCINOLONE ACETONIDE 20 MG: 40 INJECTION, SUSPENSION INTRA-ARTICULAR; INTRAMUSCULAR at 14:19

## 2024-07-29 RX ADMIN — LIDOCAINE HYDROCHLORIDE 2 ML: 10 INJECTION, SOLUTION INFILTRATION; PERINEURAL at 14:18

## 2024-07-29 NOTE — PROGRESS NOTES
Assessment/Plan:    Problem List Items Addressed This Visit     Class 3 severe obesity without serious comorbidity with body mass index (BMI) of 40.0 to 44.9 in adult (Formerly [facility])    Arthritis of right knee - Primary    Relevant Medications    triamcinolone acetonide (KENALOG-40) 40 mg/mL injection 20 mg (Completed)    lidocaine (XYLOCAINE) 1 % injection 2 mL (Completed)    Other Relevant Orders    Ambulatory Referral to Physical Therapy    Large joint arthrocentesis: R knee (Completed)   Other Visit Diagnoses     Patellofemoral arthritis        Relevant Medications    triamcinolone acetonide (KENALOG-40) 40 mg/mL injection 20 mg (Completed)    lidocaine (XYLOCAINE) 1 % injection 2 mL (Completed)    Other Relevant Orders    Ambulatory Referral to Physical Therapy    Large joint arthrocentesis: R knee (Completed)           Diagnoses and all orders for this visit:    Arthritis of right knee  -     triamcinolone acetonide (KENALOG-40) 40 mg/mL injection 20 mg  -     lidocaine (XYLOCAINE) 1 % injection 2 mL  -     Ambulatory Referral to Physical Therapy; Future  -     Large joint arthrocentesis: R knee    Patellofemoral arthritis  -     triamcinolone acetonide (KENALOG-40) 40 mg/mL injection 20 mg  -     lidocaine (XYLOCAINE) 1 % injection 2 mL  -     Ambulatory Referral to Physical Therapy; Future  -     Large joint arthrocentesis: R knee    Class 3 severe obesity due to excess calories without serious comorbidity with body mass index (BMI) of 40.0 to 44.9 in adult (Formerly [facility])        No problem-specific Assessment & Plan notes found for this encounter.        Subjective:      Patient ID: Jennifer Roa is a 69 y.o. female.    Patient presents with:  Knee Pain: Right knee pain.     X-ray on July 25 revealed mild medial compartment degeneration and narrowing with small osteophytes in the medial lateral tibial plateau and lateral femoral epicondyles.  There is also patella foraminal space narrowing along the lateral aspect.  There is  a minimal effusion and no fractures. Patient states she has fell off in fifth grade right onto her right knee she was advised at that time not to move bend or run.  She had never had an Ortho consultation.  Pain with transversing stairs down more than going up.  Intermittent popping exam revealed tenderness with patellar grind and a positive M/L Luoisa.        The following portions of the patient's history were reviewed and updated as appropriate:   She has a past medical history of Abnormal blood chemistry, Abscess of groin, Allergic (12/1955), Arthritis, Cancer (HCC) (3/2014), Cataracts, bilateral, Chronic pain, Diverticulitis of colon (2018), Diverticulosis, Endometrial cancer (HCC) (09/07/2018), Malignant neoplasm of uterus ([facility]) (05/19/2016), Obesity, Preauricular (1955), Spinal stenosis, and Uterine cancer ([facility]).,  does not have any pertinent problems on file.,   has a past surgical history that includes Cholecystectomy; Hysterectomy; Epidural block injection; Abscess drainage; Cataract extraction, bilateral; EYE SURGERY (Left, 05/2022); and Eye surgery (10/2018).,  family history includes Cancer in her father, mother, and paternal grandmother; Diabetes in her family and father; Ovarian cancer in her mother; Pancreatic cancer in her father.,   reports that she has never smoked. She has never used smokeless tobacco. She reports current alcohol use. She reports that she does not use drugs.,  is allergic to paclitaxel..  Current Outpatient Medications   Medication Sig Dispense Refill   • acetaminophen (TYLENOL) 325 mg tablet Take 2 tablets (650 mg total) by mouth every 6 (six) hours as needed for mild pain 30 tablet 0   • naproxen (NAPROSYN) 250 mg tablet Take 220 mg by mouth as needed     • traMADol (ULTRAM) 50 mg tablet Take 1 tablet (50 mg total) by mouth every 6 (six) hours as needed for moderate pain 120 tablet 0     No current facility-administered medications for this visit.            Review of Systems  "  Musculoskeletal:  Positive for arthralgias.   All other systems reviewed and are negative.        Objective:  Vitals:    07/29/24 1349   BP: 128/76   Pulse: 72   Temp: 97.6 °F (36.4 °C)   TempSrc: Tympanic   SpO2: 98%   Weight: 108 kg (238 lb)   Height: 5' 2\" (1.575 m)     Body mass index is 43.53 kg/m².     Physical Exam  Vitals and nursing note reviewed.   Constitutional:       Appearance: Normal appearance. She is well-developed.   HENT:      Head: Normocephalic and atraumatic.      Right Ear: Tympanic membrane, ear canal and external ear normal.      Left Ear: Tympanic membrane, ear canal and external ear normal.      Nose: Nose normal.      Mouth/Throat:      Mouth: Mucous membranes are moist.      Pharynx: Uvula midline.   Eyes:      General: Lids are normal.      Conjunctiva/sclera: Conjunctivae normal.      Pupils: Pupils are equal, round, and reactive to light.   Neck:      Thyroid: No thyroid mass or thyromegaly.      Vascular: No JVD.      Trachea: Trachea and phonation normal.   Cardiovascular:      Rate and Rhythm: Normal rate and regular rhythm.      Pulses: Normal pulses.      Heart sounds: Normal heart sounds, S1 normal and S2 normal. No murmur heard.     No friction rub. No gallop.   Pulmonary:      Effort: Pulmonary effort is normal.      Breath sounds: Normal breath sounds.   Abdominal:      General: Bowel sounds are normal.      Palpations: Abdomen is soft.      Tenderness: There is no abdominal tenderness.   Genitourinary:     Comments: Deferred   Musculoskeletal:         General: Normal range of motion.      Cervical back: Full passive range of motion without pain, normal range of motion and neck supple.      Right lower leg: No edema.      Left lower leg: No edema.   Lymphadenopathy:      Head:      Right side of head: No submental, submandibular, tonsillar, preauricular, posterior auricular or occipital adenopathy.      Left side of head: No submental, submandibular, tonsillar, " "preauricular, posterior auricular or occipital adenopathy.      Cervical: No cervical adenopathy.   Skin:     General: Skin is warm and dry.      Capillary Refill: Capillary refill takes less than 2 seconds.   Neurological:      General: No focal deficit present.      Mental Status: She is alert and oriented to person, place, and time.      Cranial Nerves: No cranial nerve deficit.      Sensory: Sensation is intact.      Motor: Motor function is intact.      Coordination: Coordination is intact.      Gait: Gait is intact.      Deep Tendon Reflexes: Reflexes are normal and symmetric.   Psychiatric:         Attention and Perception: Attention and perception normal.         Mood and Affect: Mood and affect normal.         Speech: Speech normal.         Behavior: Behavior normal. Behavior is cooperative.         Thought Content: Thought content normal.         Cognition and Memory: Cognition normal.         Judgment: Judgment normal.           Large joint arthrocentesis: R knee  Universal Protocol:  Consent: Verbal consent obtained.  Risks and benefits: risks, benefits and alternatives were discussed  Consent given by: patient  Time out: Immediately prior to procedure a \"time out\" was called to verify the correct patient, procedure, equipment, support staff and site/side marked as required.  Patient understanding: patient states understanding of the procedure being performed  Patient consent: the patient's understanding of the procedure matches consent given  Procedure consent: procedure consent matches procedure scheduled  Site marked: the operative site was marked  Patient identity confirmed: verbally with patient  Supporting Documentation  Indications: pain and diagnostic evaluation   Procedure Details  Location: knee - R knee  Preparation: Patient was prepped and draped in the usual sterile fashion  Needle size: 25 G  Ultrasound guidance: no  Approach: anteromedial    Aspirate amount: 0 mL  Analysis: fluid sample " "sent for laboratory analysis  Patient tolerance: patient tolerated the procedure well with no immediate complications          Portions of the record may have been created with voice recognition software. Occasional wrong word or \"sound a like\" substitutions may have occurred due to the inherent limitations of voice recognition software. Read the chart carefully and recognize, using context, where substitutions have occurred. Contact me with any questions.  "

## 2024-07-31 NOTE — PROGRESS NOTES
PT Evaluation     Today's date: 2024  Patient name: Jennifer Roa  : 1955  MRN: [id number]  Referring provider: Gaudencio Green CRNP  Dx:   Encounter Diagnosis     ICD-10-CM    1. Arthritis of right knee  M17.11       2. Patellofemoral arthritis  M17.10                      Assessment  Impairments: abnormal gait, abnormal or restricted ROM, activity intolerance, impaired balance, impaired physical strength, lacks appropriate home exercise program, pain with function, weight-bearing intolerance and activity limitations  Other impairment: decreased flexibility  Functional limitations: difficulty with stair negotiation    Assessment details: The patient is a 68 y/o female who presents to PT with diagnosis of R knee arthritis and patellofemoral arthritis.  She has complaints of constant pain though pain has decreased since getting the injection.  She demonstrates deficits with decreased ROM and strength, decreased flexibility, decreased balance and proprioception and TTP.  These deficits lead to difficulty with stair negotiation, limited standing tolerance and decreased tolerance to functional activities.  She ambulates without AD, slow anuja noted along with short stride length and decreased weight shift to RLE in stance phase.  She has difficulty with stair negotiation and has been going up and down the steps with non-reciprocal gait pattern.  Difficulty sleeping is also noted, hard to find a comfortable position to sleep.  She is unable to fully extend her knee when laying on her back.  Secondary to pain and weakness she is unable to squat or kneel.  Secondary to pain and above deficits she is limited with her overall mobility and function.  The patient would benefit from continued PT to address deficits and improve function.  Tx to include ROM, stretching, strengthening, modalities, HEP, pt education, postural ed, lifting/body mechanics, neuro re-ed, balance/proprioception Te, MT and equipment.       Understanding of Dx/Px/POC: good     Prognosis: fair    Goals  STGs:  1.  Initiate and complete HEP with verbal cues.  2.  Decrease R knee pain by > 25% in 4 weeks.  3.  Improve R knee ROM by 5-10 degrees in 4 weeks.  4.  Improve R LE strength by 1/2 grade in 4 weeks.  LTGs:  1.  Patient to be I with HEP in 12 weeks.  2. Improve R knee ROM to 0-100 degrees in 12 weeks to improve function.    3. Improve R LE strength to 4+ to 5/5 t/o in 12 weeks to improve function.  4. Decrease R knee pain to < or = to 2-3/10 with activity in 12 weeks to improve function.  5.  Patient to ambulate with normalized gait pattern in 12 weeks.  6.  Stair negotiation is improved to PLOF in 12 weeks.  7.  Recreational performance is improved to PLOF in 12 weeks.  8.  ADL performance is improved to PLOF in 12 weeks.  9.  Patient to report improved sleep in 12 weeks.         Plan  Patient would benefit from: skilled physical therapy  Planned modality interventions: cryotherapy and thermotherapy: hydrocollator packs    Planned therapy interventions: IASTM, balance, balance/weight bearing training, manual therapy, neuromuscular re-education, patient/caregiver education, postural training, self care, flexibility, functional ROM exercises, gait training, strengthening, stretching, therapeutic activities, therapeutic exercise, home exercise program and joint mobilization    Frequency: 2x week  Duration in weeks: 12  Plan of Care beginning date: 8/1/2024  Plan of Care expiration date: 10/24/2024  Treatment plan discussed with: patient  Plan details: Modalities and therapy interventions prn.        Subjective Evaluation    History of Present Illness  Mechanism of injury: The patient states that she had fallen when she was 9 while ice skating and had hurt her R knee.  She had been immobilized for one and a half years.  She has had chronic pain since then.    The end of November she was getting in her car and twisted her knee, had pain after  "that.  She notes that she did not get treatment then.  The pain has been getting worse and she has been unable to straighten her knee.      She had seen the doctor on 24.  He ordered an x-ray.  Per patient it showed arthritis.  She had gone back to see him on 24.  She got an injection in her knee.  She was referred to OPPT and she now presents for her evaluation.  She will be going back to see the doctor in January for her 6 month follow up appointment.    From EMR review of x-ray from  of R knee:  VIEWS:  XR KNEE 3 VW RIGHT NON INJURY   FINDINGS:  There is no acute fracture or dislocation.  There is a small joint effusion.  There is mild medial compartment degenerative narrowing. Small osteophytes in medial lateral tibial plateau and lateral femoral epicondyle. No significant varus angulation. Small superior patellar enthesophyte. Patellofemoral space is significantly   narrowed along the lateral aspect.  No lytic or blastic osseous lesion.  Soft tissues are unremarkable.  IMPRESSION:  Mild medial compartment degenerative changes as above.  Severe patellofemoral degenerative narrowing.  Small patellar enthesopathy.  Minimal effusion.  No fracture    The patient states that her pain was along her medial knee and along medial kneecap.  Pain is constant.  Since the injection the \"bone pain\" has been better.  Now she notes that she has more \"muscular pain\" along the front of her knee and thigh.    She denies any changes in sensations.   She reports when she is laying she is unable to fully straighten out her knee.        Patient Goals  Patient goals for therapy: increased motion, improved balance, decreased pain and increased strength  Patient goal: \"To get more steady on my feet, to get more strength, to get more movement, to have less pain.\"  Pain  At best pain ratin  At worst pain ratin  Location: R Knee  Quality: dull ache and discomfort  Relieving factors: rest  Exacerbated by: " "Squatting.    Social Support  Steps to enter house: yes  3  Stairs in house: yes   Lives in: multiple-level home  Lives with: significant other    Employment status: not working    Diagnostic Tests  X-ray: abnormal (See Above)  Treatments  Previous treatment: injection treatment      Objective     Tenderness     Right Knee   Tenderness in the medial patella. No tenderness in the lateral joint line, lateral patella and medial joint line.     Neurological Testing     Sensation     Knee   Left Knee   Intact: Light touch    Right Knee   Intact: light touch     Active Range of Motion   Left Knee   Flexion: 100 degrees   Extension: 0 degrees     Right Knee   Flexion: 90 degrees with pain  Extension: -12 degrees with pain    Strength/Myotome Testing     Left Knee   Flexion: WFL  Extension: WFL  Quadriceps contraction: good    Right Knee   Flexion: 3+  Extension: 3+  Quadriceps contraction: fair    Ambulation   Weight-Bearing Status   Assistive device used: single point cane and two-wheeled walker    Additional Weight-Bearing Status Details  Has SPC and RW at home, does not use on consistent basis.  Will use RW at night when going to the bathroom.      Ambulation: Stairs   Ascend stairs: independent  Pattern: non-reciprocal  Railings: two rails  Descend stairs: independent  Pattern: non-reciprocal  Railings: two rails    Observational Gait   Decreased walking speed, stride length and right stance time.                 Precautions: Spinal Stenosis, h/o cancer, Arthritis       Manuals 8/1       R LE        R Knee   Flex & Ext                        Neuro Re-Ed         SLS        Tandem Stance        Sidestepping        Tandem Amb                                Ther Ex        NuStep for ROM & Strength        HR/TR        SLR x 3        LAQ 3\"x10       QSets 3\"x10       SAQ        SLR        Heel Slides with strap        Bridges        S/L Hip Abd        Clamshells         Calf Stretch w/Strap 15\"x5       Hams Stretch - Seated " "in Chair 15\"x5                               Ther Activity        Stepups F/L        Stepdowns        STS        Gait Training                        Modalities        HP/CP prn                          Access Code: [email or web address]  URL: [email or web address]  Date: 08/01/2024  Prepared by: Tiarra    Exercises  - Long Sitting Quad Set  - 1 x daily - 7 x weekly - 2 sets - 10 reps - 3\" hold  - Seated Long Arc Quad  - 1 x daily - 7 x weekly - 2 sets - 10 reps - 3\" hold  - Seated Calf Stretch with Strap  - 1 x daily - 7 x weekly - 1 sets - 5 reps - 15\" hold  - Seated Hamstring Stretch  - 1 x daily - 7 x weekly - 1 sets - 5 reps - 15\" hold  "

## 2024-08-01 ENCOUNTER — EVALUATION (OUTPATIENT)
Dept: PHYSICAL THERAPY | Facility: CLINIC | Age: 69
End: 2024-08-01
Payer: MEDICARE

## 2024-08-01 DIAGNOSIS — M17.11 ARTHRITIS OF RIGHT KNEE: Primary | ICD-10-CM

## 2024-08-01 DIAGNOSIS — M17.10 PATELLOFEMORAL ARTHRITIS: ICD-10-CM

## 2024-08-01 PROCEDURE — 97110 THERAPEUTIC EXERCISES: CPT | Performed by: PHYSICAL THERAPIST

## 2024-08-01 PROCEDURE — 97161 PT EVAL LOW COMPLEX 20 MIN: CPT | Performed by: PHYSICAL THERAPIST

## 2024-08-02 DIAGNOSIS — G89.29 OTHER CHRONIC PAIN: ICD-10-CM

## 2024-08-02 RX ORDER — TRAMADOL HYDROCHLORIDE 50 MG/1
50 TABLET ORAL EVERY 6 HOURS PRN
Qty: 120 TABLET | Refills: 0 | Status: SHIPPED | OUTPATIENT
Start: 2024-08-02

## 2024-08-05 ENCOUNTER — OFFICE VISIT (OUTPATIENT)
Dept: PHYSICAL THERAPY | Facility: CLINIC | Age: 69
End: 2024-08-05
Payer: MEDICARE

## 2024-08-05 DIAGNOSIS — M17.10 PATELLOFEMORAL ARTHRITIS: ICD-10-CM

## 2024-08-05 DIAGNOSIS — M17.11 ARTHRITIS OF RIGHT KNEE: Primary | ICD-10-CM

## 2024-08-05 PROCEDURE — 97110 THERAPEUTIC EXERCISES: CPT | Performed by: PHYSICAL THERAPIST

## 2024-08-05 NOTE — PROGRESS NOTES
"Daily Note     Today's date: 2024  Patient name: Jennifer Roa  : 1955  MRN: [id number]  Referring provider: No ref. provider found  Dx:   Encounter Diagnosis     ICD-10-CM    1. Arthritis of right knee  M17.11       2. Patellofemoral arthritis  M17.10                      Subjective: no new complaints      Objective: See treatment diary below      Assessment: Tolerated treatment well. Patient demonstrated fatigue post treatment, exhibited good technique with therapeutic exercises, and would benefit from continued PT      Plan: Continue per plan of care.         Precautions: Spinal Stenosis, h/o cancer, Arthritis       Manuals        R LE        R Knee   Flex & Ext                        Neuro Re-Ed         SLS        Tandem Stance        Sidestepping        Tandem Amb                                Ther Ex        NuStep for ROM & Strength 8' L2-3       HR/TR        SLR x 3        LAQ 3\"x10       QSets 3\"x10       SAQ 2x10       SLR 2x5       Heel Slides with strap 2x10        Bridges 2x10 green       S/L Hip Abd 2x10       Clamshells  2x10       Calf Stretch w/Strap 15\"x5       Hams Stretch - Seated in Chair 15\"x5                               Ther Activity        Stepups F/L        Stepdowns        STS        Gait Training                        Modalities        HP/CP prn                            "

## 2024-08-08 ENCOUNTER — OFFICE VISIT (OUTPATIENT)
Dept: PHYSICAL THERAPY | Facility: CLINIC | Age: 69
End: 2024-08-08
Payer: MEDICARE

## 2024-08-08 DIAGNOSIS — M17.11 ARTHRITIS OF RIGHT KNEE: Primary | ICD-10-CM

## 2024-08-08 DIAGNOSIS — M17.10 PATELLOFEMORAL ARTHRITIS: ICD-10-CM

## 2024-08-08 PROCEDURE — 97110 THERAPEUTIC EXERCISES: CPT

## 2024-08-08 NOTE — PROGRESS NOTES
"Daily Note     Today's date: 2024  Patient name: Jennifer Roa  : 1955  MRN: [id number]  Referring provider: No ref. provider found  Dx:   Encounter Diagnosis     ICD-10-CM    1. Arthritis of right knee  M17.11       2. Patellofemoral arthritis  M17.10                      Subjective: Jennifer reports continued difficulty with stairs and squats with R knee. She reports soreness following last visit.       Objective: See treatment diary below      Assessment: Visual and VC to ensure correct exercise technique. Added HR/TR this visit with good tolerance. Denied any increases in pain with exercises performed. Improvement in ROM following exercises/stretches. Pt would continue to benefit from skilled PT.       Plan: Continue with current POC to address pt deficits.      Precautions: Spinal Stenosis, h/o cancer, Arthritis       Manuals 24      R LE        R Knee   Flex & Ext                        Neuro Re-Ed         SLS        Tandem Stance        Sidestepping        Tandem Amb                                Ther Ex        NuStep for ROM & Strength 8' L2-3 L3 9'      HR/TR  20x ea      SLR x 3        LAQ 3\"x10 2x10      QSets 3\"x10 5\" x20      SAQ 2x10 2x10      SLR 2x5 15x      Heel Slides with strap 2x10  2x10      Bridges 2x10 green 2x10 grn       S/L Hip Abd 2x10       Clamshells  2x10 2x10 grn       Calf Stretch w/Strap 15\"x5       Hams Stretch - Seated in Chair 15\"x5 10\"x12                              Ther Activity        Stepups F/L        Stepdowns        STS        Gait Training                        Modalities        HP/CP prn                              "

## 2024-08-12 ENCOUNTER — OFFICE VISIT (OUTPATIENT)
Dept: PHYSICAL THERAPY | Facility: CLINIC | Age: 69
End: 2024-08-12
Payer: MEDICARE

## 2024-08-12 DIAGNOSIS — M17.10 PATELLOFEMORAL ARTHRITIS: ICD-10-CM

## 2024-08-12 DIAGNOSIS — M17.11 ARTHRITIS OF RIGHT KNEE: Primary | ICD-10-CM

## 2024-08-12 PROCEDURE — 97110 THERAPEUTIC EXERCISES: CPT | Performed by: PHYSICAL THERAPIST

## 2024-08-12 NOTE — PROGRESS NOTES
"Daily Note     Today's date: 2024  Patient name: Jennifer Roa  : 1955  MRN: [id number]  Referring provider: No ref. provider found  Dx:   Encounter Diagnosis     ICD-10-CM    1. Arthritis of right knee  M17.11       2. Patellofemoral arthritis  M17.10                      Subjective: Pt reports her right knee joint has sharp pain and is causing a limp.      Objective: See treatment diary below      Assessment: Tolerated treatment well. Patient demonstrated improved mobility, ROM  and strength along with decreased pain.  Still having difficulty progressing her in closed chain activities.      Plan: Continue per plan of care.  Progress treatment as tolerated.       Precautions: Spinal Stenosis, h/o cancer, Arthritis       Manuals 24     R LE        R Knee   Flex & Ext                        Neuro Re-Ed         SLS        Tandem Stance        Sidestepping        Tandem Amb                                Ther Ex        NuStep for ROM & Strength 8' L2-3 L3 9' L3 13'     HR/TR  20x ea 20x each     SLR x 3        LAQ 3\"x10 2x10 3x10     QSets 3\"x10 5\" x20 10\" 2x10     SAQ 2x10 2x10 3x10     SLR 2x5 15x 2x10     Heel Slides with strap 2x10  2x10 2x10     Bridges 2x10 green 2x10 grn  2x10 green     S/L Hip Abd 2x10       Clamshells  2x10 2x10 grn  3x10 green     Calf Stretch w/Strap 15\"x5  10\" x12     Hams Stretch - Seated in Chair 15\"x5 10\"x12 10\" x12                             Ther Activity        Stepups F/L        Stepdowns        STS        Gait Training                        Modalities        HP/CP prn                                "

## 2024-08-15 ENCOUNTER — OFFICE VISIT (OUTPATIENT)
Dept: PHYSICAL THERAPY | Facility: CLINIC | Age: 69
End: 2024-08-15
Payer: MEDICARE

## 2024-08-15 DIAGNOSIS — M17.10 PATELLOFEMORAL ARTHRITIS: ICD-10-CM

## 2024-08-15 DIAGNOSIS — M17.11 ARTHRITIS OF RIGHT KNEE: Primary | ICD-10-CM

## 2024-08-15 PROCEDURE — 97110 THERAPEUTIC EXERCISES: CPT | Performed by: PHYSICAL THERAPIST

## 2024-08-15 NOTE — PROGRESS NOTES
"Daily Note     Today's date: 8/15/2024  Patient name: Jennifer Roa  : 1955  MRN: [id number]  Referring provider: No ref. provider found  Dx:   Encounter Diagnosis     ICD-10-CM    1. Arthritis of right knee  M17.11       2. 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      Objective: See treatment diary below      Assessment: Tolerated treatment well. Patient demonstrated fatigue post treatment, exhibited good technique with therapeutic exercises, and would benefit from continued PT      Plan: Continue per plan of care.  Progress treatment as tolerated.       Precautions: Spinal Stenosis, h/o cancer, Arthritis    Access Code: [email or web address]        Manuals 8/1 8-8-24 8-12-24 8-15-24    R LE        R Knee   Flex & Ext                        Neuro Re-Ed         SLS        Tandem Stance        Sidestepping        Tandem Amb                                Ther Ex        NuStep for ROM & Strength 8' L2-3 L3 9' L3 13' L4 13'    HR/TR  20x ea 20x each 20x each    SLR x 3        LAQ 3\"x10 2x10 3x10 3x10 2#    QSets 3\"x10 5\" x20 10\" 2x10 10\" 2x10    SAQ 2x10 2x10 3x10 3x10    SLR 2x5 15x 2x10 2x10    Heel Slides with strap 2x10  2x10 2x10 2x10    Bridges 2x10 green 2x10 grn  2x10 green 2x10 green    S/L Hip Abd 2x10       Clamshells  2x10 2x10 grn  3x10 green 3x10 green    Calf Stretch w/Strap 15\"x5  10\" x12 10\" x12    Hams Stretch - Seated in Chair 15\"x5 10\"x12 10\" x12 10\" x12        Seated hamstring curls blue 2x10                    Ther Activity        Stepups F/L        Stepdowns        STS        Gait Training                        Modalities        HP/CP prn                                  "

## 2024-08-19 ENCOUNTER — OFFICE VISIT (OUTPATIENT)
Dept: PHYSICAL THERAPY | Facility: CLINIC | Age: 69
End: 2024-08-19
Payer: MEDICARE

## 2024-08-19 DIAGNOSIS — M17.10 PATELLOFEMORAL ARTHRITIS: ICD-10-CM

## 2024-08-19 DIAGNOSIS — M17.11 ARTHRITIS OF RIGHT KNEE: Primary | ICD-10-CM

## 2024-08-19 PROCEDURE — 97110 THERAPEUTIC EXERCISES: CPT

## 2024-08-19 NOTE — PROGRESS NOTES
"Daily Note     Today's date: 2024  Patient name: Jennifer Roa  : 1955  MRN: [id number]  Referring provider: Gaudencio Green CRNP  Dx:   Encounter Diagnosis     ICD-10-CM    1. Arthritis of right knee  M17.11       2. Patellofemoral arthritis  M17.10                      Subjective: Jennifer reports less pain and stiffness in R knee but continues to have difficulty with stair negotiation.       Objective: See treatment diary below      Assessment: Visual and VC to ensure correct exercise technique. Added fwd step ups with cues for weight distribution and to avoid knee valgus with good tolerance; continues to get discomfort under inferior patella therefore performed minimal reps. Increased duration on nustep with some cardiovasuclar fatigue following but was able to quickly recover with rest. No sharp pain experiences with exercises performed. Pt would continue to benefit from skilled PT.     R knee flexion: 94 deg  R knee extension: visual measurement lacking 2 deg       Plan: Continue with current POC to address pt deficits.      Precautions: Spinal Stenosis, h/o cancer, Arthritis    Access Code: [email or web address]        Manuals 8/1 8-8-24 8-12-24 8-15-24 8-19-24   R LE        R Knee   Flex & Ext                        Neuro Re-Ed         SLS        Tandem Stance        Sidestepping        Tandem Amb                                Ther Ex        NuStep for ROM & Strength 8' L2-3 L3 9' L3 13' L4 13' L4 14'   HR/TR  20x ea 20x each 20x each 20x ea   SLR x 3        LAQ 3\"x10 2x10 3x10 3x10 2# 3x10 2#    QSets 3\"x10 5\" x20 10\" 2x10 10\" 2x10 5\" x20   SAQ 2x10 2x10 3x10 3x10 3x10   SLR 2x5 15x 2x10 2x10 2x10   Heel Slides with strap 2x10  2x10 2x10 2x10 2x10   Bridges 2x10 green 2x10 grn  2x10 green 2x10 green 2x10 green    S/L Hip Abd 2x10       Clamshells  2x10 2x10 grn  3x10 green 3x10 green 3x10 green    Calf Stretch w/Strap 15\"x5  10\" x12 10\" x12 10\"x12   Hams Stretch - Seated in Chair 15\"x5 10\"x12 10\" x12 10\" x12 " "10\"x12        Seated hamstring curls blue 2x10 Seated hamstring curls blue 2x10                   Ther Activity        Stepups F/L     4\" x10 w/glute emphasis   Stepdowns        STS        Gait Training                        Modalities        HP/CP prn                                    "

## 2024-08-22 ENCOUNTER — OFFICE VISIT (OUTPATIENT)
Dept: PHYSICAL THERAPY | Facility: CLINIC | Age: 69
End: 2024-08-22
Payer: MEDICARE

## 2024-08-22 DIAGNOSIS — M17.11 ARTHRITIS OF RIGHT KNEE: Primary | ICD-10-CM

## 2024-08-22 DIAGNOSIS — M17.10 PATELLOFEMORAL ARTHRITIS: ICD-10-CM

## 2024-08-22 PROCEDURE — 97110 THERAPEUTIC EXERCISES: CPT

## 2024-08-22 NOTE — PROGRESS NOTES
"Daily Note     Today's date: 2024  Patient name: Jennifer Roa  : 1955  MRN: [id number]  Referring provider: Gaudencio Green CRNP  Dx:   Encounter Diagnosis     ICD-10-CM    1. Arthritis of right knee  M17.11       2. Patellofemoral arthritis  M17.10                      Subjective: Jennifer reports feeling muscle soreness of R LE following last visit but denied any increases in pain. She reports improvement with ascending stairs at home by putting her entire foot on the step.       Objective: See treatment diary below      Assessment: Visual and VC to ensure correct exercise technique. Improvement in fwd step up technique this visit. Denied any increases in pain with exercises performed. Pt would continue to benefit from skilled PT. Progress as able.       Plan: Continue with current POC to address pt deficits.      Precautions: Spinal Stenosis, h/o cancer, Arthritis    Access Code: [email or web address]        Manuals 8-22-24 8-8-24 8-12-24 8-15-24 8-19-24   R LE        R Knee   Flex & Ext                        Neuro Re-Ed         SLS        Tandem Stance        Sidestepping        Tandem Amb                                Ther Ex        NuStep for ROM & Strength L4 14' L3 9' L3 13' L4 13' L4 14'   HR/TR 20x ea 20x ea 20x each 20x each 20x ea   SLR x 3        LAQ 3x10 2#  2x10 3x10 3x10 2# 3x10 2#    QSets  5\" x20 10\" 2x10 10\" 2x10 5\" x20   SAQ  2x10 3x10 3x10 3x10   SLR 2x10 15x 2x10 2x10 2x10   Heel Slides with strap  2x10 2x10 2x10 2x10   Bridges 2x10 grn  2x10 grn  2x10 green 2x10 green 2x10 green    S/L Hip Abd        Clamshells  3x10 green  2x10 grn  3x10 green 3x10 green 3x10 green    Calf Stretch w/Strap 10\"x12  10\" x12 10\" x12 10\"x12   Hams Stretch - Seated in Chair 10\"x12 10\"x12 10\" x12 10\" x12 10\"x12    Seated hamstring curls  Seated hamstring curls blue 2x10   Seated hamstring curls blue 2x10 Seated hamstring curls blue 2x10                   Ther Activity        Stepups F/L 4\" x10 fwd w/glute emphasis  " "   4\" x10 w/glute emphasis   Stepdowns        STS        Gait Training                        Modalities        HP/CP prn                                      "

## 2024-08-26 ENCOUNTER — OFFICE VISIT (OUTPATIENT)
Dept: PHYSICAL THERAPY | Facility: CLINIC | Age: 69
End: 2024-08-26
Payer: MEDICARE

## 2024-08-26 DIAGNOSIS — M17.10 PATELLOFEMORAL ARTHRITIS: ICD-10-CM

## 2024-08-26 DIAGNOSIS — M17.11 ARTHRITIS OF RIGHT KNEE: Primary | ICD-10-CM

## 2024-08-26 PROCEDURE — 97530 THERAPEUTIC ACTIVITIES: CPT | Performed by: PHYSICAL THERAPIST

## 2024-08-26 PROCEDURE — 97110 THERAPEUTIC EXERCISES: CPT | Performed by: PHYSICAL THERAPIST

## 2024-08-26 NOTE — PROGRESS NOTES
"Daily Note     Today's date: 2024  Patient name: Jennifer Roa  : 1955  MRN: [id number]  Referring provider: Gaudencio Green CRNP  Dx:   Encounter Diagnosis     ICD-10-CM    1. Arthritis of right knee  M17.11       2. Patellofemoral arthritis  M17.10                      Subjective: No new complaints      Objective: See treatment diary below      Assessment: Tolerated treatment well. Patient demonstrated fatigue post treatment, exhibited good technique with therapeutic exercises, and would benefit from continued PT      Plan: Continue per plan of care.  Progress treatment as tolerated.       Precautions: Spinal Stenosis, h/o cancer, Arthritis    Access Code: [email or web address]        Manuals 8-22-24 8-26-24 8-12-24 8-15-24 8-19-24   R LE        R Knee   Flex & Ext                        Neuro Re-Ed         SLS        Tandem Stance        Sidestepping        Tandem Amb                                Ther Ex        NuStep for ROM & Strength L4 14' L3 15' L3 13' L4 13' L4 14'   HR/TR 20x ea 20x ea 20x each 20x each 20x ea   SLR x 3        LAQ 3x10 2#  2x10 3x10 3x10 2# 3x10 2#    QSets 10\" x20 10\" x20 10\" 2x10 10\" 2x10 5\" x20   SAQ  2x10 3x10 3x10 3x10   SLR 2x10 2x10 2x10 2x10 2x10   Heel Slides with strap 2x10 2x10 2x10 2x10 2x10   Bridges 2x10 grn  2x10 grn  2x10 green 2x10 green 2x10 green    S/L Hip Abd        Clamshells  3x10 green  2x10 grn  3x10 green 3x10 green 3x10 green    Calf Stretch w/Strap 10\"x12 10\" x12 10\" x12 10\" x12 10\"x12   Hams Stretch - Seated in Chair 10\"x12 10\"x12 10\" x12 10\" x12 10\"x12    Seated hamstring curls  Seated hamstring curls blue 2x10 Seated hamstring curls blue 2x10  Seated hamstring curls blue 2x10 Seated hamstring curls blue 2x10                   Ther Activity        Stepups F/L 4\" x10 fwd w/glute emphasis  4\" 2x10 each   4\" x10 w/glute emphasis   Stepdowns        STS        Gait Training                        Modalities        HP/CP prn                                      "

## 2024-08-27 ENCOUNTER — APPOINTMENT (OUTPATIENT)
Dept: PHYSICAL THERAPY | Facility: CLINIC | Age: 69
End: 2024-08-27
Payer: MEDICARE

## 2024-08-29 ENCOUNTER — OFFICE VISIT (OUTPATIENT)
Dept: PHYSICAL THERAPY | Facility: CLINIC | Age: 69
End: 2024-08-29
Payer: MEDICARE

## 2024-08-29 DIAGNOSIS — M17.10 PATELLOFEMORAL ARTHRITIS: ICD-10-CM

## 2024-08-29 DIAGNOSIS — M17.11 ARTHRITIS OF RIGHT KNEE: Primary | ICD-10-CM

## 2024-08-29 PROCEDURE — 97110 THERAPEUTIC EXERCISES: CPT

## 2024-08-29 NOTE — PROGRESS NOTES
"Daily Note     Today's date: 2024  Patient name: Jennifer Roa  : 1955  MRN: [id number]  Referring provider: Gaudencio Green CRNP  Dx:   Encounter Diagnosis     ICD-10-CM    1. Arthritis of right knee  M17.11                      Subjective: Jennifer reports doing well in reference to R knee. She reports most pain with ascending/descending stairs, static sitting and then getting up.       Objective: See treatment diary below      Assessment: Visual and VC to ensure correct exercise technique. Increased step height this visit to continue progressing height to normalize movement with u/l handheld support. Denied any increases in pain with exercises performed. Pt would continue to benefit from skilled PT.       Plan: Continue with current POC to address pt deficits.      Precautions: Spinal Stenosis, h/o cancer, Arthritis    Access Code: [email or web address]        Manuals 8-22-24 8-26-24 8-29-24 8-15-24 8-19-24   R LE        R Knee   Flex & Ext                        Neuro Re-Ed         SLS        Tandem Stance        Sidestepping        Tandem Amb                                Ther Ex        NuStep for ROM & Strength L4 14' L3 15' L4 15' L4 13' L4 14'   HR/TR 20x ea 20x ea  20x each 20x ea   SLR x 3        LAQ 3x10 2#  2x10 2x10 2#  3x10 2# 3x10 2#    QSets 10\" x20 10\" x20 10# x20 10\" 2x10 5\" x20   SAQ  2x10 2x10 2#  3x10 3x10   SLR 2x10 2x10 2x10 2x10 2x10   Heel Slides with strap 2x10 2x10 2x10 2x10 2x10   Bridges 2x10 grn  2x10 grn  2x10 blue 2x10 green 2x10 green    S/L Hip Abd        Clamshells  3x10 green  2x10 grn  2x10 blue  3x10 green 3x10 green    Calf Stretch w/Strap 10\"x12 10\" x12 10\"x12 10\" x12 10\"x12   Hams Stretch - Seated in Chair 10\"x12 10\"x12 10\"x12 10\" x12 10\"x12    Seated hamstring curls  Seated hamstring curls blue 2x10 Seated hamstring curls blue 2x10 Seated hamstring curls blue 2x10 Seated hamstring curls blue 2x10 Seated hamstring curls blue 2x10                   Ther Activity        Stepups " "F/L 4\" x10 fwd w/glute emphasis  4\" 2x10 each 6\" x15  4\" x10 w/glute emphasis   Stepdowns        STS        Gait Training                        Modalities        HP/CP prn                                          "

## 2024-09-03 ENCOUNTER — OFFICE VISIT (OUTPATIENT)
Dept: PHYSICAL THERAPY | Facility: CLINIC | Age: 69
End: 2024-09-03
Payer: MEDICARE

## 2024-09-03 DIAGNOSIS — G89.29 OTHER CHRONIC PAIN: ICD-10-CM

## 2024-09-03 DIAGNOSIS — M17.11 ARTHRITIS OF RIGHT KNEE: Primary | ICD-10-CM

## 2024-09-03 DIAGNOSIS — M17.10 PATELLOFEMORAL ARTHRITIS: ICD-10-CM

## 2024-09-03 PROCEDURE — 97110 THERAPEUTIC EXERCISES: CPT

## 2024-09-03 RX ORDER — TRAMADOL HYDROCHLORIDE 50 MG/1
50 TABLET ORAL EVERY 6 HOURS PRN
Qty: 120 TABLET | Refills: 0 | Status: SHIPPED | OUTPATIENT
Start: 2024-09-03

## 2024-09-03 NOTE — PROGRESS NOTES
"Daily Note     Today's date: 9/3/2024  Patient name: Jennifer Rao  : 1955  MRN: [id number]  Referring provider: Gaudencio Green CRNP  Dx:   Encounter Diagnosis     ICD-10-CM    1. Arthritis of right knee  M17.11       2. Patellofemoral arthritis  M17.10                      Subjective: Jennifer offers no new complaints in regards to R knee. She reports soreness in inner thigh musculature following new hip adduction iso exercise.        Objective: See treatment diary below      Assessment: Visual and VC to ensure correct exercise technique. Able to increase reps for step ups with cues for control with good follow through. Pt would continue to benefit from skilled PT.       Plan: Continue with current POC to address pt deficits.      Precautions: Spinal Stenosis, h/o cancer, Arthritis    Access Code: [email or web address]        Manuals 8-22-24 8-26-24 8-29-24 9-3-24 8-19-24   R LE        R Knee   Flex & Ext                        Neuro Re-Ed         SLS        Tandem Stance        Sidestepping        Tandem Amb                                Ther Ex        NuStep for ROM & Strength L4 14' L3 15' L4 15' L4 15' L4 14'   HR/TR 20x ea 20x ea  30x ea 20x ea   SLR x 3        LAQ 3x10 2#  2x10 2x10 2#  2x10 2#  3x10 2#    QSets 10\" x20 10\" x20 10# x20 10\"x20 5\" x20   SAQ  2x10 2x10 2#  2# 2x10 3x10   SLR 2x10 2x10 2x10 2x10 2x10   Hip adduction iso    5\" x20    Heel Slides with strap 2x10 2x10 2x10 2x10 2x10   Bridges 2x10 grn  2x10 grn  2x10 blue 2x10 blue  2x10 green    S/L Hip Abd        Clamshells  3x10 green  2x10 grn  2x10 blue  2x10 blue  3x10 green    Calf Stretch w/Strap 10\"x12 10\" x12 10\"x12 10\"x12 10\"x12   Hams Stretch - Seated in Chair 10\"x12 10\"x12 10\"x12 10\"x12 10\"x12    Seated hamstring curls  Seated hamstring curls blue 2x10 Seated hamstring curls blue 2x10 Seated hamstring curls blue 2x10 Seated hamstring curls blue 2x10 Seated hamstring curls blue 2x10                   Ther Activity        Stepups F/L 4\" x10 " "fwd w/glute emphasis  4\" 2x10 each 6\" x15 6\" x20 4\" x10 w/glute emphasis   Stepdowns        STS        Gait Training                        Modalities        HP/CP prn                                            "

## 2024-09-05 ENCOUNTER — EVALUATION (OUTPATIENT)
Dept: PHYSICAL THERAPY | Facility: CLINIC | Age: 69
End: 2024-09-05
Payer: MEDICARE

## 2024-09-05 DIAGNOSIS — M17.11 ARTHRITIS OF RIGHT KNEE: Primary | ICD-10-CM

## 2024-09-05 DIAGNOSIS — M17.10 PATELLOFEMORAL ARTHRITIS: ICD-10-CM

## 2024-09-05 PROCEDURE — 97110 THERAPEUTIC EXERCISES: CPT | Performed by: PHYSICAL THERAPIST

## 2024-09-05 NOTE — PROGRESS NOTES
PT Re-Evaluation    Today's date: 2024  Patient name: Jennifer Roa  : 1955  MRN: [id number]  Referring provider: Gaudencio Green CRNP  Dx:   Encounter Diagnosis     ICD-10-CM    1. Arthritis of right knee  M17.11       2. Patellofemoral arthritis  M17.10                      Assessment  Impairments: abnormal gait, abnormal or restricted ROM, activity intolerance, impaired balance, impaired physical strength, lacks appropriate home exercise program, pain with function, weight-bearing intolerance and activity limitations  Other impairment: decreased flexibility  Functional limitations: difficulty with stair negotiation    Assessment details: The patient is a 68 y/o female who presents to PT with diagnosis of R knee arthritis and patellofemoral arthritis.  She has complaints of constant pain though pain has decreased since getting the injection.  She demonstrates deficits with decreased ROM and strength, decreased flexibility, decreased balance and proprioception and TTP.  These deficits lead to difficulty with stair negotiation, limited standing tolerance and decreased tolerance to functional activities.  She ambulates without AD, slow anuja noted along with short stride length and decreased weight shift to RLE in stance phase.  She has difficulty with stair negotiation and has been going up and down the steps with non-reciprocal gait pattern.  Difficulty sleeping is also noted, hard to find a comfortable position to sleep.  She is unable to fully extend her knee when laying on her back.  Secondary to pain and weakness she is unable to squat or kneel.  Secondary to pain and above deficits she is limited with her overall mobility and function.  The patient would benefit from continued PT to address deficits and improve function.  Tx to include ROM, stretching, strengthening, modalities, HEP, pt education, postural ed, lifting/body mechanics, neuro re-ed, balance/proprioception Te, MT and equipment.       9-5-24:  Pt showing progressive improvements and would benefit from continued functional progression in order to return to pre-morbid ability levels.    Understanding of Dx/Px/POC: good     Prognosis: fair    Goals  STGs:  1.  Initiate and complete HEP with verbal cues.-MET  2.  Decrease R knee pain by > 25% in 4 weeks. -EXCELLENT PROGRESS  3.  Improve R knee ROM by 5-10 degrees in 4 weeks. -MET  4.  Improve R LE strength by 1/2 grade in 4 weeks.  LTGs: -MET  1.  Patient to be I with HEP in 12 weeks.-EXCELLENT PROGRESS  2. Improve R knee ROM to 0-100 degrees in 12 weeks to improve function.- MET  3. Improve R LE strength to 4+ to 5/5 t/o in 12 weeks to improve function.-EXCELLENT PROGRESS  4. Decrease R knee pain to < or = to 2-3/10 with activity in 12 weeks to improve function.-EXCELLENT PROGRESS  5.  Patient to ambulate with normalized gait pattern in 12 weeks. -EXCELLENT PROGRESS  6.  Stair negotiation is improved to PLOF in 12 weeks.-GOOD PROGRESS  7.  Recreational performance is improved to PLOF in 12 weeks. -SOME PROGRESS  8.  ADL performance is improved to PLOF in 12 weeks. -GOOD PROGRESS  9.  Patient to report improved sleep in 12 weeks. GOOD PROGRESS        Plan  Patient would benefit from: skilled physical therapy  Planned modality interventions: cryotherapy and thermotherapy: hydrocollator packs    Planned therapy interventions: IASTM, balance, balance/weight bearing training, manual therapy, neuromuscular re-education, patient/caregiver education, postural training, self care, flexibility, functional ROM exercises, gait training, strengthening, stretching, therapeutic activities, therapeutic exercise, home exercise program and joint mobilization    Frequency: 2x week  Duration in weeks: 8  Plan of Care beginning date: 8/1/2024  Plan of Care expiration date: 10/24/2024  Treatment plan discussed with: patient  Plan details: Modalities and therapy interventions prn.        Subjective  "Evaluation    History of Present Illness  Mechanism of injury: The patient states that she had fallen when she was 9 while ice skating and had hurt her R knee.  She had been immobilized for one and a half years.  She has had chronic pain since then.    The end of November she was getting in her car and twisted her knee, had pain after that.  She notes that she did not get treatment then.  The pain has been getting worse and she has been unable to straighten her knee.      She had seen the doctor on 7/25/24.  He ordered an x-ray.  Per patient it showed arthritis.  She had gone back to see him on 7/29/24.  She got an injection in her knee.  She was referred to [facility]PP[facility] and she now presents for her evaluation.  She will be going back to see the doctor in January for her 6 month follow up appointment.    From EMR review of x-ray from 7/25 of R knee:  VIEWS:  XR KNEE 3 VW RIGHT NON INJURY   FINDINGS:  There is no acute fracture or dislocation.  There is a small joint effusion.  There is mild medial compartment degenerative narrowing. Small osteophytes in medial lateral tibial plateau and lateral femoral epicondyle. No significant varus angulation. Small superior patellar enthesophyte. Patellofemoral space is significantly   narrowed along the lateral aspect.  No lytic or blastic osseous lesion.  Soft tissues are unremarkable.  IMPRESSION:  Mild medial compartment degenerative changes as above.  Severe patellofemoral degenerative narrowing.  Small patellar enthesopathy.  Minimal effusion.  No fracture    The patient states that her pain was along her medial knee and along medial kneecap.  Pain is constant.  Since the injection the \"bone pain\" has been better.  Now she notes that she has more \"muscular pain\" along the front of her knee and thigh.    She denies any changes in sensations.   She reports when she is laying she is unable to fully straighten out her knee.      9-5-24:  Pt reports she is able to stand longer, walk " "further and handle other tasks much better since starting therapy.  She is now more limited in activity because of her back rather than her knee.    Patient Goals  Patient goals for therapy: increased motion, improved balance, decreased pain and increased strength  Patient goal: \"To get more steady on my feet, to get more strength, to get more movement, to have less pain.\"  Pain  At best pain ratin  At worst pain ratin  Location: R Knee  Quality: dull ache and discomfort  Relieving factors: rest  Exacerbated by: Squatting.    Social Support  Steps to enter house: yes  3  Stairs in house: yes   Lives in: multiple-level home  Lives with: significant other    Employment status: not working    Diagnostic Tests  X-ray: abnormal (See Above)  Treatments  Previous treatment: injection treatment      Objective     Tenderness     Right Knee   Tenderness in the medial patella. No tenderness in the lateral joint line, lateral patella and medial joint line.     Neurological Testing     Sensation     Knee   Left Knee   Intact: Light touch    Right Knee   Intact: light touch     Active Range of Motion   Left Knee   Flexion: 100 degrees   Extension: 0 degrees     Right Knee   Flexion: 100 degrees with pain  Extension: 2 degrees     Strength/Myotome Testing     Left Knee   Flexion: WFL  Extension: WFL  Quadriceps contraction: good    Right Knee   Flexion: 4  Extension: 4  Quadriceps contraction: good    Ambulation   Weight-Bearing Status   Assistive device used: single point cane and two-wheeled walker    Additional Weight-Bearing Status Details  Has SPC and RW at home, does not use on consistent basis.  Will use RW at night when going to the bathroom.      Ambulation: Stairs   Ascend stairs: independent  Pattern: non-reciprocal  Railings: two rails  Descend stairs: independent  Pattern: non-reciprocal  Railings: two rails    Observational Gait   Decreased walking speed, stride length and right stance time.               " "  Precautions: Spinal Stenosis, h/o cancer, Arthritis    Access Code: [email or web address]        Manuals 8-22-24 8-26-24 8-29-24 9-3-24 9-5-24   R LE        R Knee   Flex & Ext                        Neuro Re-Ed         SLS        Tandem Stance        Sidestepping        Tandem Amb                                Ther Ex        NuStep for ROM & Strength L4 14' L3 15' L4 15' L4 15' L4 14'   HR/TR 20x ea 20x ea  30x ea 20x ea   SLR x 3        LAQ 3x10 2#  2x10 2x10 2#  2x10 2#  3x10 2#    QSets 10\" x20 10\" x20 10# x20 10\"x20 5\" x20   SAQ  2x10 2x10 2#  2# 2x10 3x10   SLR 2x10 2x10 2x10 2x10 2x10   Hip adduction iso    5\" x20    Heel Slides with strap 2x10 2x10 2x10 2x10 2x10   Bridges 2x10 grn  2x10 grn  2x10 blue 2x10 blue  2x10 green    S/L Hip Abd        Clamshells  3x10 green  2x10 grn  2x10 blue  2x10 blue  3x10 green    Calf Stretch w/Strap 10\"x12 10\" x12 10\"x12 10\"x12 10\"x12   Hams Stretch - Seated in Chair 10\"x12 10\"x12 10\"x12 10\"x12 10\"x12    Seated hamstring curls  Seated hamstring curls blue 2x10 Seated hamstring curls blue 2x10 Seated hamstring curls blue 2x10 Seated hamstring curls blue 2x10 Seated hamstring curls blue 2x10                   Ther Activity        Stepups F/L 4\" x10 fwd w/glute emphasis  4\" 2x10 each 6\" x15 6\" x20 4\" x10 w/glute emphasis   Stepdowns        STS        Gait Training                        Modalities        HP/CP prn                  "

## 2024-09-05 NOTE — LETTER
2024    LINA Beckman    Patient: Jennifer Roa   YOB: 1955   Date of Visit: 2024     Encounter Diagnosis     ICD-10-CM    1. Arthritis of right knee  M17.11       2. Patellofemoral arthritis  M17.10           Dear Dr. Green:    Thank you for your recent referral of Jennifer Roa. Please review the attached evaluation summary from Jennifer's recent visit.     Please verify that you agree with the plan of care by signing the attached order.     If you have any questions or concerns, please do not hesitate to call.     I sincerely appreciate the opportunity to share in the care of one of your patients and hope to have another opportunity to work with you in the near future.       Sincerely,    Gabriel Valenzuela, PT      Referring Provider:      I certify that I have read the below Plan of Care and certify the need for these services furnished under this plan of treatment while under my care.                    LINA Beckman  Via In Basket          PT Re-Evaluation    Today's date: 2024  Patient name: Jennifer Roa  : 1955  MRN: [id number]  Referring provider: Gaudencio Green CRNP  Dx:   Encounter Diagnosis     ICD-10-CM    1. Arthritis of right knee  M17.11       2. Patellofemoral arthritis  M17.10                      Assessment  Impairments: abnormal gait, abnormal or restricted ROM, activity intolerance, impaired balance, impaired physical strength, lacks appropriate home exercise program, pain with function, weight-bearing intolerance and activity limitations  Other impairment: decreased flexibility  Functional limitations: difficulty with stair negotiation    Assessment details: The patient is a 68 y/o female who presents to PT with diagnosis of R knee arthritis and patellofemoral arthritis.  She has complaints of constant pain though pain has decreased since getting the injection.  She demonstrates deficits with decreased ROM and strength, decreased  flexibility, decreased balance and proprioception and TTP.  These deficits lead to difficulty with stair negotiation, limited standing tolerance and decreased tolerance to functional activities.  She ambulates without AD, slow anuja noted along with short stride length and decreased weight shift to RLE in stance phase.  She has difficulty with stair negotiation and has been going up and down the steps with non-reciprocal gait pattern.  Difficulty sleeping is also noted, hard to find a comfortable position to sleep.  She is unable to fully extend her knee when laying on her back.  Secondary to pain and weakness she is unable to squat or kneel.  Secondary to pain and above deficits she is limited with her overall mobility and function.  The patient would benefit from continued PT to address deficits and improve function.  Tx to include ROM, stretching, strengthening, modalities, HEP, pt education, postural ed, lifting/body mechanics, neuro re-ed, balance/proprioception Te, MT and equipment.      Understanding of Dx/Px/POC: good     Prognosis: fair    Goals  STGs:  1.  Initiate and complete HEP with verbal cues.  2.  Decrease R knee pain by > 25% in 4 weeks.  3.  Improve R knee ROM by 5-10 degrees in 4 weeks.  4.  Improve R LE strength by 1/2 grade in 4 weeks.  LTGs:  1.  Patient to be I with HEP in 12 weeks.  2. Improve R knee ROM to 0-100 degrees in 12 weeks to improve function.    3. Improve R LE strength to 4+ to 5/5 t/o in 12 weeks to improve function.  4. Decrease R knee pain to < or = to 2-3/10 with activity in 12 weeks to improve function.  5.  Patient to ambulate with normalized gait pattern in 12 weeks.  6.  Stair negotiation is improved to PLOF in 12 weeks.  7.  Recreational performance is improved to PLOF in 12 weeks.  8.  ADL performance is improved to PLOF in 12 weeks.  9.  Patient to report improved sleep in 12 weeks.         Plan  Patient would benefit from: skilled physical therapy  Planned modality  interventions: cryotherapy and thermotherapy: hydrocollator packs    Planned therapy interventions: IASTM, balance, balance/weight bearing training, manual therapy, neuromuscular re-education, patient/caregiver education, postural training, self care, flexibility, functional ROM exercises, gait training, strengthening, stretching, therapeutic activities, therapeutic exercise, home exercise program and joint mobilization    Frequency: 2x week  Duration in weeks: 12  Plan of Care beginning date: 8/1/2024  Plan of Care expiration date: 10/24/2024  Treatment plan discussed with: patient  Plan details: Modalities and therapy interventions prn.      Subjective Evaluation    History of Present Illness  Mechanism of injury: The patient states that she had fallen when she was 9 while ice skating and had hurt her R knee.  She had been immobilized for one and a half years.  She has had chronic pain since then.    The end of November she was getting in her car and twisted her knee, had pain after that.  She notes that she did not get treatment then.  The pain has been getting worse and she has been unable to straighten her knee.      She had seen the doctor on 7/25/24.  He ordered an x-ray.  Per patient it showed arthritis.  She had gone back to see him on 7/29/24.  She got an injection in her knee.  She was referred to OPPT and she now presents for her evaluation.  She will be going back to see the doctor in January for her 6 month follow up appointment.    From EMR review of x-ray from 7/25 of R knee:  VIEWS:  XR KNEE 3 VW RIGHT NON INJURY   FINDINGS:  There is no acute fracture or dislocation.  There is a small joint effusion.  There is mild medial compartment degenerative narrowing. Small osteophytes in medial lateral tibial plateau and lateral femoral epicondyle. No significant varus angulation. Small superior patellar enthesophyte. Patellofemoral space is significantly   narrowed along the lateral aspect.  No lytic or  "blastic osseous lesion.  Soft tissues are unremarkable.  IMPRESSION:  Mild medial compartment degenerative changes as above.  Severe patellofemoral degenerative narrowing.  Small patellar enthesopathy.  Minimal effusion.  No fracture    The patient states that her pain was along her medial knee and along medial kneecap.  Pain is constant.  Since the injection the \"bone pain\" has been better.  Now she notes that she has more \"muscular pain\" along the front of her knee and thigh.    She denies any changes in sensations.   She reports when she is laying she is unable to fully straighten out her knee.        Patient Goals  Patient goals for therapy: increased motion, improved balance, decreased pain and increased strength  Patient goal: \"To get more steady on my feet, to get more strength, to get more movement, to have less pain.\"  Pain  At best pain ratin  At worst pain ratin  Location: R Knee  Quality: dull ache and discomfort  Relieving factors: rest  Exacerbated by: Squatting.    Social Support  Steps to enter house: yes  3  Stairs in house: yes   Lives in: multiple-level home  Lives with: significant other    Employment status: not working    Diagnostic Tests  X-ray: abnormal (See Above)  Treatments  Previous treatment: injection treatment    Objective     Tenderness     Right Knee   Tenderness in the medial patella. No tenderness in the lateral joint line, lateral patella and medial joint line.     Neurological Testing     Sensation     Knee   Left Knee   Intact: Light touch    Right Knee   Intact: light touch     Active Range of Motion   Left Knee   Flexion: 100 degrees   Extension: 0 degrees     Right Knee   Flexion: 90 degrees with pain  Extension: -12 degrees with pain    Strength/Myotome Testing     Left Knee   Flexion: WFL  Extension: WFL  Quadriceps contraction: good    Right Knee   Flexion: 3+  Extension: 3+  Quadriceps contraction: fair    Ambulation   Weight-Bearing Status   Assistive device " "used: single point cane and two-wheeled walker    Additional Weight-Bearing Status Details  Has SPC and RW at home, does not use on consistent basis.  Will use RW at night when going to the bathroom.      Ambulation: Stairs   Ascend stairs: independent  Pattern: non-reciprocal  Railings: two rails  Descend stairs: independent  Pattern: non-reciprocal  Railings: two rails    Observational Gait   Decreased walking speed, stride length and right stance time.                 Precautions: Spinal Stenosis, h/o cancer, Arthritis    Access Code: [email or web address]        Manuals 8-22-24 8-26-24 8-29-24 9-3-24 8-19-24   R LE        R Knee   Flex & Ext                        Neuro Re-Ed         SLS        Tandem Stance        Sidestepping        Tandem Amb                                Ther Ex        NuStep for ROM & Strength L4 14' L3 15' L4 15' L4 15' L4 14'   HR/TR 20x ea 20x ea  30x ea 20x ea   SLR x 3        LAQ 3x10 2#  2x10 2x10 2#  2x10 2#  3x10 2#    QSets 10\" x20 10\" x20 10# x20 10\"x20 5\" x20   SAQ  2x10 2x10 2#  2# 2x10 3x10   SLR 2x10 2x10 2x10 2x10 2x10   Hip adduction iso    5\" x20    Heel Slides with strap 2x10 2x10 2x10 2x10 2x10   Bridges 2x10 grn  2x10 grn  2x10 blue 2x10 blue  2x10 green    S/L Hip Abd        Clamshells  3x10 green  2x10 grn  2x10 blue  2x10 blue  3x10 green    Calf Stretch w/Strap 10\"x12 10\" x12 10\"x12 10\"x12 10\"x12   Hams Stretch - Seated in Chair 10\"x12 10\"x12 10\"x12 10\"x12 10\"x12    Seated hamstring curls  Seated hamstring curls blue 2x10 Seated hamstring curls blue 2x10 Seated hamstring curls blue 2x10 Seated hamstring curls blue 2x10 Seated hamstring curls blue 2x10                   Ther Activity        Stepups F/L 4\" x10 fwd w/glute emphasis  4\" 2x10 each 6\" x15 6\" x20 4\" x10 w/glute emphasis   Stepdowns        STS        Gait Training                        Modalities        HP/CP prn                                   "

## 2024-09-09 ENCOUNTER — OFFICE VISIT (OUTPATIENT)
Dept: PHYSICAL THERAPY | Facility: CLINIC | Age: 69
End: 2024-09-09
Payer: MEDICARE

## 2024-09-09 DIAGNOSIS — M17.11 ARTHRITIS OF RIGHT KNEE: Primary | ICD-10-CM

## 2024-09-09 DIAGNOSIS — M17.10 PATELLOFEMORAL ARTHRITIS: ICD-10-CM

## 2024-09-09 PROCEDURE — 97110 THERAPEUTIC EXERCISES: CPT | Performed by: PHYSICAL THERAPIST

## 2024-09-09 PROCEDURE — 97112 NEUROMUSCULAR REEDUCATION: CPT | Performed by: PHYSICAL THERAPIST

## 2024-09-09 NOTE — PROGRESS NOTES
"Daily Note     Today's date: 2024  Patient name: Jennifer Roa  : 1955  MRN: [id number]  Referring provider: Gaudencio Green CRNP  Dx:   Encounter Diagnosis     ICD-10-CM    1. Arthritis of right knee  M17.11       2. Patellofemoral arthritis  M17.10                      Subjective: Continued improvement      Objective: See treatment diary below      Assessment: Tolerated treatment well. Patient demonstrated fatigue post treatment, exhibited good technique with therapeutic exercises, and would benefit from continued PT      Plan: Continue per plan of care.  Progress treatment as tolerated.       recautions: Spinal Stenosis, h/o cancer, Arthritis    Access Code: [email or web address]        Manuals 9-9-24 8-26-24 8-29-24 9-3-24 9-5-24   R LE        R Knee   Flex & Ext                        Neuro Re-Ed         SLS        Tandem Stance        Sidestepping        Tandem Amb        Standing knee fleixon and SLS 2x10 bilat       Retro walk at band tree Blue 8' x10                                       Ther Ex        NuStep for ROM & Strength L4 14' L3 15' L4 15' L4 15' L4 14'   HR/TR 20x ea 20x ea  30x ea 20x ea   SLR x 3        LAQ 3x10 2#  2x10 2x10 2#  2x10 2#  3x10 2#    QSets 10\" x20 10\" x20 10# x20 10\"x20 5\" x20   SAQ  2x10 2x10 2#  2# 2x10 3x10   SLR 2x10 2x10 2x10 2x10 2x10   Hip adduction iso    5\" x20    Heel Slides with strap 2x10 2x10 2x10 2x10 2x10   Bridges 2x10 grn  2x10 grn  2x10 blue 2x10 blue  2x10 green    S/L Hip Abd        Clamshells  3x10 green  2x10 grn  2x10 blue  2x10 blue  3x10 green    Calf Stretch w/Strap 10\"x12 10\" x12 10\"x12 10\"x12 10\"x12   Hams Stretch - Seated in Chair 10\"x12 10\"x12 10\"x12 10\"x12 10\"x12    Seated hamstring curls  Seated hamstring curls blue 2x10 Seated hamstring curls blue 2x10 Seated hamstring curls blue 2x10 Seated hamstring curls blue 2x10 Seated hamstring curls blue 2x10                   Ther Activity        Stepups F/L 4\" x10 fwd w/glute emphasis  4\" 2x10 each 6\" " "x15 6\" x20 4\" x10 w/glute emphasis   Stepdowns        STS        Gait Training                        Modalities        HP/CP prn                  "

## 2024-09-12 ENCOUNTER — OFFICE VISIT (OUTPATIENT)
Dept: PHYSICAL THERAPY | Facility: CLINIC | Age: 69
End: 2024-09-12
Payer: MEDICARE

## 2024-09-12 DIAGNOSIS — M17.11 ARTHRITIS OF RIGHT KNEE: Primary | ICD-10-CM

## 2024-09-12 DIAGNOSIS — M17.10 PATELLOFEMORAL ARTHRITIS: ICD-10-CM

## 2024-09-12 PROCEDURE — 97110 THERAPEUTIC EXERCISES: CPT | Performed by: PHYSICAL THERAPIST

## 2024-09-12 NOTE — PROGRESS NOTES
"Daily Note     Today's date: 2024  Patient name: Jennifer Roa  : 1955  MRN: [id number]  Referring provider: Gaudencio Green CRNP  Dx:   Encounter Diagnosis     ICD-10-CM    1. Arthritis of right knee  M17.11       2. Patellofemoral arthritis  M17.10                      Subjective: Pt reports continuing to feel better and get around better      Objective: See treatment diary below      Assessment: Tolerated treatment well. Patient able to tolerate progression of dynamic strengthening without any increase in pain.      Plan: Continue per plan of care.  Progress treatment as tolerated.       recautions: Spinal Stenosis, h/o cancer, Arthritis    Access Code: [email or web address]        Manuals 9-9-24 9-12-24 8-29-24 9-3-24 9-5-24   R LE        R Knee   Flex & Ext                        Neuro Re-Ed         SLS        Tandem Stance        Sidestepping        Tandem Amb        Standing knee fleixon and SLS 2x10 bilat 2x10 2.5#      Retro walk at band tree Blue 8' x10 Blue 8' x10      Wall ball squat  2x10                              Ther Ex        NuStep for ROM & Strength L4 14' L3 15' L4 15' L4 15' L4 14'   HR/TR 20x ea 20x ea  30x ea 20x ea   SLR x 3        LAQ 3x10 2#  2x10 2# 2x10 2#  2x10 2#  3x10 2#    QSets 10\" x20 10\" x20 10# x20 10\"x20 5\" x20   SAQ  2x10 2x10 2#  2# 2x10 3x10   SLR 2x10 2x10 2# 2x10 2x10 2x10   Hip adduction iso    5\" x20    Heel Slides with strap 2x10 2x10 2x10 2x10 2x10   Bridges 2x10 grn  2x10 grn  2x10 blue 2x10 blue  2x10 green    S/L Hip Abd        Clamshells  3x10 green  2x10 grn  2x10 blue  2x10 blue  3x10 green    Calf Stretch w/Strap 10\"x12 10\" x12 10\"x12 10\"x12 10\"x12   Hams Stretch - Seated in Chair 10\"x12 10\"x12 10\"x12 10\"x12 10\"x12    Seated hamstring curls  Seated hamstring curls blue 2x10 Seated hamstring curls blue 2x10 Seated hamstring curls blue 2x10 Seated hamstring curls blue 2x10 Seated hamstring curls blue 2x10                   Ther Activity        Stepups F/L 4\" x10 " "fwd w/glute emphasis  4\" 2x10 each 6\" x15 6\" x20 4\" x10 w/glute emphasis   Stepdowns        STS        Gait Training                        Modalities        HP/CP prn                       "

## 2024-09-16 ENCOUNTER — OFFICE VISIT (OUTPATIENT)
Dept: PHYSICAL THERAPY | Facility: CLINIC | Age: 69
End: 2024-09-16
Payer: MEDICARE

## 2024-09-16 DIAGNOSIS — M17.11 ARTHRITIS OF RIGHT KNEE: Primary | ICD-10-CM

## 2024-09-16 DIAGNOSIS — M17.10 PATELLOFEMORAL ARTHRITIS: ICD-10-CM

## 2024-09-16 PROCEDURE — 97112 NEUROMUSCULAR REEDUCATION: CPT

## 2024-09-16 PROCEDURE — 97110 THERAPEUTIC EXERCISES: CPT

## 2024-09-16 NOTE — PROGRESS NOTES
"Daily Note     Today's date: 2024  Patient name: Jennifer Roa  : 1955  MRN: [id number]  Referring provider: Gaudencio Green CRNP  Dx:   Encounter Diagnosis     ICD-10-CM    1. Arthritis of right knee  M17.11       2. Patellofemoral arthritis  M17.10                      Subjective: Jennifer reports increased stiffness/discomfort in R knee with prolong sitting. She reports improvement in navigating steps at home.       Objective: See treatment diary below      Assessment: Visual and VC to ensure correct exercise technique. Pt educated to try seated HR/TR, knee ext/flex, marches prior to getting up from seated position to see if this eases pain. Denied any increases in pain with exercises performed. Progress as able.       Plan: Continue with current POC to address pt deficits.      recautions: Spinal Stenosis, h/o cancer, Arthritis    Access Code: [email or web address]        Manuals 9-9-24 9-12-24 9-16-24 9-3-24 9-5-24   R LE        R Knee   Flex & Ext                        Neuro Re-Ed         SLS        Tandem Stance        Sidestepping        Tandem Amb        Standing knee fleixon and SLS 2x10 bilat 2x10 2.5# 2x10 2.5#     Retro walk at band tree Blue 8' x10 Blue 8' x10 Blue x10     Wall ball squat  2x10 2x10                             Ther Ex        NuStep for ROM & Strength L4 14' L3 15' L4 15' L4 15' L4 14'   HR/TR 20x ea 20x ea 20x ea  30x ea 20x ea   SLR x 3        LAQ 3x10 2#  2x10 2# 2x10 2#  2x10 2#  3x10 2#    QSets 10\" x20 10\" x20 10\"x20 10\"x20 5\" x20   SAQ  2x10 2x10 2#  2# 2x10 3x10   SLR 2x10 2x10 2# 2x10 2x10 2x10   Hip adduction iso    5\" x20    Heel Slides with strap 2x10 2x10  2x10 2x10   Bridges 2x10 grn  2x10 grn  2x10 resume tband NV  2x10 blue  2x10 green    S/L Hip Abd        Clamshells  3x10 green  2x10 grn  2x10 blue  2x10 blue  3x10 green    Calf Stretch w/Strap 10\"x12 10\" x12 10\"x12 10\"x12 10\"x12   Hams Stretch - Seated in Chair 10\"x12 10\"x12 10\"x12 10\"x12 10\"x12    Seated hamstring " "curls  Seated hamstring curls blue 2x10 Seated hamstring curls blue 2x10 Seated hamstring curls blue 2x10 Seated hamstring curls blue 2x10 Seated hamstring curls blue 2x10                   Ther Activity        Stepups F/L 4\" x10 fwd w/glute emphasis  4\" 2x10 each 4\" 2x10 ea (consider increasing step height NV) 6\" x20 4\" x10 w/glute emphasis   Stepdowns        STS        Gait Training                        Modalities        HP/CP prn                         "

## 2024-09-19 ENCOUNTER — OFFICE VISIT (OUTPATIENT)
Dept: PHYSICAL THERAPY | Facility: CLINIC | Age: 69
End: 2024-09-19
Payer: MEDICARE

## 2024-09-19 DIAGNOSIS — M17.10 PATELLOFEMORAL ARTHRITIS: ICD-10-CM

## 2024-09-19 DIAGNOSIS — M17.11 ARTHRITIS OF RIGHT KNEE: Primary | ICD-10-CM

## 2024-09-19 PROCEDURE — 97110 THERAPEUTIC EXERCISES: CPT

## 2024-09-19 NOTE — PROGRESS NOTES
"Daily Note     Today's date: 2024  Patient name: Jennifer Roa  : 1955  MRN: [id number]  Referring provider: Gaudencio Green CRNP  Dx:   Encounter Diagnosis     ICD-10-CM    1. Arthritis of right knee  M17.11       2. Patellofemoral arthritis  M17.10           Start Time: 1426          Subjective: Jennifer reports LBP today after doing a lot of walking yesterday.       Objective: See treatment diary below      Assessment: Visual and VC to ensure correct exercise technique. Held standing activities this visit due to increased LBP with WB. Tolerated all additional exercises well without increased pain in LB or R knee. Increased difficulty moving R knee from prolonged extension position to flexion position. Pt would continue to benefit from skilled PT. Resume full program if able NV.       Plan: Continue with current POC to address pt deficits.      recautions: Spinal Stenosis, h/o cancer, Arthritis    Access Code: [email or web address]        Manuals 24   R LE        R Knee   Flex & Ext                        Neuro Re-Ed         SLS        Tandem Stance        Sidestepping        Tandem Amb        Standing knee fleixon and SLS 2x10 bilat 2x10 2.5# 2x10 2.5# Held     Retro walk at band tree Blue 8' x10 Blue 8' x10 Blue x10 Held     Wall ball squat  2x10 2x10 Held                             Ther Ex        NuStep for ROM & Strength L4 14' L3 15' L4 15' L4 15' L4 14'   HR/TR 20x ea 20x ea 20x ea  20x ea 20x ea   SLR x 3        LAQ 3x10 2#  2x10 2# 2x10 2#  2x10 2#  3x10 2#    QSets 10\" x20 10\" x20 10\"x20 10\"x20 5\" x20   SAQ  2x10 2x10 2#  2x10 2#  3x10   SLR 2x10 2x10 2# 2x10 2x10 2x10   Hip adduction iso        Heel Slides with strap 2x10 2x10   2x10   Bridges 2x10 grn  2x10 grn  2x10 resume tband NV  2x10 Blue tband limited ROM 2x10 green    S/L Hip Abd        Clamshells  3x10 green  2x10 grn  2x10 blue  3x10 blue  3x10 green    Calf Stretch w/Strap 10\"x12 10\" x12 10\"x12 10\"x12 10\"x12 " "  Hams Stretch - Seated in Chair 10\"x12 10\"x12 10\"x12 10\"x12 10\"x12    Seated hamstring curls  Seated hamstring curls blue 2x10 Seated hamstring curls blue 2x10 Seated hamstring curls blue 2x10 Seated hamstring curls blue 2x10 Seated hamstring curls blue 2x10                   Ther Activity        Stepups F/L 4\" x10 fwd w/glute emphasis  4\" 2x10 each 4\" 2x10 ea (consider increasing step height NV) Held  4\" x10 w/glute emphasis   Stepdowns        STS        Gait Training                        Modalities        HP/CP prn                           "

## 2024-09-23 ENCOUNTER — OFFICE VISIT (OUTPATIENT)
Dept: PHYSICAL THERAPY | Facility: CLINIC | Age: 69
End: 2024-09-23
Payer: MEDICARE

## 2024-09-23 DIAGNOSIS — M17.10 PATELLOFEMORAL ARTHRITIS: Primary | ICD-10-CM

## 2024-09-23 DIAGNOSIS — M17.11 ARTHRITIS OF RIGHT KNEE: ICD-10-CM

## 2024-09-23 PROCEDURE — 97110 THERAPEUTIC EXERCISES: CPT | Performed by: PHYSICAL THERAPIST

## 2024-09-23 PROCEDURE — 97112 NEUROMUSCULAR REEDUCATION: CPT | Performed by: PHYSICAL THERAPIST

## 2024-09-23 NOTE — PROGRESS NOTES
"Daily Note     Today's date: 2024  Patient name: Jennifer Roa  : 1955  MRN: [id number]  Referring provider: Gaudencio Green CRNP  Dx:   Encounter Diagnosis     ICD-10-CM    1. Patellofemoral arthritis  M17.10       2. Arthritis of right knee  M17.11                      Subjective: pt reports she continues to feel good      Objective: See treatment diary below      Assessment: Tolerated treatment well. Patient able to tolerate the full program without increasing back pain.      Plan: Continue per plan of care.  Progress treatment as tolerated.       recautions: Spinal Stenosis, h/o cancer, Arthritis    Access Code: [email or web address]        Manuals 24   R LE        R Knee   Flex & Ext                        Neuro Re-Ed         SLS        Tandem Stance        Sidestepping        Tandem Amb        Standing knee fleixon and SLS 2x10 bilat 2x10 2.5# 2x10 2.5# Held  2.5# 2x10   Retro walk at band tree Blue 8' x10 Blue 8' x10 Blue x10 Held  Blue 10' 15x   Wall ball squat  2x10 2x10 Held  2x10                           Ther Ex        NuStep for ROM & Strength L4 14' L3 15' L4 15' L4 15' L4 14'   HR/TR 20x ea 20x ea 20x ea  20x ea 20x ea   SLR x 3        LAQ 3x10 2#  2x10 2# 2x10 2#  2x10 2#  3x10 2#    QSets 10\" x20 10\" x20 10\"x20 10\"x20 5\" x20   SAQ  2x10 2x10 2#  2x10 2#  3x10   SLR 2x10 2x10 2# 2x10 2x10 2x10   Hip adduction iso        Heel Slides with strap 2x10 2x10   2x10   Bridges 2x10 grn  2x10 grn  2x10 resume tband NV  2x10 Blue tband limited ROM 2x10 green    S/L Hip Abd        Clamshells  3x10 green  2x10 grn  2x10 blue  3x10 blue  3x10 green    Calf Stretch w/Strap 10\"x12 10\" x12 10\"x12 10\"x12 10\"x12   Hams Stretch - Seated in Chair 10\"x12 10\"x12 10\"x12 10\"x12 10\"x12    Seated hamstring curls  Seated hamstring curls blue 2x10 Seated hamstring curls blue 2x10 Seated hamstring curls blue 2x10 Seated hamstring curls blue 2x10 Seated hamstring curls blue 2x10               " "    Ther Activity        Stepups F/L 4\" x10 fwd w/glute emphasis  4\" 2x10 each 4\" 2x10 ea (consider increasing step height NV) Held  4\" x10 w/glute emphasis   Stepdowns        STS        Gait Training                        Modalities        HP/CP prn                             "

## 2024-09-26 ENCOUNTER — OFFICE VISIT (OUTPATIENT)
Dept: PHYSICAL THERAPY | Facility: CLINIC | Age: 69
End: 2024-09-26
Payer: MEDICARE

## 2024-09-26 DIAGNOSIS — M17.11 ARTHRITIS OF RIGHT KNEE: ICD-10-CM

## 2024-09-26 DIAGNOSIS — M17.10 PATELLOFEMORAL ARTHRITIS: Primary | ICD-10-CM

## 2024-09-26 PROCEDURE — 97110 THERAPEUTIC EXERCISES: CPT | Performed by: PHYSICAL THERAPIST

## 2024-09-26 PROCEDURE — 97112 NEUROMUSCULAR REEDUCATION: CPT | Performed by: PHYSICAL THERAPIST

## 2024-09-26 NOTE — PROGRESS NOTES
"Daily Note     Today's date: 2024  Patient name: Jennifer Roa  : 1955  MRN: [id number]  Referring provider: Gaudencio Green CRNP  Dx:   Encounter Diagnosis     ICD-10-CM    1. Patellofemoral arthritis  M17.10       2. Arthritis of right knee  M17.11                      Subjective: pt reports continuing to feel good.      Objective: See treatment diary below      Assessment: Tolerated treatment well. Patient demonstrated fatigue post treatment, exhibited good technique with therapeutic exercises, and would benefit from continued PT      Plan: Continue per plan of care.  Progress treatment as tolerated.       recautions: Spinal Stenosis, h/o cancer, Arthritis    Access Code: [email or web address]        Manuals 24   R LE        R Knee   Flex & Ext                        Neuro Re-Ed         SLS        Tandem Stance        Sidestepping        Tandem Amb        Standing knee fleixon and SLS 2x10 bilat 2x10 2.5# 2x10 2.5# Held  2.5# 2x10   Retro walk at band tree Blue 8' x10 Blue 8' x10 Blue x10 Held  Blue 10' 15x   Wall ball squat 3x10 2x10 2x10 Held  2x10                           Ther Ex        NuStep for ROM & Strength L4 15' L3 15' L4 15' L4 15' L4 14'   HR/TR 20x ea 20x ea 20x ea  20x ea 20x ea   SLR x 3        LAQ 3x10 2#  2x10 2# 2x10 2#  2x10 2#  3x10 2#    QSets 10\" x20 10\" x20 10\"x20 10\"x20 5\" x20   SAQ  2x10 2x10 2#  2x10 2#  3x10   SLR 2x10 2# 2x10 2# 2x10 2x10 2x10   Hip adduction iso        Heel Slides with strap  2x10   2x10   Bridges 2x10 blue 2x10 grn  2x10 resume tband NV  2x10 Blue tband limited ROM 2x10 green    S/L Hip Abd        Clamshells  3x10 green  2x10 grn  2x10 blue  3x10 blue  3x10 green    Calf Stretch w/Strap 10\"x12 10\" x12 10\"x12 10\"x12 10\"x12   Hams Stretch - Seated in Chair 10\"x12 10\"x12 10\"x12 10\"x12 10\"x12    Seated hamstring curls  Seated hamstring curls blue 2x10 Seated hamstring curls blue 2x10 Seated hamstring curls blue 2x10 Seated " "hamstring curls blue 2x10 Seated hamstring curls blue 2x10                   Ther Activity        Stepups F/L 4\" x10 fwd w/glute emphasis  4\" 2x10 each 4\" 2x10 ea (consider increasing step height NV) Held  4\" x10 w/glute emphasis   Stepdowns        STS        Gait Training                        Modalities        HP/CP prn                               "

## 2024-09-30 ENCOUNTER — OFFICE VISIT (OUTPATIENT)
Dept: PHYSICAL THERAPY | Facility: CLINIC | Age: 69
End: 2024-09-30
Payer: MEDICARE

## 2024-09-30 DIAGNOSIS — M17.11 ARTHRITIS OF RIGHT KNEE: ICD-10-CM

## 2024-09-30 DIAGNOSIS — M17.10 PATELLOFEMORAL ARTHRITIS: Primary | ICD-10-CM

## 2024-09-30 PROCEDURE — 97140 MANUAL THERAPY 1/> REGIONS: CPT | Performed by: PHYSICAL THERAPIST

## 2024-09-30 PROCEDURE — 97112 NEUROMUSCULAR REEDUCATION: CPT | Performed by: PHYSICAL THERAPIST

## 2024-09-30 PROCEDURE — 97110 THERAPEUTIC EXERCISES: CPT | Performed by: PHYSICAL THERAPIST

## 2024-09-30 NOTE — PROGRESS NOTES
"Daily Note     Today's date: 2024  Patient name: Jennifer Roa  : 1955  MRN: [id number]  Referring provider: Gaudencio Green CRNP  Dx:   Encounter Diagnosis     ICD-10-CM    1. Patellofemoral arthritis  M17.10       2. Arthritis of right knee  M17.11                      Subjective: Pt reports \"pulling\" her hamstring last night and was having some trouble walking.      Objective: See treatment diary below      Assessment: Tolerated treatment well. Patient demonstrated fatigue post treatment, exhibited good technique with therapeutic exercises, and would benefit from continued PT.  Pt had improved symptoms post treatment and would benefit from progressed functional strengthening.      Plan: Continue per plan of care.  Progress treatment as tolerated.       recautions: Spinal Stenosis, h/o cancer, Arthritis    Access Code: [email or web address]        Manuals 24   R LE        R Knee   Flex & Ext  STM right hamstring 10'                      Neuro Re-Ed         SLS        Tandem Stance        Sidestepping        Tandem Amb        Standing knee fleixon and SLS 2x10 bilat 2x10 2.5# 2x10 2.5# Held  2.5# 2x10   Retro walk at band tree Blue 8' x10 Blue 8' x10 Blue x10 Held  Blue 10' 15x   Wall ball squat 3x10 2x10 2x10 Held  2x10                           Ther Ex        NuStep for ROM & Strength L4 15' L3 15' L4 15' L4 15' L4 14'   HR/TR 20x ea 20x ea 20x ea  20x ea 20x ea   SLR x 3        LAQ 3x10 2#  2x10 2# 2x10 2#  2x10 2#  3x10 2#    QSets 10\" x20 10\" x20 10\"x20 10\"x20 5\" x20   SAQ  2x10 2x10 2#  2x10 2#  3x10   SLR 2x10 2# 2x10 2# 2x10 2x10 2x10   Hip adduction iso        Heel Slides with strap  2x10   2x10   Bridges 2x10 blue 2x10 grn  2x10 resume tband NV  2x10 Blue tband limited ROM 2x10 green    S/L Hip Abd        Clamshells  3x10 green  2x10 grn  2x10 blue  3x10 blue  3x10 green    Calf Stretch w/Strap 10\"x12 10\" x12 10\"x12 10\"x12 10\"x12   Hams Stretch - Seated in Chair " "10\"x12 10\"x12 10\"x12 10\"x12 10\"x12    Seated hamstring curls  Seated hamstring curls blue 2x10 Seated hamstring curls blue 2x10 Seated hamstring curls blue 2x10 Seated hamstring curls blue 2x10 Seated hamstring curls blue 2x10                   Ther Activity        Stepups F/L 4\" x10 fwd w/glute emphasis  4\" 2x10 each 4\" 2x10 ea (consider increasing step height NV) Held  4\" x10 w/glute emphasis   Stepdowns        STS        Gait Training                        Modalities        HP/CP prn                                 "

## 2024-10-03 ENCOUNTER — OFFICE VISIT (OUTPATIENT)
Dept: PHYSICAL THERAPY | Facility: CLINIC | Age: 69
End: 2024-10-03
Payer: MEDICARE

## 2024-10-03 DIAGNOSIS — M17.10 PATELLOFEMORAL ARTHRITIS: Primary | ICD-10-CM

## 2024-10-03 DIAGNOSIS — M17.11 ARTHRITIS OF RIGHT KNEE: ICD-10-CM

## 2024-10-03 DIAGNOSIS — G89.29 OTHER CHRONIC PAIN: ICD-10-CM

## 2024-10-03 PROCEDURE — 97110 THERAPEUTIC EXERCISES: CPT | Performed by: PHYSICAL THERAPIST

## 2024-10-03 PROCEDURE — 97140 MANUAL THERAPY 1/> REGIONS: CPT | Performed by: PHYSICAL THERAPIST

## 2024-10-03 PROCEDURE — 97112 NEUROMUSCULAR REEDUCATION: CPT | Performed by: PHYSICAL THERAPIST

## 2024-10-03 RX ORDER — TRAMADOL HYDROCHLORIDE 50 MG/1
50 TABLET ORAL EVERY 6 HOURS PRN
Qty: 120 TABLET | Refills: 0 | Status: SHIPPED | OUTPATIENT
Start: 2024-10-03

## 2024-10-03 NOTE — TELEPHONE ENCOUNTER
Reason for call:   [x] Refill   [] Prior Auth  [] Other:     Office:   [x] PCP/Provider -   Ordering Department: [facility]IMARY [facility]  Authorized By: LINA Lopez  [] Specialty/Provider -     Medication:  traMADol (ULTRAM) 50 mg tablet    Dose/Frequency: Take 1 tablet (50 mg total) by mouth every 6 (six) hours as needed for moderate pain     Quantity: 120    Pharmacy: [facility]/pharmacy #[id number] - [place] - [address] [phone]    Does the patient have enough for 3 days?   [] Yes   [x] No - Send as HP to POD

## 2024-10-03 NOTE — PROGRESS NOTES
"Daily Note     Today's date: 10/3/2024  Patient name: Jennifer Roa  : 1955  MRN: [id number]  Referring provider: Gaudencio Green CRNP  Dx:   Encounter Diagnosis     ICD-10-CM    1. Patellofemoral arthritis  M17.10       2. Arthritis of right knee  M17.11                      Subjective: Pt reports her knee has been feeling good the past few days.      Objective: See treatment diary below      Assessment: Tolerated treatment well. Patient showing improvements in functional srength and dynamic weight bearing ability.      Plan: Continue per plan of care.  Progress treatment as tolerated.       recautions: Spinal Stenosis, h/o cancer, Arthritis    Access Code: [email or web address]        Manuals 9-26-24 9-30-24 10-3-24 9-19-24 9-23-24   R LE        R Knee   Flex & Ext  STM right hamstring 10'                      Neuro Re-Ed         SLS        Tandem Stance        Sidestepping        Tandem Amb        Standing knee fleixon and SLS 2x10 bilat 2x10 2.5# 2x10 2.5# Held  2.5# 2x10   Retro walk at band tree Blue 8' x10 Blue 8' x10 Blue x10 Held  Blue 10' 15x   Wall ball squat 3x10 2x10 2x10 Held  2x10                           Ther Ex        NuStep for ROM & Strength L4 15' L4 15' L4 15' L4 15' L4 14'   HR/TR 20x ea 20x ea 20x ea  20x ea 20x ea   SLR x 3        LAQ 3x10 2#  2x10 2# 2x10 2#  2x10 2#  3x10 2#    QSets 10\" x20 10\" x20 10\"x20 10\"x20 5\" x20   SAQ  2x10 2x10 2#  2x10 2#  3x10   SLR 2x10 2# 2x10 2# 2x10 2x10 2x10   Hip adduction iso        Heel Slides with strap  2x10   2x10   Bridges 2x10 blue 2x10 grn  2x10 resume tband NV  2x10 Blue tband limited ROM 2x10 green    S/L Hip Abd        Clamshells  3x10 green  2x10 grn  2x10 blue  3x10 blue  3x10 green    Calf Stretch w/Strap 10\"x12 10\" x12 10\"x12 10\"x12 10\"x12   Hams Stretch - Seated in Chair 10\"x12 10\"x12 10\"x12 10\"x12 10\"x12    Seated hamstring curls  Seated hamstring curls blue 2x10 Seated hamstring curls blue 2x10 Seated hamstring curls blue 2x10 Seated " "hamstring curls blue 2x10 Seated hamstring curls blue 2x10                   Ther Activity        Stepups F/L 4\" x10 fwd w/glute emphasis  4\" 2x10 each 6\" 2x10 ea (consider increasing step height NV) Held  4\" x10 w/glute emphasis   Stepdowns        STS        Gait Training                        Modalities        HP/CP prn                                   "

## 2024-10-07 ENCOUNTER — OFFICE VISIT (OUTPATIENT)
Dept: PHYSICAL THERAPY | Facility: CLINIC | Age: 69
End: 2024-10-07
Payer: MEDICARE

## 2024-10-07 DIAGNOSIS — M17.10 PATELLOFEMORAL ARTHRITIS: Primary | ICD-10-CM

## 2024-10-07 DIAGNOSIS — M17.11 ARTHRITIS OF RIGHT KNEE: ICD-10-CM

## 2024-10-07 PROCEDURE — 97112 NEUROMUSCULAR REEDUCATION: CPT

## 2024-10-07 PROCEDURE — 97110 THERAPEUTIC EXERCISES: CPT

## 2024-10-07 NOTE — PROGRESS NOTES
"Daily Note     Today's date: 10/7/2024  Patient name: Jennifer Roa  : 1955  MRN: [id number]  Referring provider: Gaudencio Green CRNP  Dx:   Encounter Diagnosis     ICD-10-CM    1. Patellofemoral arthritis  M17.10       2. Arthritis of right knee  M17.11                      Subjective: Jennifer reports continued discomfort in LB but is improving. She reports having difficulty descending stairs with R knee.       Objective: See treatment diary below      Assessment: Visual and VC to ensure correct exercise technique. Added leg press this visit to continue progressing LE strength but without irritating her LB; tolerated well. Increased step height to simulate home setting; increased difficulty but was able to complete. Pt would continue to benefit from skilled PT.       Plan: Continue with current POC to address pt deficits.      recautions: Spinal Stenosis, h/o cancer, Arthritis    Access Code: [email or web address]        Manuals 9-26-24 9-30-24 10-3-24 10-7-24 9-23-24   R LE        R Knee   Flex & Ext  STM right hamstring 10' .                     Neuro Re-Ed         SLS        Tandem Stance        Sidestepping        Tandem Amb        Standing knee fleixon and SLS 2x10 bilat 2x10 2.5# 2x10 2.5# 2x10 2.5# abd, ext, flexion 2.5# 2x10   Retro walk at [employer] tree Blue 8' x10 Blue 8' x10 Blue x10 Blue x10 Blue 10' 15x   Wall ball squat 3x10 2x10 2x10  2x10                           Ther Ex        NuStep for ROM & Strength L4 15' L4 15' L4 15' L4 15' L4 14'   HR/TR 20x ea 20x ea 20x ea  20x ea 20x ea   SLR x 3        LAQ 3x10 2#  2x10 2# 2x10 2#  2# 2x10 3x10 2#    QSets 10\" x20 10\" x20 10\"x20  5\" x20   SAQ  2x10 2x10 2#  2x10 2#  3x10   SLR 2x10 2# 2x10 2# 2x10 2x10 2#  2x10   Hip adduction iso        Heel Slides with strap  2x10   2x10   Bridges 2x10 blue 2x10 grn  2x10 resume tband NV   2x10 green    S/L Hip Abd        Clamshells  3x10 green  2x10 grn  2x10 blue  2x10 blue  3x10 green    Calf Stretch w/Strap 10\"x12 10\" x12 " "10\"x12 10\"x12 10\"x12   Hams Stretch - Seated in Chair 10\"x12 10\"x12 10\"x12 10\"x12 10\"x12    Seated hamstring curls  Seated hamstring curls blue 2x10 Seated hamstring curls blue 2x10 Seated hamstring curls blue 2x10 Seated hamstring curls blue 2x10 Seated hamstring curls blue 2x10   Leg press     20# 2x10            Ther Activity        Stepups F/L 4\" x10 fwd w/glute emphasis  4\" 2x10 each 6\" 2x10 ea (consider increasing step height NV) 8\" x10 R  4\" x10 w/glute emphasis   Stepdowns        STS        Gait Training                        Modalities        HP/CP prn                                     "

## 2024-10-10 ENCOUNTER — OFFICE VISIT (OUTPATIENT)
Dept: PHYSICAL THERAPY | Facility: CLINIC | Age: 69
End: 2024-10-10
Payer: MEDICARE

## 2024-10-10 DIAGNOSIS — M17.10 PATELLOFEMORAL ARTHRITIS: Primary | ICD-10-CM

## 2024-10-10 DIAGNOSIS — M17.11 ARTHRITIS OF RIGHT KNEE: ICD-10-CM

## 2024-10-10 PROCEDURE — 97110 THERAPEUTIC EXERCISES: CPT | Performed by: PHYSICAL THERAPIST

## 2024-10-10 PROCEDURE — 97112 NEUROMUSCULAR REEDUCATION: CPT | Performed by: PHYSICAL THERAPIST

## 2024-10-10 PROCEDURE — 97530 THERAPEUTIC ACTIVITIES: CPT | Performed by: PHYSICAL THERAPIST

## 2024-10-10 NOTE — PROGRESS NOTES
PT Re-Evaluation    Today's date: 10/10/2024  Patient name: Jennifer Roa  : 1955  MRN: [id number]  Referring provider: Gaudencio Green CRNP  Dx:   Encounter Diagnosis     ICD-10-CM    1. Patellofemoral arthritis  M17.10       2. Arthritis of right knee  M17.11                      Assessment  Impairments: abnormal gait, abnormal or restricted ROM, activity intolerance, impaired balance, impaired physical strength, lacks appropriate home exercise program, pain with function, weight-bearing intolerance and activity limitations  Other impairment: decreased flexibility  Functional limitations: difficulty with stair negotiation    Assessment details: The patient is a 68 y/o female who presents to PT with diagnosis of R knee arthritis and patellofemoral arthritis.  She has complaints of constant pain though pain has decreased since getting the injection.  She demonstrates deficits with decreased ROM and strength, decreased flexibility, decreased balance and proprioception and TTP.  These deficits lead to difficulty with stair negotiation, limited standing tolerance and decreased tolerance to functional activities.  She ambulates without AD, slow anuja noted along with short stride length and decreased weight shift to RLE in stance phase.  She has difficulty with stair negotiation and has been going up and down the steps with non-reciprocal gait pattern.  Difficulty sleeping is also noted, hard to find a comfortable position to sleep.  She is unable to fully extend her knee when laying on her back.  Secondary to pain and weakness she is unable to squat or kneel.  Secondary to pain and above deficits she is limited with her overall mobility and function.  The patient would benefit from continued PT to address deficits and improve function.  Tx to include ROM, stretching, strengthening, modalities, HEP, pt education, postural ed, lifting/body mechanics, neuro re-ed, balance/proprioception Te, MT and equipment.       9-5-24:  Pt showing progressive improvements and would benefit from continued functional progression in order to return to pre-morbid ability levels.    Understanding of Dx/Px/POC: good     Prognosis: fair    Goals  STGs:  1.  Initiate and complete HEP with verbal cues.-MET  2.  Decrease R knee pain by > 25% in 4 weeks. -EXCELLENT PROGRESS  3.  Improve R knee ROM by 5-10 degrees in 4 weeks. -MET  4.  Improve R LE strength by 1/2 grade in 4 weeks.  LTGs: -MET  1.  Patient to be I with HEP in 12 weeks.-EXCELLENT PROGRESS  2. Improve R knee ROM to 0-100 degrees in 12 weeks to improve function.- MET  3. Improve R LE strength to 4+ to 5/5 t/o in 12 weeks to improve function.-EXCELLENT PROGRESS  4. Decrease R knee pain to < or = to 2-3/10 with activity in 12 weeks to improve function.-EXCELLENT PROGRESS  5.  Patient to ambulate with normalized gait pattern in 12 weeks. -EXCELLENT PROGRESS  6.  Stair negotiation is improved to PLOF in 12 weeks.-GOOD PROGRESS  7.  Recreational performance is improved to PLOF in 12 weeks. -SOME PROGRESS  8.  ADL performance is improved to PLOF in 12 weeks. -GOOD PROGRESS  9.  Patient to report improved sleep in 12 weeks. GOOD PROGRESS        Plan  Patient would benefit from: skilled physical therapy  Planned modality interventions: cryotherapy and thermotherapy: hydrocollator packs    Planned therapy interventions: IASTM, balance, balance/weight bearing training, manual therapy, neuromuscular re-education, patient/caregiver education, postural training, self care, flexibility, functional ROM exercises, gait training, strengthening, stretching, therapeutic activities, therapeutic exercise, home exercise program and joint mobilization    Frequency: 2x week  Duration in weeks: 8  Plan of Care beginning date: 8/1/2024  Plan of Care expiration date: 10/24/2024  Treatment plan discussed with: patient  Plan details: Modalities and therapy interventions prn.      Subjective Evaluation    History  "of Present Illness  Mechanism of injury: The patient states that she had fallen when she was 9 while ice skating and had hurt her R knee.  She had been immobilized for one and a half years.  She has had chronic pain since then.    The end of November she was getting in her car and twisted her knee, had pain after that.  She notes that she did not get treatment then.  The pain has been getting worse and she has been unable to straighten her knee.      She had seen the doctor on 7/25/24.  He ordered an x-ray.  Per patient it showed arthritis.  She had gone back to see him on 7/29/24.  She got an injection in her knee.  She was referred to OPPT and she now presents for her evaluation.  She will be going back to see the doctor in January for her 6 month follow up appointment.    From EMR review of x-ray from 7/25 of R knee:  VIEWS:  XR KNEE 3 VW RIGHT NON INJURY   FINDINGS:  There is no acute fracture or dislocation.  There is a small joint effusion.  There is mild medial compartment degenerative narrowing. Small osteophytes in medial lateral tibial plateau and lateral femoral epicondyle. No significant varus angulation. Small superior patellar enthesophyte. Patellofemoral space is significantly   narrowed along the lateral aspect.  No lytic or blastic osseous lesion.  Soft tissues are unremarkable.  IMPRESSION:  Mild medial compartment degenerative changes as above.  Severe patellofemoral degenerative narrowing.  Small patellar enthesopathy.  Minimal effusion.  No fracture    The patient states that her pain was along her medial knee and along medial kneecap.  Pain is constant.  Since the injection the \"bone pain\" has been better.  Now she notes that she has more \"muscular pain\" along the front of her knee and thigh.    She denies any changes in sensations.   She reports when she is laying she is unable to fully straighten out her knee.      9-5-24:  Pt reports she is able to stand longer, walk further and handle " "other tasks much better since starting therapy.  She is now more limited in activity because of her back rather than her knee.    10-10-24:  Pt reports marked functional improvements in getting in and out of her car and getting up and down her stairs better.    Patient Goals  Patient goals for therapy: increased motion, improved balance, decreased pain and increased strength  Patient goal: \"To get more steady on my feet, to get more strength, to get more movement, to have less pain.\"  Pain  Current pain ratin  At best pain ratin  At worst pain ratin  Location: R Knee-lateral knee  Quality: dull ache and discomfort  Relieving factors: rest  Exacerbated by: Squatting.    Social Support  Steps to enter house: yes  3  Stairs in house: yes   Lives in: multiple-level home  Lives with: significant other    Employment status: not working    Diagnostic Tests  X-ray: abnormal (See Above)  Treatments  Previous treatment: injection treatment    Objective     Tenderness     Right Knee   No tenderness in the lateral joint line, lateral patella and medial joint line.     Neurological Testing     Sensation     Knee   Left Knee   Intact: Light touch    Right Knee   Intact: light touch     Active Range of Motion   Left Knee   Flexion: 100 degrees   Extension: 0 degrees     Right Knee   Flexion: 100 degrees with pain  Extension: 0 degrees     Strength/Myotome Testing     Left Knee   Flexion: WFL  Extension: WFL  Quadriceps contraction: good    Right Knee   Flexion: 4  Extension: 4  Quadriceps contraction: good    Ambulation   Weight-Bearing Status   Assistive device used: single point cane and two-wheeled walker    Additional Weight-Bearing Status Details  Has SPC and RW at home, does not use on consistent basis.  Will use RW at night when going to the bathroom.      10-10-24:  Pt reports not having to use her walker or cane in a few weeks and then, only at night when getting out of bed to use the bathroom.    Ambulation: " "Stairs   Ascend stairs: independent  Pattern: non-reciprocal  Railings: two rails  Descend stairs: independent  Pattern: non-reciprocal  Railings: two rails    Observational Gait   Decreased walking speed, stride length and right stance time.                 Precautions: Spinal Stenosis, h/o cancer, Arthritis    Access Code: [email or web address]        Manuals 9-26-24 9-30-24 10-3-24 10-7-24 10-10-24   R LE        R Knee   Flex & Ext  STM right hamstring 10' .                     Neuro Re-Ed         SLS        Tandem Stance        Sidestepping        Tandem Amb        Standing knee fleixon and SLS 2x10 bilat 2x10 2.5# 2x10 2.5# 2x10 2.5# abd, ext, flexion 2.5# 2x10   Retro walk at [employer] tree Blue 8' x10 Blue 8' x10 Blue x10 Blue x10 Blue 10' 15x   Wall ball squat 3x10 2x10 2x10  2x10                           Ther Ex        NuStep for ROM & Strength L4 15' L4 15' L4 15' L4 15' L4 14'   HR/TR 20x ea 20x ea 20x ea  20x ea 20x ea   SLR x 3        LAQ 3x10 2#  2x10 2# 2x10 2#  2# 2x10 3x10 2#    QSets 10\" x20 10\" x20 10\"x20  5\" x20   SAQ  2x10 2x10 2#  2x10 2#  3x10   SLR 2x10 2# 2x10 2# 2x10 2x10 2#  2x10   Hip adduction iso        Heel Slides with strap  2x10   2x10   Bridges 2x10 blue 2x10 grn  2x10 resume tband NV   2x10 green    S/L Hip Abd        Clamshells  3x10 green  2x10 grn  2x10 blue  2x10 blue  3x10 green    Calf Stretch w/Strap 10\"x12 10\" x12 10\"x12 10\"x12 10\"x12   Hams Stretch - Seated in Chair 10\"x12 10\"x12 10\"x12 10\"x12 10\"x12    Seated hamstring curls  Seated hamstring curls blue 2x10 Seated hamstring curls blue 2x10 Seated hamstring curls blue 2x10 Seated hamstring curls blue 2x10 Seated hamstring curls blue 2x10   Leg press     20# 2x10            Ther Activity        Stepups F/L 4\" x10 fwd w/glute emphasis  4\" 2x10 each 6\" 2x10 ea (consider increasing step height NV) 8\" x10 R  8\" x10 w/glute emphasis   Stepdowns        STS        Gait Training                        Modalities        HP/CP prn                  "

## 2024-10-14 ENCOUNTER — APPOINTMENT (OUTPATIENT)
Dept: PHYSICAL THERAPY | Facility: CLINIC | Age: 69
End: 2024-10-14
Payer: MEDICARE

## 2024-10-17 ENCOUNTER — OFFICE VISIT (OUTPATIENT)
Dept: PHYSICAL THERAPY | Facility: CLINIC | Age: 69
End: 2024-10-17
Payer: MEDICARE

## 2024-10-17 DIAGNOSIS — M17.11 ARTHRITIS OF RIGHT KNEE: ICD-10-CM

## 2024-10-17 DIAGNOSIS — M17.10 PATELLOFEMORAL ARTHRITIS: Primary | ICD-10-CM

## 2024-10-17 PROCEDURE — 97110 THERAPEUTIC EXERCISES: CPT | Performed by: PHYSICAL THERAPIST

## 2024-10-17 PROCEDURE — 97112 NEUROMUSCULAR REEDUCATION: CPT | Performed by: PHYSICAL THERAPIST

## 2024-10-17 NOTE — PROGRESS NOTES
"Daily Note     Today's date: 10/17/2024  Patient name: Jennifer Roa  : 1955  MRN: [id number]  Referring provider: Gaudencio Green CRNP  Dx:   Encounter Diagnosis     ICD-10-CM    1. Patellofemoral arthritis  M17.10       2. Arthritis of right knee  M17.11                      Subjective: No new complaints      Objective: See treatment diary below      Assessment: Tolerated treatment well. Patient showing improvements in strength and function      Plan: Continue per plan of care.  Progress treatment as tolerated.       Precautions: Spinal Stenosis, h/o cancer, Arthritis    Access Code: [email or web address]        Manuals 10-17-24 9-30-24 10-3-24 10-7-24 10-10-24   R LE        R Knee   Flex & Ext  STM right hamstring 10' .                     Neuro Re-Ed         SLS        Tandem Stance        Sidestepping        Tandem Amb        Standing knee fleixon and SLS 2x10 bilat 2x10 2.5# 2x10 2.5# 2x10 2.5# abd, ext, flexion 2.5# 2x10   Retro walk at [employer] tree Blue 8' x10 Blue 8' x10 Blue x10 Blue x10 Blue 10' 15x   Wall ball squat 3x10 2x10 2x10  2x10                           Ther Ex        NuStep for ROM & Strength L4 15' L4 15' L4 15' L4 15' L4 14'   HR/TR 20x ea 20x ea 20x ea  20x ea 20x ea   SLR x 3        LAQ 3x10 2#  2x10 2# 2x10 2#  2# 2x10 3x10 2#    QSets 10\" x20 10\" x20 10\"x20  5\" x20   SAQ  2x10 2x10 2#  2x10 2#  3x10   SLR 2x10 2# 2x10 2# 2x10 2x10 2#  2x10   Hip adduction iso        Heel Slides with strap  2x10   2x10   Bridges 2x10 blue 2x10 grn  2x10 resume tband NV   2x10 green    S/L Hip Abd        Clamshells  3x10 green  2x10 grn  2x10 blue  2x10 blue  3x10 green    Calf Stretch w/Strap 10\"x12 10\" x12 10\"x12 10\"x12 10\"x12   Hams Stretch - Seated in Chair 10\"x12 10\"x12 10\"x12 10\"x12 10\"x12    Seated hamstring curls  Seated hamstring curls blue 2x10 Seated hamstring curls blue 2x10 Seated hamstring curls blue 2x10 Seated hamstring curls blue 2x10 Seated hamstring curls blue 2x10   Leg press     20# 2x10      " "      Ther Activity        Stepups F/L 4\" x10 fwd w/glute emphasis  4\" 2x10 each 6\" 2x10 ea (consider increasing step height NV) 8\" x10 R  8\" x10 w/glute emphasis   Stepdowns        STS        Gait Training                        Modalities        HP/CP prn                     "

## 2024-10-21 ENCOUNTER — OFFICE VISIT (OUTPATIENT)
Dept: PHYSICAL THERAPY | Facility: CLINIC | Age: 69
End: 2024-10-21
Payer: MEDICARE

## 2024-10-21 DIAGNOSIS — M17.10 PATELLOFEMORAL ARTHRITIS: Primary | ICD-10-CM

## 2024-10-21 DIAGNOSIS — M17.11 ARTHRITIS OF RIGHT KNEE: ICD-10-CM

## 2024-10-21 PROCEDURE — 97110 THERAPEUTIC EXERCISES: CPT

## 2024-10-21 NOTE — PROGRESS NOTES
"Daily Note     Today's date: 10/21/2024  Patient name: Jennifer Roa  : 1955  MRN: [id number]  Referring provider: Gaudencio Green CRNP  Dx:   Encounter Diagnosis     ICD-10-CM    1. Patellofemoral arthritis  M17.10       2. Arthritis of right knee  M17.11                      Subjective: Jennifer reports L knee is more painful then R knee but in same location.       Objective: See treatment diary below      Assessment: Visual and VC to ensure correct exercise technique. Decreased time on nustep due to pt arriving late due to traffic. Limited ROM with wall ball squats due to L knee pain but was able to tolerate in shortened knee flex ROM. Pt would continue to benefit from skilled PT. Progress as able.       Plan: Continue with current POC to address pt deficits.      Precautions: Spinal Stenosis, h/o cancer, Arthritis    Access Code: [email or web address]        Manuals 10-17-24 10-21-24 10-3-24 10-7-24 10-10-24   R LE        R Knee   Flex & Ext   .                     Neuro Re-Ed         SLS        Tandem Stance        Sidestepping        Tandem Amb        Standing knee fleixon and SLS 2x10 bilat  2x10 2.5# 2x10 2.5# abd, ext, flexion 2.5# 2x10   Retro walk at [employer] tree Blue 8' x10  Blue x10 Blue x10 Blue 10' 15x   Wall ball squat 3x10 2x10 limited ROM due to L knee 2x10  2x10                           Ther Ex        NuStep for ROM & Strength L4 15' Lv5 10' on new machine  L4 15' L4 15' L4 14'   HR/TR 20x ea 20x ea 20x ea  20x ea 20x ea   SLR x 3        LAQ 3x10 2#  3x10 2#  2x10 2#  2# 2x10 3x10 2#    QSets 10\" x20 10\"x20 10\"x20  5\" x20   SAQ   2x10 2#  2x10 2#  3x10   SLR 2x10 2# 2x10 2#  2x10 2x10 2#  2x10   Hip adduction iso  5\" x20      Heel Slides with strap     2x10   Bridges 2x10 blue 2x10 blue  2x10 resume tband NV   2x10 green    S/L Hip Abd  5\" x20      Clamshells  3x10 green  Blue 20x 2x10 blue  2x10 blue  3x10 green    Calf Stretch w/Strap 10\"x12 10\"x12 10\"x12 10\"x12 10\"x12   Hams Stretch - Seated in Chair " "10\"x12 10\"x12 10\"x12 10\"x12 10\"x12    Seated hamstring curls  Seated hamstring curls blue 2x10 Seated hamstring curls blue 2x10 Seated hamstring curls blue 2x10 Seated hamstring curls blue 2x10 Seated hamstring curls blue 2x10   Leg press     20# 2x10            Ther Activity        Stepups F/L 4\" x10 fwd w/glute emphasis   6\" 2x10 ea (consider increasing step height NV) 8\" x10 R  8\" x10 w/glute emphasis   Stepdowns        STS        Gait Training                        Modalities        HP/CP prn                       "

## 2024-10-24 ENCOUNTER — OFFICE VISIT (OUTPATIENT)
Dept: PHYSICAL THERAPY | Facility: CLINIC | Age: 69
End: 2024-10-24
Payer: MEDICARE

## 2024-10-24 DIAGNOSIS — M17.10 PATELLOFEMORAL ARTHRITIS: Primary | ICD-10-CM

## 2024-10-24 DIAGNOSIS — M17.11 ARTHRITIS OF RIGHT KNEE: ICD-10-CM

## 2024-10-24 PROCEDURE — 97110 THERAPEUTIC EXERCISES: CPT | Performed by: PHYSICAL THERAPIST

## 2024-10-24 PROCEDURE — 97112 NEUROMUSCULAR REEDUCATION: CPT | Performed by: PHYSICAL THERAPIST

## 2024-10-24 NOTE — PROGRESS NOTES
"Daily Note     Today's date: 10/24/2024  Patient name: Jennifer Roa  : 1955  MRN: [id number]  Referring provider: Gaudencio Green CRNP  Dx:   Encounter Diagnosis     ICD-10-CM    1. Patellofemoral arthritis  M17.10       2. Arthritis of right knee  M17.11                      Subjective: Pt reports the right knee has been doing a lot better but she is noticing her left knee is hurting more now.      Objective: See treatment diary below      Assessment: Tolerated treatment well. Patient experienced improvement in both knees post treatment and would benefit from bilateral strengthening and stretching moving forward.      Plan: Continue per plan of care.  Progress treatment as tolerated.       Precautions: Spinal Stenosis, h/o cancer, Arthritis    Access Code: [email or web address]        Manuals 10-17-24 10-21-24 10-24-24 10-7-24 10-10-24   R LE        R Knee   Flex & Ext   .                     Neuro Re-Ed         SLS        Tandem Stance        Sidestepping        Tandem Amb        Standing knee fleixon and SLS 2x10 bilat  2x10 2.5# 2x10 2.5# abd, ext, flexion 2.5# 2x10   Retro walk at [place] Blue 8' x10  Blue x15 Blue x10 Blue 10' 15x   Wall ball squat 3x10 2x10 limited ROM due to L knee 2x10  2x10                           Ther Ex        NuStep for ROM & Strength L4 15' Lv5 10' on new machine  L4 15' L4 15' L4 14'   HR/TR 20x ea 20x ea 20x ea  20x ea 20x ea   SLR x 3        LAQ 3x10 2#  3x10 2#  2x10 2#  2# 2x10 3x10 2#    QSets 10\" x20 10\"x20 10\"x20  5\" x20   SAQ   2x10 2#  2x10 2#  3x10   SLR 2x10 2# 2x10 2#  2x10 2# regina 2x10 2#  2x10   Hip adduction iso  5\" x20      Heel Slides with strap     2x10   Bridges 2x10 blue 2x10 blue  2x10 resume tband NV   2x10 green    S/L Hip Abd  5\" x20      Clamshells  3x10 green  Blue 20x 2x10 blue  2x10 blue  3x10 green    Calf Stretch w/Strap 10\"x12 10\"x12 10\"x12 10\"x12 10\"x12   Hams Stretch - Seated in Chair 10\"x12 10\"x12 10\"x12 10\"x12 10\"x12    Seated hamstring curls  " "Seated hamstring curls blue 2x10 Seated hamstring curls blue 2x10 Seated hamstring curls blue 2x10 Seated hamstring curls blue 2x10 Seated hamstring curls blue 2x10   Leg press     20# 2x10            Ther Activity        Stepups F/L 4\" x10 fwd w/glute emphasis   6\" 2x10 ea (consider increasing step height NV) 8\" x10 R  8\" x10 w/glute emphasis   Stepdowns        STS        Gait Training                        Modalities        HP/CP prn                         "

## 2024-10-28 ENCOUNTER — OFFICE VISIT (OUTPATIENT)
Dept: PHYSICAL THERAPY | Facility: CLINIC | Age: 69
End: 2024-10-28
Payer: MEDICARE

## 2024-10-28 DIAGNOSIS — M17.10 PATELLOFEMORAL ARTHRITIS: Primary | ICD-10-CM

## 2024-10-28 DIAGNOSIS — M17.11 ARTHRITIS OF RIGHT KNEE: ICD-10-CM

## 2024-10-28 PROCEDURE — 97110 THERAPEUTIC EXERCISES: CPT

## 2024-10-28 PROCEDURE — 97112 NEUROMUSCULAR REEDUCATION: CPT

## 2024-10-28 NOTE — PROGRESS NOTES
"Daily Note     Today's date: 10/28/2024  Patient name: Jennifer Roa  : 1955  MRN: [id number]  Referring provider: Gaudencio Green CRNP  Dx:   Encounter Diagnosis     ICD-10-CM    1. Patellofemoral arthritis  M17.10       2. Arthritis of right knee  M17.11           Start Time: 1115  Stop Time: 1215  Total time in clinic (min): 60 minutes    Subjective: Patient reports that she feels ok.  Patient reports only a little pain in her R knee, mostly stiffness.      Objective: See treatment diary below      Assessment: Tolerated treatment well.   Patient participated in skilled PT session focused on strengthening, stretching, and ROM. Patient able to complete exercise program with feeling looser after session.  Patient demonstrates some LLE circumduction with step ups this session.  Improvement with R knee ROM with no pain.  Patient needed v.c. for proper technique with exercises.   Patient would continue to benefit from skilled PT interventions to address strengthening, stretching, and ROM. Patient demonstrated fatigue post treatment      Plan: Continue per plan of care.      Precautions: Spinal Stenosis, h/o cancer, Arthritis    Access Code: [email or web address]        Manuals 10-17-24 10-21-24 10-24-24 10/28/24 10-10-24   R LE        R Knee   Flex & Ext   .                     Neuro Re-Ed         SLS        Tandem Stance        Sidestepping        Tandem Amb        Standing knee fleixon and SLS 2x10 bilat  2x10 2.5# 2.5 # 2x10 2.5# 2x10   Retro walk at [employer] tree Blue 8' x10  Blue x15 Blue 15x Blue 10' 15x   Wall ball squat 3x10 2x10 limited ROM due to L knee 2x10 2x10 2x10                           Ther Ex        NuStep for ROM & Strength L4 15' Lv5 10' on new machine  L4 15' L4 x 15' L4 14'   HR/TR 20x ea 20x ea 20x ea  20x ea 20x ea   SLR x 3        LAQ 3x10 2#  3x10 2#  2x10 2#  2.5# B/L 2x10 3x10 2#    QSets 10\" x20 10\"x20 10\"x20  5\" x20   SAQ   2x10 2#  2# 2x10 3x10   SLR 2x10 2# 2x10 2#  2x10 2# regina 2# B/L 2x10 ea " "2x10   Hip adduction iso  5\" x20      Heel Slides with strap     2x10   Bridges 2x10 blue 2x10 blue  2x10 resume tband NV  Blue TB 2x10 2x10 green    S/L Hip Abd  5\" x20      Clamshells  3x10 green  Blue 20x 2x10 blue  2x10 Blue 3x10 green    Calf Stretch w/Strap 10\"x12 10\"x12 10\"x12 10\" 12x 10\"x12   Hams Stretch - Seated in Chair 10\"x12 10\"x12 10\"x12 10\" 12x ea 10\"x12    Seated hamstring curls  Seated hamstring curls blue 2x10 Seated hamstring curls blue 2x10 Seated hamstring curls blue 2x10  Seated hamstring curls blue 2x10   Leg press                 Ther Activity        Stepups F/L 4\" x10 fwd w/glute emphasis   6\" 2x10 ea (consider increasing step height NV) 8\" 2x10 ea  8\" x10 w/glute emphasis   Stepdowns        STS        Gait Training                        Modalities        HP/CP prn                           "

## 2024-10-31 ENCOUNTER — OFFICE VISIT (OUTPATIENT)
Dept: PHYSICAL THERAPY | Facility: CLINIC | Age: 69
End: 2024-10-31
Payer: MEDICARE

## 2024-10-31 DIAGNOSIS — M17.10 PATELLOFEMORAL ARTHRITIS: Primary | ICD-10-CM

## 2024-10-31 DIAGNOSIS — M17.11 ARTHRITIS OF RIGHT KNEE: ICD-10-CM

## 2024-10-31 PROCEDURE — 97110 THERAPEUTIC EXERCISES: CPT | Performed by: PHYSICAL THERAPIST

## 2024-10-31 PROCEDURE — 97112 NEUROMUSCULAR REEDUCATION: CPT | Performed by: PHYSICAL THERAPIST

## 2024-10-31 NOTE — PROGRESS NOTES
"Daily Note     Today's date: 10/31/2024  Patient name: Jennifer Roa  : 1955  MRN: [id number]  Referring provider: Gaudencio Green CRNP  Dx:   Encounter Diagnosis     ICD-10-CM    1. Patellofemoral arthritis  M17.10       2. Arthritis of right knee  M17.11                      Subjective: pt reports getting stronger and not having lnight eg cramps much at all anymore.      Objective: See treatment diary below      Assessment: Tolerated treatment well. Patient demonstrated fatigue post treatment, exhibited good technique with therapeutic exercises, and would benefit from continued PT      Plan: Continue per plan of care.  Progress treatment as tolerated.       Precautions: Spinal Stenosis, h/o cancer, Arthritis    Access Code: [email or web address]        Manuals 10-17-24 10-21-24 10-24-24 10/28/24 10-31-24   R LE        R Knee   Flex & Ext   .                     Neuro Re-Ed         SLS        Tandem Stance        Sidestepping        Tandem Amb        Standing knee fleixon and SLS 2x10 bilat  2x10 2.5# 2.5 # 2x10 2.5# 2x10   Retro walk at band tree Blue 8' x10  Blue x15 Blue 15x Blue 10' 15x   Wall ball squat 3x10 2x10 limited ROM due to L knee 2x10 2x10 2x10                           Ther Ex        NuStep for ROM & Strength L4 15' Lv5 10' on new machine  L4 15' L4 x 15' L4 14'   HR/TR 20x ea 20x ea 20x ea  20x ea 20x ea   SLR x 3        LAQ 3x10 2#  3x10 2#  2x10 2#  2.5# B/L 2x10 3x10 2#    QSets 10\" x20 10\"x20 10\"x20  5\" x20   SAQ   2x10 2#  2# 2x10 3x10   SLR 2x10 2# 2x10 2#  2x10 2# regina 2# B/L 2x10 ea 2x10 2#   Hip adduction iso  5\" x20      Heel Slides with strap        Bridges 2x10 blue 2x10 blue  2x10 resume tband NV  Blue TB 2x10 Blue 3x10    S/L Hip Abd  5\" x20      Clamshells  3x10 green  Blue 20x 2x10 blue  2x10 Blue 3x10 blue   Calf Stretch w/Strap 10\"x12 10\"x12 10\"x12 10\" 12x 10\"x12   Hams Stretch - Seated in Chair 10\"x12 10\"x12 10\"x12 10\" 12x ea 10\"x12    Seated hamstring curls  Seated hamstring curls " "blue 2x10 Seated hamstring curls blue 2x10 Seated hamstring curls blue 2x10     Leg press                 Ther Activity        Stepups F/L 4\" x10 fwd w/glute emphasis   6\" 2x10 ea (consider increasing step height NV) 8\" 2x10 ea  8\" x10 w/glute emphasis   Stepdowns        STS        Gait Training                        Modalities        HP/CP prn                             "

## 2024-11-04 ENCOUNTER — OFFICE VISIT (OUTPATIENT)
Dept: PHYSICAL THERAPY | Facility: CLINIC | Age: 69
End: 2024-11-04
Payer: MEDICARE

## 2024-11-04 DIAGNOSIS — M17.11 ARTHRITIS OF RIGHT KNEE: ICD-10-CM

## 2024-11-04 DIAGNOSIS — M17.10 PATELLOFEMORAL ARTHRITIS: Primary | ICD-10-CM

## 2024-11-04 DIAGNOSIS — G89.29 OTHER CHRONIC PAIN: ICD-10-CM

## 2024-11-04 PROCEDURE — 97110 THERAPEUTIC EXERCISES: CPT

## 2024-11-04 PROCEDURE — 97112 NEUROMUSCULAR REEDUCATION: CPT

## 2024-11-04 NOTE — TELEPHONE ENCOUNTER
Reason for call:   [x] Refill   [] Prior Auth  [] Other:     Office: [facility]  [x] PCP/Provider -  Talita Izquierdo   [] Specialty/Provider -     Medication: tramadol     Dose/Frequency: 50 mg/ 1 tab every 6 hours PRN     Quantity: 30 day supply     Pharmacy: [facility] in [place]    Does the patient have enough for 3 days?   [] Yes   [x] No - Send as HP to POD

## 2024-11-04 NOTE — PROGRESS NOTES
"Daily Note     Today's date: 2024  Patient name: Jennifer Roa  : 1955  MRN: [id number]  Referring provider: Gaudencio Green CRNP  Dx:   Encounter Diagnosis     ICD-10-CM    1. Patellofemoral arthritis  M17.10       2. Arthritis of right knee  M17.11                      Subjective: Jennifer offers no new complaints in regards to R knee. She reports still having some discomfort in L knee.       Objective: See treatment diary below      Assessment: Visual and VC to ensure correct exercise technique. No vaulting noted during 8\" step ups. Denied any increases in pain with exercises performed. Progress as able.       Plan: Continue with current POC to address pt deficits.      Precautions: Spinal Stenosis, h/o cancer, Arthritis    Access Code: [email or web address]        Manuals 11-4-24 10-21-24 10-24-24 10/28/24 10-31-24   R LE        R Knee   Flex & Ext   .                     Neuro Re-Ed         SLS        Tandem Stance        Sidestepping        Tandem Amb        Standing knee fleixon and SLS 2.5# 2x10  2x10 2.5# 2.5 # 2x10 2.5# 2x10   Retro walk at band tree Blue 10x  Blue x15 Blue 15x Blue 10' 15x   Wall ball squat 2x10 2x10 limited ROM due to L knee 2x10 2x10 2x10                           Ther Ex        NuStep for ROM & Strength Lv4 15' Lv5 10' on new machine  L4 15' L4 x 15' L4 14'   HR/TR 20x ea 20x ea 20x ea  20x ea 20x ea   SLR x 3        LAQ 3x10 2.5#  3x10 2#  2x10 2#  2.5# B/L 2x10 3x10 2#    QSets  10\"x20 10\"x20  5\" x20   SAQ   2x10 2#  2# 2x10 3x10   SLR 2# 2x10 regina  2x10 2#  2x10 2# regina 2# B/L 2x10 ea 2x10 2#   Hip adduction iso 5\" x20 5\" x20      Heel Slides with strap        Bridges Black 3x10 2x10 blue  2x10 resume tband NV  Blue TB 2x10 Blue 3x10    S/L Hip Abd  5\" x20      Clamshells  3x10 black  Blue 20x 2x10 blue  2x10 Blue 3x10 blue   Calf Stretch w/Strap 10\"x12 10\"x12 10\"x12 10\" 12x 10\"x12   Hams Stretch - Seated in Chair 10\"x12 10\"x12 10\"x12 10\" 12x ea 10\"x12    Seated hamstring curls   Seated " "hamstring curls blue 2x10 Seated hamstring curls blue 2x10     Leg press                 Ther Activity        Stepups F/L 8\" x20 regina   6\" 2x10 ea (consider increasing step height NV) 8\" 2x10 ea  8\" x10 w/glute emphasis   Stepdowns        STS        Gait Training                        Modalities        HP/CP prn                               "

## 2024-11-05 DIAGNOSIS — G89.29 OTHER CHRONIC PAIN: ICD-10-CM

## 2024-11-06 RX ORDER — TRAMADOL HYDROCHLORIDE 50 MG/1
50 TABLET ORAL EVERY 6 HOURS PRN
Qty: 120 TABLET | Refills: 0 | Status: SHIPPED | OUTPATIENT
Start: 2024-11-06

## 2024-11-06 RX ORDER — TRAMADOL HYDROCHLORIDE 50 MG/1
50 TABLET ORAL EVERY 6 HOURS PRN
Qty: 120 TABLET | Refills: 0 | OUTPATIENT
Start: 2024-11-06

## 2024-11-07 ENCOUNTER — OFFICE VISIT (OUTPATIENT)
Dept: PHYSICAL THERAPY | Facility: CLINIC | Age: 69
End: 2024-11-07
Payer: MEDICARE

## 2024-11-07 DIAGNOSIS — M17.11 ARTHRITIS OF RIGHT KNEE: ICD-10-CM

## 2024-11-07 DIAGNOSIS — M17.10 PATELLOFEMORAL ARTHRITIS: Primary | ICD-10-CM

## 2024-11-07 PROCEDURE — 97110 THERAPEUTIC EXERCISES: CPT | Performed by: PHYSICAL THERAPIST

## 2024-11-07 PROCEDURE — 97112 NEUROMUSCULAR REEDUCATION: CPT | Performed by: PHYSICAL THERAPIST

## 2024-11-07 NOTE — PROGRESS NOTES
"Daily Note     Today's date: 2024  Patient name: Jennifer Roa  : 1955  MRN: [id number]  Referring provider: Gaudencio Green CRNP  Dx:   Encounter Diagnosis     ICD-10-CM    1. Patellofemoral arthritis  M17.10       2. Arthritis of right knee  M17.11                      Subjective:  Pt reports she continues to feel pretty good as far as her knee goes.      Objective: See treatment diary below      Assessment: Tolerated treatment well. Patient tolerating more closed chain activities with no difficulty or increase in symptoms.  Patient demonstrated fatigue post treatment, exhibited good technique with therapeutic exercises, and would benefit from continued PT      Plan: Continue per plan of care.  Progress treatment as tolerated.       Precautions: Spinal Stenosis, h/o cancer, Arthritis    Access Code: [email or web address]        Manuals 11-4-24 11-7-24 10-24-24 10/28/24 10-31-24   R LE        R Knee   Flex & Ext   .                     Neuro Re-Ed         SLS        Tandem Stance        Sidestepping        Tandem Amb        Standing knee fleixon and SLS 2.5# 2x10 3# 2x10 each bilat 2x10 2.5# 2.5 # 2x10 2.5# 2x10   Retro walk at band tree Blue 10x Black10' 12x Blue x15 Blue 15x Blue 10' 15x   Wall ball squat 2x10 2x10  2x10 2x10 2x10                           Ther Ex        NuStep for ROM & Strength Lv4 15' Lv5 10' on new machine  L4 15' L4 x 15' L4 14'   HR/TR 20x ea 20x ea 20x ea  20x ea 20x ea   SLR x 3        LAQ 3x10 2.5#  3x10 3#  2x10 2#  2.5# B/L 2x10 3x10 2#    QSets  10\"x20 10\"x20  5\" x20   SAQ   2x10 2#  2# 2x10 3x10   SLR 2# 2x10 regina  2x10 3#  2x10 2# regina 2# B/L 2x10 ea 2x10 2#   Hip adduction iso 5\" x20 5\" x20      Heel Slides with strap        Bridges Black 3x10 2x10 blue  2x10 resume tband NV  Blue TB 2x10 Blue 3x10    S/L Hip Abd  5\" x20      Clamshells  3x10 black  Black 20x 2x10 blue  2x10 Blue 3x10 blue   Calf Stretch w/Strap 10\"x12 10\"x12 10\"x12 10\" 12x 10\"x12   Hams Stretch - Seated in Chair " "10\"x12 10\"x12 10\"x12 10\" 12x ea 10\"x12    Seated hamstring curls    Seated hamstring curls blue 2x10     Leg press                 Ther Activity        Stepups F/L 8\" x20 regina  8\" 2x10 bilaterally 6\" 2x10 ea (consider increasing step height NV) 8\" 2x10 ea  8\" x10 w/glute emphasis   Stepdowns        STS        Gait Training                        Modalities        HP/CP prn                                 "

## 2024-11-11 ENCOUNTER — OFFICE VISIT (OUTPATIENT)
Dept: PHYSICAL THERAPY | Facility: CLINIC | Age: 69
End: 2024-11-11
Payer: MEDICARE

## 2024-11-11 DIAGNOSIS — M17.11 ARTHRITIS OF RIGHT KNEE: ICD-10-CM

## 2024-11-11 DIAGNOSIS — M17.10 PATELLOFEMORAL ARTHRITIS: Primary | ICD-10-CM

## 2024-11-11 PROCEDURE — 97110 THERAPEUTIC EXERCISES: CPT | Performed by: PHYSICAL THERAPIST

## 2024-11-11 NOTE — PROGRESS NOTES
"Daily Note     Today's date: 2024  Patient name: Jennifer Roa  : 1955  MRN: [id number]  Referring provider: Gaudencio Geren CRNP  Dx:   Encounter Diagnosis     ICD-10-CM    1. Patellofemoral arthritis  M17.10       2. Arthritis of right knee  M17.11                      Subjective: Pt reports new activities bothered her low back and not her knee.        Objective: See treatment diary below      Assessment: Tolerated treatment well. Patient had improvement in back with treatment and was not bothered by today's workload      Plan: Continue per plan of care.  Progress treatment as tolerated.       Precautions: Spinal Stenosis, h/o cancer, Arthritis    Access Code: [email or web address]        Manuals 11-4-24 11-7-24 11-11-24 10/28/24 10-31-24   R LE        R Knee   Flex & Ext   .                     Neuro Re-Ed         SLS        Tandem Stance        Sidestepping        Tandem Amb        Standing knee fleixon and SLS 2.5# 2x10 3# 2x10 each bilat 2x10 2.5# 2.5 # 2x10 2.5# 2x10   Retro walk at [employer] tree Blue 10x Black10' 12x Blue x15 Blue 15x Blue 10' 15x   Wall ball squat 2x10 2x10  2x10 2x10 2x10                           Ther Ex        NuStep for ROM & Strength Lv4 15' Lv5 10' on new machine  L4 15' L4 x 15' L4 14'   HR/TR 20x ea 20x ea 20x ea  20x ea 20x ea   SLR x 3        LAQ 3x10 2.5#  3x10 3#  2x10 2#  2.5# B/L 2x10 3x10 2#    QSets  10\"x20 10\"x20  5\" x20   SAQ   3x10 3#  2# 2x10 3x10   SLR 2# 2x10 regina  2x10 3#  2x10 2# regina 2# B/L 2x10 ea 2x10 2#   Hip adduction iso 5\" x20 5\" x20      Heel Slides with strap        Bridges Black 3x10 2x10 blue  2x10 resume tband NV  Blue TB 2x10 Blue 3x10    S/L Hip Abd  5\" x20      Clamshells  3x10 black  Black 20x 2x10 blue  2x10 Blue 3x10 blue   Calf Stretch w/Strap 10\"x12 10\"x12 10\"x12 10\" 12x 10\"x12   Hams Stretch - Seated in Chair 10\"x12 10\"x12 10\"x12 10\" 12x ea 10\"x12    Seated hamstring curls         Leg press                 Ther Activity        Stepups F/L 8\" x20 regina " " 8\" 2x10 bilaterally 8\" 2x10 ea (consider increasing step height NV) 8\" 2x10 ea  8\" x10 w/glute emphasis   Stepdowns        STS        Gait Training                        Modalities        HP/CP prn                                   "

## 2024-11-14 ENCOUNTER — OFFICE VISIT (OUTPATIENT)
Dept: PHYSICAL THERAPY | Facility: CLINIC | Age: 69
End: 2024-11-14
Payer: MEDICARE

## 2024-11-14 DIAGNOSIS — M17.11 ARTHRITIS OF RIGHT KNEE: ICD-10-CM

## 2024-11-14 DIAGNOSIS — M17.10 PATELLOFEMORAL ARTHRITIS: Primary | ICD-10-CM

## 2024-11-14 PROCEDURE — 97110 THERAPEUTIC EXERCISES: CPT

## 2024-11-14 PROCEDURE — 97112 NEUROMUSCULAR REEDUCATION: CPT

## 2024-11-14 NOTE — PROGRESS NOTES
"Daily Note     Today's date: 2024  Patient name: Jennifer Roa  : 1955  MRN: [id number]  Referring provider: Gaudencio Green CRNP  Dx:   Encounter Diagnosis     ICD-10-CM    1. Patellofemoral arthritis  M17.10       2. Arthritis of right knee  M17.11                      Subjective: Jennifer reports R and L knee are bothering her still. She had quilting yesterday and had to take three advil so she could manage making dinner due to LBP and knee pain following.       Objective: See treatment diary below      Assessment: Visual and VC to ensure correct exercise technique. Unable to complete full program due to LBP. Pt would continue to benefit from skilled PT.       Plan: Continue with current POC to address pt deficits.      Precautions: Spinal Stenosis, h/o cancer, Arthritis    Access Code: [email or web address]        Manuals 11-4-24 11-7-24 11-11-24 11-14-24 10-31-24   R LE        R Knee   Flex & Ext   .                     Neuro Re-Ed         SLS        Tandem Stance        Sidestepping        Tandem Amb        Standing knee fleixon and SLS 2.5# 2x10 3# 2x10 each bilat 2x10 2.5# 3# 2x10 regina  2.5# 2x10   Retro walk at band tree Blue 10x Black10' 12x Blue x15 Black x15 Blue 10' 15x   Wall ball squat 2x10 2x10  2x10 2x10 2x10                           Ther Ex        NuStep for ROM & Strength Lv4 15' Lv5 10' on new machine  L4 15' Lv4 15' L4 14'   HR/TR 20x ea 20x ea 20x ea  20x ea 20x ea   SLR x 3        LAQ 3x10 2.5#  3x10 3#  2x10 2#  3# 2x10 regina  3x10 2#    QSets  10\"x20 10\"x20  5\" x20   SAQ   3x10 3#   3x10   SLR 2# 2x10 regina  2x10 3#  2x10 2# regina 2x10 2# regina  2x10 2#   Hip adduction iso 5\" x20 5\" x20      Heel Slides with strap        Bridges Black 3x10 2x10 blue  2x10 resume tband NV  2x10 black  Blue 3x10    S/L Hip Abd  5\" x20      Clamshells  3x10 black  Black 20x 2x10 blue  3x10 black  3x10 blue   Calf Stretch w/Strap 10\"x12 10\"x12 10\"x12 10\"x12 10\"x12   Hams Stretch - Seated in Chair 10\"x12 10\"x12 10\"x12 " "10\"x12 10\"x12    Seated hamstring curls         Leg press                 Ther Activity        Stepups F/L 8\" x20 regina  8\" 2x10 bilaterally 8\" 2x10 ea (consider increasing step height NV)  8\" x10 w/glute emphasis   Stepdowns        STS        Gait Training                        Modalities        HP/CP prn                                     "

## 2024-11-18 ENCOUNTER — OFFICE VISIT (OUTPATIENT)
Dept: PHYSICAL THERAPY | Facility: CLINIC | Age: 69
End: 2024-11-18
Payer: MEDICARE

## 2024-11-18 DIAGNOSIS — M17.11 ARTHRITIS OF RIGHT KNEE: ICD-10-CM

## 2024-11-18 DIAGNOSIS — M17.10 PATELLOFEMORAL ARTHRITIS: Primary | ICD-10-CM

## 2024-11-18 PROCEDURE — 97112 NEUROMUSCULAR REEDUCATION: CPT

## 2024-11-18 PROCEDURE — 97110 THERAPEUTIC EXERCISES: CPT

## 2024-11-18 NOTE — PROGRESS NOTES
PT Re-Evaluation  Collection of subjective/objective data performed by Crystal Cruz PTA; Assessment, POC, and Goal attainment performed by Kolton Valenzuela DPT      Today's date: 2024  Patient name: Jennifer Roa  : 1955  MRN: [id number]  Referring provider: Gaudencio Green CRNP  Dx:   Encounter Diagnosis     ICD-10-CM    1. Patellofemoral arthritis  M17.10       2. Arthritis of right knee  M17.11                      Assessment  Impairments: abnormal gait, abnormal or restricted ROM, activity intolerance, impaired balance, impaired physical strength, lacks appropriate home exercise program, pain with function, weight-bearing intolerance and activity limitations  Other impairment: decreased flexibility  Functional limitations: difficulty with stair negotiation    Assessment details: The patient is a 68 y/o female who presents to PT with diagnosis of R knee arthritis and patellofemoral arthritis.  She has complaints of constant pain though pain has decreased since getting the injection.  She demonstrates deficits with decreased ROM and strength, decreased flexibility, decreased balance and proprioception and TTP.  These deficits lead to difficulty with stair negotiation, limited standing tolerance and decreased tolerance to functional activities.  She ambulates without AD, slow anuja noted along with short stride length and decreased weight shift to RLE in stance phase.  She has difficulty with stair negotiation and has been going up and down the steps with non-reciprocal gait pattern.  Difficulty sleeping is also noted, hard to find a comfortable position to sleep.  She is unable to fully extend her knee when laying on her back.  Secondary to pain and weakness she is unable to squat or kneel.  Secondary to pain and above deficits she is limited with her overall mobility and function.  The patient would benefit from continued PT to address deficits and improve function.  Tx to include ROM, stretching,  strengthening, modalities, HEP, pt education, postural ed, lifting/body mechanics, neuro re-ed, balance/proprioception Te, MT and equipment.      9-5-24:  Pt showing progressive improvements and would benefit from continued functional progression in order to return to pre-morbid ability levels.    11-18-24:  Pt continues to improve ROM and strength.  She also has improved performance of most activities around her house.  Getting in and out of a care and some other lateral movement activities are still somewhat troublesome.    Understanding of Dx/Px/POC: good     Prognosis: fair    Goals  STGs:  1.  Initiate and complete HEP with verbal cues.-MET  2.  Decrease R knee pain by > 25% in 4 weeks. -EXCELLENT PROGRESS  3.  Improve R knee ROM by 5-10 degrees in 4 weeks. -MET  4.  Improve R LE strength by 1/2 grade in 4 weeks.  LTGs: -MET  1.  Patient to be I with HEP in 12 weeks.-EXCELLENT PROGRESS  2. Improve R knee ROM to 0-100 degrees in 12 weeks to improve function.- MET  3. Improve R LE strength to 4+ to 5/5 t/o in 12 weeks to improve function.-EXCELLENT PROGRESS  4. Decrease R knee pain to < or = to 2-3/10 with activity in 12 weeks to improve function.-EXCELLENT PROGRESS  5.  Patient to ambulate with normalized gait pattern in 12 weeks. -EXCELLENT PROGRESS  6.  Stair negotiation is improved to PLOF in 12 weeks.-GOOD PROGRESS  7.  Recreational performance is improved to PLOF in 12 weeks. -SOME PROGRESS  8.  ADL performance is improved to PLOF in 12 weeks. -GOOD PROGRESS  9.  Patient to report improved sleep in 12 weeks. GOOD PROGRESS        Plan  Patient would benefit from: skilled physical therapy  Planned modality interventions: cryotherapy and thermotherapy: hydrocollator packs    Planned therapy interventions: IASTM, balance, balance/weight bearing training, manual therapy, neuromuscular re-education, patient/caregiver education, postural training, self care, flexibility, functional ROM exercises, gait training,  "strengthening, stretching, therapeutic activities, therapeutic exercise, home exercise program and joint mobilization    Frequency: 2x week  Duration in weeks: 4  Treatment plan discussed with: patient  Plan details: Modalities and therapy interventions prn.      Subjective Evaluation    History of Present Illness  Mechanism of injury: The patient states that she had fallen when she was 9 while ice skating and had hurt her R knee.  She had been immobilized for one and a half years.  She has had chronic pain since then.    The end of November she was getting in her car and twisted her knee, had pain after that.  She notes that she did not get treatment then.  The pain has been getting worse and she has been unable to straighten her knee.      She had seen the doctor on 7/25/24.  He ordered an x-ray.  Per patient it showed arthritis.  She had gone back to see him on 7/29/24.  She got an injection in her knee.  She was referred to OPPT and she now presents for her evaluation.  She will be going back to see the doctor in January for her 6 month follow up appointment.    From EMR review of x-ray from 7/25 of R knee:  VIEWS:  XR KNEE 3 VW RIGHT NON INJURY   FINDINGS:  There is no acute fracture or dislocation.  There is a small joint effusion.  There is mild medial compartment degenerative narrowing. Small osteophytes in medial lateral tibial plateau and lateral femoral epicondyle. No significant varus angulation. Small superior patellar enthesophyte. Patellofemoral space is significantly   narrowed along the lateral aspect.  No lytic or blastic osseous lesion.  Soft tissues are unremarkable.  IMPRESSION:  Mild medial compartment degenerative changes as above.  Severe patellofemoral degenerative narrowing.  Small patellar enthesopathy.  Minimal effusion.  No fracture    The patient states that her pain was along her medial knee and along medial kneecap.  Pain is constant.  Since the injection the \"bone pain\" has been " "better.  Now she notes that she has more \"muscular pain\" along the front of her knee and thigh.    She denies any changes in sensations.   She reports when she is laying she is unable to fully straighten out her knee.      24:  Pt reports she is able to stand longer, walk further and handle other tasks much better since starting therapy.  She is now more limited in activity because of her back rather than her knee.    10-10-24:  Pt reports marked functional improvements in getting in and out of her car and getting up and down her stairs better.    24: Pt denies any instances of buckling of R knee or needing to use SPC in the past month. She reports most difficulty with static positions for example standing and cooking and getting out of a chair, navigating stairs. She reports improvements in getting out of the car and is now able to ascend stairs reciprocally since starting PT.     Patient Goals  Patient goals for therapy: increased motion, improved balance, decreased pain and increased strength  Patient goal: \"To get more steady on my feet, to get more strength, to get more movement, to have less pain.\"  Pain  Current pain ratin  At best pain ratin  At worst pain ratin  Location: R Knee-lateral knee  Quality: dull ache and discomfort  Relieving factors: rest  Exacerbated by: Squatting.    Social Support  Steps to enter house: yes  3  Stairs in house: yes   Lives in: multiple-level home  Lives with: significant other    Employment status: not working    Diagnostic Tests  X-ray: abnormal (See Above)  Treatments  Previous treatment: injection treatment    Objective     Tenderness     Right Knee   No tenderness in the lateral joint line, lateral patella and medial joint line.     Neurological Testing     Sensation     Knee   Left Knee   Intact: Light touch    Right Knee   Intact: light touch     Active Range of Motion   Left Knee   Flexion: 100 degrees   Extension: 0 degrees     Right Knee " "  Flexion: 107 degrees with pain  Extension: 0 degrees     Strength/Myotome Testing     Left Knee   Flexion: WFL  Extension: WFL  Quadriceps contraction: good    Right Knee   Flexion: 4+  Extension: 4+  Quadriceps contraction: good    Ambulation   Weight-Bearing Status   Assistive device used: single point cane and two-wheeled walker    Additional Weight-Bearing Status Details  Has SPC and RW at home, does not use on consistent basis.  Will use RW at night when going to the bathroom.      10-10-24:  Pt reports not having to use her walker or cane in a few weeks and then, only at night when getting out of bed to use the bathroom.    Ambulation: Stairs   Ascend stairs: independent  Pattern: non-reciprocal  Railings: two rails  Descend stairs: independent  Pattern: non-reciprocal  Railings: two rails    Observational Gait   Decreased walking speed, stride length and right stance time.       Precautions: Spinal Stenosis, h/o cancer, Arthritis    Access Code: [email or web address]        Manuals 11-4-24 11-7-24 11-11-24 11-14-24 11-18-24   R LE        R Knee   Flex & Ext   .                     Neuro Re-Ed         SLS        Tandem Stance        Sidestepping        Tandem Amb        Standing knee fleixon and SLS 2.5# 2x10 3# 2x10 each bilat 2x10 2.5# 3# 2x10 regina     Retro walk at band tree Blue 10x Black10' 12x Blue x15 Black x15    Wall ball squat 2x10 2x10  2x10 2x10    Zaida step over      3 short hurdles x3 back and forth   STS      High low 2x10           Ther Ex        NuStep for ROM & Strength Lv4 15' Lv5 10' on new machine  L4 15' Lv4 15' Lv4 15'   HR/TR 20x ea 20x ea 20x ea  20x ea    SLR x 3        LAQ 3x10 2.5#  3x10 3#  2x10 2#  3# 2x10 regina     QSets  10\"x20 10\"x20  10\"x20   SAQ   3x10 3#      SLR 2# 2x10 regina  2x10 3#  2x10 2# regina 2x10 2# regina  2x10 2# R    Hip adduction iso 5\" x20 5\" x20      Heel Slides with strap        Bridges Black 3x10 2x10 blue  2x10 resume tband NV  2x10 black  2x10 black    S/L Hip Abd  5\" x20    " "  Clamshells  3x10 black  Black 20x 2x10 blue  3x10 black  3x10 black    Calf Stretch w/Strap 10\"x12 10\"x12 10\"x12 10\"x12 10\"x12   Hams Stretch - Seated in Chair 10\"x12 10\"x12 10\"x12 10\"x12 10\"x12   Seated hamstring curls         Leg press                 Ther Activity        Stepups F/L 8\" x20 regina  8\" 2x10 bilaterally 8\" 2x10 ea (consider increasing step height NV)     Stepdowns        STS        Gait Training                        Modalities        HP/CP prn                                       "

## 2024-11-21 ENCOUNTER — OFFICE VISIT (OUTPATIENT)
Dept: PHYSICAL THERAPY | Facility: CLINIC | Age: 69
End: 2024-11-21
Payer: MEDICARE

## 2024-11-21 DIAGNOSIS — M17.10 PATELLOFEMORAL ARTHRITIS: Primary | ICD-10-CM

## 2024-11-21 DIAGNOSIS — M17.11 ARTHRITIS OF RIGHT KNEE: ICD-10-CM

## 2024-11-21 PROCEDURE — 97110 THERAPEUTIC EXERCISES: CPT | Performed by: PHYSICAL THERAPIST

## 2024-11-21 NOTE — PROGRESS NOTES
"Daily Note     Today's date: 2024  Patient name: Jennifer Roa  : 1955  MRN: [id number]  Referring provider: Gaudencio Green CRNP  Dx:   Encounter Diagnosis     ICD-10-CM    1. Patellofemoral arthritis  M17.10       2. Arthritis of right knee  M17.11                      Subjective: Pt reports some increased soreness in her knee and things the incoming bad weather may have something to do with it.      Objective: See treatment diary below      Assessment: Tolerated treatment well. Patient demonstrated fatigue post treatment, exhibited good technique with therapeutic exercises, and would benefit from continued PT      Plan: Continue per plan of care.  Progress treatment as tolerated.       Precautions: Spinal Stenosis, h/o cancer, Arthritis    Access Code: [email or web address]        Manuals 24   R LE        R Knee   Flex & Ext   .                     Neuro Re-Ed         SLS        Tandem Stance        Sidestepping        Tandem Amb        Standing knee fleixon and SLS 2.5# 2x10 3# 2x10 each bilat 2x10 2.5# 3# 2x10 regina  3# 2x10   Retro walk at band tree Blue 10x Black10' 12x Blue x15 Black x15 Blue 10' 15x   Wall ball squat 2x10 2x10  2x10 2x10 2x10                           Ther Ex        NuStep for ROM & Strength Lv4 15' Lv5 10' on new machine  L4 15' Lv4 15' L4 14'   HR/TR 20x ea 20x ea 20x ea  20x ea 20x ea   SLR x 3        LAQ 3x10 2.5#  3x10 3#  2x10 2#  3# 2x10 regina  3x10 3#    QSets  10\"x20 10\"x20  5\" x20   SAQ   3x10 3#   3x10   SLR 2# 2x10 regina  2x10 3#  2x10 2# regina 2x10 2# regina  2x10 2#   Hip adduction iso 5\" x20 5\" x20      Heel Slides with strap        Bridges Black 3x10 2x10 blue  2x10 resume tband NV  2x10 black  Black 3x10    S/L Hip Abd  5\" x20      Clamshells  3x10 black  Black 20x 2x10 blue  3x10 black  3x10 black   Calf Stretch w/Strap 10\"x12 10\"x12 10\"x12 10\"x12 10\"x12   Hams Stretch - Seated in Chair 10\"x12 10\"x12 10\"x12 10\"x12 10\"x12    Seated hamstring " "curls         Leg press                 Ther Activity        Stepups F/L 8\" x20 regina  8\" 2x10 bilaterally 8\" 2x10 ea (consider increasing step height NV)  8\" x10 w/glute emphasis   Stepdowns        STS        Gait Training                        Modalities        HP/CP prn                                       "

## 2024-11-25 ENCOUNTER — OFFICE VISIT (OUTPATIENT)
Dept: PHYSICAL THERAPY | Facility: CLINIC | Age: 69
End: 2024-11-25
Payer: MEDICARE

## 2024-11-25 DIAGNOSIS — M17.10 PATELLOFEMORAL ARTHRITIS: Primary | ICD-10-CM

## 2024-11-25 DIAGNOSIS — M17.11 ARTHRITIS OF RIGHT KNEE: ICD-10-CM

## 2024-11-25 PROCEDURE — 97110 THERAPEUTIC EXERCISES: CPT | Performed by: PHYSICAL THERAPIST

## 2024-11-25 PROCEDURE — 97112 NEUROMUSCULAR REEDUCATION: CPT | Performed by: PHYSICAL THERAPIST

## 2024-11-25 NOTE — PROGRESS NOTES
"Daily Note     Today's date: 2024  Patient name: Jennifer Roa  : 1955  MRN: [id number]  Referring provider: Gaudencio Green CRNP  Dx:   Encounter Diagnosis     ICD-10-CM    1. Patellofemoral arthritis  M17.10       2. Arthritis of right knee  M17.11                      Subjective: No new complaints      Objective: See treatment diary below      Assessment: Tolerated treatment well. Patient demonstrated fatigue post treatment and would benefit from continued PT.  Pt's right knee (patellar tendon area) was a little painful post tx but moist heat took that away.      Plan: Continue per plan of care.  Progress treatment as tolerated.       Precautions: Spinal Stenosis, h/o cancer, Arthritis    Access Code: [email or web address]        Manuals 24   R LE        R Knee   Flex & Ext   .                     Neuro Re-Ed         SLS        Tandem Stance        Sidestepping        Tandem Amb        Standing knee fleixon and SLS 4# 2x10 3# 2x10 each bilat 2x10 2.5# 3# 2x10 regina  3# 2x10   Retro walk at band tree Black 10x Black10' 12x Blue x15 Black x15 Blue 10' 15x   Wall ball squat 2x10 2x10  2x10 2x10 2x10                           Ther Ex        NuStep for ROM & Strength Lv4 15' Lv5 10' on new machine  L4 15' Lv4 15' L4 14'   HR/TR 20x ea 20x ea 20x ea  20x ea 20x ea   SLR x 3        LAQ 3x10 2.5#  3x10 3#  2x10 2#  3# 2x10 regina  3x10 3#    QSets  10\"x20 10\"x20  5\" x20   SAQ   3x10 3#   3x10   SLR 2# 2x10 regina  2x10 3#  2x10 2# regina 2x10 2# regina  2x10 2#   Hip adduction iso 5\" x20 5\" x20      Heel Slides with strap        Bridges Black 3x10 2x10 blue  2x10 resume tband NV  2x10 black  Black 3x10    S/L Hip Abd  5\" x20      Clamshells  3x10 black  Black 20x 2x10 blue  3x10 black  3x10 black   Calf Stretch w/Strap 10\"x12 10\"x12 10\"x12 10\"x12 10\"x12   Hams Stretch - Seated in Chair 10\"x12 10\"x12 10\"x12 10\"x12 10\"x12    Seated hamstring curls         Leg press                 Ther " "Activity        Stepups F/L 8\" x20 regina  8\" 2x10 bilaterally 8\" 2x10 ea (consider increasing step height NV)  8\" x10 w/glute emphasis   Stepdowns        STS        Gait Training                        Modalities        HP/CP prn MHP 12' post tx                                        "

## 2024-11-27 ENCOUNTER — OFFICE VISIT (OUTPATIENT)
Dept: PHYSICAL THERAPY | Facility: CLINIC | Age: 69
End: 2024-11-27
Payer: MEDICARE

## 2024-11-27 DIAGNOSIS — M17.10 PATELLOFEMORAL ARTHRITIS: Primary | ICD-10-CM

## 2024-11-27 DIAGNOSIS — M17.11 ARTHRITIS OF RIGHT KNEE: ICD-10-CM

## 2024-11-27 PROCEDURE — 97110 THERAPEUTIC EXERCISES: CPT

## 2024-11-27 NOTE — PROGRESS NOTES
"Daily Note     Today's date: 2024  Patient name: Jennifer Roa  : 1955  MRN: [id number]  Referring provider: Gaudencio Green CRNP  Dx:   Encounter Diagnosis     ICD-10-CM    1. Patellofemoral arthritis  M17.10       2. Arthritis of right knee  M17.11                      Subjective: Jennifer reports R knee was bothering her yesterday after going to three different stores. She reports still feeling sensitive.       Objective: See treatment diary below      Assessment: Visual and VC to ensure correct exercise technique. Able to complete full program without increases in pain in R knee. No vaulting present during fwd step ups. Pt would continue to benefit from skilled PT.       Plan: Continue with current POC to address pt deficits.      Precautions: Spinal Stenosis, h/o cancer, Arthritis    Access Code: [email or web address]        Manuals 24   R LE        R Knee   Flex & Ext   .                     Neuro Re-Ed         SLS        Tandem Stance        Sidestepping        Tandem Amb        Standing knee fleixon and SLS 4# 2x10 Repeat NV  2x10 2.5# 3# 2x10 regina  3# 2x10   Retro walk at band tree Black 10x Black x10 Blue x15 Black x15 Blue 10' 15x   Wall ball squat 2x10 2x10 2x10 2x10 2x10                           Ther Ex        NuStep for ROM & Strength Lv4 15' Lv4 15' L4 15' Lv4 15' L4 14'   HR/TR 20x ea 20x ea 20x ea  20x ea 20x ea   SLR x 3        LAQ 3x10 2.5#  3x10 2.5# 2x10 2#  3# 2x10 regina  3x10 3#    QSets   10\"x20  5\" x20   SAQ   3x10 3#   3x10   SLR 2# 2x10 regina  2# 2x10 regina  2x10 2# regina 2x10 2# regina  2x10 2#   Hip adduction iso 5\" x20       Heel Slides with strap        Bridges Black 3x10 Black 3x10 2x10 resume tband NV  2x10 black  Black 3x10    S/L Hip Abd        Clamshells  3x10 black  3x10 black  2x10 blue  3x10 black  3x10 black   Calf Stretch w/Strap 10\"x12 10\"x12 10\"x12 10\"x12 10\"x12   Hams Stretch - Seated in Chair 10\"x12 10\"x12 10\"x12 10\"x12 10\"x12    Seated " "hamstring curls         Leg press                 Ther Activity        Stepups F/L 8\" x20 regina  8\" x20 8\" 2x10 ea (consider increasing step height NV)  8\" x10 w/glute emphasis   Stepdowns        STS        Gait Training                        Modalities        HP/CP prn MHP 12' post tx                                          "

## 2024-12-02 ENCOUNTER — OFFICE VISIT (OUTPATIENT)
Dept: PHYSICAL THERAPY | Facility: CLINIC | Age: 69
End: 2024-12-02
Payer: MEDICARE

## 2024-12-02 DIAGNOSIS — M17.10 PATELLOFEMORAL ARTHRITIS: Primary | ICD-10-CM

## 2024-12-02 DIAGNOSIS — M17.11 ARTHRITIS OF RIGHT KNEE: ICD-10-CM

## 2024-12-02 PROCEDURE — 97110 THERAPEUTIC EXERCISES: CPT | Performed by: PHYSICAL THERAPIST

## 2024-12-02 PROCEDURE — 97112 NEUROMUSCULAR REEDUCATION: CPT | Performed by: PHYSICAL THERAPIST

## 2024-12-02 PROCEDURE — 97530 THERAPEUTIC ACTIVITIES: CPT | Performed by: PHYSICAL THERAPIST

## 2024-12-02 NOTE — PROGRESS NOTES
"Daily Note     Today's date: 2024  Patient name: Jennifer Roa  : 1955  MRN: [id number]  Referring provider: Gaudencio Green CRNP  Dx:   Encounter Diagnosis     ICD-10-CM    1. Patellofemoral arthritis  M17.10       2. Arthritis of right knee  M17.11                      Subjective: Pt reports her knee is \"hanging in there\" but feels she may need another injection.      Objective: See treatment diary below      Assessment: Tolerated treatment well. She had most difficulty with 8\" step ups on the right, causing pain.   Patient demonstrated fatigue post treatment, exhibited good technique with therapeutic exercises, and would benefit from continued PT      Plan: Continue per plan of care.  Progress treatment as tolerated.       Precautions: Spinal Stenosis, h/o cancer, Arthritis    Access Code: [email or web address]        Manuals 24   R LE        R Knee   Flex & Ext   .                     Neuro Re-Ed         SLS        Tandem Stance        Sidestepping        Tandem Amb        Standing knee fleixon and SLS 4# 2x10 Repeat NV  2x10 2.5# 3# 2x10 regina  3# 2x10   Retro walk at band tree Black 10x Black x10 Blue x15 Black x15 Blue 10' 15x   Wall ball squat 2x10 2x10 2x10 2x10 2x10                           Ther Ex        NuStep for ROM & Strength Lv4 15' Lv4 15' L4 15' Lv4 15' L4 14'   HR/TR 20x ea 20x ea 20x ea  20x ea 20x ea   SLR x 3        LAQ 3x10 2.5#  3x10 2.5# 2x10 2#  3# 2x10 regina  3x10 3#    QSets   10\"x20  5\" x20   SAQ   3x10 3#   3x10   SLR 2# 2x10 regina  2# 2x10 regina  2x10 2# regina 2x10 2# regina  2x10 2#   Hip adduction iso 5\" x20       Heel Slides with strap        Bridges Black 3x10 Black 3x10 2x10 resume tband NV  2x10 black  Black 3x10    S/L Hip Abd        Clamshells  3x10 black  3x10 black  2x10 blue  3x10 black  3x10 black   Calf Stretch w/Strap 10\"x12 10\"x12 10\"x12 10\"x12 10\"x12   Hams Stretch - Seated in Chair 10\"x12 10\"x12 10\"x12 10\"x12 10\"x12    Seated hamstring " "curls         Leg press                 Ther Activity        Stepups F/L 8\" x20 regina  8\" x20 8\" 2x10 ea (consider increasing step height NV)  8\" x10 w/glute emphasis   Stepdowns        STS        Gait Training                        Modalities        HP/CP prn MHP 12' post tx                                            "

## 2024-12-04 ENCOUNTER — APPOINTMENT (OUTPATIENT)
Dept: PHYSICAL THERAPY | Facility: CLINIC | Age: 69
End: 2024-12-04
Payer: MEDICARE

## 2024-12-04 DIAGNOSIS — G89.29 OTHER CHRONIC PAIN: ICD-10-CM

## 2024-12-04 RX ORDER — TRAMADOL HYDROCHLORIDE 50 MG/1
50 TABLET ORAL EVERY 6 HOURS PRN
Qty: 120 TABLET | Refills: 0 | Status: SHIPPED | OUTPATIENT
Start: 2024-12-04

## 2024-12-09 ENCOUNTER — OFFICE VISIT (OUTPATIENT)
Dept: PHYSICAL THERAPY | Facility: CLINIC | Age: 69
End: 2024-12-09
Payer: MEDICARE

## 2024-12-09 DIAGNOSIS — M17.11 ARTHRITIS OF RIGHT KNEE: ICD-10-CM

## 2024-12-09 DIAGNOSIS — M17.10 PATELLOFEMORAL ARTHRITIS: Primary | ICD-10-CM

## 2024-12-09 PROCEDURE — 97110 THERAPEUTIC EXERCISES: CPT

## 2024-12-09 NOTE — PROGRESS NOTES
"Daily Note     Today's date: 2024  Patient name: Jennifer Roa  : 1955  MRN: [id number]  Referring provider: Gaudencio Green CRNP  Dx:   Encounter Diagnosis     ICD-10-CM    1. Patellofemoral arthritis  M17.10       2. Arthritis of right knee  M17.11                      Subjective: Jennifer reports twisting L ankle last week and reports continued tenderness. She reports LB is bothering her after putting up [place]ada tree yesterday. She offers no new complaints in regards to R knee pain today.       Objective: See treatment diary below      Assessment: Held standing exercises due to L ankle pain and pt was also limited today with LBP. Tolerated non weight bearing program well denying any increases in pain. Visual and VC to ensure correct exercise technique. Pt would continue to benefit from skilled PT. Pt educated if L ankle pain continues to persist and not improve to seek care.       Plan: Continue with current POC to address pt deficits.      Precautions: Spinal Stenosis, h/o cancer, Arthritis    Access Code: [email or web address]        Manuals 24   R LE        R Knee   Flex & Ext   .                     Neuro Re-Ed         SLS        Tandem Stance        Sidestepping        Tandem Amb        Standing knee fleixon and SLS 4# 2x10 Repeat NV  2x10 2.5# Held  3# 2x10   Retro walk at band tree Black 10x Black x10 Blue x15 Held  Blue 10' 15x   Wall ball squat 2x10 2x10 2x10 Held  2x10                           Ther Ex        NuStep for ROM & Strength Lv4 15' Lv4 15' L4 15' Lv4 20' L4 14'   HR/TR 20x ea 20x ea 20x ea  Held  20x ea   SLR x 3        LAQ 3x10 2.5#  3x10 2.5# 2x10 2#   3x10 3#    QSets   10\"x20  5\" x20   SAQ   3x10 3#  3x10 3#  3x10   SLR 2# 2x10 regina  2# 2x10 regina  2x10 2# regina 2x10 2# regina  2x10 2#   Hip adduction iso 5\" x20   5\" x20    Heel Slides with strap        Bridges Black 3x10 Black 3x10 2x10 resume tband NV  Black 3x10 Black 3x10    S/L Hip Abd      " "  Clamshells  3x10 black  3x10 black  2x10 blue  3x10 black  3x10 black   Calf Stretch w/Strap 10\"x12 10\"x12 10\"x12 10\"x12 10\"x12   Hams Stretch - Seated in Chair 10\"x12 10\"x12 10\"x12 10\"x12 10\"x12    Seated hamstring curls         Leg press                 Ther Activity        Stepups F/L 8\" x20 regina  8\" x20 8\" 2x10 ea (consider increasing step height NV)  8\" x10 w/glute emphasis   Stepdowns        STS        Gait Training                        Modalities        HP/CP prn MHP 12' post tx                                              "

## 2024-12-12 ENCOUNTER — OFFICE VISIT (OUTPATIENT)
Dept: PHYSICAL THERAPY | Facility: CLINIC | Age: 69
End: 2024-12-12
Payer: MEDICARE

## 2024-12-12 DIAGNOSIS — M17.10 PATELLOFEMORAL ARTHRITIS: Primary | ICD-10-CM

## 2024-12-12 DIAGNOSIS — M17.11 ARTHRITIS OF RIGHT KNEE: ICD-10-CM

## 2024-12-12 PROCEDURE — 97110 THERAPEUTIC EXERCISES: CPT

## 2024-12-12 NOTE — PROGRESS NOTES
"Daily Note     Today's date: 2024  Patient name: Jennifer Roa  : 1955  MRN: [id number]  Referring provider: Gaudencio Green CRNP  Dx:   Encounter Diagnosis     ICD-10-CM    1. Patellofemoral arthritis  M17.10       2. Arthritis of right knee  M17.11                      Subjective: Jennifer reports feeling better in reference to L ankle but still wants to avoid putting pressure on the ankle. She reports doing okay in reference to R knee.       Objective: See treatment diary below      Assessment: Continued to hold standing exercises due to L ankle pain. Tolerated seated and supine exercises well denying and increases in pain in R knee or L ankle. Visual and VC to ensure correct exercise technique. Pt would continue to benefit from skilled PT.       Plan: Continue with current POC to address pt deficits.      Precautions: Spinal Stenosis, h/o cancer, Arthritis    Access Code: [email or web address]        Manuals 24   R LE        R Knee   Flex & Ext   .                     Neuro Re-Ed         SLS        Tandem Stance        Sidestepping        Tandem Amb        Standing knee fleixon and SLS 4# 2x10 Repeat NV  2x10 2.5# Held  Held    Retro walk at band tree Black 10x Black x10 Blue x15 Held  Held    Wall ball squat 2x10 2x10 2x10 Held  Held                            Ther Ex        NuStep for ROM & Strength Lv4 15' Lv4 15' L4 15' Lv4 20' Lv4 20'   HR/TR 20x ea 20x ea 20x ea  Held     SLR x 3        LAQ 3x10 2.5#  3x10 2.5# 2x10 2#      QSets   10\"x20     SAQ   3x10 3#  3x10 3#  3x10 4#    SLR 2# 2x10 regina  2# 2x10 regina  2x10 2# regina 2x10 2# regina  2x10 2#    Hip adduction iso 5\" x20   5\" x20 5\" x20   Heel Slides with strap        Bridges Black 3x10 Black 3x10 2x10 resume tband NV  Black 3x10 Black 3x10   S/L Hip Abd        Clamshells  3x10 black  3x10 black  2x10 blue  3x10 black  Black 3x10   Calf Stretch w/Strap 10\"x12 10\"x12 10\"x12 10\"x12 10\"x12   Hams Stretch - Seated in Chair " "10\"x12 10\"x12 10\"x12 10\"x12 10\"x12   Seated hamstring curls         Leg press                 Ther Activity        Stepups F/L 8\" x20 regina  8\" x20 8\" 2x10 ea (consider increasing step height NV)     Stepdowns        STS        Gait Training                        Modalities        HP/CP prn MHP 12' post tx                                                "

## 2024-12-16 ENCOUNTER — OFFICE VISIT (OUTPATIENT)
Dept: PHYSICAL THERAPY | Facility: CLINIC | Age: 69
End: 2024-12-16
Payer: MEDICARE

## 2024-12-16 DIAGNOSIS — M17.10 PATELLOFEMORAL ARTHRITIS: Primary | ICD-10-CM

## 2024-12-16 DIAGNOSIS — M17.11 ARTHRITIS OF RIGHT KNEE: ICD-10-CM

## 2024-12-16 PROCEDURE — 97110 THERAPEUTIC EXERCISES: CPT

## 2024-12-16 PROCEDURE — 97112 NEUROMUSCULAR REEDUCATION: CPT

## 2024-12-16 NOTE — PROGRESS NOTES
"Daily Note     Today's date: 2024  Patient name: Jennifer Roa  : 1955  MRN: [id number]  Referring provider: Gaudencio Green CRNP  Dx:   Encounter Diagnosis     ICD-10-CM    1. Patellofemoral arthritis  M17.10       2. Arthritis of right knee  M17.11                      Subjective: Jennifer reports improvement in L ankle pain. She reports not doing much over the weekend so R knee is not bothering her at the current moment.       Objective: See treatment diary below      Assessment: Visual and VC to ensure correct exercise technique. Added some standing exercises back into program; tolerated well. Unable to complete HR at this time due to pain in L ankle therefore held. Pt would continue to benefit from skilled PT.       Plan: Continue with current POC to address pt deficits.      Precautions: Spinal Stenosis, h/o cancer, Arthritis    Access Code: [email or web address]        Manuals 24   R LE        R Knee   Flex & Ext   .                     Neuro Re-Ed         SLS        Tandem Stance        Sidestepping        Tandem Amb        Standing knee fleixon and SLS 2x10 regina  Repeat NV  2x10 2.5# Held  Held    Retro walk at band tree  Black x10 Blue x15 Held  Held    Wall ball squat 2x10 2x10 2x10 Held  Held    Standing hip abd/ext  2x10 regina                        Ther Ex        NuStep for ROM & Strength Lv4 18' Lv4 15' L4 15' Lv4 20' Lv4 20'   HR/TR Unable p!  20x ea 20x ea  Held     SLR x 3        LAQ  3x10 2.5# 2x10 2#      QSets   10\"x20     SAQ 3x10 4# regina   3x10 3#  3x10 3#  3x10 4#    SLR 2# 2x10 regina  2# 2x10 regina  2x10 2# regina 2x10 2# regina  2x10 2#    Hip adduction iso    5\" x20 5\" x20   Heel Slides with strap        Bridges Black 3x10 Black 3x10 2x10 resume tband NV  Black 3x10 Black 3x10   S/L Hip Abd        Clamshells  Black 3x10 3x10 black  2x10 blue  3x10 black  Black 3x10   Calf Stretch w/Strap 10\"x12 10\"x12 10\"x12 10\"x12 10\"x12   Hams Stretch - Seated in Chair 10\"x12 " "10\"x12 10\"x12 10\"x12 10\"x12   Seated hamstring curls         Leg press                 Ther Activity        Stepups F/L  8\" x20 8\" 2x10 ea (consider increasing step height NV)     Stepdowns        STS        Gait Training                        Modalities        HP/CP prn                                                   "

## 2024-12-18 ENCOUNTER — OFFICE VISIT (OUTPATIENT)
Dept: PHYSICAL THERAPY | Facility: CLINIC | Age: 69
End: 2024-12-18
Payer: MEDICARE

## 2024-12-18 DIAGNOSIS — M17.10 PATELLOFEMORAL ARTHRITIS: Primary | ICD-10-CM

## 2024-12-18 DIAGNOSIS — M17.11 ARTHRITIS OF RIGHT KNEE: ICD-10-CM

## 2024-12-18 PROCEDURE — 97110 THERAPEUTIC EXERCISES: CPT

## 2024-12-18 NOTE — PROGRESS NOTES
"Daily Note     Today's date: 2024  Patient name: Jennifer Roa  : 1955  MRN: [id number]  Referring provider: Gaudencio Green CRNP  Dx:   Encounter Diagnosis     ICD-10-CM    1. Patellofemoral arthritis  M17.10       2. Arthritis of right knee  M17.11                      Subjective: Jennifer reports R knee pain today.       Objective: See treatment diary below      Assessment: Visual and VC to ensure correct exercise technique. Held standing hip exercises due to fatigue and pt deferral after supine exercises since R knee was more bothersome this visit. Resume NV if able. Denied any increases in pain with exercises performed. Pt would continue to benefit from skilled PT.       Plan: Continue with current POC to address pt deficits.      Precautions: Spinal Stenosis, h/o cancer, Arthritis    Access Code: [email or web address]        Manuals 24   R LE        R Knee   Flex & Ext   .                     Neuro Re-Ed         SLS        Tandem Stance        Sidestepping        Tandem Amb        Standing knee fleixon and SLS 2x10 regina  Held  2x10 2.5# Held  Held    Retro walk at band tree   Blue x15 Held  Held    Wall ball squat 2x10 2x10 2x10 Held  Held    Standing hip abd/ext  2x10 regina  Held                       Ther Ex        NuStep for ROM & Strength Lv4 18' Lv4 15' L4 15' Lv4 20' Lv4 20'   HR/TR Unable p!  Seated 2x10 ea 20x ea  Held     SLR x 3        LAQ   2x10 2#      QSets   10\"x20     SAQ 3x10 4# regina  3x10 4# regina  3x10 3#  3x10 3#  3x10 4#    SLR 2# 2x10 regina  2# 2x10 regina  2x10 2# regina 2x10 2# regina  2x10 2#    Hip adduction iso    5\" x20 5\" x20   Heel Slides with strap        Bridges Black 3x10 Black 3x10 2x10 resume tband NV  Black 3x10 Black 3x10   S/L Hip Abd        Clamshells  Black 3x10 Black 3x10 2x10 blue  3x10 black  Black 3x10   Calf Stretch w/Strap 10\"x12 10\"x12 10\"x12 10\"x12 10\"x12   Hams Stretch - Seated in Chair 10\"x12 10\"x12 10\"x12 10\"x12 10\"x12   Seated hamstring " "curls         Leg press                 Ther Activity        Stepups F/L   8\" 2x10 ea (consider increasing step height NV)     Stepdowns        STS        Gait Training                        Modalities        HP/CP prn                                                     "

## 2024-12-23 ENCOUNTER — APPOINTMENT (OUTPATIENT)
Dept: PHYSICAL THERAPY | Facility: CLINIC | Age: 69
End: 2024-12-23
Payer: MEDICARE

## 2024-12-26 ENCOUNTER — EVALUATION (OUTPATIENT)
Dept: PHYSICAL THERAPY | Facility: CLINIC | Age: 69
End: 2024-12-26
Payer: MEDICARE

## 2024-12-26 DIAGNOSIS — M17.11 ARTHRITIS OF RIGHT KNEE: ICD-10-CM

## 2024-12-26 DIAGNOSIS — M17.10 PATELLOFEMORAL ARTHRITIS: Primary | ICD-10-CM

## 2024-12-26 PROCEDURE — 97110 THERAPEUTIC EXERCISES: CPT | Performed by: PHYSICAL THERAPIST

## 2024-12-26 PROCEDURE — 97112 NEUROMUSCULAR REEDUCATION: CPT | Performed by: PHYSICAL THERAPIST

## 2024-12-26 NOTE — PROGRESS NOTES
PT Re-Evaluation        Today's date: 2024  Patient name: Jennifer Roa  : 1955  MRN: [id number]  Referring provider: Gaudencio Green CRNP  Dx:   Encounter Diagnosis     ICD-10-CM    1. Patellofemoral arthritis  M17.10       2. Arthritis of right knee  M17.11                      Assessment  Impairments: abnormal or restricted ROM, activity intolerance, impaired balance, impaired physical strength, pain with function and activity limitations  Other impairment: decreased flexibility  Functional limitations: difficulty with stair negotiation    Assessment details: The patient is a 70 y/o female who presents to PT with diagnosis of R knee arthritis and patellofemoral arthritis.  She has complaints of constant pain though pain has decreased since getting the injection.  She demonstrates deficits with decreased ROM and strength, decreased flexibility, decreased balance and proprioception and TTP.  These deficits lead to difficulty with stair negotiation, limited standing tolerance and decreased tolerance to functional activities.  She ambulates without AD, slow anuja noted along with short stride length and decreased weight shift to RLE in stance phase.  She has difficulty with stair negotiation and has been going up and down the steps with non-reciprocal gait pattern.  Difficulty sleeping is also noted, hard to find a comfortable position to sleep.  She is unable to fully extend her knee when laying on her back.  Secondary to pain and weakness she is unable to squat or kneel.  Secondary to pain and above deficits she is limited with her overall mobility and function.  The patient would benefit from continued PT to address deficits and improve function.  Tx to include ROM, stretching, strengthening, modalities, HEP, pt education, postural ed, lifting/body mechanics, neuro re-ed, balance/proprioception Te, MT and equipment.      24:  Pt showing progressive improvements and would benefit from  continued functional progression in order to return to pre-morbid ability levels.    11-18-24:  Pt continues to improve ROM and strength.  She also has improved performance of most activities around her house.  Getting in and out of a care and some other lateral movement activities are still somewhat troublesome.    12-26-24:  Pt recovering from her ankle and knee pain set back of a few weeks ago.  She has gained some motion and strength throughout.  Would benefit from progression of functional strengthening from 2-4 weeks.    Understanding of Dx/Px/POC: good     Prognosis: fair    Goals  STGs:  1.  Initiate and complete HEP with verbal cues.-MET  2.  Decrease R knee pain by > 25% in 4 weeks. -EXCELLENT PROGRESS  3.  Improve R knee ROM by 5-10 degrees in 4 weeks. -MET  4.  Improve R LE strength by 1/2 grade in 4 weeks.  LTGs: -MET  1.  Patient to be I with HEP in 12 weeks.-EXCELLENT PROGRESS  2. Improve R knee ROM to 0-100 degrees in 12 weeks to improve function.- MET  3. Improve R LE strength to 4+ to 5/5 t/o in 12 weeks to improve function.-EXCELLENT PROGRESS  4. Decrease R knee pain to < or = to 2-3/10 with activity in 12 weeks to improve function.-EXCELLENT PROGRESS  5.  Patient to ambulate with normalized gait pattern in 12 weeks. -EXCELLENT PROGRESS  6.  Stair negotiation is improved to PLOF in 12 weeks.-GOOD PROGRESS  7.  Recreational performance is improved to PLOF in 12 weeks. -SOME PROGRESS  8.  ADL performance is improved to PLOF in 12 weeks. -GOOD PROGRESS  9.  Patient to report improved sleep in 12 weeks. GOOD PROGRESS        Plan  Patient would benefit from: skilled physical therapy and PT eval  Planned modality interventions: cryotherapy and thermotherapy: hydrocollator packs    Planned therapy interventions: balance, balance/weight bearing training, neuromuscular re-education, patient/caregiver education, postural training, self care, flexibility, functional ROM exercises, strengthening, stretching,  "therapeutic activities, therapeutic exercise and home exercise program    Frequency: 2x week  Duration in weeks: 4  Treatment plan discussed with: patient  Plan details: Modalities and therapy interventions prn.        Subjective Evaluation    History of Present Illness  Mechanism of injury: The patient states that she had fallen when she was 9 while ice skating and had hurt her R knee.  She had been immobilized for one and a half years.  She has had chronic pain since then.    The end of November she was getting in her car and twisted her knee, had pain after that.  She notes that she did not get treatment then.  The pain has been getting worse and she has been unable to straighten her knee.      She had seen the doctor on 7/25/24.  He ordered an x-ray.  Per patient it showed arthritis.  She had gone back to see him on 7/29/24.  She got an injection in her knee.  She was referred to OPPT and she now presents for her evaluation.  She will be going back to see the doctor in January for her 6 month follow up appointment.    From EMR review of x-ray from 7/25 of R knee:  VIEWS:  XR KNEE 3 VW RIGHT NON INJURY   FINDINGS:  There is no acute fracture or dislocation.  There is a small joint effusion.  There is mild medial compartment degenerative narrowing. Small osteophytes in medial lateral tibial plateau and lateral femoral epicondyle. No significant varus angulation. Small superior patellar enthesophyte. Patellofemoral space is significantly   narrowed along the lateral aspect.  No lytic or blastic osseous lesion.  Soft tissues are unremarkable.  IMPRESSION:  Mild medial compartment degenerative changes as above.  Severe patellofemoral degenerative narrowing.  Small patellar enthesopathy.  Minimal effusion.  No fracture    The patient states that her pain was along her medial knee and along medial kneecap.  Pain is constant.  Since the injection the \"bone pain\" has been better.  Now she notes that she has more " "\"muscular pain\" along the front of her knee and thigh.    She denies any changes in sensations.   She reports when she is laying she is unable to fully straighten out her knee.      24:  Pt reports she is able to stand longer, walk further and handle other tasks much better since starting therapy.  She is now more limited in activity because of her back rather than her knee.    10-10-24:  Pt reports marked functional improvements in getting in and out of her car and getting up and down her stairs better.    24: Pt denies any instances of buckling of R knee or needing to use SPC in the past month. She reports most difficulty with static positions for example standing and cooking and getting out of a chair, navigating stairs. She reports improvements in getting out of the car and is now able to ascend stairs reciprocally since starting PT.     24:  Pt reports her acute ankle irritation is getting better and her knee irritation has came down as well.    Patient Goals  Patient goals for therapy: increased motion, improved balance, decreased pain and increased strength  Patient goal: \"To get more steady on my feet, to get more strength, to get more movement, to have less pain.\"  Pain  Current pain ratin  At best pain ratin  At worst pain rating: 3  Location: R Knee-lateral knee  Quality: dull ache and discomfort  Relieving factors: rest  Exacerbated by: Squatting.    Social Support  Steps to enter house: yes  3  Stairs in house: yes   Lives in: multiple-level home  Lives with: significant other    Employment status: not working    Diagnostic Tests  X-ray: abnormal (See Above)  Treatments  Previous treatment: injection treatment      Objective     Tenderness     Right Knee   No tenderness in the lateral joint line, lateral patella and medial joint line.     Neurological Testing     Sensation     Knee   Left Knee   Intact: Light touch    Right Knee   Intact: light touch     Active Range of Motion " "  Left Knee   Flexion: 112 degrees   Extension: 0 degrees     Right Knee   Flexion: 110 degrees   Extension: 0 degrees     Strength/Myotome Testing     Left Knee   Flexion: WFL  Extension: WFL  Quadriceps contraction: good    Right Knee   Flexion: 4+  Extension: 4+  Quadriceps contraction: good    Ambulation   Weight-Bearing Status   Assistive device used: single point cane and two-wheeled walker    Additional Weight-Bearing Status Details  Has SPC and RW at home, does not use on consistent basis.  Will use RW at night when going to the bathroom.      10-10-24:  Pt reports not having to use her walker or cane in a few weeks and then, only at night when getting out of bed to use the bathroom.    Ambulation: Stairs   Ascend stairs: independent  Pattern: non-reciprocal  Railings: two rails  Descend stairs: independent  Pattern: non-reciprocal  Railings: two rails    Observational Gait   Decreased walking speed, stride length and right stance time.       Precautions: Spinal Stenosis, h/o cancer, Arthritis    Access Code: [email or web address]        Manuals 12-16-24 12-18-24 12-26-24 12-9-24 12-12-24   R LE        R Knee   Flex & Ext   .                     Neuro Re-Ed         SLS        Tandem Stance        Sidestepping        Tandem Amb        Standing knee fleixon and SLS 2x10 regina  Held  2x10 2.5# Held  Held    Retro walk at band tree   Blue x15 Held  Held    Wall ball squat 2x10 2x10 2x10 Held  Held    Standing hip abd/ext  2x10 regian  Held  2x10 ea regina                     Ther Ex        NuStep for ROM & Strength Lv4 18' Lv4 15' L4 15' Lv4 20' Lv4 20'   HR/TR Unable p!  Seated 2x10 ea 20x ea  Held     SLR x 3        LAQ   2x10 2#      QSets   10\"x20     SAQ 3x10 4# regina  3x10 4# regina  3x10 3#  3x10 3#  3x10 4#    SLR 2# 2x10 regina  2# 2x10 regina  2x10 2# regina 2x10 2# regina  2x10 2#    Hip adduction iso    5\" x20 5\" x20   Heel Slides with strap        Bridges Black 3x10 Black 3x10 2x10 resume tband NV  Black 3x10 Black 3x10   S/L Hip Abd    " "    Clamshells  Black 3x10 Black 3x10 2x10 blue  3x10 black  Black 3x10   Calf Stretch w/Strap 10\"x12 10\"x12 10\"x12 10\"x12 10\"x12   Hams Stretch - Seated in Chair 10\"x12 10\"x12 10\"x12 10\"x12 10\"x12   Seated hamstring curls         Leg press                 Ther Activity        Stepups F/L        Stepdowns        STS        Gait Training                        Modalities        HP/CP prn                                         "

## 2024-12-31 ENCOUNTER — OFFICE VISIT (OUTPATIENT)
Dept: PHYSICAL THERAPY | Facility: CLINIC | Age: 69
End: 2024-12-31
Payer: MEDICARE

## 2024-12-31 DIAGNOSIS — M17.10 PATELLOFEMORAL ARTHRITIS: Primary | ICD-10-CM

## 2024-12-31 DIAGNOSIS — M17.11 ARTHRITIS OF RIGHT KNEE: ICD-10-CM

## 2024-12-31 PROCEDURE — 97110 THERAPEUTIC EXERCISES: CPT

## 2024-12-31 NOTE — PROGRESS NOTES
"Daily Note     Today's date: 2024  Patient name: Jennifer Roa  : 1955  MRN: [id number]  Referring provider: Gaudencio Green CRNP  Dx:   Encounter Diagnosis     ICD-10-CM    1. Patellofemoral arthritis  M17.10       2. Arthritis of right knee  M17.11                      Subjective: Jennifer reports not doing much lately therefore her R knee isn't bothering her too much. She reports L sided upper gluteal muscular pain from previous visit and has been taking Advil. She took a tramadol prior to coming to therapy.       Objective: See treatment diary below      Assessment: Visual and VC to ensure correct exercise technique. Modified therapy session due to pain in L glute. Tolerated exercises performed well. Progress to full program as able.       Plan: Continue with current POC to address pt deficits.      Precautions: Spinal Stenosis, h/o cancer, Arthritis    Access Code: [email or web address]        Manuals 24   R LE        R Knee   Flex & Ext   .                     Neuro Re-Ed         SLS        Tandem Stance        Sidestepping        Tandem Amb        Standing knee fleixon and SLS 2x10 regina  Held  2x10 2.5#  Held    Retro walk at band tree   Blue x15  Held    Wall ball squat 2x10 2x10 2x10  Held    Standing hip abd/ext  2x10 regina  Held  2x10 ea regina                     Ther Ex        NuStep for ROM & Strength Lv4 18' Lv4 15' L4 15' L4 15' Lv4 20'   HR/TR Unable p!  Seated 2x10 ea 20x ea  Seated x20 ea    SLR x 3        LAQ   2x10 2#  2x10 2#     QSets   10\"x20 10\"x20    SAQ 3x10 4# regina  3x10 4# regina  3x10 3#  3x10 regina  3x10 4#    SLR 2# 2x10 regina  2# 2x10 regina  2x10 2# regina 2x10 0# R  2x10 2#    Hip adduction iso     5\" x20   Heel Slides with strap        Bridges Black 3x10 Black 3x10 2x10 resume tband NV   Black 3x10   S/L Hip Abd        Clamshells  Black 3x10 Black 3x10 2x10 blue  2x10 blue R only iso with L  Black 3x10   Calf Stretch w/Strap 10\"x12 10\"x12 10\"x12 10\"x12 10\"x12 " "  Hams Stretch - Seated in Chair 10\"x12 10\"x12 10\"x12 10\"x12 10\"x12   Seated hamstring curls     Blue 2x10    Leg press                 Ther Activity        Stepups F/L        Stepdowns        STS        Gait Training                        Modalities        HP/CP prn                                                       "

## 2025-01-02 ENCOUNTER — OFFICE VISIT (OUTPATIENT)
Dept: PHYSICAL THERAPY | Facility: CLINIC | Age: 70
End: 2025-01-02
Payer: MEDICARE

## 2025-01-02 DIAGNOSIS — M17.11 ARTHRITIS OF RIGHT KNEE: ICD-10-CM

## 2025-01-02 DIAGNOSIS — M17.10 PATELLOFEMORAL ARTHRITIS: Primary | ICD-10-CM

## 2025-01-02 PROCEDURE — 97110 THERAPEUTIC EXERCISES: CPT

## 2025-01-02 NOTE — PROGRESS NOTES
"Daily Note     Today's date: 2025  Patient name: Jennifer Roa  : 1955  MRN: [id number]  Referring provider: Gaudencio Green CRNP  Dx:   Encounter Diagnosis     ICD-10-CM    1. Patellofemoral arthritis  M17.10       2. Arthritis of right knee  M17.11                      Subjective: Jennifer reports less pain in L hip overall, still painful but no pain medications needed. She offers no new complaints in regards to R knee.       Objective: See treatment diary below      Assessment: Visual and VC to ensure correct exercise technique. Tolerated program well with less pain in L glute which was limiting regina exercises. Trial standing exercises NV dependent upon L glute pain.       Plan: Continue with current POC to address pt deficits.      Precautions: Spinal Stenosis, h/o cancer, Arthritis    Access Code: [email or web address]        Manuals 24   R LE        R Knee   Flex & Ext   .                     Neuro Re-Ed         SLS        Tandem Stance        Sidestepping        Tandem Amb        Standing knee fleixon and SLS 2x10 regina  Held  2x10 2.5#     Retro walk at band tree   Blue x15     Wall ball squat 2x10 2x10 2x10     Standing hip abd/ext  2x10 regina  Held  2x10 ea regina                     Ther Ex        NuStep for ROM & Strength Lv4 18' Lv4 15' L4 15' L4 15' Lv4 15'   HR/TR Unable p!  Seated 2x10 ea 20x ea  Seated x20 ea Seated x20 ea   SLR x 3        LAQ   2x10 2#  2x10 2#  2x10 2#    QSets   10\"x20 10\"x20    SAQ 3x10 4# regina  3x10 4# regina  3x10 3#  3x10 regina  3x10 regina 4#    SLR 2# 2x10 regina  2# 2x10 regina  2x10 2# regina 2x10 0# R  2x10 regina    Hip adduction iso        Heel Slides with strap        Bridges Black 3x10 Black 3x10 2x10 resume tband NV      S/L Hip Abd        Clamshells  Black 3x10 Black 3x10 2x10 blue  2x10 blue R only iso with L  3x10 blue regina    Calf Stretch w/Strap 10\"x12 10\"x12 10\"x12 10\"x12 10\"x12   Hams Stretch - Seated in Chair 10\"x12 10\"x12 10\"x12 10\"x12 10\"x12 "   Seated hamstring curls     Blue 2x10 Blue 2x10   Leg press                 Ther Activity        Stepups F/L        Stepdowns        STS        Gait Training                        Modalities        HP/CP prn

## 2025-01-03 DIAGNOSIS — G89.29 OTHER CHRONIC PAIN: ICD-10-CM

## 2025-01-03 RX ORDER — TRAMADOL HYDROCHLORIDE 50 MG/1
50 TABLET ORAL EVERY 6 HOURS PRN
Qty: 120 TABLET | Refills: 0 | Status: SHIPPED | OUTPATIENT
Start: 2025-01-03

## 2025-01-06 ENCOUNTER — OFFICE VISIT (OUTPATIENT)
Dept: PHYSICAL THERAPY | Facility: CLINIC | Age: 70
End: 2025-01-06
Payer: MEDICARE

## 2025-01-06 DIAGNOSIS — M17.10 PATELLOFEMORAL ARTHRITIS: Primary | ICD-10-CM

## 2025-01-06 DIAGNOSIS — M17.11 ARTHRITIS OF RIGHT KNEE: ICD-10-CM

## 2025-01-06 PROCEDURE — 97112 NEUROMUSCULAR REEDUCATION: CPT

## 2025-01-06 PROCEDURE — 97110 THERAPEUTIC EXERCISES: CPT

## 2025-01-06 NOTE — PROGRESS NOTES
"Daily Note     Today's date: 2025  Patient name: Jennifer Roa  : 1955  MRN: [id number]  Referring provider: Gaudencio Green CRNP  Dx:   Encounter Diagnosis     ICD-10-CM    1. Patellofemoral arthritis  M17.10       2. Arthritis of right knee  M17.11                      Subjective: Jennifer reports \"not too bad\" in reference to R knee.       Objective: See treatment diary below      Assessment: Able to tolerate more standing exercises this visit. Visual and VC to ensure correct exercise technique. Continue to progress as pt is able due to LB and L glute pain is limiting her at this time.       Plan: Continue with current POC to address pt deficits.      Precautions: Spinal Stenosis, h/o cancer, Arthritis    Access Code: [email or web address]        Manuals 24   R LE        R Knee   Flex & Ext   .                     Neuro Re-Ed         SLS        Tandem Stance        Sidestepping        Tandem Amb        Standing knee fleixon and SLS 2x10  Held  2x10 2.5#     Retro walk at band tree Blue x10  Blue x15     Wall ball squat Mini squat at rail x15 2x10 2x10     Standing hip abd/ext   Held  2x10 ea regina                     Ther Ex        NuStep for ROM & Strength Lv4 15' Lv4 15' L4 15' L4 15' Lv4 15'   HR/TR Seated x30 ea Seated 2x10 ea 20x ea  Seated x20 ea Seated x20 ea   SLR x 3        LAQ   2x10 2#  2x10 2#  2x10 2#    QSets   10\"x20 10\"x20    SAQ 3x10 regina 4#  3x10 4# regina  3x10 3#  3x10 regina  3x10 regina 4#    SLR 2x10 regina 2#  2# 2x10 regina  2x10 2# regina 2x10 0# R  2x10 regina    Hip adduction iso        Heel Slides with strap        Bridges  Black 3x10 2x10 resume tband NV      S/L Hip Abd        Clamshells   Black 3x10 2x10 blue  2x10 blue R only iso with L  3x10 blue regina    Calf Stretch w/Strap 10\"x12 10\"x12 10\"x12 10\"x12 10\"x12   Hams Stretch - Seated in Chair 10\"x12 10\"x12 10\"x12 10\"x12 10\"x12   Seated hamstring curls     Blue 2x10 Blue 2x10   Leg press                 Ther Activity      "   Stepups F/L        Stepdowns        STS        Gait Training                        Modalities        HP/CP prn

## 2025-01-09 ENCOUNTER — OFFICE VISIT (OUTPATIENT)
Dept: PHYSICAL THERAPY | Facility: CLINIC | Age: 70
End: 2025-01-09
Payer: MEDICARE

## 2025-01-09 DIAGNOSIS — M17.10 PATELLOFEMORAL ARTHRITIS: Primary | ICD-10-CM

## 2025-01-09 DIAGNOSIS — M17.11 ARTHRITIS OF RIGHT KNEE: ICD-10-CM

## 2025-01-09 PROCEDURE — 97112 NEUROMUSCULAR REEDUCATION: CPT

## 2025-01-09 PROCEDURE — 97110 THERAPEUTIC EXERCISES: CPT

## 2025-01-09 NOTE — PROGRESS NOTES
"Daily Note     Today's date: 2025  Patient name: Jennifer Roa  : 1955  MRN: [id number]  Referring provider: Gaudencio Green CRNP  Dx:   Encounter Diagnosis     ICD-10-CM    1. Patellofemoral arthritis  M17.10       2. Arthritis of right knee  M17.11                      Subjective: Jennifer reports having quilting group yesterday and wasn't debilitated after with LB and R knee. She continues to note less limitations with L hip pain.       Objective: See treatment diary below      Assessment: Visual and VC to ensure correct exercise technique. Denied any increases in pain with exercises performed. Added standing hip abd back into program with no increases in pain in L hip. Pt would continue to benefit from skilled PT. Progress as able.       Plan: Continue with current POC to address pt deficits.      Precautions: Spinal Stenosis, h/o cancer, Arthritis    Access Code: [email or web address]        Manuals 24   R LE        R Knee   Flex & Ext   .                     Neuro Re-Ed         SLS        Tandem Stance        Sidestepping        Tandem Amb        Standing knee fleixon and SLS 2x10  2x10 2x10 2.5#     Retro walk at band tree Blue x10 Black x15 Blue x15     Wall ball squat Mini squat at rail x15 Mini squat x20 2x10     Standing hip abd/ext   X20 abd R  2x10 ea regina                     Ther Ex        NuStep for ROM & Strength Lv4 15' Lv4 15' L4 15' L4 15' Lv4 15'   HR/TR Seated x30 ea Seated x30 ea 20x ea  Seated x20 ea Seated x20 ea   SLR x 3        LAQ   2x10 2#  2x10 2#  2x10 2#    QSets   10\"x20 10\"x20    SAQ 3x10 regina 4#  3x10 regina 4#  3x10 3#  3x10 regina  3x10 regina 4#    SLR 2x10 regina 2#  2x10 regina 2#  2x10 2# regina 2x10 0# R  2x10 regina    Hip adduction iso        Heel Slides with strap        Bridges   2x10 resume tband NV      S/L Hip Abd        Clamshells    2x10 blue  2x10 blue R only iso with L  3x10 blue regina    Calf Stretch w/Strap 10\"x12 10\"x12 10\"x12 10\"x12 10\"x12   Hams " "Stretch - Seated in Chair 10\"x12 10\"x12 10\"x12 10\"x12 10\"x12   Seated hamstring curls     Blue 2x10 Blue 2x10   Leg press                 Ther Activity        Stepups F/L        Stepdowns        STS        Gait Training                        Modalities        HP/CP prn                                                             "

## 2025-01-10 ENCOUNTER — TELEPHONE (OUTPATIENT)
Age: 70
End: 2025-01-10

## 2025-01-10 DIAGNOSIS — E55.9 VITAMIN D DEFICIENCY: ICD-10-CM

## 2025-01-10 DIAGNOSIS — Z13.29 SCREENING FOR THYROID DISORDER: ICD-10-CM

## 2025-01-10 DIAGNOSIS — E78.2 MIXED HYPERLIPIDEMIA: ICD-10-CM

## 2025-01-10 DIAGNOSIS — Z13.0 SCREENING FOR DEFICIENCY ANEMIA: ICD-10-CM

## 2025-01-10 DIAGNOSIS — Z13.1 SCREENING FOR DIABETES MELLITUS: Primary | ICD-10-CM

## 2025-01-10 NOTE — TELEPHONE ENCOUNTER
Pt reports her AWV is scheduled on jan.16th and her lab work isn't available to be obtained till jan 25th. Pt would like to know if PCP can't change the dates on the lab work or change the Patients AWV date till after her lab work is obtained.  PCP please call pt back to further advise.

## 2025-01-10 NOTE — TELEPHONE ENCOUNTER
Can you put in the BW for her she has an appointment scheduled on the 23rd with you and is planning on getting her BW done on the 20th of this month  The BW that is in her chart now was from [facility]

## 2025-01-13 ENCOUNTER — OFFICE VISIT (OUTPATIENT)
Dept: PHYSICAL THERAPY | Facility: CLINIC | Age: 70
End: 2025-01-13
Payer: MEDICARE

## 2025-01-13 DIAGNOSIS — M17.11 ARTHRITIS OF RIGHT KNEE: ICD-10-CM

## 2025-01-13 DIAGNOSIS — M17.10 PATELLOFEMORAL ARTHRITIS: Primary | ICD-10-CM

## 2025-01-13 PROCEDURE — 97110 THERAPEUTIC EXERCISES: CPT

## 2025-01-13 PROCEDURE — 97112 NEUROMUSCULAR REEDUCATION: CPT

## 2025-01-13 NOTE — PROGRESS NOTES
"Daily Note     Today's date: 2025  Patient name: Jennifer Roa  : 1955  MRN: [id number]  Referring provider: Gaudencio Green CRNP  Dx:   Encounter Diagnosis     ICD-10-CM    1. Patellofemoral arthritis  M17.10       2. Arthritis of right knee  M17.11                      Subjective: Jennifer reports doing good today; offers no new complaints in regards to R knee.       Objective: See treatment diary below      Assessment: Visual and VC to ensure correct exercise technique. Increased standing exercise tolerance this visit. Bridges caused discomfort to LB therefore did not resume past 10 reps. Pt would continue to benefit from skilled PT.       Plan: Continue with current POC to address pt deficits.      Precautions: Spinal Stenosis, h/o cancer, Arthritis    Access Code: [email or web address]        Manuals 24   R LE        R Knee   Flex & Ext                        Neuro Re-Ed         SLS        Tandem Stance        Sidestepping        Tandem Amb        Standing knee fleixon and SLS 2x10  2x10 2x10     Retro walk at band tree Blue x10 Black x15 Black x15     Wall ball squat Mini squat at rail x15 Mini squat x20 Pball squat x20     Standing hip abd/ext   X20 abd R  X20 abd  X20 march                      Ther Ex        NuStep for ROM & Strength Lv4 15' Lv4 15' Lv4 15' L4 15' Lv4 15'   HR/TR Seated x30 ea Seated x30 ea Seated x30 ea Seated x20 ea Seated x20 ea   SLR x 3        LAQ    2x10 2#  2x10 2#    QSets    10\"x20    SAQ 3x10 regina 4#  3x10 regina 4#  3x10 regina 4#  3x10 regina  3x10 regina 4#    SLR 2x10 regina 2#  2x10 regina 2#  2x10 regina 2#  2x10 0# R  2x10 regina    Hip adduction iso        Heel Slides with strap        Bridges   Black tband x10     S/L Hip Abd        Clamshells    Black tband x20 2x10 blue R only iso with L  3x10 blue regina    Calf Stretch w/Strap 10\"x12 10\"x12 10\"x12 10\"x12 10\"x12   Hams Stretch - Seated in Chair 10\"x12 10\"x12 10\"x12 10\"x12 10\"x12   Seated hamstring curls     Blue " 2x10 Blue 2x10   Leg press                 Ther Activity        Stepups F/L        Stepdowns        STS        Gait Training                        Modalities        HP/CP prn

## 2025-01-20 ENCOUNTER — APPOINTMENT (OUTPATIENT)
Dept: LAB | Facility: CLINIC | Age: 70
End: 2025-01-20
Payer: MEDICARE

## 2025-01-20 DIAGNOSIS — Z13.1 SCREENING FOR DIABETES MELLITUS: ICD-10-CM

## 2025-01-20 DIAGNOSIS — E78.2 MIXED HYPERLIPIDEMIA: ICD-10-CM

## 2025-01-20 DIAGNOSIS — Z13.0 SCREENING FOR DEFICIENCY ANEMIA: ICD-10-CM

## 2025-01-20 DIAGNOSIS — Z13.29 SCREENING FOR THYROID DISORDER: ICD-10-CM

## 2025-01-20 PROCEDURE — 80061 LIPID PANEL: CPT

## 2025-01-20 PROCEDURE — 83036 HEMOGLOBIN GLYCOSYLATED A1C: CPT

## 2025-01-20 PROCEDURE — 85025 COMPLETE CBC W/AUTO DIFF WBC: CPT

## 2025-01-20 PROCEDURE — 80053 COMPREHEN METABOLIC PANEL: CPT

## 2025-01-20 PROCEDURE — 84443 ASSAY THYROID STIM HORMONE: CPT

## 2025-01-21 LAB
ALBUMIN SERPL BCG-MCNC: 3.8 G/DL (ref 3.5–5)
ALP SERPL-CCNC: 49 U/L (ref 34–104)
ALT SERPL W P-5'-P-CCNC: 17 U/L (ref 7–52)
ANION GAP SERPL CALCULATED.3IONS-SCNC: 14 MMOL/L (ref 4–13)
AST SERPL W P-5'-P-CCNC: 23 U/L (ref 13–39)
BASOPHILS # BLD AUTO: 0.06 THOUSANDS/ΜL (ref 0–0.1)
BASOPHILS NFR BLD AUTO: 1 % (ref 0–1)
BILIRUB SERPL-MCNC: 0.52 MG/DL (ref 0.2–1)
BUN SERPL-MCNC: 19 MG/DL (ref 5–25)
CALCIUM SERPL-MCNC: 10 MG/DL (ref 8.4–10.2)
CHLORIDE SERPL-SCNC: 100 MMOL/L (ref 96–108)
CHOLEST SERPL-MCNC: 240 MG/DL (ref ?–200)
CO2 SERPL-SCNC: 25 MMOL/L (ref 21–32)
CREAT SERPL-MCNC: 1.08 MG/DL (ref 0.6–1.3)
EOSINOPHIL # BLD AUTO: 0.18 THOUSAND/ΜL (ref 0–0.61)
EOSINOPHIL NFR BLD AUTO: 2 % (ref 0–6)
ERYTHROCYTE [DISTWIDTH] IN BLOOD BY AUTOMATED COUNT: 12 % (ref 11.6–15.1)
EST. AVERAGE GLUCOSE BLD GHB EST-MCNC: 114 MG/DL
GFR SERPL CREATININE-BSD FRML MDRD: 51 ML/MIN/1.73SQ M
GLUCOSE P FAST SERPL-MCNC: 109 MG/DL (ref 65–99)
HBA1C MFR BLD: 5.6 %
HCT VFR BLD AUTO: 41.8 % (ref 34.8–46.1)
HDLC SERPL-MCNC: 52 MG/DL
HGB BLD-MCNC: 13.4 G/DL (ref 11.5–15.4)
IMM GRANULOCYTES # BLD AUTO: 0.02 THOUSAND/UL (ref 0–0.2)
IMM GRANULOCYTES NFR BLD AUTO: 0 % (ref 0–2)
LDLC SERPL CALC-MCNC: 146 MG/DL (ref 0–100)
LYMPHOCYTES # BLD AUTO: 1.69 THOUSANDS/ΜL (ref 0.6–4.47)
LYMPHOCYTES NFR BLD AUTO: 22 % (ref 14–44)
MCH RBC QN AUTO: 31.5 PG (ref 26.8–34.3)
MCHC RBC AUTO-ENTMCNC: 32.1 G/DL (ref 31.4–37.4)
MCV RBC AUTO: 98 FL (ref 82–98)
MONOCYTES # BLD AUTO: 0.55 THOUSAND/ΜL (ref 0.17–1.22)
MONOCYTES NFR BLD AUTO: 7 % (ref 4–12)
NEUTROPHILS # BLD AUTO: 5.37 THOUSANDS/ΜL (ref 1.85–7.62)
NEUTS SEG NFR BLD AUTO: 68 % (ref 43–75)
NRBC BLD AUTO-RTO: 0 /100 WBCS
PLATELET # BLD AUTO: 307 THOUSANDS/UL (ref 149–390)
PMV BLD AUTO: 10.7 FL (ref 8.9–12.7)
POTASSIUM SERPL-SCNC: 4.2 MMOL/L (ref 3.5–5.3)
PROT SERPL-MCNC: 8 G/DL (ref 6.4–8.4)
RBC # BLD AUTO: 4.25 MILLION/UL (ref 3.81–5.12)
SODIUM SERPL-SCNC: 139 MMOL/L (ref 135–147)
TRIGL SERPL-MCNC: 209 MG/DL (ref ?–150)
TSH SERPL DL<=0.05 MIU/L-ACNC: 4.24 UIU/ML (ref 0.45–4.5)
WBC # BLD AUTO: 7.87 THOUSAND/UL (ref 4.31–10.16)

## 2025-01-23 ENCOUNTER — OFFICE VISIT (OUTPATIENT)
Dept: FAMILY MEDICINE CLINIC | Facility: CLINIC | Age: 70
End: 2025-01-23
Payer: MEDICARE

## 2025-01-23 VITALS
BODY MASS INDEX: 42.25 KG/M2 | DIASTOLIC BLOOD PRESSURE: 70 MMHG | WEIGHT: 229.6 LBS | OXYGEN SATURATION: 97 % | SYSTOLIC BLOOD PRESSURE: 152 MMHG | TEMPERATURE: 96.5 F | HEIGHT: 62 IN | HEART RATE: 91 BPM | RESPIRATION RATE: 20 BRPM

## 2025-01-23 DIAGNOSIS — Z00.00 MEDICARE ANNUAL WELLNESS VISIT, SUBSEQUENT: ICD-10-CM

## 2025-01-23 DIAGNOSIS — Z13.820 SCREENING FOR OSTEOPOROSIS: ICD-10-CM

## 2025-01-23 DIAGNOSIS — R25.2 MUSCLE CRAMPS: ICD-10-CM

## 2025-01-23 DIAGNOSIS — E55.9 VITAMIN D DEFICIENCY: ICD-10-CM

## 2025-01-23 DIAGNOSIS — E78.2 MIXED HYPERLIPIDEMIA: Primary | ICD-10-CM

## 2025-01-23 DIAGNOSIS — E66.01 CLASS 3 SEVERE OBESITY DUE TO EXCESS CALORIES WITHOUT SERIOUS COMORBIDITY WITH BODY MASS INDEX (BMI) OF 40.0 TO 44.9 IN ADULT (HCC): ICD-10-CM

## 2025-01-23 DIAGNOSIS — Z23 ENCOUNTER FOR IMMUNIZATION: ICD-10-CM

## 2025-01-23 DIAGNOSIS — E66.813 CLASS 3 SEVERE OBESITY DUE TO EXCESS CALORIES WITHOUT SERIOUS COMORBIDITY WITH BODY MASS INDEX (BMI) OF 40.0 TO 44.9 IN ADULT (HCC): ICD-10-CM

## 2025-01-23 DIAGNOSIS — M81.0 AGE-RELATED OSTEOPOROSIS WITHOUT CURRENT PATHOLOGICAL FRACTURE: ICD-10-CM

## 2025-01-23 PROBLEM — F11.20 CONTINUOUS OPIOID DEPENDENCE (HCC): Status: RESOLVED | Noted: 2022-06-13 | Resolved: 2025-01-23

## 2025-01-23 PROCEDURE — 90662 IIV NO PRSV INCREASED AG IM: CPT

## 2025-01-23 PROCEDURE — 99214 OFFICE O/P EST MOD 30 MIN: CPT

## 2025-01-23 PROCEDURE — G0008 ADMIN INFLUENZA VIRUS VAC: HCPCS

## 2025-01-23 PROCEDURE — G0439 PPPS, SUBSEQ VISIT: HCPCS

## 2025-01-23 RX ORDER — DEXAMETHASONE 1 MG
TABLET ORAL
Qty: 1 TABLET | Refills: 0 | Status: SHIPPED | OUTPATIENT
Start: 2025-01-23

## 2025-01-23 NOTE — ASSESSMENT & PLAN NOTE
-Vitamin D 23.2  -Will start on 2000 IU of vitamin D per day and will check levels at next visit

## 2025-01-23 NOTE — PROGRESS NOTES
Name: Jennifer Roa      : 1955      MRN: [id number]  Encounter Provider: Rochelle Miles PA-C  Encounter Date: 2025   Encounter department: [facility]    Assessment & Plan  Medicare annual wellness visit, subsequent         Mixed hyperlipidemia  -Patient has had chronic high cholesterol  -Advised of the risks with high cholesterol including increased stroke an heart attack risk   -Patient declines medication therapy  -Will start 1000 mg fish oil 2x daily and states she will change her diet around   -I recommended plenty of fruits/veggies/whole grains, regular physical activity as able, and cutting back on meat/dairy/fried/processed foods.          Vitamin D deficiency  -Vitamin D 23.2  -Will start on 2000 IU of vitamin D per day and will check levels at next visit        Muscle cramps  -Will increase water and hydration  -States she forgets to drink water a lot of time   -Potassium normal  -Will check magnesium   Orders:  •  Magnesium; Future    Class 3 severe obesity due to excess calories without serious comorbidity with body mass index (BMI) of 40.0 to 44.9 in adult (HCC)  -Will check cortisol level  -Explained timings of medication administration and when she will get labs drawn   Orders:  •  dexamethasone (DECADRON) 1 mg tablet; Take 1 mg by mouth between 11 PM to 12 AM for one dose  •  Cortisol Level,7-9 AM Specimen; Future    Encounter for immunization    Orders:  •  influenza vaccine, high-dose, PF 0.5 mL (Fluzone High Dose)    Screening for osteoporosis    Orders:  •  DXA bone density spine hip and pelvis; Future    Age-related osteoporosis without current pathological fracture    Orders:  •  DXA bone density spine hip and pelvis; Future       Preventive health issues were discussed with patient, and age appropriate screening tests were ordered as noted in patient's After Visit Summary. Personalized health advice and appropriate referrals for health education or  preventive services given if needed, as noted in patient's After Visit Summary.    History of Present Illness   {?Quick Links Encounters * My Last Note * Last Note in Specialty * Snapshot * Since Last Visit * History :37945}  Patient is a 69-year-old female who presents today for medicare annual wellness visit. She states that she has been feeling well. She does state that she has been going to PT for her patellofemoral arthritis and she does have stairs at home and needs to know how to navigate them. She also says that she does get muscle spasms in her back on one side and then will twist and it will radiate to the other side of her back. She states she often forgets to drink water. Denies any other acute concerns today. Denies chest pain, SOB, abdominal pain, changes in bowels        Patient Care Team:  Rochelle Miles PA-C as PCP - General (Physician Assistant)    Review of Systems   Constitutional:  Negative for chills, fatigue and fever.   HENT:  Negative for congestion, ear pain and sore throat.    Eyes:  Negative for pain.   Respiratory:  Negative for cough and shortness of breath.    Cardiovascular:  Negative for chest pain and palpitations.   Gastrointestinal:  Negative for abdominal pain, constipation, diarrhea, nausea and vomiting.   Genitourinary:  Negative for dysuria and hematuria.   Musculoskeletal:  Positive for arthralgias and back pain.        Muscle spasms in lower back    Skin:  Negative for color change and rash.   Neurological:  Negative for syncope, numbness and headaches.   All other systems reviewed and are negative.    Medical History Reviewed by provider this encounter:  Tobacco  Allergies  Meds  Problems  Med Hx  Surg Hx  Fam Hx       Annual Wellness Visit Questionnaire   Jennifer is here for her Subsequent Wellness visit. Last Medicare Wellness visit information reviewed, patient interviewed, no change since last AWV.     Health Risk Assessment:   Patient rates overall health as fair.  Patient feels that their physical health rating is same. Patient is satisfied with their life. Eyesight was rated as same. Hearing was rated as same. Patient feels that their emotional and mental health rating is same. Patients states they are never, rarely angry. Patient states they are sometimes unusually tired/fatigued. Pain experienced in the last 7 days has been some. Patient's pain rating has been 8/10. Patient states that she has experienced no weight loss or gain in last 6 months.     Depression Screening:   PHQ-2 Score: 0  PHQ-9 Score: 0      Fall Risk Screening:   In the past year, patient has experienced: no history of falling in past year      Urinary Incontinence Screening:   Patient has leaked urine accidently in the last six months.     Home Safety:  Patient has trouble with stairs inside or outside of their home. Patient has working smoke alarms and has working carbon monoxide detector. Home safety hazards include: none.     Nutrition:   Current diet is Regular.     Medications:   Patient is not currently taking any over-the-counter supplements. Patient is able to manage medications.     Activities of Daily Living (ADLs)/Instrumental Activities of Daily Living (IADLs):   Walk and transfer into and out of bed and chair?: Yes  Dress and groom yourself?: Yes    Bathe or shower yourself?: Yes    Feed yourself? Yes  Do your laundry/housekeeping?: Yes  Manage your money, pay your bills and track your expenses?: Yes  Make your own meals?: Yes    Do your own shopping?: Yes    Previous Hospitalizations:   Any hospitalizations or ED visits within the last 12 months?: No      Advance Care Planning:   Living will: No    Durable POA for healthcare: No    Advanced directive: No      Cognitive Screening:   Provider or family/friend/caregiver concerned regarding cognition?: No    PREVENTIVE SCREENINGS      Cardiovascular Screening:    General: Screening Not Indicated and History Lipid Disorder      Diabetes  Screening:     General: Screening Current      Colorectal Cancer Screening:     General: Screening Current      Breast Cancer Screening:     General: Screening Current      Cervical Cancer Screening:    General: Screening Not Indicated      Osteoporosis Screening:    General: Screening Not Indicated and History Osteoporosis      Lung Cancer Screening:     General: Screening Not Indicated      Hepatitis C Screening:    General: Screening Current    Screening, Brief Intervention, and Referral to Treatment (SBIRT)    Screening  Typical number of drinks in a day: 0  Typical number of drinks in a week: 0  Interpretation: Low risk drinking behavior.    Single Item Drug Screening:  How often have you used an illegal drug (including marijuana) or a prescription medication for non-medical reasons in the past year? never    Single Item Drug Screen Score: 0  Interpretation: Negative screen for possible drug use disorder    Social Drivers of Health     Financial Resource Strain: Low Risk  (1/17/2024)    Overall Financial Resource Strain (CARDIA)    • Difficulty of Paying Living Expenses: Not very hard   Food Insecurity: No Food Insecurity (1/23/2025)    Hunger Vital Sign    • Worried About Running Out of Food in the Last Year: Never true    • Ran Out of Food in the Last Year: Never true   Transportation Needs: No Transportation Needs (1/23/2025)    PRAPARE - Transportation    • Lack of Transportation (Medical): No    • Lack of Transportation (Non-Medical): No   Housing Stability: Low Risk  (1/23/2025)    Housing Stability Vital Sign    • Unable to Pay for Housing in the Last Year: No    • Number of Times Moved in the Last Year: 0    • Homeless in the Last Year: No   Utilities: Not At Risk (1/23/2025)    [facility] Utilities    • Threatened with loss of utilities: No     No results found.    Objective {?Quick Links Trend Vitals * Enter New Vitals * Results Review * Timeline (Adult) * Labs * Imaging * Cardiology * Procedures * Lung  "Cancer Screening * Surgical eConsent :37010}  /70 (Patient Position: Sitting, Cuff Size: Large)   Pulse 91   Temp (!) 96.5 °F (35.8 °C) (Tympanic)   Resp 20   Ht 5' 2\" (1.575 m)   Wt 104 kg (229 lb 9.6 oz)   SpO2 97%   BMI 41.99 kg/m²     Physical Exam  Vitals and nursing note reviewed.   Constitutional:       General: She is not in acute distress.     Appearance: Normal appearance. She is well-developed.   HENT:      Head: Normocephalic and atraumatic.      Right Ear: Tympanic membrane normal.      Left Ear: Tympanic membrane normal.      Nose: Nose normal.      Mouth/Throat:      Mouth: Mucous membranes are moist.   Eyes:      Conjunctiva/sclera: Conjunctivae normal.   Cardiovascular:      Rate and Rhythm: Normal rate and regular rhythm.      Heart sounds: Normal heart sounds. No murmur heard.  Pulmonary:      Effort: Pulmonary effort is normal. No respiratory distress.      Breath sounds: Normal breath sounds. No wheezing or rhonchi.   Abdominal:      Palpations: Abdomen is soft.      Tenderness: There is no abdominal tenderness.   Musculoskeletal:         General: No swelling.      Cervical back: Neck supple.   Skin:     General: Skin is warm and dry.      Capillary Refill: Capillary refill takes less than 2 seconds.   Neurological:      Mental Status: She is alert and oriented to person, place, and time.   Psychiatric:         Mood and Affect: Mood normal.         Behavior: Behavior normal.         Thought Content: Thought content normal.         Judgment: Judgment normal.       Administrative Statements {?Quick Links Full Problem List * Level of Service * [facility]}  I have spent a total time of 45 minutes in caring for this patient on the day of the visit/encounter including Diagnostic results, Prognosis, Risks and benefits of tx options, Instructions for management, Patient and family education, Importance of tx compliance, Risk factor reductions, Impressions, Counseling / Coordination of " care, Documenting in the medical record, Reviewing / ordering tests, medicine, procedures  , and Obtaining or reviewing history  . Topics discussed with the patient / family include symptom assessment and management and medication review.

## 2025-01-23 NOTE — PATIENT INSTRUCTIONS
Medicare Preventive Visit Patient Instructions  Thank you for completing your Welcome to Medicare Visit or Medicare Annual Wellness Visit today. Your next wellness visit will be due in one year (1/24/2026).  The screening/preventive services that you may require over the next 5-10 years are detailed below. Some tests may not apply to you based off risk factors and/or age. Screening tests ordered at today's visit but not completed yet may show as past due. Also, please note that scanned in results may not display below.  Preventive Screenings:  Service Recommendations Previous Testing/Comments   Colorectal Cancer Screening  * Colonoscopy    * Fecal Occult Blood Test (FOBT)/Fecal Immunochemical Test (FIT)  * Fecal DNA/Cologuard Test  * Flexible Sigmoidoscopy Age: 45-75 years old   Colonoscopy: every 10 years (may be performed more frequently if at higher risk)  OR  FOBT/FIT: every 1 year  OR  Cologuard: every 3 years  OR  Sigmoidoscopy: every 5 years  Screening may be recommended earlier than age 45 if at higher risk for colorectal cancer. Also, an individualized decision between you and your healthcare provider will decide whether screening between the ages of 76-85 would be appropriate. Colonoscopy: 11/11/2015  FOBT/FIT: Not on file  Cologuard: Not on file  Sigmoidoscopy: Not on file    Screening Current     Breast Cancer Screening Age: 40+ years old  Frequency: every 1-2 years  Not required if history of left and right mastectomy Mammogram: 09/16/2024    Screening Current   Cervical Cancer Screening Between the ages of 21-29, pap smear recommended once every 3 years.   Between the ages of 30-65, can perform pap smear with HPV co-testing every 5 years.   Recommendations may differ for women with a history of total hysterectomy, cervical cancer, or abnormal pap smears in past. Pap Smear: 07/05/2019    Screening Not Indicated   Hepatitis C Screening Once for adults born between 1945 and 1965  More frequently in  patients at high risk for Hepatitis C Hep C Antibody: 10/04/2018    Screening Current   Diabetes Screening 1-2 times per year if you're at risk for diabetes or have pre-diabetes Fasting glucose: 109 mg/dL (1/20/2025)  A1C: 5.6 % (1/20/2025)  Screening Current   Cholesterol Screening Once every 5 years if you don't have a lipid disorder. May order more often based on risk factors. Lipid panel: 01/20/2025    Screening Not Indicated  History Lipid Disorder     Other Preventive Screenings Covered by Medicare:  Abdominal Aortic Aneurysm (AAA) Screening: covered once if your at risk. You're considered to be at risk if you have a family history of AAA.  Lung Cancer Screening: covers low dose CT scan once per year if you meet all of the following conditions: (1) Age 55-77; (2) No signs or symptoms of lung cancer; (3) Current smoker or have quit smoking within the last 15 years; (4) You have a tobacco smoking history of at least 20 pack years (packs per day multiplied by number of years you smoked); (5) You get a written order from a healthcare provider.  Glaucoma Screening: covered annually if you're considered high risk: (1) You have diabetes OR (2) Family history of glaucoma OR (3)  aged 50 and older OR (4)  American aged 65 and older  Osteoporosis Screening: covered every 2 years if you meet one of the following conditions: (1) You're estrogen deficient and at risk for osteoporosis based off medical history and other findings; (2) Have a vertebral abnormality; (3) On glucocorticoid therapy for more than 3 months; (4) Have primary hyperparathyroidism; (5) On osteoporosis medications and need to assess response to drug therapy.   Last bone density test (DXA Scan): 12/16/2021.  HIV Screening: covered annually if you're between the age of 15-65. Also covered annually if you are younger than 15 and older than 65 with risk factors for HIV infection. For pregnant patients, it is covered up to 3 times per  pregnancy.    Immunizations:  Immunization Recommendations   Influenza Vaccine Annual influenza vaccination during flu season is recommended for all persons aged >= 6 months who do not have contraindications   Pneumococcal Vaccine   * Pneumococcal conjugate vaccine = PCV13 (Prevnar 13), PCV15 (Vaxneuvance), PCV20 (Prevnar 20)  * Pneumococcal polysaccharide vaccine = PPSV23 (Pneumovax) Adults 19-63 yo with certain risk factors or if 65+ yo  If never received any pneumonia vaccine: recommend Prevnar 20 (PCV20)  Give PCV20 if previously received 1 dose of PCV13 or PPSV23   Hepatitis B Vaccine 3 dose series if at intermediate or high risk (ex: diabetes, end stage renal disease, liver disease)   Respiratory syncytial virus (RSV) Vaccine - COVERED BY MEDICARE PART D  * RSVPreF3 (Arexvy) CDC recommends that adults 60 years of age and older may receive a single dose of RSV vaccine using shared clinical decision-making (SCDM)   Tetanus (Td) Vaccine - COST NOT COVERED BY MEDICARE PART B Following completion of primary series, a booster dose should be given every 10 years to maintain immunity against tetanus. Td may also be given as tetanus wound prophylaxis.   Tdap Vaccine - COST NOT COVERED BY MEDICARE PART B Recommended at least once for all adults. For pregnant patients, recommended with each pregnancy.   Shingles Vaccine (Shingrix) - COST NOT COVERED BY MEDICARE PART B  2 shot series recommended in those 19 years and older who have or will have weakened immune systems or those 50 years and older     Health Maintenance Due:      Topic Date Due   • DXA SCAN  12/16/2023   • Colorectal Cancer Screening  11/11/2025   • Breast Cancer Screening: Mammogram  09/16/2026   • Hepatitis C Screening  Completed     Immunizations Due:      Topic Date Due   • COVID-19 Vaccine (1 - 2024-25 season) Never done     Advance Directives   What are advance directives?  Advance directives are legal documents that state your wishes and plans for  medical care. These plans are made ahead of time in case you lose your ability to make decisions for yourself. Advance directives can apply to any medical decision, such as the treatments you want, and if you want to donate organs.   What are the types of advance directives?  There are many types of advance directives, and each state has rules about how to use them. You may choose a combination of any of the following:  Living will:  This is a written record of the treatment you want. You can also choose which treatments you do not want, which to limit, and which to stop at a certain time. This includes surgery, medicine, IV fluid, and tube feedings.   Durable power of  for healthcare (DPAHC):  This is a written record that states who you want to make healthcare choices for you when you are unable to make them for yourself. This person, called a proxy, is usually a family member or a friend. You may choose more than 1 proxy.  Do not resuscitate (DNR) order:  A DNR order is used in case your heart stops beating or you stop breathing. It is a request not to have certain forms of treatment, such as CPR. A DNR order may be included in other types of advance directives.  Medical directive:  This covers the care that you want if you are in a coma, near death, or unable to make decisions for yourself. You can list the treatments you want for each condition. Treatment may include pain medicine, surgery, blood transfusions, dialysis, IV or tube feedings, and a ventilator (breathing machine).  Values history:  This document has questions about your views, beliefs, and how you feel and think about life. This information can help others choose the care that you would choose.  Why are advance directives important?  An advance directive helps you control your care. Although spoken wishes may be used, it is better to have your wishes written down. Spoken wishes can be misunderstood, or not followed. Treatments may be given  even if you do not want them. An advance directive may make it easier for your family to make difficult choices about your care.   Urinary Incontinence   Urinary incontinence (UI)  is when you lose control of your bladder. UI develops because your bladder cannot store or empty urine properly. The 3 most common types of UI are stress incontinence, urge incontinence, or both.  Medicines:   May be given to help strengthen your bladder control. Report any side effects of medication to your healthcare provider.  Do pelvic muscle exercises often:  Your pelvic muscles help you stop urinating. Squeeze these muscles tight for 5 seconds, then relax for 5 seconds. Gradually work up to squeezing for 10 seconds. Do 3 sets of 15 repetitions a day, or as directed. This will help strengthen your pelvic muscles and improve bladder control.  Train your bladder:  Go to the bathroom at set times, such as every 2 hours, even if you do not feel the urge to go. You can also try to hold your urine when you feel the urge to go. For example, hold your urine for 5 minutes when you feel the urge to go. As that becomes easier, hold your urine for 10 minutes.   Self-care:   Keep a UI record.  Write down how often you leak urine and how much you leak. Make a note of what you were doing when you leaked urine.  Drink liquids as directed. You may need to limit the amount of liquid you drink to help control your urine leakage. Do not drink any liquid right before you go to bed. Limit or do not have drinks that contain caffeine or alcohol.   Prevent constipation.  Eat a variety of high-fiber foods. Good examples are high-fiber cereals, beans, vegetables, and whole-grain breads. Walking is the best way to trigger your intestines to have a bowel movement.  Exercise regularly and maintain a healthy weight.  Weight loss and exercise will decrease pressure on your bladder and help you control your leakage.   Use a catheter as directed  to help empty your  bladder. A catheter is a tiny, plastic tube that is put into your bladder to drain your urine.   Go to behavior therapy as directed.  Behavior therapy may be used to help you learn to control your urge to urinate.    Weight Management   Why it is important to manage your weight:  Being overweight increases your risk of health conditions such as heart disease, high blood pressure, type 2 diabetes, and certain types of cancer. It can also increase your risk for osteoarthritis, sleep apnea, and other respiratory problems. Aim for a slow, steady weight loss. Even a small amount of weight loss can lower your risk of health problems.  How to lose weight safely:  A safe and healthy way to lose weight is to eat fewer calories and get regular exercise. You can lose up about 1 pound a week by decreasing the number of calories you eat by 500 calories each day.   Healthy meal plan for weight management:  A healthy meal plan includes a variety of foods, contains fewer calories, and helps you stay healthy. A healthy meal plan includes the following:  Eat whole-grain foods more often.  A healthy meal plan should contain fiber. Fiber is the part of grains, fruits, and vegetables that is not broken down by your body. Whole-grain foods are healthy and provide extra fiber in your diet. Some examples of whole-grain foods are whole-wheat breads and pastas, oatmeal, brown rice, and bulgur.  Eat a variety of vegetables every day.  Include dark, leafy greens such as spinach, kale, piper greens, and mustard greens. Eat yellow and orange vegetables such as carrots, sweet potatoes, and winter squash.   Eat a variety of fruits every day.  Choose fresh or canned fruit (canned in its own juice or light syrup) instead of juice. Fruit juice has very little or no fiber.  Eat low-fat dairy foods.  Drink fat-free (skim) milk or 1% milk. Eat fat-free yogurt and low-fat cottage cheese. Try low-fat cheeses such as mozzarella and other reduced-fat  cheeses.  Choose meat and other protein foods that are low in fat.  Choose beans or other legumes such as split peas or lentils. Choose fish, skinless poultry (chicken or turkey), or lean cuts of red meat (beef or pork). Before you cook meat or poultry, cut off any visible fat.   Use less fat and oil.  Try baking foods instead of frying them. Add less fat, such as margarine, sour cream, regular salad dressing and mayonnaise to foods. Eat fewer high-fat foods. Some examples of high-fat foods include french fries, doughnuts, ice cream, and cakes.  Eat fewer sweets.  Limit foods and drinks that are high in sugar. This includes candy, cookies, regular soda, and sweetened drinks.  Exercise:  Exercise at least 30 minutes per day on most days of the week. Some examples of exercise include walking, biking, dancing, and swimming. You can also fit in more physical activity by taking the stairs instead of the elevator or parking farther away from stores. Ask your healthcare provider about the best exercise plan for you.      © Copyright [employer] 2018 Information is for End User's use only and may not be sold, redistributed or otherwise used for commercial purposes. All illustrations and images included in CareNotes® are the copyrighted property of [employer] or [employer]

## 2025-01-23 NOTE — ASSESSMENT & PLAN NOTE
-Patient has had chronic high cholesterol  -Advised of the risks with high cholesterol including increased stroke an heart attack risk   -Patient declines medication therapy  -Will start 1000 mg fish oil 2x daily and states she will change her diet around   -I recommended plenty of fruits/veggies/whole grains, regular physical activity as able, and cutting back on meat/dairy/fried/processed foods.

## 2025-01-23 NOTE — ASSESSMENT & PLAN NOTE
-Will check cortisol level  -Explained timings of medication administration and when she will get labs drawn   Orders:  •  dexamethasone (DECADRON) 1 mg tablet; Take 1 mg by mouth between 11 PM to 12 AM for one dose  •  Cortisol Level,7-9 AM Specimen; Future

## 2025-02-03 DIAGNOSIS — G89.29 OTHER CHRONIC PAIN: ICD-10-CM

## 2025-02-04 RX ORDER — TRAMADOL HYDROCHLORIDE 50 MG/1
50 TABLET ORAL EVERY 6 HOURS PRN
Qty: 120 TABLET | Refills: 0 | Status: SHIPPED | OUTPATIENT
Start: 2025-02-04

## 2025-02-04 NOTE — TELEPHONE ENCOUNTER
Patient called to request a refill for their tramadol advised a refill was requested on 2/3/25 and is pending approval. Patient verbalized understanding and is in agreement.  Patient stated she is out of medication.

## 2025-02-27 ENCOUNTER — OFFICE VISIT (OUTPATIENT)
Dept: URGENT CARE | Facility: CLINIC | Age: 70
End: 2025-02-27
Payer: MEDICARE

## 2025-02-27 VITALS
DIASTOLIC BLOOD PRESSURE: 82 MMHG | RESPIRATION RATE: 18 BRPM | HEART RATE: 85 BPM | SYSTOLIC BLOOD PRESSURE: 140 MMHG | TEMPERATURE: 98 F | OXYGEN SATURATION: 96 %

## 2025-02-27 DIAGNOSIS — L08.9 LOCAL INFECTION OF THE SKIN AND SUBCUTANEOUS TISSUE, UNSPECIFIED: Primary | ICD-10-CM

## 2025-02-27 PROCEDURE — 99203 OFFICE O/P NEW LOW 30 MIN: CPT

## 2025-02-27 PROCEDURE — G0463 HOSPITAL OUTPT CLINIC VISIT: HCPCS

## 2025-02-27 RX ORDER — CEPHALEXIN 500 MG/1
500 CAPSULE ORAL EVERY 8 HOURS SCHEDULED
Qty: 21 CAPSULE | Refills: 0 | Status: SHIPPED | OUTPATIENT
Start: 2025-02-27 | End: 2025-03-06

## 2025-03-01 NOTE — PROGRESS NOTES
Name: Jennifer Roa      : 1955      MRN: [id number]  Encounter Provider: Swetha Sauer PA-C  Encounter Date: 2025   Encounter department: [facility]  :  Assessment & Plan  Local infection of the skin and subcutaneous tissue, unspecified    Orders:    cephalexin (KEFLEX) 500 mg capsule; Take 1 capsule (500 mg total) by mouth every 8 (eight) hours for 7 days    Pt with a redness/scratch across R eye from upper medial lid to the lateral under eye/cheek. No bleeding, no major drainage, no concern of the actual eye- more eyelid.    No concerning findings on actual eye exam, no sensitivity to light, etc. Will send abx for concern for developing skin infection of eye lid. Reviewed warm compresses as needed and eye rinsing as needed. Reviewed return precautions for worsened redness, change sin vision, or changes to the actual eye.      History of Present Illness   Eye Problem   Associated symptoms include an eye discharge. Pertinent negatives include no fever.     Jennifer Roa is a 69 y.o. female who presents with concerns after her dog scratched her right eyelid with its paw earlier this week but now her eyelid is red and crusting.        Review of Systems   Constitutional:  Negative for fever.   Eyes:  Positive for discharge.   Skin:  Positive for wound.          Objective   /82   Pulse 85   Temp 98 °F (36.7 °C)   Resp 18   SpO2 96%      Physical Exam  Constitutional:       Appearance: Normal appearance.   HENT:      Head: Normocephalic and atraumatic.   Eyes:      Extraocular Movements: Extraocular movements intact.      Conjunctiva/sclera: Conjunctivae normal.      Pupils: Pupils are equal, round, and reactive to light.      Comments: Pt with a redness/scratch across R eye from upper medial lid to the lateral under eye/cheek. No bleeding, no major drainage, no concern of the actual eye- more eyelid.   Skin:     General: Skin is warm and dry.      Findings: Rash  present.   Neurological:      Mental Status: She is alert.

## 2025-03-04 DIAGNOSIS — G89.29 OTHER CHRONIC PAIN: ICD-10-CM

## 2025-03-04 RX ORDER — TRAMADOL HYDROCHLORIDE 50 MG/1
50 TABLET ORAL EVERY 6 HOURS PRN
Qty: 120 TABLET | Refills: 0 | Status: SHIPPED | OUTPATIENT
Start: 2025-03-04

## 2025-04-03 DIAGNOSIS — G89.29 OTHER CHRONIC PAIN: ICD-10-CM

## 2025-04-04 RX ORDER — TRAMADOL HYDROCHLORIDE 50 MG/1
50 TABLET ORAL EVERY 6 HOURS PRN
Qty: 120 TABLET | Refills: 0 | Status: SHIPPED | OUTPATIENT
Start: 2025-04-04

## 2025-05-01 DIAGNOSIS — G89.29 OTHER CHRONIC PAIN: ICD-10-CM

## 2025-05-02 RX ORDER — TRAMADOL HYDROCHLORIDE 50 MG/1
50 TABLET ORAL EVERY 6 HOURS PRN
Qty: 120 TABLET | Refills: 0 | Status: SHIPPED | OUTPATIENT
Start: 2025-05-02

## 2025-05-23 ENCOUNTER — APPOINTMENT (EMERGENCY)
Dept: CT IMAGING | Facility: HOSPITAL | Age: 70
End: 2025-05-23
Payer: MEDICARE

## 2025-05-23 ENCOUNTER — HOSPITAL ENCOUNTER (EMERGENCY)
Facility: HOSPITAL | Age: 70
Discharge: HOME | End: 2025-05-23
Attending: FAMILY MEDICINE
Payer: MEDICARE

## 2025-05-23 VITALS
DIASTOLIC BLOOD PRESSURE: 53 MMHG | SYSTOLIC BLOOD PRESSURE: 125 MMHG | OXYGEN SATURATION: 98 % | TEMPERATURE: 98.6 F | HEART RATE: 72 BPM | RESPIRATION RATE: 22 BRPM

## 2025-05-23 DIAGNOSIS — R10.9 ABDOMINAL PAIN: Primary | ICD-10-CM

## 2025-05-23 DIAGNOSIS — K57.92 DIVERTICULITIS: ICD-10-CM

## 2025-05-23 DIAGNOSIS — R03.0 ELEVATED BLOOD PRESSURE READING: ICD-10-CM

## 2025-05-23 LAB
ALBUMIN SERPL BCG-MCNC: 4.1 G/DL (ref 3.5–5)
ALP SERPL-CCNC: 55 U/L (ref 34–104)
ALT SERPL W P-5'-P-CCNC: 14 U/L (ref 7–52)
ANION GAP SERPL CALCULATED.3IONS-SCNC: 11 MMOL/L (ref 4–13)
AST SERPL W P-5'-P-CCNC: 15 U/L (ref 13–39)
BASOPHILS # BLD AUTO: 0.04 THOUSANDS/ÂΜL (ref 0–0.1)
BASOPHILS NFR BLD AUTO: 0 % (ref 0–1)
BILIRUB DIRECT SERPL-MCNC: 0.03 MG/DL (ref 0–0.2)
BILIRUB SERPL-MCNC: 0.54 MG/DL (ref 0.2–1)
BUN SERPL-MCNC: 17 MG/DL (ref 5–25)
CALCIUM SERPL-MCNC: 9.6 MG/DL (ref 8.4–10.2)
CHLORIDE SERPL-SCNC: 104 MMOL/L (ref 96–108)
CO2 SERPL-SCNC: 23 MMOL/L (ref 21–32)
CREAT SERPL-MCNC: 0.95 MG/DL (ref 0.6–1.3)
EOSINOPHIL # BLD AUTO: 0.2 THOUSAND/ÂΜL (ref 0–0.61)
EOSINOPHIL NFR BLD AUTO: 2 % (ref 0–6)
ERYTHROCYTE [DISTWIDTH] IN BLOOD BY AUTOMATED COUNT: 11.9 % (ref 11.6–15.1)
GFR SERPL CREATININE-BSD FRML MDRD: 60 ML/MIN/1.73SQ M
GLUCOSE SERPL-MCNC: 106 MG/DL (ref 65–140)
HCT VFR BLD AUTO: 37.2 % (ref 34.8–46.1)
HGB BLD-MCNC: 12.1 G/DL (ref 11.5–15.4)
IMM GRANULOCYTES # BLD AUTO: 0.03 THOUSAND/UL (ref 0–0.2)
IMM GRANULOCYTES NFR BLD AUTO: 0 % (ref 0–2)
LIPASE SERPL-CCNC: 24 U/L (ref 11–82)
LYMPHOCYTES # BLD AUTO: 1.7 THOUSANDS/ÂΜL (ref 0.6–4.47)
LYMPHOCYTES NFR BLD AUTO: 14 % (ref 14–44)
MCH RBC QN AUTO: 31.6 PG (ref 26.8–34.3)
MCHC RBC AUTO-ENTMCNC: 32.5 G/DL (ref 31.4–37.4)
MCV RBC AUTO: 97 FL (ref 82–98)
MONOCYTES # BLD AUTO: 0.86 THOUSAND/ÂΜL (ref 0.17–1.22)
MONOCYTES NFR BLD AUTO: 7 % (ref 4–12)
NEUTROPHILS # BLD AUTO: 9.03 THOUSANDS/ÂΜL (ref 1.85–7.62)
NEUTS SEG NFR BLD AUTO: 77 % (ref 43–75)
NRBC BLD AUTO-RTO: 0 /100 WBCS
PLATELET # BLD AUTO: 259 THOUSANDS/UL (ref 149–390)
PMV BLD AUTO: 10.3 FL (ref 8.9–12.7)
POTASSIUM SERPL-SCNC: 3.9 MMOL/L (ref 3.5–5.3)
PROT SERPL-MCNC: 7.9 G/DL (ref 6.4–8.4)
RBC # BLD AUTO: 3.83 MILLION/UL (ref 3.81–5.12)
SODIUM SERPL-SCNC: 138 MMOL/L (ref 135–147)
WBC # BLD AUTO: 11.86 THOUSAND/UL (ref 4.31–10.16)

## 2025-05-23 PROCEDURE — 96374 THER/PROPH/DIAG INJ IV PUSH: CPT

## 2025-05-23 PROCEDURE — 36415 COLL VENOUS BLD VENIPUNCTURE: CPT | Performed by: FAMILY MEDICINE

## 2025-05-23 PROCEDURE — 96375 TX/PRO/DX INJ NEW DRUG ADDON: CPT

## 2025-05-23 PROCEDURE — 99285 EMERGENCY DEPT VISIT HI MDM: CPT | Performed by: FAMILY MEDICINE

## 2025-05-23 PROCEDURE — 83690 ASSAY OF LIPASE: CPT | Performed by: FAMILY MEDICINE

## 2025-05-23 PROCEDURE — 80076 HEPATIC FUNCTION PANEL: CPT | Performed by: FAMILY MEDICINE

## 2025-05-23 PROCEDURE — 80048 BASIC METABOLIC PNL TOTAL CA: CPT | Performed by: FAMILY MEDICINE

## 2025-05-23 PROCEDURE — 99284 EMERGENCY DEPT VISIT MOD MDM: CPT

## 2025-05-23 PROCEDURE — 96361 HYDRATE IV INFUSION ADD-ON: CPT

## 2025-05-23 PROCEDURE — 74177 CT ABD & PELVIS W/CONTRAST: CPT

## 2025-05-23 PROCEDURE — 85025 COMPLETE CBC W/AUTO DIFF WBC: CPT | Performed by: FAMILY MEDICINE

## 2025-05-23 RX ORDER — FENTANYL CITRATE 50 UG/ML
25 INJECTION, SOLUTION INTRAMUSCULAR; INTRAVENOUS ONCE
Refills: 0 | Status: COMPLETED | OUTPATIENT
Start: 2025-05-23 | End: 2025-05-23

## 2025-05-23 RX ORDER — ONDANSETRON 2 MG/ML
4 INJECTION INTRAMUSCULAR; INTRAVENOUS ONCE
Status: COMPLETED | OUTPATIENT
Start: 2025-05-23 | End: 2025-05-23

## 2025-05-23 RX ADMIN — IOHEXOL 100 ML: 350 INJECTION, SOLUTION INTRAVENOUS at 10:52

## 2025-05-23 RX ADMIN — AMOXICILLIN AND CLAVULANATE POTASSIUM 1 TABLET: 875; 125 TABLET, FILM COATED ORAL at 12:33

## 2025-05-23 RX ADMIN — ONDANSETRON 4 MG: 2 INJECTION INTRAMUSCULAR; INTRAVENOUS at 11:26

## 2025-05-23 RX ADMIN — SODIUM CHLORIDE 1000 ML: 0.9 INJECTION, SOLUTION INTRAVENOUS at 10:32

## 2025-05-23 RX ADMIN — FENTANYL CITRATE 25 MCG: 50 INJECTION INTRAMUSCULAR; INTRAVENOUS at 11:26

## 2025-05-23 NOTE — ED PROVIDER NOTES
Time reflects when diagnosis was documented in both MDM as applicable and the Disposition within this note       Time User Action Codes Description Comment    5/23/2025 12:18 PM Emil French Add [R10.9] Abdominal pain     5/23/2025 12:18 PM Emil French Add [R03.0] Elevated blood pressure reading     5/23/2025 12:18 PM Emil French Add [K57.92] Diverticulitis           ED Disposition       ED Disposition   Discharge    Condition   Stable    Date/Time   Fri May 23, 2025 12:18 PM    Comment   Jennifer Roa discharge to home/self care.                   Assessment & Plan       Medical Decision Making  Amount and/or Complexity of Data Reviewed  Labs: ordered.  Radiology: ordered.    Risk  Prescription drug management.    70-year-old female presented to ED with complaint of lower abdominal pain.  Patient states the pain started on Monday had a little bit of diarrhea some nausea.  Rating her pain 8 out of 10 at this time.  Patient denies any chest pain shortness of breath.  Differential diagnoses rule out bowel obstruction/perforated viscus/appendicitis/diverticulitis/AAA/malignancy/peptic ulcer disease/perforated ulcer  Plan will obtain lab CT abdomen pelvis patient is given pain medication  Patient assessed at bedside  Labs reviewed CT reviewed which shows acute sigmoid diverticulitis we will start patient on Augmentin pain control with pain medication patient is provided with referral to follow-up with GI as well as general surgery  Disposition discharge home with strict caution to return if notice any worsening symptoms patient verbalized understand the plan DC home         Medications   sodium chloride 0.9 % bolus 1,000 mL (0 mL Intravenous Stopped 5/23/25 1235)   iohexol (OMNIPAQUE) 350 MG/ML injection (MULTI-DOSE) 100 mL (100 mL Intravenous Given 5/23/25 1052)   fentaNYL injection 25 mcg (25 mcg Intravenous Given 5/23/25 1126)   ondansetron (ZOFRAN) injection 4 mg (4 mg Intravenous Given 5/23/25 1126)    amoxicillin-clavulanate (AUGMENTIN) 875-125 mg per tablet 1 tablet (1 tablet Oral Given 5/23/25 1233)       ED Risk Strat Scores                    (ISAR) Identification of Seniors at Risk  Before the illness or injury that brought you to the Emergency, did you need someone to help you on a regular basis?: 0  In the last 24 hours, have you needed more help than usual?: 0  Have you been hospitalized for one or more nights during the past 6 months?: 0  In general, do you see well?: 0  In general, do you have serious problems with your memory?: 0  Do you take more than three different medications every day?: 0  ISAR Score: 0            SBIRT 20yo+      Flowsheet Row Most Recent Value   Initial Alcohol Screen: US AUDIT-C     1. How often do you have a drink containing alcohol? 0 Filed at: 05/23/2025 1030   2. How many drinks containing alcohol do you have on a typical day you are drinking?  0 Filed at: 05/23/2025 1030   3b. FEMALE Any Age, or MALE 65+: How often do you have 4 or more drinks on one occassion? 0 Filed at: 05/23/2025 1030   Audit-C Score 0 Filed at: 05/23/2025 1030   TALHA: How many times in the past year have you...    Used an illegal drug or used a prescription medication for non-medical reasons? Never Filed at: 05/23/2025 1030                            History of Present Illness       Chief Complaint   Patient presents with    Abdominal Pain     RLQ pain worsening over the past week.        Past Medical History[1]   Past Surgical History[2]   Family History[3]   Social History[4]   E-Cigarette/Vaping    E-Cigarette Use Never User       E-Cigarette/Vaping Substances    Nicotine No     THC No     CBD No     Flavoring No     Other No     Unknown No       I have reviewed and agree with the history as documented.     HPI    Review of Systems   Constitutional:  Negative for chills and fever.   HENT:  Negative for rhinorrhea and sore throat.    Eyes:  Negative for visual disturbance.   Respiratory:  Negative  for cough and shortness of breath.    Cardiovascular:  Negative for chest pain and leg swelling.   Gastrointestinal:  Positive for abdominal pain, diarrhea and nausea. Negative for vomiting.   Genitourinary:  Negative for dysuria.   Musculoskeletal:  Negative for back pain and myalgias.   Skin:  Negative for rash.   Neurological:  Negative for dizziness and headaches.   Psychiatric/Behavioral:  Negative for confusion.    All other systems reviewed and are negative.          Objective       ED Triage Vitals   Temperature Pulse Blood Pressure Respirations SpO2 Patient Position - Orthostatic VS   05/23/25 1024 05/23/25 1024 05/23/25 1024 05/23/25 1024 05/23/25 1024 05/23/25 1024   97.6 °F (36.4 °C) 91 (!) 193/86 18 96 % Lying      Temp Source Heart Rate Source BP Location FiO2 (%) Pain Score    05/23/25 1024 05/23/25 1024 05/23/25 1024 -- 05/23/25 1235    Temporal Monitor Left arm  3      Vitals      Date and Time Temp Pulse SpO2 Resp BP Pain Score FACES Pain Rating User   05/23/25 1235 -- -- -- -- -- 3 -- SG   05/23/25 1230 98.6 °F (37 °C) 72 98 % 22 125/53 -- -- SG   05/23/25 1024 97.6 °F (36.4 °C) 91 96 % 18 193/86 -- -- SG            Physical Exam  Vitals and nursing note reviewed.   Constitutional:       Appearance: She is well-developed.   HENT:      Head: Normocephalic and atraumatic.      Right Ear: External ear normal.      Left Ear: External ear normal.      Nose: Nose normal.      Mouth/Throat:      Mouth: Mucous membranes are moist.      Pharynx: No oropharyngeal exudate.     Eyes:      General: No scleral icterus.        Right eye: No discharge.         Left eye: No discharge.      Conjunctiva/sclera: Conjunctivae normal.      Pupils: Pupils are equal, round, and reactive to light.       Cardiovascular:      Rate and Rhythm: Normal rate and regular rhythm.      Pulses: Normal pulses.      Heart sounds: Normal heart sounds.   Pulmonary:      Effort: Pulmonary effort is normal. No respiratory distress.       Breath sounds: Normal breath sounds. No wheezing.   Abdominal:      General: Bowel sounds are normal.      Palpations: Abdomen is soft.      Tenderness: There is generalized abdominal tenderness.     Musculoskeletal:         General: Normal range of motion.      Cervical back: Normal range of motion and neck supple.   Lymphadenopathy:      Cervical: No cervical adenopathy.     Skin:     General: Skin is warm and dry.      Capillary Refill: Capillary refill takes less than 2 seconds.     Neurological:      General: No focal deficit present.      Mental Status: She is alert and oriented to person, place, and time.     Psychiatric:         Mood and Affect: Mood normal.         Behavior: Behavior normal.         Results Reviewed       Procedure Component Value Units Date/Time    Basic metabolic panel [[id number]] Collected: 05/23/25 1034    Lab Status: Final result Specimen: Blood from Arm, Right Updated: 05/23/25 1057     Sodium 138 mmol/L      Potassium 3.9 mmol/L      Chloride 104 mmol/L      CO2 23 mmol/L      ANION GAP 11 mmol/L      BUN 17 mg/dL      Creatinine 0.95 mg/dL      Glucose 106 mg/dL      Calcium 9.6 mg/dL      eGFR 60 ml/min/1.73sq m     Narrative:      National Kidney Disease Foundation guidelines for Chronic Kidney Disease (CKD):     Stage 1 with normal or high GFR (GFR > 90 mL/min/1.73 square meters)    Stage 2 Mild CKD (GFR = 60-89 mL/min/1.73 square meters)    Stage 3A Moderate CKD (GFR = 45-59 mL/min/1.73 square meters)    Stage 3B Moderate CKD (GFR = 30-44 mL/min/1.73 square meters)    Stage 4 Severe CKD (GFR = 15-29 mL/min/1.73 square meters)    Stage 5 End Stage CKD (GFR <15 mL/min/1.73 square meters)  Note: GFR calculation is accurate only with a steady state creatinine    Hepatic function panel [[id number]]  (Normal) Collected: 05/23/25 1034    Lab Status: Final result Specimen: Blood from Arm, Right Updated: 05/23/25 1057     Total Bilirubin 0.54 mg/dL      Bilirubin, Direct 0.03 mg/dL       Alkaline Phosphatase 55 U/L      AST 15 U/L      ALT 14 U/L      Total Protein 7.9 g/dL      Albumin 4.1 g/dL     Lipase [[id number]]  (Normal) Collected: 05/23/25 1034    Lab Status: Final result Specimen: Blood from Arm, Right Updated: 05/23/25 1057     Lipase 24 u/L     CBC and differential [[id number]]  (Abnormal) Collected: 05/23/25 1034    Lab Status: Final result Specimen: Blood from Arm, Right Updated: 05/23/25 1039     WBC 11.86 Thousand/uL      RBC 3.83 Million/uL      Hemoglobin 12.1 g/dL      Hematocrit 37.2 %      MCV 97 fL      MCH 31.6 pg      MCHC 32.5 g/dL      RDW 11.9 %      MPV 10.3 fL      Platelets 259 Thousands/uL      nRBC 0 /100 WBCs      Segmented % 77 %      Immature Grans % 0 %      Lymphocytes % 14 %      Monocytes % 7 %      Eosinophils Relative 2 %      Basophils Relative 0 %      Absolute Neutrophils 9.03 Thousands/µL      Absolute Immature Grans 0.03 Thousand/uL      Absolute Lymphocytes 1.70 Thousands/µL      Absolute Monocytes 0.86 Thousand/µL      Eosinophils Absolute 0.20 Thousand/µL      Basophils Absolute 0.04 Thousands/µL     UA w Reflex to Microscopic w Reflex to Culture [[id number]]     Lab Status: No result Specimen: Urine             CT abdomen pelvis with contrast   Final Interpretation by Jean Pierre Lamar MD (05/23 1140)      Acute uncomplicated sigmoid diverticulitis.         The study was marked in EPIC for immediate notification.            Workstation performed: [id number]             Procedures    ED Medication and Procedure Management   Prior to Admission Medications   Prescriptions Last Dose Informant Patient Reported? Taking?   acetaminophen (TYLENOL) 325 mg tablet   No No   Sig: Take 2 tablets (650 mg total) by mouth every 6 (six) hours as needed for mild pain   dexamethasone (DECADRON) 1 mg tablet   No No   Sig: Take 1 mg by mouth between 11 PM to 12 AM for one dose   naproxen (NAPROSYN) 250 mg tablet  Self Yes No   Sig: Take 220 mg by mouth as needed   traMADol  (ULTRAM) 50 mg tablet   No No   Sig: Take 1 tablet (50 mg total) by mouth every 6 (six) hours as needed for moderate pain      Facility-Administered Medications: None     Patient's Medications   Discharge Prescriptions    AMOXICILLIN-CLAVULANATE (AUGMENTIN) 875-125 MG PER TABLET    Take 1 tablet by mouth every 12 (twelve) hours for 7 days       Start Date: 5/23/2025 End Date: 5/30/2025       Order Dose: 1 tablet       Quantity: 14 tablet    Refills: 0       ED SEPSIS DOCUMENTATION   Time reflects when diagnosis was documented in both MDM as applicable and the Disposition within this note       Time User Action Codes Description Comment    5/23/2025 12:18 PM Emil French Add [R10.9] Abdominal pain     5/23/2025 12:18 PM Emil French Add [R03.0] Elevated blood pressure reading     5/23/2025 12:18 PM Emil French Add [K57.92] Diverticulitis                      [1]   Past Medical History:  Diagnosis Date    Abnormal blood chemistry     resolved: 2/9/2017    Abscess of groin     last assessed: 10/11/2013    Allergic 12/1955    Arthritis     Cancer (HCC) 3/2014    Cataracts, bilateral     Chronic pain     Diverticulitis of colon 2018    Diverticulosis     Endometrial cancer (HCC) 09/07/2018    Malignant neoplasm of uterus (HCC) 05/19/2016    Obesity     Preauricular 1955    Spinal stenosis     Uterine cancer (HCC)     uterine   [2]   Past Surgical History:  Procedure Laterality Date    ABCESS DRAINAGE      right ear    CATARACT EXTRACTION, BILATERAL      CHOLECYSTECTOMY      EPIDURAL BLOCK INJECTION      EYE SURGERY Left 05/2022    EYE SURGERY  10/2018    Cataract  Both eyes    HYSTERECTOMY      Total: With removal of both tubes and both ovaries   [3]   Family History  Problem Relation Name Age of Onset    Ovarian cancer Mother      Cancer Mother      Pancreatic cancer Father Nafisa ([place]) [place]     Cancer Father Nafisa ([place]) Radha     Diabetes Father Nafisa ([place]) Radha     Diabetes Family          mellitus     Cancer Paternal Grandmother Dorina Garcia    [4]   Social History  Tobacco Use    Smoking status: Never    Smokeless tobacco: Never   Vaping Use    Vaping status: Never Used   Substance Use Topics    Alcohol use: Yes     Comment: Social events    Drug use: No     Comment: chronic narcotic use ( as per allscripts)        French Stein MD  05/23/25 7222

## 2025-05-30 ENCOUNTER — TELEPHONE (OUTPATIENT)
Age: 70
End: 2025-05-30

## 2025-05-30 DIAGNOSIS — B37.31 VAGINAL YEAST INFECTION: Primary | ICD-10-CM

## 2025-05-30 RX ORDER — FLUCONAZOLE 150 MG/1
TABLET ORAL
Qty: 2 TABLET | Refills: 0 | Status: SHIPPED | OUTPATIENT
Start: 2025-05-30 | End: 2025-06-06

## 2025-05-30 NOTE — TELEPHONE ENCOUNTER
I sent over diflucan for her. 1 dose and if she is still symptomatic, she can take a 2nd dose 2 days later

## 2025-05-30 NOTE — TELEPHONE ENCOUNTER
WHO - patient     WHAT - possible yeast infection ( itching, burning). Patient seen in ER on 05/23/25 given antibiotics.     WHEN - started yesterday    How Often/Duration -    Pain -    Alleviating Factors (anything they tried to use to help so far) -    Next Steps - new med. No available appointment today.     Callback- patient requesting call back.

## 2025-06-02 ENCOUNTER — HOSPITAL ENCOUNTER (OUTPATIENT)
Dept: BONE DENSITY | Facility: HOSPITAL | Age: 70
Discharge: HOME | End: 2025-06-02
Payer: MEDICARE

## 2025-06-02 VITALS — BODY MASS INDEX: 41.77 KG/M2 | HEIGHT: 62 IN | WEIGHT: 227 LBS

## 2025-06-02 DIAGNOSIS — Z13.820 SCREENING FOR OSTEOPOROSIS: ICD-10-CM

## 2025-06-02 DIAGNOSIS — M81.0 AGE-RELATED OSTEOPOROSIS WITHOUT CURRENT PATHOLOGICAL FRACTURE: ICD-10-CM

## 2025-06-02 PROCEDURE — 77080 DXA BONE DENSITY AXIAL: CPT

## 2025-06-03 DIAGNOSIS — G89.29 OTHER CHRONIC PAIN: ICD-10-CM

## 2025-06-03 RX ORDER — TRAMADOL HYDROCHLORIDE 50 MG/1
50 TABLET ORAL EVERY 6 HOURS PRN
Qty: 120 TABLET | Refills: 0 | Status: SHIPPED | OUTPATIENT
Start: 2025-06-03

## 2025-06-04 NOTE — TELEPHONE ENCOUNTER
Patient called the RX Refill Line. Message is being forwarded to the office.     Patient called stating she received a message from [facility] that there is a problem with the tradadol script sent to them yesterday. Please call [facility] to resolve the issue. Patient is out of medication.      Please contact patient at [phone]

## 2025-06-05 DIAGNOSIS — G89.29 OTHER CHRONIC PAIN: ICD-10-CM

## 2025-06-05 RX ORDER — TRAMADOL HYDROCHLORIDE 50 MG/1
50 TABLET ORAL EVERY 6 HOURS PRN
Qty: 120 TABLET | Refills: 0 | OUTPATIENT
Start: 2025-06-05

## 2025-06-09 ENCOUNTER — RESULTS FOLLOW-UP (OUTPATIENT)
Dept: FAMILY MEDICINE CLINIC | Facility: CLINIC | Age: 70
End: 2025-06-09

## 2025-07-03 ENCOUNTER — OFFICE VISIT (OUTPATIENT)
Age: 70
End: 2025-07-03
Payer: MEDICARE

## 2025-07-03 VITALS
DIASTOLIC BLOOD PRESSURE: 74 MMHG | BODY MASS INDEX: 41.77 KG/M2 | OXYGEN SATURATION: 97 % | HEIGHT: 62 IN | TEMPERATURE: 97.6 F | SYSTOLIC BLOOD PRESSURE: 128 MMHG | HEART RATE: 78 BPM | WEIGHT: 227 LBS

## 2025-07-03 DIAGNOSIS — K57.92 DIVERTICULITIS: Primary | ICD-10-CM

## 2025-07-03 DIAGNOSIS — R10.31 RIGHT LOWER QUADRANT ABDOMINAL PAIN: ICD-10-CM

## 2025-07-03 DIAGNOSIS — G89.29 OTHER CHRONIC PAIN: ICD-10-CM

## 2025-07-03 PROCEDURE — 99204 OFFICE O/P NEW MOD 45 MIN: CPT | Performed by: NURSE PRACTITIONER

## 2025-07-03 RX ORDER — MULTIVITAMIN WITH IRON
500 TABLET ORAL EVERY OTHER DAY
COMMUNITY

## 2025-07-03 RX ORDER — CHLORAL HYDRATE 500 MG
1000 CAPSULE ORAL DAILY
COMMUNITY

## 2025-07-03 RX ORDER — POLYETHYLENE GLYCOL 3350 17 G/17G
238 POWDER, FOR SOLUTION ORAL ONCE
Qty: 238 G | Refills: 0 | Status: SHIPPED | OUTPATIENT
Start: 2025-07-03 | End: 2025-07-18 | Stop reason: HOSPADM

## 2025-07-03 RX ORDER — SODIUM CHLORIDE, SODIUM LACTATE, POTASSIUM CHLORIDE, CALCIUM CHLORIDE 600; 310; 30; 20 MG/100ML; MG/100ML; MG/100ML; MG/100ML
125 INJECTION, SOLUTION INTRAVENOUS CONTINUOUS
Status: CANCELLED | OUTPATIENT
Start: 2025-07-03

## 2025-07-03 NOTE — ASSESSMENT & PLAN NOTE
While the patient was in the office today, I discussed with the patient that because she is more than past due for repeat colonoscopy and recently had a bout of diverticulitis with right lower quadrant abdominal pain, I do feel would be beneficial to proceed with a colonoscopy at this time to evaluate for any other underlying etiology that could explain her symptoms.  At this point have to try to make sure she is fully cleaned out we are going to have her start 1 capful of MiraLAX daily at least 7 to 10 days prior to the colonoscopy and then proceed with the colonoscopy bowel prep as ordered. The patient was agreeable and verbalized an understanding.    I obtained informed verbal consent from the patient. The risks/benefits/alternatives of the Colonoscopy procedure were discussed with the patient. Risks included, but not limited to, infection, bleeding, perforation, injury to organs in the abdomen, missed lesion, and incomplete procedure were discussed. The patient gave verbal understanding and is agreeable to proceed. Bowel prep instructions were reviewed and given as ordered. Patient's questions were answered to the best of my ability and until they verbalized an understanding.      Encouraged patient continue drink at least 32 to 64 ounces of water daily.  Continue to watch for red flag symptoms.    While the patient was in the office today we did review GI red flag symptoms, including, but not limited to: chronic nausea, vomiting, diarrhea, chills, fever, and unintentional weight loss and should call or contact our office with any changes or concerns. I reviewed with the patient that if they notice any blood while vomiting or in their stool they should contact or office or go to the nearest emergency room for immediate evaluation. The patient was agreeable and verbalized an understanding.    Orders:  •  Ambulatory Referral to Gastroenterology  •  Colonoscopy; Future  •  polyethylene glycol (MiraLax) 17 GM/SCOOP  powder; Take 238 g by mouth once for 1 dose Take 238 g my mouth. Use as directed

## 2025-07-03 NOTE — PATIENT INSTRUCTIONS
Proceed with Colonoscopy.   Start 1 capful of Miralax daily 7-10 days prior to the colonoscopy.   Continue to drink at least 32-64 oz of water daily.   Continue to watch for red flag symptoms.   Schedule a f/u OV after Colonoscopy.     We did review GI red flag symptoms, including, but not limited to: chronic nausea, vomiting, diarrhea, chills, fever, and unintentional weight loss and should call or contact our office with any changes or concerns. I reviewed with the patient that if they notice any blood while vomiting or in their stool they should contact or office or go to the nearest emergency room for immediate evaluation. The patient was agreeable and verbalized an understanding.

## 2025-07-03 NOTE — ASSESSMENT & PLAN NOTE
Orders:  •  Colonoscopy; Future  •  polyethylene glycol (MiraLax) 17 GM/SCOOP powder; Take 238 g by mouth once for 1 dose Take 238 g my mouth. Use as directed

## 2025-07-03 NOTE — PROGRESS NOTES
Name: Jennifer Roa      : 1955      MRN: [id number]  Encounter Provider: LINA Salazar  Encounter Date: 7/3/2025   Encounter department: [facility]  :  Assessment & Plan  Diverticulitis  While the patient was in the office today, I discussed with the patient that because she is more than past due for repeat colonoscopy and recently had a bout of diverticulitis with right lower quadrant abdominal pain, I do feel would be beneficial to proceed with a colonoscopy at this time to evaluate for any other underlying etiology that could explain her symptoms.  At this point have to try to make sure she is fully cleaned out we are going to have her start 1 capful of MiraLAX daily at least 7 to 10 days prior to the colonoscopy and then proceed with the colonoscopy bowel prep as ordered. The patient was agreeable and verbalized an understanding.    I obtained informed verbal consent from the patient. The risks/benefits/alternatives of the Colonoscopy procedure were discussed with the patient. Risks included, but not limited to, infection, bleeding, perforation, injury to organs in the abdomen, missed lesion, and incomplete procedure were discussed. The patient gave verbal understanding and is agreeable to proceed. Bowel prep instructions were reviewed and given as ordered. Patient's questions were answered to the best of my ability and until they verbalized an understanding.      Encouraged patient continue drink at least 32 to 64 ounces of water daily.  Continue to watch for red flag symptoms.    While the patient was in the office today we did review GI red flag symptoms, including, but not limited to: chronic nausea, vomiting, diarrhea, chills, fever, and unintentional weight loss and should call or contact our office with any changes or concerns. I reviewed with the patient that if they notice any blood while vomiting or in their stool they should contact or office or  go to the nearest emergency room for immediate evaluation. The patient was agreeable and verbalized an understanding.    Orders:  •  Ambulatory Referral to Gastroenterology  •  Colonoscopy; Future  •  polyethylene glycol (MiraLax) 17 GM/SCOOP powder; Take 238 g by mouth once for 1 dose Take 238 g my mouth. Use as directed    Right lower quadrant abdominal pain  Orders:  •  Colonoscopy; Future  •  polyethylene glycol (MiraLax) 17 GM/SCOOP powder; Take 238 g by mouth once for 1 dose Take 238 g my mouth. Use as directed    The patient is to schedule a follow up office visit after her colonoscopy, but understands to call or contact our offices with any issues before then or as needed.    History of Present Illness   Jennifer Roa is a 70 y.o. female who presents today for her initial consultation and evaluation recent diverticulitis flareup and right lower quadrant abdominal pain and to discuss the possibility of proceeding with a colonoscopy.  The patient reports that her most recent diverticulitis flareup was one of the worst she has ever had and at this point she is limiting her diet to fish and chicken as pork and beef seems to irritate her stomach.  Bowel movements are starting to return to her more normal but is still having at least 2 to 5/day.    The patient currently denies any reflux, nausea, dysphagia, vomiting, decreased appetite, unplanned weight loss, or abdominal pain. Water Intake: 1 quart daily.     The patient currently reports that they have a BM 2-5 daily, until recently, and reports that it is sometimes relieving, without any constipation, straining, melena or bloody stools. Last BM: Today. Flatus: Yes.    PMH/PSH:   Abdominal/Chest Surgery: Cholecystectomy and hysterectomy.  Colon Cancer: Denied  Any Cancer: Uterine  Pre-Cancerous Polyps: Denied  Crohn's: Denied  Ulcerative Colitis: Denied  Monreal's Esophagus: N/A  Celiac Disease: N/A  Liver Disease: Denied    Tobacco/Vaping: Denied  ETOH: Very  "rarely  Marijuana: Denied  Illicit Drug Use: Denied    FH:  Colon Cancer: Denied  Any Cancer: Mother; Ovarian, Father; Pancreatic  Family Members with Pre-Cancerous Polyps: Denied  Crohn's: Denied  Ulcerative Colitis: Denied  Celiac Disease: Denied  Liver Disease: Denied    GI Meds: None.  Daily NSAID Use: Denied  Daily Tylenol Use: Denied  Any New Meds: Denied    Imaging: (5/23/25) CT of the abdomen pelvis with contrast: Evidence of acute sigmoid diverticulitis.    Endoscopy History: EGD: (None):      COLONOSCOPY: (11/11/15): Normal with a recommendation of a repeat colonoscopy in 5 years.    DUE: 11/11/20     HPI  History obtained from: patient and patient's Significant Other  Review of Systems A complete review of systems is negative other than that noted above in the HPI.      Current Medications[1]  Objective   /74 (BP Location: Left arm, Patient Position: Sitting, Cuff Size: Standard)   Pulse 78   Temp 97.6 °F (36.4 °C) (Temporal)   Ht 5' 2\" (1.575 m)   Wt 103 kg (227 lb)   SpO2 97%   BMI 41.52 kg/m²     Physical Exam   Sclera without icterus and benign. Lung sounds clear to auscultation b/l. Normal S1 & S2 upon exam. Abdomen is round, obese, soft, nontender, with hypoactive bowel sounds x 4.  Trace edema noted of the b/l lower extremities upon exam today. Skin is non-icteric.     Lab Results: I personally reviewed relevant lab results.                    [1]  Current Outpatient Medications   Medication Sig Dispense Refill   • acetaminophen (TYLENOL) 325 mg tablet Take 2 tablets (650 mg total) by mouth every 6 (six) hours as needed for mild pain 30 tablet 0   • Cholecalciferol (VITAMIN D3) 1,000 units tablet Take 1,000 Units by mouth daily     • MAGNESIUM GLYCINATE PO Take 480 mg by mouth daily at bedtime     • naproxen (NAPROSYN) 250 mg tablet Take 220 mg by mouth as needed     • Omega-3 Fatty Acids (fish oil) 1,000 mg Take 1,000 mg by mouth daily     • polyethylene glycol (MiraLax) 17 GM/SCOOP " powder Take 238 g by mouth once for 1 dose Take 238 g my mouth. Use as directed 238 g 0   • vitamin B-12 (VITAMIN B-12) 500 mcg tablet Take 500 mcg by mouth every other day     • traMADol (ULTRAM) 50 mg tablet Take 1 tablet (50 mg total) by mouth every 6 (six) hours as needed for moderate pain 120 tablet 0     No current facility-administered medications for this visit.

## 2025-07-07 RX ORDER — TRAMADOL HYDROCHLORIDE 50 MG/1
50 TABLET ORAL EVERY 6 HOURS PRN
Qty: 120 TABLET | Refills: 0 | Status: SHIPPED | OUTPATIENT
Start: 2025-07-07

## 2025-07-18 ENCOUNTER — HOSPITAL ENCOUNTER (OUTPATIENT)
Dept: GASTROENTEROLOGY | Facility: HOSPITAL | Age: 70
Setting detail: OUTPATIENT SURGERY
End: 2025-07-18
Attending: NURSE PRACTITIONER
Payer: MEDICARE

## 2025-07-18 ENCOUNTER — ANESTHESIA (OUTPATIENT)
Dept: GASTROENTEROLOGY | Facility: HOSPITAL | Age: 70
End: 2025-07-18
Payer: MEDICARE

## 2025-07-18 ENCOUNTER — ANESTHESIA EVENT (OUTPATIENT)
Dept: GASTROENTEROLOGY | Facility: HOSPITAL | Age: 70
End: 2025-07-18
Payer: MEDICARE

## 2025-07-18 VITALS
SYSTOLIC BLOOD PRESSURE: 111 MMHG | BODY MASS INDEX: 41.77 KG/M2 | RESPIRATION RATE: 21 BRPM | HEART RATE: 67 BPM | DIASTOLIC BLOOD PRESSURE: 58 MMHG | TEMPERATURE: 97 F | OXYGEN SATURATION: 97 % | HEIGHT: 62 IN | WEIGHT: 227 LBS

## 2025-07-18 DIAGNOSIS — K57.92 DIVERTICULITIS: ICD-10-CM

## 2025-07-18 DIAGNOSIS — R10.31 RIGHT LOWER QUADRANT ABDOMINAL PAIN: ICD-10-CM

## 2025-07-18 PROCEDURE — 88305 TISSUE EXAM BY PATHOLOGIST: CPT | Performed by: PATHOLOGY

## 2025-07-18 PROCEDURE — 45385 COLONOSCOPY W/LESION REMOVAL: CPT | Performed by: STUDENT IN AN ORGANIZED HEALTH CARE EDUCATION/TRAINING PROGRAM

## 2025-07-18 RX ORDER — LIDOCAINE HYDROCHLORIDE 20 MG/ML
INJECTION, SOLUTION EPIDURAL; INFILTRATION; INTRACAUDAL; PERINEURAL AS NEEDED
Status: DISCONTINUED | OUTPATIENT
Start: 2025-07-18 | End: 2025-07-18

## 2025-07-18 RX ORDER — SODIUM CHLORIDE, SODIUM LACTATE, POTASSIUM CHLORIDE, CALCIUM CHLORIDE 600; 310; 30; 20 MG/100ML; MG/100ML; MG/100ML; MG/100ML
125 INJECTION, SOLUTION INTRAVENOUS CONTINUOUS
Status: DISCONTINUED | OUTPATIENT
Start: 2025-07-18 | End: 2025-07-18

## 2025-07-18 RX ORDER — PROPOFOL 10 MG/ML
INJECTION, EMULSION INTRAVENOUS AS NEEDED
Status: DISCONTINUED | OUTPATIENT
Start: 2025-07-18 | End: 2025-07-18

## 2025-07-18 RX ADMIN — LIDOCAINE HYDROCHLORIDE 60 MG: 20 INJECTION, SOLUTION EPIDURAL; INFILTRATION; INTRACAUDAL; PERINEURAL at 07:23

## 2025-07-18 RX ADMIN — PROPOFOL 130 MCG/KG/MIN: 10 INJECTION, EMULSION INTRAVENOUS at 07:24

## 2025-07-18 RX ADMIN — SODIUM CHLORIDE, SODIUM LACTATE, POTASSIUM CHLORIDE, AND CALCIUM CHLORIDE: .6; .31; .03; .02 INJECTION, SOLUTION INTRAVENOUS at 07:14

## 2025-07-18 RX ADMIN — PROPOFOL 100 MG: 10 INJECTION, EMULSION INTRAVENOUS at 07:23

## 2025-07-18 NOTE — ANESTHESIA POSTPROCEDURE EVALUATION
Post-Op Assessment Note    CV Status:  Stable  Pain Score: 0    Pain management: adequate       Mental Status:  Sleepy   Hydration Status:  Stable   PONV Controlled:  None   Airway Patency:  Patent     Post Op Vitals Reviewed: Yes    No anethesia notable event occurred.            Last Filed PACU Vitals:  Vitals Value Taken Time   Temp 97 °F (36.1 °C) 07/18/25 07:48   Pulse 66 07/18/25 07:55   /57 07/18/25 07:55   Resp 20 07/18/25 07:55   SpO2 96 % 07/18/25 07:55       Modified Maria Isabel:     Vitals Value Taken Time   Activity 2 07/18/25 07:51   Respiration 2 07/18/25 07:51   Circulation 1 07/18/25 07:51   Consciousness 1 07/18/25 07:51   Oxygen Saturation 2 07/18/25 07:51     Modified Maria Isabel Score: 8

## 2025-07-18 NOTE — H&P
"  [facility]  History & Physical     PATIENT INFO     Name: Jennifer Roa  YOB: 1955   Age: 70 y.o.   Sex: female   MRN: [id number]     HISTORY OF PRESENT ILLNESS     Jennifer Roa is a 70 y.o. year old female who presents for colonoscopy for diverticulitis.  Last colonoscopy in 2015.  No antithrombotics or anticoagulants.     REVIEW OF SYSTEMS     Per the HPI, and otherwise unremarkable.    Historical Information   Past Medical History[1]  Past Surgical History[2]  Social History   Social History     Substance and Sexual Activity   Alcohol Use Yes    Comment: Social events     Social History     Substance and Sexual Activity   Drug Use No    Comment: chronic narcotic use ( as per allscripts)     Tobacco Use History[3]  Family History[4]     MEDICATIONS & ALLERGIES     Current Outpatient Medications   Medication Instructions    acetaminophen (TYLENOL) 650 mg, Oral, Every 6 hours PRN    Cholecalciferol (VITAMIN D3) 1,000 Units, Daily    fish oil 1,000 mg, Daily    MAGNESIUM GLYCINATE  mg, Daily at bedtime    naproxen (NAPROSYN) 220 mg, As needed    polyethylene glycol (MIRALAX) 238 g, Oral, Once, Take 238 g my mouth. Use as directed    traMADol (ULTRAM) 50 mg, Oral, Every 6 hours PRN    vitamin B-12 (VITAMIN B-12) 500 mcg, Every other day     Allergies[5]     PHYSICAL EXAM      Objective   Blood pressure 165/79, pulse 70, temperature 98.4 °F (36.9 °C), temperature source Temporal, resp. rate 16, height 5' 2\" (1.575 m), weight 103 kg (227 lb), SpO2 96%. Body mass index is 41.52 kg/m².    General Appearance:   Alert, cooperative, no distress   Lungs:   Equal chest rise, respirations unlabored    Heart:   Regular rate and rhythm   Abdomen:   Soft, non-tender, non-distended; normal bowel sounds; no masses, no organomegaly    Extremities:   No edema       ASSESSMENT & PLAN     This is a 70 y.o. year old female here for colonoscopy, and she is stable and optimized for " her procedure.      Ezio Simons D.O.  [facility]  Division of Gastroenterology & Hepatology  Available on Tiger[employer]  [email or web address]    ** Please Note: This note is constructed using a voice recognition dictation system. **       [1]   Past Medical History:  Diagnosis Date    Abnormal blood chemistry     resolved: 2/9/2017    Abscess of groin     last assessed: 10/11/2013    Allergic 12/1955    Arthritis     Cancer (HCC) 3/2014    Cataracts, bilateral     Chronic pain     Diverticulitis of colon 2018    Diverticulosis     Endometrial cancer (HCC) 09/07/2018    Malignant neoplasm of uterus (HCC) 05/19/2016    Obesity     Preauricular 1955    Spinal stenosis     Uterine cancer (HCC)     uterine   [2]   Past Surgical History:  Procedure Laterality Date    ABCESS DRAINAGE      right ear    CATARACT EXTRACTION, BILATERAL      CHOLECYSTECTOMY      clips in gallbladder    COLONOSCOPY      EPIDURAL BLOCK INJECTION      EYE SURGERY Left 05/2022    EYE SURGERY  10/2018    Cataract  Both eyes    HYSTERECTOMY      Total: With removal of both tubes and both ovaries   [3]   Social History  Tobacco Use   Smoking Status Never   Smokeless Tobacco Never   [4]   Family History  Problem Relation Name Age of Onset    Ovarian cancer Mother      Cancer Mother      Pancreatic cancer Father Nafisa (Hume) Radha     Cancer Father Nafisa (Hume) Radha     Diabetes Father Nafisa ([place]) Radha     Diabetes Family          mellitus    Cancer Paternal Grandmother Dorina Garcia    [5]   Allergies  Allergen Reactions    Paclitaxel Anaphylaxis     Anaphylaxis

## 2025-07-18 NOTE — ANESTHESIA PREPROCEDURE EVALUATION
Procedure:  COLONOSCOPY    Relevant Problems   CARDIO   (+) Mixed hyperlipidemia      MUSCULOSKELETAL   (+) Arthritis of right knee      NEURO/PSYCH   (+) Tingling      NPO 2am prep 7/18    Physical Exam    Airway     Mallampati score: I  TM Distance: >3 FB  Neck ROM: full      Cardiovascular  Cardiovascular exam normal    Dental    poor dentition    Pulmonary  Pulmonary exam normal     Neurological      Other Findings  post-pubertal.      Anesthesia Plan  ASA Score- 2     Anesthesia Type- IV sedation with anesthesia with ASA Monitors.         Additional Monitors:     Airway Plan:            Plan Factors-Exercise tolerance (METS): >4 METS.    Chart reviewed. EKG reviewed.  Existing labs reviewed. Patient summary reviewed.          Obstructive sleep apnea risk education given perioperatively.        Induction- intravenous.    Postoperative Plan- .   Monitoring Plan - Monitoring plan - standard ASA monitoring  Post Operative Pain Plan - non-opiod analgesics    Perioperative Resuscitation Plan - Level 1 - Full Code.       Informed Consent- Anesthetic plan and risks discussed with patient.  I personally reviewed this patient with the CRNA. Discussed and agreed on the Anesthesia Plan with the CRNA..      NPO Status:  Vitals Value Taken Time   Date of last liquid 07/18/25 07/18/25 06:57   Time of last liquid 0200 07/18/25 06:57   Date of last solid 07/16/25 07/18/25 06:57   Time of last solid 1700 07/18/25 06:57

## 2025-07-24 PROCEDURE — 88305 TISSUE EXAM BY PATHOLOGIST: CPT | Performed by: PATHOLOGY

## 2025-07-26 ENCOUNTER — APPOINTMENT (OUTPATIENT)
Dept: LAB | Facility: CLINIC | Age: 70
End: 2025-07-26
Payer: MEDICARE

## 2025-07-26 DIAGNOSIS — E66.813 CLASS 3 SEVERE OBESITY DUE TO EXCESS CALORIES WITHOUT SERIOUS COMORBIDITY WITH BODY MASS INDEX (BMI) OF 40.0 TO 44.9 IN ADULT: ICD-10-CM

## 2025-07-26 DIAGNOSIS — R25.2 MUSCLE CRAMPS: ICD-10-CM

## 2025-07-26 LAB
CORTIS AM PEAK SERPL-MCNC: 0.6 UG/DL (ref 6.7–22.6)
MAGNESIUM SERPL-MCNC: 1.4 MG/DL (ref 1.9–2.7)

## 2025-07-26 PROCEDURE — 82533 TOTAL CORTISOL: CPT

## 2025-07-26 PROCEDURE — 83735 ASSAY OF MAGNESIUM: CPT

## 2025-07-26 PROCEDURE — 36415 COLL VENOUS BLD VENIPUNCTURE: CPT

## 2025-07-29 ENCOUNTER — OFFICE VISIT (OUTPATIENT)
Dept: FAMILY MEDICINE CLINIC | Facility: CLINIC | Age: 70
End: 2025-07-29
Payer: MEDICARE

## 2025-07-29 VITALS
SYSTOLIC BLOOD PRESSURE: 122 MMHG | TEMPERATURE: 97.7 F | DIASTOLIC BLOOD PRESSURE: 70 MMHG | BODY MASS INDEX: 41.77 KG/M2 | WEIGHT: 227 LBS | HEART RATE: 70 BPM | OXYGEN SATURATION: 97 % | RESPIRATION RATE: 16 BRPM | HEIGHT: 62 IN

## 2025-07-29 DIAGNOSIS — M25.561 CHRONIC PAIN OF RIGHT KNEE: ICD-10-CM

## 2025-07-29 DIAGNOSIS — G89.29 CHRONIC PAIN OF RIGHT KNEE: ICD-10-CM

## 2025-07-29 DIAGNOSIS — Z13.0 SCREENING FOR DEFICIENCY ANEMIA: ICD-10-CM

## 2025-07-29 DIAGNOSIS — Z13.29 SCREENING FOR THYROID DISORDER: ICD-10-CM

## 2025-07-29 DIAGNOSIS — E61.2 MAGNESIUM DEFICIENCY: ICD-10-CM

## 2025-07-29 DIAGNOSIS — M17.11 ARTHRITIS OF RIGHT KNEE: Primary | ICD-10-CM

## 2025-07-29 DIAGNOSIS — Z13.1 SCREENING FOR DIABETES MELLITUS: ICD-10-CM

## 2025-07-29 DIAGNOSIS — K59.01 SLOW TRANSIT CONSTIPATION: ICD-10-CM

## 2025-07-29 DIAGNOSIS — E55.9 VITAMIN D DEFICIENCY: ICD-10-CM

## 2025-07-29 DIAGNOSIS — Z13.220 SCREENING FOR LIPID DISORDERS: ICD-10-CM

## 2025-07-29 DIAGNOSIS — K57.92 DIVERTICULITIS: ICD-10-CM

## 2025-07-29 PROCEDURE — 99213 OFFICE O/P EST LOW 20 MIN: CPT

## 2025-07-31 ENCOUNTER — OFFICE VISIT (OUTPATIENT)
Dept: OBGYN CLINIC | Facility: CLINIC | Age: 70
End: 2025-07-31
Payer: MEDICARE

## 2025-07-31 VITALS — BODY MASS INDEX: 41.41 KG/M2 | HEIGHT: 62 IN | WEIGHT: 225 LBS

## 2025-07-31 DIAGNOSIS — M17.11 PRIMARY OSTEOARTHRITIS OF RIGHT KNEE: Primary | ICD-10-CM

## 2025-07-31 PROCEDURE — 99204 OFFICE O/P NEW MOD 45 MIN: CPT | Performed by: STUDENT IN AN ORGANIZED HEALTH CARE EDUCATION/TRAINING PROGRAM

## 2025-07-31 PROCEDURE — 20610 DRAIN/INJ JOINT/BURSA W/O US: CPT | Performed by: STUDENT IN AN ORGANIZED HEALTH CARE EDUCATION/TRAINING PROGRAM

## 2025-07-31 RX ORDER — LIDOCAINE HYDROCHLORIDE 20 MG/ML
4 INJECTION, SOLUTION INFILTRATION; PERINEURAL
Status: COMPLETED | OUTPATIENT
Start: 2025-07-31 | End: 2025-07-31

## 2025-07-31 RX ORDER — BUPIVACAINE HYDROCHLORIDE 2.5 MG/ML
4 INJECTION, SOLUTION INFILTRATION; PERINEURAL
Status: COMPLETED | OUTPATIENT
Start: 2025-07-31 | End: 2025-07-31

## 2025-07-31 RX ORDER — METHYLPREDNISOLONE ACETATE 40 MG/ML
1 INJECTION, SUSPENSION INTRA-ARTICULAR; INTRALESIONAL; INTRAMUSCULAR; SOFT TISSUE
Status: COMPLETED | OUTPATIENT
Start: 2025-07-31 | End: 2025-07-31

## 2025-07-31 RX ADMIN — LIDOCAINE HYDROCHLORIDE 4 ML: 20 INJECTION, SOLUTION INFILTRATION; PERINEURAL at 09:30

## 2025-07-31 RX ADMIN — METHYLPREDNISOLONE ACETATE 1 ML: 40 INJECTION, SUSPENSION INTRA-ARTICULAR; INTRALESIONAL; INTRAMUSCULAR; SOFT TISSUE at 09:30

## 2025-07-31 RX ADMIN — BUPIVACAINE HYDROCHLORIDE 4 ML: 2.5 INJECTION, SOLUTION INFILTRATION; PERINEURAL at 09:30

## 2025-08-04 DIAGNOSIS — G89.29 OTHER CHRONIC PAIN: ICD-10-CM

## 2025-08-05 RX ORDER — TRAMADOL HYDROCHLORIDE 50 MG/1
50 TABLET ORAL EVERY 6 HOURS PRN
Qty: 120 TABLET | Refills: 0 | Status: SHIPPED | OUTPATIENT
Start: 2025-08-05